# Patient Record
Sex: MALE | Race: WHITE | Employment: OTHER | URBAN - METROPOLITAN AREA
[De-identification: names, ages, dates, MRNs, and addresses within clinical notes are randomized per-mention and may not be internally consistent; named-entity substitution may affect disease eponyms.]

---

## 2017-01-28 ENCOUNTER — HOSPITAL ENCOUNTER (INPATIENT)
Age: 62
LOS: 3 days | Discharge: HOME | DRG: 249 | End: 2017-01-31
Attending: EMERGENCY MEDICINE | Admitting: INTERNAL MEDICINE
Payer: SUBSIDIZED

## 2017-01-28 ENCOUNTER — APPOINTMENT (OUTPATIENT)
Dept: GENERAL RADIOLOGY | Age: 62
DRG: 249 | End: 2017-01-28
Attending: EMERGENCY MEDICINE
Payer: SUBSIDIZED

## 2017-01-28 DIAGNOSIS — I20.0 UNSTABLE ANGINA PECTORIS (HCC): Primary | ICD-10-CM

## 2017-01-28 DIAGNOSIS — I20.9 ISCHEMIC CHEST PAIN (HCC): ICD-10-CM

## 2017-01-28 DIAGNOSIS — Z72.0 TOBACCO ABUSE: ICD-10-CM

## 2017-01-28 PROBLEM — I21.4 NSTEMI (NON-ST ELEVATED MYOCARDIAL INFARCTION) (HCC): Status: ACTIVE | Noted: 2017-01-28

## 2017-01-28 PROBLEM — R07.9 CHEST PAIN: Status: ACTIVE | Noted: 2017-01-28

## 2017-01-28 LAB
ALBUMIN SERPL BCP-MCNC: 3.1 G/DL (ref 3.2–4.6)
ALBUMIN/GLOB SERPL: 0.6 {RATIO} (ref 1.2–3.5)
ALP SERPL-CCNC: 85 U/L (ref 50–136)
ALT SERPL-CCNC: 15 U/L (ref 12–65)
ANION GAP BLD CALC-SCNC: 9 MMOL/L (ref 7–16)
AST SERPL W P-5'-P-CCNC: 21 U/L (ref 15–37)
BASOPHILS # BLD AUTO: 0 K/UL (ref 0–0.2)
BASOPHILS # BLD: 0 % (ref 0–2)
BILIRUB SERPL-MCNC: 0.2 MG/DL (ref 0.2–1.1)
BUN SERPL-MCNC: 15 MG/DL (ref 8–23)
CALCIUM SERPL-MCNC: 8.1 MG/DL (ref 8.3–10.4)
CHLORIDE SERPL-SCNC: 106 MMOL/L (ref 98–107)
CO2 SERPL-SCNC: 26 MMOL/L (ref 21–32)
CREAT SERPL-MCNC: 1.08 MG/DL (ref 0.8–1.5)
DIFFERENTIAL METHOD BLD: ABNORMAL
EOSINOPHIL # BLD: 0.2 K/UL (ref 0–0.8)
EOSINOPHIL NFR BLD: 3 % (ref 0.5–7.8)
ERYTHROCYTE [DISTWIDTH] IN BLOOD BY AUTOMATED COUNT: 13.1 % (ref 11.9–14.6)
GLOBULIN SER CALC-MCNC: 4.8 G/DL (ref 2.3–3.5)
GLUCOSE SERPL-MCNC: 106 MG/DL (ref 65–100)
HCT VFR BLD AUTO: 45.4 % (ref 41.1–50.3)
HGB BLD-MCNC: 15.2 G/DL (ref 13.6–17.2)
IMM GRANULOCYTES # BLD: 0 K/UL (ref 0–0.5)
IMM GRANULOCYTES NFR BLD AUTO: 0.1 % (ref 0–5)
LYMPHOCYTES # BLD AUTO: 40 % (ref 13–44)
LYMPHOCYTES # BLD: 2.9 K/UL (ref 0.5–4.6)
MCH RBC QN AUTO: 31.4 PG (ref 26.1–32.9)
MCHC RBC AUTO-ENTMCNC: 33.5 G/DL (ref 31.4–35)
MCV RBC AUTO: 93.8 FL (ref 79.6–97.8)
MONOCYTES # BLD: 0.5 K/UL (ref 0.1–1.3)
MONOCYTES NFR BLD AUTO: 7 % (ref 4–12)
NEUTS SEG # BLD: 3.5 K/UL (ref 1.7–8.2)
NEUTS SEG NFR BLD AUTO: 50 % (ref 43–78)
PLATELET # BLD AUTO: 196 K/UL (ref 150–450)
PMV BLD AUTO: 9.9 FL (ref 10.8–14.1)
POTASSIUM SERPL-SCNC: 4.1 MMOL/L (ref 3.5–5.1)
PROT SERPL-MCNC: 7.9 G/DL (ref 6.3–8.2)
RBC # BLD AUTO: 4.84 M/UL (ref 4.23–5.67)
SODIUM SERPL-SCNC: 141 MMOL/L (ref 136–145)
TROPONIN I BLD-MCNC: 0.02 NG/ML (ref 0–0.08)
WBC # BLD AUTO: 7.3 K/UL (ref 4.3–11.1)

## 2017-01-28 PROCEDURE — 96368 THER/DIAG CONCURRENT INF: CPT | Performed by: EMERGENCY MEDICINE

## 2017-01-28 PROCEDURE — 80053 COMPREHEN METABOLIC PANEL: CPT | Performed by: EMERGENCY MEDICINE

## 2017-01-28 PROCEDURE — 84484 ASSAY OF TROPONIN QUANT: CPT

## 2017-01-28 PROCEDURE — 99285 EMERGENCY DEPT VISIT HI MDM: CPT | Performed by: EMERGENCY MEDICINE

## 2017-01-28 PROCEDURE — 65270000029 HC RM PRIVATE

## 2017-01-28 PROCEDURE — 99218 HC RM OBSERVATION: CPT

## 2017-01-28 PROCEDURE — 74011000250 HC RX REV CODE- 250: Performed by: INTERNAL MEDICINE

## 2017-01-28 PROCEDURE — 74011250636 HC RX REV CODE- 250/636: Performed by: EMERGENCY MEDICINE

## 2017-01-28 PROCEDURE — 96365 THER/PROPH/DIAG IV INF INIT: CPT | Performed by: EMERGENCY MEDICINE

## 2017-01-28 PROCEDURE — 93005 ELECTROCARDIOGRAM TRACING: CPT | Performed by: EMERGENCY MEDICINE

## 2017-01-28 PROCEDURE — 71010 XR CHEST PORT: CPT

## 2017-01-28 PROCEDURE — 85025 COMPLETE CBC W/AUTO DIFF WBC: CPT | Performed by: EMERGENCY MEDICINE

## 2017-01-28 PROCEDURE — 65660000000 HC RM CCU STEPDOWN

## 2017-01-28 RX ORDER — HEPARIN SODIUM 5000 [USP'U]/100ML
12-25 INJECTION, SOLUTION INTRAVENOUS
Status: DISCONTINUED | OUTPATIENT
Start: 2017-01-28 | End: 2017-01-30

## 2017-01-28 RX ORDER — NITROGLYCERIN 20 MG/100ML
10 INJECTION INTRAVENOUS CONTINUOUS
Status: DISCONTINUED | OUTPATIENT
Start: 2017-01-28 | End: 2017-01-30

## 2017-01-28 RX ORDER — ZOLPIDEM TARTRATE 5 MG/1
5 TABLET ORAL
Status: DISCONTINUED | OUTPATIENT
Start: 2017-01-28 | End: 2017-01-31 | Stop reason: HOSPADM

## 2017-01-28 RX ORDER — LISINOPRIL 5 MG/1
10 TABLET ORAL DAILY
Status: DISCONTINUED | OUTPATIENT
Start: 2017-01-29 | End: 2017-01-29

## 2017-01-28 RX ORDER — ATORVASTATIN CALCIUM 40 MG/1
80 TABLET, FILM COATED ORAL ONCE
Status: COMPLETED | OUTPATIENT
Start: 2017-01-29 | End: 2017-01-29

## 2017-01-28 RX ORDER — GUAIFENESIN 100 MG/5ML
81 LIQUID (ML) ORAL DAILY
Status: DISCONTINUED | OUTPATIENT
Start: 2017-01-29 | End: 2017-01-31 | Stop reason: HOSPADM

## 2017-01-28 RX ORDER — METOPROLOL TARTRATE 25 MG/1
25 TABLET, FILM COATED ORAL 2 TIMES DAILY
Status: DISCONTINUED | OUTPATIENT
Start: 2017-01-29 | End: 2017-01-31 | Stop reason: HOSPADM

## 2017-01-28 RX ORDER — ATORVASTATIN CALCIUM 40 MG/1
40 TABLET, FILM COATED ORAL DAILY
Status: DISCONTINUED | OUTPATIENT
Start: 2017-01-29 | End: 2017-01-31 | Stop reason: HOSPADM

## 2017-01-28 RX ORDER — SODIUM CHLORIDE 0.9 % (FLUSH) 0.9 %
5-10 SYRINGE (ML) INJECTION EVERY 8 HOURS
Status: DISCONTINUED | OUTPATIENT
Start: 2017-01-29 | End: 2017-01-31 | Stop reason: HOSPADM

## 2017-01-28 RX ORDER — NITROGLYCERIN 0.4 MG/1
0.4 TABLET SUBLINGUAL
Status: DISCONTINUED | OUTPATIENT
Start: 2017-01-28 | End: 2017-01-31 | Stop reason: HOSPADM

## 2017-01-28 RX ORDER — HEPARIN SODIUM 5000 [USP'U]/100ML
12-25 INJECTION, SOLUTION INTRAVENOUS
Status: DISCONTINUED | OUTPATIENT
Start: 2017-01-29 | End: 2017-01-29 | Stop reason: SDUPTHER

## 2017-01-28 RX ORDER — RANOLAZINE 500 MG/1
500 TABLET, EXTENDED RELEASE ORAL 2 TIMES DAILY
Status: DISCONTINUED | OUTPATIENT
Start: 2017-01-29 | End: 2017-01-31 | Stop reason: HOSPADM

## 2017-01-28 RX ORDER — RANITIDINE 150 MG/1
150 TABLET, FILM COATED ORAL 2 TIMES DAILY
Status: ON HOLD | COMMUNITY
End: 2017-01-31

## 2017-01-28 RX ORDER — GUAIFENESIN 100 MG/5ML
81 LIQUID (ML) ORAL DAILY
Status: DISCONTINUED | OUTPATIENT
Start: 2017-01-29 | End: 2017-01-28

## 2017-01-28 RX ORDER — HEPARIN SODIUM 5000 [USP'U]/ML
4000 INJECTION, SOLUTION INTRAVENOUS; SUBCUTANEOUS
Status: COMPLETED | OUTPATIENT
Start: 2017-01-28 | End: 2017-01-28

## 2017-01-28 RX ORDER — SODIUM CHLORIDE 0.9 % (FLUSH) 0.9 %
5-10 SYRINGE (ML) INJECTION AS NEEDED
Status: DISCONTINUED | OUTPATIENT
Start: 2017-01-28 | End: 2017-01-31 | Stop reason: HOSPADM

## 2017-01-28 RX ADMIN — HEPARIN SODIUM 4000 UNITS: 5000 INJECTION, SOLUTION INTRAVENOUS; SUBCUTANEOUS at 21:05

## 2017-01-28 RX ADMIN — NITROGLYCERIN 10 MCG/MIN: 20 INJECTION INTRAVENOUS at 21:17

## 2017-01-28 RX ADMIN — Medication 10 ML: at 23:10

## 2017-01-28 RX ADMIN — HEPARIN SODIUM AND DEXTROSE 12 UNITS/KG/HR: 5000; 5 INJECTION INTRAVENOUS at 21:05

## 2017-01-28 NOTE — ED TRIAGE NOTES
Patient arrives via ems from home after experiencing chest pain after walking to the mailbox. VSS for ems. 1 NTG given by EMS with full relief of pain. No ASA due to allergy.

## 2017-01-28 NOTE — IP AVS SNAPSHOT
Current Discharge Medication List  
  
Take these medications at their scheduled times Dose & Instructions Dispensing Information Comments Morning Noon Evening Bedtime  
 aspirin 81 mg tablet Your next dose is:  Tomorrow Dose:  81 mg Take 81 mg by mouth daily. Refills:  0  
     
  
   
   
   
  
 atorvastatin 40 mg tablet Commonly known as:  LIPITOR Your next dose is: Today Dose:  40 mg Take 1 Tab by mouth daily. Quantity:  30 Tab Refills:  11  
     
   
   
   
  
  
 lisinopril 20 mg tablet Commonly known as:  Melanie Wilber Your next dose is:  Tomorrow Dose:  20 mg Take 1 Tab by mouth daily. Quantity:  30 Tab Refills:  11  
     
  
   
   
   
  
 metoprolol tartrate 25 mg tablet Commonly known as:  LOPRESSOR Your next dose is: Today Dose:  25 mg Take 1 Tab by mouth two (2) times a day. Quantity:  60 Tab Refills:  11  
     
  
   
   
   
  
  
 prasugrel 10 mg tablet Commonly known as:  EFFIENT Your next dose is:  Tomorrow Dose:  10 mg Take 1 Tab by mouth daily. Quantity:  30 Tab Refills:  11  
     
  
   
   
   
  
 raNITIdine 150 mg tablet Commonly known as:  ZANTAC Your next dose is: Today Dose:  150 mg Take 1 Tab by mouth two (2) times a day. Indications: GASTROESOPHAGEAL REFLUX, HEARTBURN Quantity:  60 Tab Refills:  11  
     
  
   
   
   
  
  
 ranolazine  mg SR tablet Commonly known as:  RANEXA Your next dose is: Today Dose:  500 mg Take 1 Tab by mouth two (2) times a day. Quantity:  60 Tab Refills:  11 Take these medications as needed Dose & Instructions Dispensing Information Comments Morning Noon Evening Bedtime  
 albuterol 90 mcg/actuation inhaler Commonly known as:  VENTOLIN HFA Notes to Patient:  AS NEEDED Dose:  2 Puff Take 2 Puffs by inhalation every four (4) hours as needed for Wheezing or Shortness of Breath. Quantity:  1 Inhaler Refills:  4  
     
   
   
   
  
 nitroglycerin 0.4 mg SL tablet Commonly known as:  NITROSTAT Notes to Patient:  AS NEEDED Dose:  0.4 mg  
1 Tab by SubLINGual route every five (5) minutes as needed for Chest Pain. Quantity:  1 Bottle Refills:  5 Where to Get Your Medications These medications were sent to [address] [place] [address], [facility] [place] [address]  [address] [place] [address] [facility] [address] Phone:  [phone]  
  prasugrel 10 mg tablet Information about where to get these medications is not yet available ! Ask your nurse or doctor about these medications  
  atorvastatin 40 mg tablet  
 lisinopril 20 mg tablet  
 metoprolol tartrate 25 mg tablet  
 nitroglycerin 0.4 mg SL tablet  
 raNITIdine 150 mg tablet  
 ranolazine  mg SR tablet

## 2017-01-28 NOTE — IP AVS SNAPSHOT
Merlin Laroche 
 
 
 [address] [facility] [address] 
[phone] Patient: Kerri Bob MRN: [id number] ZTZ:[id number] You are allergic to the following Allergen Reactions Aspirin Swelling Takes 81 mg at home. Patient can tolerate Aspirin in low doses, but in larger doses causes swelling. Recent Documentation Height Weight BMI Smoking Status 1.707 m 80.3 kg 27.57 kg/m2 Current Every Day Smoker Emergency Contacts Name Discharge Info Relation Home Work Mobile Staci Prajapati  Child [2] [phone] About your hospitalization You were admitted on:  January 28, 2017 You last received care in the:  [facility] 3 CLINICAL OBSERVATION You were discharged on:  January 31, 2017 Unit phone number:  [phone] Why you were hospitalized Your primary diagnosis was:  Not on File Your diagnoses also included:  Chest Pain, Nstemi (Non-St Elevated Myocardial Infarction) (Hcc) Providers Seen During Your Hospitalizations Provider Role Specialty Primary office phone Jorgito Solorio MD Attending Provider Emergency Medicine [phone] Sasha Watts MD Attending Provider Cardiology [phone] Your Primary Care Physician (PCP) Primary Care Physician Office Phone Office Fax UNKNOWN, PROVIDER ** None ** ** None ** Follow-up Information Follow up With Details Comments Contact Info Sharda Diamond MD On 2/6/2017 Monday, February 6th @ 8:15am-- [facility] office [address] [facility] [address] 
[phone] Your Appointments Monday February 06, 2017  8:15 AM EST Extended Office Visit with Sharda Diamond MD  
[facility] Cardiology ([address] [facility]) [address] [facility] 
[address] [facility] [address]  
[phone] Current Discharge Medication List  
  
START taking these medications Dose & Instructions Dispensing Information Comments Morning Noon Evening Bedtime  
 atorvastatin 40 mg tablet Commonly known as:  LIPITOR Your next dose is: Today Dose:  40 mg Take 1 Tab by mouth daily. Quantity:  30 Tab Refills:  11  
     
   
   
   
  
  
 lisinopril 20 mg tablet Commonly known as:  Marge Lillian Your next dose is:  Tomorrow Dose:  20 mg Take 1 Tab by mouth daily. Quantity:  30 Tab Refills:  11  
     
  
   
   
   
  
 metoprolol tartrate 25 mg tablet Commonly known as:  LOPRESSOR Your next dose is: Today Dose:  25 mg Take 1 Tab by mouth two (2) times a day. Quantity:  60 Tab Refills:  11  
     
  
   
   
   
  
  
 nitroglycerin 0.4 mg SL tablet Commonly known as:  NITROSTAT Notes to Patient:  AS NEEDED Dose:  0.4 mg  
1 Tab by SubLINGual route every five (5) minutes as needed for Chest Pain. Quantity:  1 Bottle Refills:  5  
     
   
   
   
  
 prasugrel 10 mg tablet Commonly known as:  EFFIENT Your next dose is:  Tomorrow Dose:  10 mg Take 1 Tab by mouth daily. Quantity:  30 Tab Refills:  11  
     
  
   
   
   
  
 ranolazine  mg SR tablet Commonly known as:  RANEXA Your next dose is: Today Dose:  500 mg Take 1 Tab by mouth two (2) times a day. Quantity:  60 Tab Refills:  11 CONTINUE these medications which have NOT CHANGED Dose & Instructions Dispensing Information Comments Morning Noon Evening Bedtime  
 albuterol 90 mcg/actuation inhaler Commonly known as:  VENTOLIN HFA Notes to Patient:  AS NEEDED Dose:  2 Puff Take 2 Puffs by inhalation every four (4) hours as needed for Wheezing or Shortness of Breath. Quantity:  1 Inhaler Refills:  4  
     
   
   
   
  
 aspirin 81 mg tablet Your next dose is:  Tomorrow Dose:  81 mg Take 81 mg by mouth daily. Refills:  0 raNITIdine 150 mg tablet Commonly known as:  ZANTAC Your next dose is: Today Dose:  150 mg Take 1 Tab by mouth two (2) times a day. Indications: GASTROESOPHAGEAL REFLUX, HEARTBURN Quantity:  60 Tab Refills:  11 STOP taking these medications   
 amoxicillin-clavulanate 875-125 mg per tablet Commonly known as:  AUGMENTIN Where to Get Your Medications These medications were sent to [address] [facility] [address] [facility]  [address] [facility] [address] [place] [address] Phone:  [phone]  
  prasugrel 10 mg tablet Information on where to get these meds will be given to you by the nurse or doctor. ! Ask your nurse or doctor about these medications  
  atorvastatin 40 mg tablet  
 lisinopril 20 mg tablet  
 metoprolol tartrate 25 mg tablet  
 nitroglycerin 0.4 mg SL tablet  
 raNITIdine 150 mg tablet  
 ranolazine  mg SR tablet Discharge Instructions Cardiac Catheterization/Angiography Discharge Instructions *Check the puncture site frequently for swelling or bleeding. If you see any bleeding, lie down and apply pressure over the area with a clean town or washcloth. Notify your doctor for any redness, swelling, drainage or oozing from the puncture site. Notify your doctor for any fever or chills. *If the leg or arm with the puncture becomes cold, numb or painful, call [facility] Cardiology at [phone] *Activity should be limited for the next 48 hours. Climb stairs as little as possible and avoid any stooping, bending or strenuous activity for 48 hours. No heavy lifting (anything over 10 pounds) for three days. *Do not drive for 48 hours. *You may resume your usual diet.  Drink more fluids than usual. 
 
*Have a responsible person drive you home and stay with you for at least 24 hours after your heart catheterization/angiography. *You may remove the bandage from your Left Wrist in 24 hours. You may shower in 24 hours. No tub baths, hot tubs or swimming for one week. Do not place any lotions, creams, powders, ointments over the puncture site for one week. You may place a clean band-aid over the puncture site each day for 5 days. Change this daily. Percutaneous Coronary Intervention: What to Expect at [facility] Your Recovery Percutaneous coronary intervention (PCI) is the name for procedures that are used to open a narrowed or blocked coronary artery. The two most common PCI procedures are coronary angioplasty and coronary stent placement. Your groin or arm may have a bruise and feel sore for a day or two after a percutaneous coronary intervention (PCI). You can do light activities around the house, but nothing strenuous for several days. This care sheet gives you a general idea about how long it will take for you to recover. But each person recovers at a different pace. Follow the steps below to get better as quickly as possible. How can you care for yourself at home? Activity · Do not do strenuous exercise and do not lift, pull, or push anything heavy until your doctor says it is okay. This may be for a day or two. You can walk around the house and do light activity, such as cooking. · You may shower 24 to 48 hours after the procedure, if your doctor okays it. Pat the incision dry. Do not take a bath for 1 week, or until your doctor tells you it is okay. · If the catheter was placed in your groin, try not to walk up stairs for the first couple of days. · If the catheter was placed in your arm near your wrist, do not bend your wrist deeply for the first couple of days. Be careful using your hand to get into and out of a chair or bed. · If your doctor recommends it, get more exercise. Walking is a good choice. Bit by bit, increase the amount you walk every day.  Try for at least 30 minutes on most days of the week. Diet · Drink plenty of fluids to help your body flush out the dye. If you have kidney, heart, or liver disease and have to limit fluids, talk with your doctor before you increase the amount of fluids you drink. · Keep eating a heart-healthy diet that has lots of fruits, vegetables, and whole grains. If you have not been eating this way, talk to your doctor. You also may want to talk to a dietitian. This expert can help you to learn about healthy foods and plan meals. Medicines · Your doctor will tell you if and when you can restart your medicines. He or she will also give you instructions about taking any new medicines. · If you take blood thinners, such as warfarin (Coumadin), clopidogrel (Plavix), or aspirin, be sure to talk to your doctor. He or she will tell you if and when to start taking those medicines again. Make sure that you understand exactly what your doctor wants you to do. · Your doctor will prescribe blood-thinning medicines. You will likely take aspirin plus another antiplatelet, such as clopidogrel (Plavix). It is very important that you take these medicines exactly as directed. These medicines help keep the coronary artery open and reduce your risk of a heart attack. · Call your doctor if you think you are having a problem with your medicine. Care of the catheter site · For 1 or 2 days, keep a bandage over the spot where the catheter was inserted. The bandage probably will fall off in this time. · Put ice or a cold pack on the area for 10 to 20 minutes at a time to help with soreness or swelling. Put a thin cloth between the ice and your skin. Follow-up care is a key part of your treatment and safety. Be sure to make and go to all appointments, and call your doctor if you are having problems. It's also a good idea to know your test results and keep a list of the medicines you take. When should you call for help? Call 911 anytime you think you may need emergency care. For example, call if: 
· You passed out (lost consciousness). · You have severe trouble breathing. · You have sudden chest pain and shortness of breath, or you cough up blood. · You have symptoms of a heart attack, such as: ¨ Chest pain or pressure. ¨ Sweating. ¨ Shortness of breath. ¨ Nausea or vomiting. ¨ Pain that spreads from the chest to the neck, jaw, or one or both shoulders or arms. ¨ Dizziness or lightheadedness. ¨ A fast or uneven pulse. After calling 911, chew 1 adult-strength aspirin. Wait for an ambulance. Do not try to drive yourself. · You have been diagnosed with angina, and you have angina symptoms that do not go away with rest or are not getting better within 5 minutes after you take one dose of nitroglycerin. Call your doctor now or seek immediate medical care if: 
· You are bleeding from the area where the catheter was put in your artery. · You have a fast-growing, painful lump at the catheter site. · You have signs of infection, such as: 
¨ Increased pain, swelling, warmth, or redness. ¨ Red streaks leading from the catheter site. ¨ Pus draining from the catheter site. ¨ A fever. · Your leg or arm looks blue or feels cold, numb, or tingly. Watch closely for changes in your health, and be sure to contact your doctor if you have any problems. Where can you learn more? Go to http://jaz-yaritza.info/. Enter C962 in the search box to learn more about \"Percutaneous Coronary Intervention: What to Expect at Home. \" Current as of: January 27, 2016 Content Version: 11.1 © 6165-9231 [employer]. Care instructions adapted under license by [employer] (which disclaims liability or warranty for this information).  If you have questions about a medical condition or this instruction, always ask your healthcare professional. Linda Meléndez [facility] disclaims any warranty or liability for your use of this information. Reducing Heart Attack Risk With Daily Medicine: Care Instructions Your Care Instructions Heart disease is the number one cause of death. If you are at risk for heart disease, there are many medicines that can reduce your risk. These include: · ACE inhibitors. These are a type of blood pressure medicine. They can reduce the risk of heart attacks and strokes if you are at high risk. · Statin medicines. These lower cholesterol. They can also reduce the risk of heart disease and strokes. · Aspirin. It can help certain people lower their risk of a heart attack or stroke. · Beta-blocker medicines. These are a type of blood pressure and heart medicine. They can reduce the chance of early death if you have had a heart attack. All medicines can cause side effects. So it is important to understand the pros and cons of any medicine you take. It is also important to take your medicines exactly as your doctor tells you to. Follow-up care is a key part of your treatment and safety. Be sure to make and go to all appointments, and call your doctor if you are having problems. It's also a good idea to know your test results and keep a list of the medicines you take. ACE inhibitors ACE (angiotensin-converting enzyme) inhibitors are used for three main reasons. They lower blood pressure, protect the kidneys, and prevent heart attacks and strokes. Examples include benazepril (Lotensin), lisinopril (Prinivil, Zestril), and ramipril (Altace). Before you start taking an ACE inhibitor, make sure your doctor knows if: 
· You are taking a water pill (diuretic). · You are taking potassium pills or using salt substitutes. · You are pregnant or breastfeeding. · You have had a kidney transplant or other kidney problems. ACE inhibitors can cause side effects. Call your doctor right away if you have: · Trouble breathing. · Swelling in your face, head, neck, or tongue. · Dizziness or lightheadedness. · A dry cough. Statins Statins lower cholesterol. Examples include atorvastatin (Lipitor), lovastatin (Mevacor), pravastatin (Pravachol), and simvastatin (Zocor). Before you start taking a statin, make sure your doctor knows if: 
· You have had a kidney transplant or other kidney problems. · You have liver disease. · You take any other prescription medicine, over-the-counter medicine, vitamins, supplements, or herbal remedies. · You are pregnant or breastfeeding. Statins can cause side effects. Call your doctor right away if you have: · New, severe muscle aches. · Brown urine. Aspirin Taking an aspirin every day can lower your risk for a heart attack. A heart attack occurs when a blood vessel in the heart gets blocked. When this happens, oxygen can't get to the heart muscle, and part of the heart dies. Aspirin can help prevent blood clots that can block the blood vessels. Talk to your doctor before you start taking aspirin every day. He or she may recommend that you take one low-dose aspirin (81 mg) tablet each day, with a meal and a full glass of water. Taking aspirin isn't right for everyone, because it can cause serious bleeding. And you may not be able to use aspirin if you: 
· Have asthma. · Have an ulcer or other stomach problem. · Take some other medicine (called a blood thinner) that prevents blood clots. · Are allergic to aspirin. Before having a surgery or procedure, tell your doctor or dentist that you take aspirin. He or she will tell you if you should stop taking aspirin beforehand. Make sure that you understand exactly what your doctor wants you to do. Aspirin can cause side effects. Call your doctor right away if you have: · Unusual bleeding or bruising. · Nausea, vomiting, or heartburn. · Black or bloody stools. Beta-blockers Beta-blockers are used for three main reasons. They lower blood pressure, relieve angina symptoms (such as chest pain or pressure), and reduce the chances of a second heart attack. They include atenolol (Tenormin), carvedilol (Coreg), and metoprolol (Lopressor). Before you start taking a beta-blocker, make sure your doctor knows if you have: · Severe asthma or frequent asthma attacks. · A very slow pulse (less than 55 beats a minute). Beta-blockers can cause side effects. Call your doctor right away if you have: · Wheezing or trouble breathing. · Dizziness or lightheadedness. · Asthma that gets worse. When should you call for help? Call 911 anytime you think you may need emergency care. For example, call if: 
· You passed out (lost consciousness). Call your doctor now or seek immediate medical care if: 
· You are wheezing or have trouble breathing. · You have swelling in your face, head, neck, or tongue. · You are dizzy or lightheaded, or you feel like you may faint. · You have severe muscle pain, weakness, or brown urine. · You have vision problems. · You have new bruises or blood spots under your skin. · Your stools are black and tarlike or have streaks of blood. Watch closely for changes in your health, and be sure to contact your doctor if: 
· You have ringing in your ears. · You feel very tired. · You have gas, constipation, or an upset stomach. Where can you learn more? Go to http://jaz-yaritza.info/. Enter R428 in the search box to learn more about \"Reducing Heart Attack Risk With Daily Medicine: Care Instructions. \" Current as of: March 28, 2016 Content Version: 11.1 © 4582-6514 [employer]. Care instructions adapted under license by [employer] (which disclaims liability or warranty for this information).  If you have questions about a medical condition or this instruction, always ask your healthcare professional. [employer] disclaims any warranty or liability for your use of this information. Heart-Healthy Diet: Care Instructions Your Care Instructions A heart-healthy diet has lots of vegetables, fruits, nuts, beans, and whole grains, and is low in salt. It limits foods that are high in saturated fat, such as meats, cheeses, and fried foods. It may be hard to change your diet, but even small changes can lower your risk of heart attack and heart disease. Follow-up care is a key part of your treatment and safety. Be sure to make and go to all appointments, and call your doctor if you are having problems. It's also a good idea to know your test results and keep a list of the medicines you take. How can you care for yourself at home? Watch your portions · Learn what a serving is. A \"serving\" and a \"portion\" are not always the same thing. Make sure that you are not eating larger portions than are recommended. For example, a serving of pasta is ½ cup. A serving size of meat is 2 to 3 ounces. A 3-ounce serving is about the size of a deck of cards. Measure serving sizes until you are good at [place]" them. Keep in mind that restaurants often serve portions that are 2 or 3 times the size of one serving. · To keep your energy level up and keep you from feeling hungry, eat often but in smaller portions. · Eat only the number of calories you need to stay at a healthy weight. If you need to lose weight, eat fewer calories than your body burns (through exercise and other physical activity). Eat more fruits and vegetables · Eat a variety of fruit and vegetables every day. Dark green, deep orange, red, or yellow fruits and vegetables are especially good for you. Examples include spinach, carrots, peaches, and berries. · Keep carrots, celery, and other veggies handy for snacks. Buy fruit that is in season and store it where you can see it so that you will be tempted to eat it. · Cook dishes that have a lot of veggies in them, such as stir-fries and soups. Limit saturated and trans fat · Read food labels, and try to avoid saturated and trans fats. They increase your risk of heart disease. Trans fat is found in many processed foods such as cookies and crackers. · Use olive or canola oil when you cook. Try cholesterol-lowering spreads, such as Benecol or Take Control. · Bake, broil, grill, or steam foods instead of frying them. · Choose lean meats instead of high-fat meats such as hot dogs and sausages. Cut off all visible fat when you prepare meat. · Eat fish, skinless poultry, and meat alternatives such as soy products instead of high-fat meats. Soy products, such as tofu, may be especially good for your heart. · Choose low-fat or fat-free milk and dairy products. Eat fish · Eat at least two servings of fish a week. Certain fish, such as salmon and tuna, contain omega-3 fatty acids, which may help reduce your risk of heart attack. Eat foods high in fiber · Eat a variety of grain products every day. Include whole-grain foods that have lots of fiber and nutrients. Examples of whole-grain foods include oats, whole wheat bread, and brown rice. · Buy whole-grain breads and cereals, instead of white bread or pastries. Limit salt and sodium · Limit how much salt and sodium you eat to help lower your blood pressure. · Taste food before you salt it. Add only a little salt when you think you need it. With time, your taste buds will adjust to less salt. · Eat fewer snack items, fast foods, and other high-salt, processed foods. Check food labels for the amount of sodium in packaged foods. · Choose low-sodium versions of canned goods (such as soups, vegetables, and beans). Limit sugar · Limit drinks and foods with added sugar. These include candy, desserts, and soda pop. Limit alcohol · Limit alcohol to no more than 2 drinks a day for men and 1 drink a day for women. Too much alcohol can cause health problems. When should you call for help? Watch closely for changes in your health, and be sure to contact your doctor if: 
· You would like help planning heart-healthy meals. Where can you learn more? Go to http://jaz-yaritza.info/. Enter V137 in the search box to learn more about \"Heart-Healthy Diet: Care Instructions. \" Current as of: January 27, 2016 Content Version: 11.1 © 2006-2016 [employer]. Care instructions adapted under license by [employer] (which disclaims liability or warranty for this information). If you have questions about a medical condition or this instruction, always ask your healthcare professional. [place] [address] any warranty or liability for your use of this information. DISCHARGE SUMMARY from Nurse The following personal items are in your possession at time of discharge: 
 
Dental Appliances: None Visual Aid: Glasses Home Medications: None Jewelry: Watch Clothing: At bedside Other Valuables: Cell Phone, [employer] PATIENT INSTRUCTIONS: 
 
 
F-face looks uneven A-arms unable to move or move unevenly S-speech slurred or non-existent T-time-call 911 as soon as signs and symptoms begin-DO NOT go Back to bed or wait to see if you get better-TIME IS BRAIN. Warning Signs of HEART ATTACK Call 911 if you have these symptoms: 
? Chest discomfort.  Most heart attacks involve discomfort in the center of the chest that lasts more than a few minutes, or that goes away and comes back. It can feel like uncomfortable pressure, squeezing, fullness, or pain. ? Discomfort in other areas of the upper body. Symptoms can include pain or discomfort in one or both arms, the back, neck, jaw, or stomach. ? Shortness of breath with or without chest discomfort. ? Other signs may include breaking out in a cold sweat, nausea, or lightheadedness. Don't wait more than five minutes to call [address]! Fast action can save your life. Calling 911 is almost always the fastest way to get lifesaving treatment. Emergency Medical Services staff can begin treatment when they arrive  up to an hour sooner than if someone gets to the hospital by car. The discharge information has been reviewed with the patient. The patient verbalized understanding. Discharge medications reviewed with the patient and appropriate educational materials and side effects teaching were provided. Discharge Orders Procedure Order Date Status Priority Quantity Spec Type Associated Dx REFERRAL TO [facility] 01/31/17 0856 Normal Routine 1 [facility] Announcement We are excited to announce that we are making your provider's discharge notes available to you in [facility]. You will see these notes when they are completed and signed by the physician that discharged you from your recent hospital stay. If you have any questions or concerns about any information you see in [facility], please call the Health Information Department where you were seen or reach out to your Primary Care Provider for more information about your plan of care. Introducing [facility]! [facility] introduces [facility] patient portal. Now you can access parts of your medical record, email your doctor's office, and request medication refills online. 1. In your internet browser, go to [email or web address] 2. Click on the First Time User? Click Here link in the Sign In box.  You will see the New Member Sign Up page. 3. Enter your [employer] Access Code exactly as it appears below. You will not need to use this code after youve completed the sign-up process. If you do not sign up before the expiration date, you must request a new code. · [employer] Access Code: [email or web address] Expires: 3/20/2017  6:40 PM 
 
4. Enter the last four digits of your Social Security Number (xxxx) and Date of Birth (mm/dd/yyyy) as indicated and click Submit. You will be taken to the next sign-up page. 5. Create a [employer] ID. This will be your [employer] login ID and cannot be changed, so think of one that is secure and easy to remember. 6. Create a [employer] password. You can change your password at any time. 7. Enter your Password Reset Question and Answer. This can be used at a later time if you forget your password. 8. Enter your e-mail address. You will receive e-mail notification when new information is available in [address] 9. Click Sign Up. You can now view and download portions of your medical record. 10. Click the Download Summary menu link to download a portable copy of your medical information. If you have questions, please visit the Frequently Asked Questions section of the [employer] website. Remember, [employer] is NOT to be used for urgent needs. For medical emergencies, dial 911. Now available from your iPhone and Android! General Information Please provide this summary of care documentation to your next provider. Patient Signature:  ____________________________________________________________ Date:  ____________________________________________________________  
  
Cady Saavedra Provider Signature:  ____________________________________________________________ Date:  ____________________________________________________________

## 2017-01-29 ENCOUNTER — APPOINTMENT (OUTPATIENT)
Dept: GENERAL RADIOLOGY | Age: 62
DRG: 249 | End: 2017-01-29
Attending: INTERNAL MEDICINE
Payer: SUBSIDIZED

## 2017-01-29 LAB
ALBUMIN SERPL BCP-MCNC: 3 G/DL (ref 3.2–4.6)
ALBUMIN/GLOB SERPL: 0.7 {RATIO} (ref 1.2–3.5)
ALP SERPL-CCNC: 80 U/L (ref 50–136)
ALT SERPL-CCNC: 14 U/L (ref 12–65)
ANION GAP BLD CALC-SCNC: 12 MMOL/L (ref 7–16)
APTT PPP: 41.6 SEC (ref 23.5–31.7)
APTT PPP: 55.9 SEC (ref 23.5–31.7)
APTT PPP: 80.2 SEC (ref 23.5–31.7)
AST SERPL W P-5'-P-CCNC: 22 U/L (ref 15–37)
ATRIAL RATE: 59 BPM
ATRIAL RATE: 77 BPM
BILIRUB DIRECT SERPL-MCNC: <0.1 MG/DL
BILIRUB SERPL-MCNC: 0.5 MG/DL (ref 0.2–1.1)
BUN SERPL-MCNC: 13 MG/DL (ref 8–23)
CALCIUM SERPL-MCNC: 8.4 MG/DL (ref 8.3–10.4)
CALCULATED P AXIS, ECG09: 44 DEGREES
CALCULATED P AXIS, ECG09: 60 DEGREES
CALCULATED R AXIS, ECG10: -26 DEGREES
CALCULATED R AXIS, ECG10: -31 DEGREES
CALCULATED T AXIS, ECG11: -160 DEGREES
CALCULATED T AXIS, ECG11: 92 DEGREES
CHLORIDE SERPL-SCNC: 104 MMOL/L (ref 98–107)
CHOLEST SERPL-MCNC: 221 MG/DL
CO2 SERPL-SCNC: 24 MMOL/L (ref 21–32)
CREAT SERPL-MCNC: 0.83 MG/DL (ref 0.8–1.5)
DIAGNOSIS, 93000: NORMAL
DIAGNOSIS, 93000: NORMAL
DIASTOLIC BP, ECG02: NORMAL MMHG
DIASTOLIC BP, ECG02: NORMAL MMHG
ERYTHROCYTE [DISTWIDTH] IN BLOOD BY AUTOMATED COUNT: 13.2 % (ref 11.9–14.6)
EST. AVERAGE GLUCOSE BLD GHB EST-MCNC: 143 MG/DL
GLOBULIN SER CALC-MCNC: 4.6 G/DL (ref 2.3–3.5)
GLUCOSE BLD STRIP.AUTO-MCNC: 120 MG/DL (ref 65–100)
GLUCOSE BLD STRIP.AUTO-MCNC: 122 MG/DL (ref 65–100)
GLUCOSE BLD STRIP.AUTO-MCNC: 138 MG/DL (ref 65–100)
GLUCOSE SERPL-MCNC: 109 MG/DL (ref 65–100)
HBA1C MFR BLD: 6.6 % (ref 4.8–6)
HCT VFR BLD AUTO: 45.6 % (ref 41.1–50.3)
HDLC SERPL-MCNC: 57 MG/DL (ref 40–60)
HDLC SERPL: 3.9 {RATIO}
HGB BLD-MCNC: 15.2 G/DL (ref 13.6–17.2)
INR PPP: 1 (ref 0.9–1.2)
LDLC SERPL CALC-MCNC: 142.6 MG/DL
LIPID PROFILE,FLP: ABNORMAL
MCH RBC QN AUTO: 31.1 PG (ref 26.1–32.9)
MCHC RBC AUTO-ENTMCNC: 33.3 G/DL (ref 31.4–35)
MCV RBC AUTO: 93.3 FL (ref 79.6–97.8)
P-R INTERVAL, ECG05: 134 MS
P-R INTERVAL, ECG05: 136 MS
PLATELET # BLD AUTO: 215 K/UL (ref 150–450)
PMV BLD AUTO: 10.2 FL (ref 10.8–14.1)
POTASSIUM SERPL-SCNC: 4 MMOL/L (ref 3.5–5.1)
PROT SERPL-MCNC: 7.6 G/DL (ref 6.3–8.2)
PROTHROMBIN TIME: 10.5 SEC (ref 9.6–12)
Q-T INTERVAL, ECG07: 354 MS
Q-T INTERVAL, ECG07: 488 MS
QRS DURATION, ECG06: 92 MS
QRS DURATION, ECG06: 94 MS
QTC CALCULATION (BEZET), ECG08: 400 MS
QTC CALCULATION (BEZET), ECG08: 483 MS
RBC # BLD AUTO: 4.89 M/UL (ref 4.23–5.67)
SODIUM SERPL-SCNC: 140 MMOL/L (ref 136–145)
SYSTOLIC BP, ECG01: NORMAL MMHG
SYSTOLIC BP, ECG01: NORMAL MMHG
TRIGL SERPL-MCNC: 107 MG/DL (ref 35–150)
TROPONIN I SERPL-MCNC: 0.33 NG/ML (ref 0.02–0.05)
TROPONIN I SERPL-MCNC: 0.43 NG/ML (ref 0.02–0.05)
TROPONIN I SERPL-MCNC: 0.62 NG/ML (ref 0.02–0.05)
TSH SERPL DL<=0.005 MIU/L-ACNC: 2.36 UIU/ML (ref 0.36–3.74)
VENTRICULAR RATE, ECG03: 59 BPM
VENTRICULAR RATE, ECG03: 77 BPM
VLDLC SERPL CALC-MCNC: 21.4 MG/DL (ref 6–23)
WBC # BLD AUTO: 6.8 K/UL (ref 4.3–11.1)

## 2017-01-29 PROCEDURE — 74011250636 HC RX REV CODE- 250/636: Performed by: EMERGENCY MEDICINE

## 2017-01-29 PROCEDURE — 71010 XR CHEST SNGL V: CPT

## 2017-01-29 PROCEDURE — 84484 ASSAY OF TROPONIN QUANT: CPT | Performed by: INTERNAL MEDICINE

## 2017-01-29 PROCEDURE — 80061 LIPID PANEL: CPT | Performed by: INTERNAL MEDICINE

## 2017-01-29 PROCEDURE — 74011250636 HC RX REV CODE- 250/636: Performed by: INTERNAL MEDICINE

## 2017-01-29 PROCEDURE — 83036 HEMOGLOBIN GLYCOSYLATED A1C: CPT | Performed by: INTERNAL MEDICINE

## 2017-01-29 PROCEDURE — 85027 COMPLETE CBC AUTOMATED: CPT | Performed by: INTERNAL MEDICINE

## 2017-01-29 PROCEDURE — 82962 GLUCOSE BLOOD TEST: CPT

## 2017-01-29 PROCEDURE — 65270000029 HC RM PRIVATE

## 2017-01-29 PROCEDURE — C8929 TTE W OR WO FOL WCON,DOPPLER: HCPCS

## 2017-01-29 PROCEDURE — 93005 ELECTROCARDIOGRAM TRACING: CPT | Performed by: INTERNAL MEDICINE

## 2017-01-29 PROCEDURE — 84443 ASSAY THYROID STIM HORMONE: CPT | Performed by: INTERNAL MEDICINE

## 2017-01-29 PROCEDURE — 36415 COLL VENOUS BLD VENIPUNCTURE: CPT | Performed by: INTERNAL MEDICINE

## 2017-01-29 PROCEDURE — 74011250637 HC RX REV CODE- 250/637: Performed by: INTERNAL MEDICINE

## 2017-01-29 PROCEDURE — 85730 THROMBOPLASTIN TIME PARTIAL: CPT | Performed by: INTERNAL MEDICINE

## 2017-01-29 PROCEDURE — 65660000000 HC RM CCU STEPDOWN

## 2017-01-29 PROCEDURE — 74011000250 HC RX REV CODE- 250: Performed by: INTERNAL MEDICINE

## 2017-01-29 PROCEDURE — 80048 BASIC METABOLIC PNL TOTAL CA: CPT | Performed by: INTERNAL MEDICINE

## 2017-01-29 PROCEDURE — 80076 HEPATIC FUNCTION PANEL: CPT | Performed by: INTERNAL MEDICINE

## 2017-01-29 PROCEDURE — 85610 PROTHROMBIN TIME: CPT | Performed by: INTERNAL MEDICINE

## 2017-01-29 RX ORDER — LISINOPRIL 20 MG/1
20 TABLET ORAL DAILY
Status: DISCONTINUED | OUTPATIENT
Start: 2017-01-30 | End: 2017-01-31 | Stop reason: HOSPADM

## 2017-01-29 RX ORDER — HEPARIN SODIUM 5000 [USP'U]/ML
35 INJECTION, SOLUTION INTRAVENOUS; SUBCUTANEOUS ONCE
Status: COMPLETED | OUTPATIENT
Start: 2017-01-29 | End: 2017-01-29

## 2017-01-29 RX ADMIN — Medication 10 ML: at 16:20

## 2017-01-29 RX ADMIN — Medication 10 ML: at 05:08

## 2017-01-29 RX ADMIN — RANOLAZINE 500 MG: 500 TABLET, FILM COATED, EXTENDED RELEASE ORAL at 21:35

## 2017-01-29 RX ADMIN — Medication 10 ML: at 21:36

## 2017-01-29 RX ADMIN — HEPARIN SODIUM 2850 UNITS: 5000 INJECTION, SOLUTION INTRAVENOUS; SUBCUTANEOUS at 05:18

## 2017-01-29 RX ADMIN — RANOLAZINE 500 MG: 500 TABLET, FILM COATED, EXTENDED RELEASE ORAL at 09:20

## 2017-01-29 RX ADMIN — HEPARIN SODIUM 2850 UNITS: 5000 INJECTION INTRAVENOUS; SUBCUTANEOUS at 14:20

## 2017-01-29 RX ADMIN — HEPARIN SODIUM AND DEXTROSE 18 UNITS/KG/HR: 5000; 5 INJECTION INTRAVENOUS at 16:15

## 2017-01-29 RX ADMIN — PERFLUTREN 1 ML: 6.52 INJECTION, SUSPENSION INTRAVENOUS at 10:36

## 2017-01-29 RX ADMIN — ATORVASTATIN CALCIUM 40 MG: 40 TABLET, FILM COATED ORAL at 09:21

## 2017-01-29 RX ADMIN — ASPIRIN 81 MG CHEWABLE TABLET 81 MG: 81 TABLET CHEWABLE at 09:21

## 2017-01-29 RX ADMIN — METOPROLOL TARTRATE 25 MG: 25 TABLET ORAL at 09:21

## 2017-01-29 RX ADMIN — LISINOPRIL 10 MG: 5 TABLET ORAL at 09:21

## 2017-01-29 RX ADMIN — ATORVASTATIN CALCIUM 80 MG: 40 TABLET, FILM COATED ORAL at 00:25

## 2017-01-29 NOTE — PROGRESS NOTES
Received call from Dr Thomas Barnett for pt he is admitting. VO received to place admit order to the obs unit and Nitro gtt and placed in connect care.   Called and talked with  Kristy Anderson to notify of VO received from Dr Thomas Barnett.

## 2017-01-29 NOTE — ROUTINE PROCESS
TRANSFER - IN REPORT:    Verbal report received from Josué Carson RN(name) on Martha Hammond  being received from ED(unit) for routine progression of care      Report consisted of patients Situation, Background, Assessment and   Recommendations(SBAR). Information from the following report(s) SBAR, Kardex, [facility] and Recent Results was reviewed with the receiving nurse. Opportunity for questions and clarification was provided. Assessment completed upon patients arrival to unit and care assumed.

## 2017-01-29 NOTE — ROUTINE PROCESS
TRANSFER - OUT REPORT:    Verbal report given to receiving RN on Kerri Bob  being transferred to room 12 Tele for routine progression of care       Report consisted of patients Situation, Background, Assessment and   Recommendations(SBAR). Information from the following report(s) SBAR, ED Summary, Recent Results and Cardiac Rhythm NSR was reviewed with the receiving nurse. Lines:   Peripheral IV 01/28/17 Right Antecubital (Active)        Opportunity for questions and clarification was provided.       Patient transported with:   Monitor  O2 @ 2 liters  Registered Nurse

## 2017-01-29 NOTE — ED PROVIDER NOTES
HPI Comments: For about a month he has had recurring chest discomfort mainly at fixed level of exertion such as walking to the mailbox. With rest it tends to go away approximately 5 minutes. Today he was in the shower and non-active when he had onset of similar pain that lasted for much longer period of time  probably at least 1 hour  and resolved when given nitroglycerin after EMS arrived. He had taken aspirin 81 mg this morning. He is a smoker with history of CABG ×4 vessel and stenting. He is not seeing Dr. Onelia Frederick for multiple years though he has contacted him and has appointment pending in the early February. On arrival he is pain-free. Patient is a 60 y.o. male presenting with chest pain. The history is provided by the patient. Chest Pain (Angina)    This is a recurrent problem. The problem has been resolved. The problem occurs daily. The pain is associated with normal activity. The pain is moderate. The quality of the pain is described as tightness. The symptoms are aggravated by movement. Associated symptoms include nausea and shortness of breath. Pertinent negatives include no diaphoresis, no fever, no irregular heartbeat, no lower extremity edema, no near-syncope, no sputum production, no vomiting and no weakness. He has tried nothing for the symptoms. His past medical history does not include DVT or PE. Procedural history includes cardiac catheterization, cardiac stents and CABG.        Past Medical History:   Diagnosis Date    Arthritis     CAD (coronary artery disease)     GERD (gastroesophageal reflux disease)     Hypertension        Past Surgical History:   Procedure Laterality Date    Pr cardiac surg procedure unlist       cabg quad 1999, stents 2008    Hx heent       tonsil;s    Hx heart catheterization  02/08/2012         Family History:   Problem Relation Age of Onset    Heart Disease Mother     Cancer Mother     Heart Disease Father     Diabetes Brother     Stroke Maternal Grandfather        Social History     Social History    Marital status: SINGLE     Spouse name: N/A    Number of children: N/A    Years of education: N/A     Occupational History    Not on file. Social History Main Topics    Smoking status: Current Every Day Smoker     Packs/day: 0.25     Years: 30.00    Smokeless tobacco: Not on file    Alcohol use No      Comment: occasional    Drug use: No    Sexual activity: Not on file     Other Topics Concern    Not on file     Social History Narrative         ALLERGIES: Aspirin    Review of Systems   Constitutional: Negative for diaphoresis and fever. Respiratory: Positive for shortness of breath. Negative for sputum production. Cardiovascular: Positive for chest pain. Negative for near-syncope. Gastrointestinal: Positive for nausea. Negative for vomiting. Neurological: Negative for weakness. All other systems reviewed and are negative. Vitals:    01/28/17 1854   BP: (!) 180/98   Pulse: 71   Resp: 16   Temp: 97.8 °F (36.6 °C)   SpO2: 98%   Weight: 86.2 kg (190 lb)   Height: 5' 6\" (1.676 m)            Physical Exam   Constitutional: He appears well-developed and well-nourished. No distress. no distress pleasant and cooperative   HENT:   Head: Atraumatic. Poor dentition   Eyes: No scleral icterus. Neck: Neck supple. Cardiovascular: Normal rate, regular rhythm, normal heart sounds and intact distal pulses. Pulmonary/Chest: Effort normal. No respiratory distress. He has no wheezes. He has no rales. Abdominal: Soft. There is no tenderness. There is no rebound. Musculoskeletal: Normal range of motion. He exhibits no tenderness or deformity. Neurological: He is alert. Coordination normal.   Skin: Skin is warm and dry. Psychiatric: His behavior is normal. Thought content normal.   Nursing note and vitals reviewed. MDM  Number of Diagnoses or Management Options  Diagnosis management comments:  It was about 1 month of anginal type pain as fixed level of exertion that provoked in on this night had similar pain of more prolonged duration only resolved by nitroglycerin given by EMS and now consistent with unstable angina. He is a smoker with history of prior coronary artery bypass grafting ×4 vessels and multiple stents. Cardiology was consulted and admission is planned       Amount and/or Complexity of Data Reviewed  Clinical lab tests: ordered and reviewed  Tests in the radiology section of CPT®: reviewed and ordered  Obtain history from someone other than the patient: yes  Review and summarize past medical records: yes  Discuss the patient with other providers: yes  Independent visualization of images, tracings, or specimens: yes    Risk of Complications, Morbidity, and/or Mortality  Presenting problems: high  Diagnostic procedures: low  Management options: moderate    Patient Progress  Patient progress: stable    ED Course       Procedures      Recent Results (from the past 12 hour(s))   POC TROPONIN-I    Collection Time: 01/28/17  6:58 PM   Result Value Ref Range    Troponin-I (POC) 0.02 0.0 - 0.08 ng/ml   CBC WITH AUTOMATED DIFF    Collection Time: 01/28/17  7:06 PM   Result Value Ref Range    WBC 7.3 4.3 - 11.1 K/uL    RBC 4.84 4.23 - 5.67 M/uL    HGB 15.2 13.6 - 17.2 g/dL    HCT 45.4 41.1 - 50.3 %    MCV 93.8 79.6 - 97.8 FL    MCH 31.4 26.1 - 32.9 PG    MCHC 33.5 31.4 - 35.0 g/dL    RDW 13.1 11.9 - 14.6 %    PLATELET 579 828 - 171 K/uL    MPV 9.9 (L) 10.8 - 14.1 FL    DF AUTOMATED      NEUTROPHILS 50 43 - 78 %    LYMPHOCYTES 40 13 - 44 %    MONOCYTES 7 4.0 - 12.0 %    EOSINOPHILS 3 0.5 - 7.8 %    BASOPHILS 0 0.0 - 2.0 %    IMMATURE GRANULOCYTES 0.1 0.0 - 5.0 %    ABS. NEUTROPHILS 3.5 1.7 - 8.2 K/UL    ABS. LYMPHOCYTES 2.9 0.5 - 4.6 K/UL    ABS. MONOCYTES 0.5 0.1 - 1.3 K/UL    ABS. EOSINOPHILS 0.2 0.0 - 0.8 K/UL    ABS. BASOPHILS 0.0 0.0 - 0.2 K/UL    ABS. IMM.  GRANS. 0.0 0.0 - 0.5 K/UL   METABOLIC PANEL, COMPREHENSIVE Collection Time: 01/28/17  7:06 PM   Result Value Ref Range    Sodium 141 136 - 145 mmol/L    Potassium 4.1 3.5 - 5.1 mmol/L    Chloride 106 98 - 107 mmol/L    CO2 26 21 - 32 mmol/L    Anion gap 9 7 - 16 mmol/L    Glucose 106 (H) 65 - 100 mg/dL    BUN 15 8 - 23 MG/DL    Creatinine 1.08 0.8 - 1.5 MG/DL    GFR est AA >60 >60 ml/min/1.73m2    GFR est non-AA >60 >60 ml/min/1.73m2    Calcium 8.1 (L) 8.3 - 10.4 MG/DL    Bilirubin, total 0.2 0.2 - 1.1 MG/DL    ALT 15 12 - 65 U/L    AST 21 15 - 37 U/L    Alk.  phosphatase 85 50 - 136 U/L    Protein, total 7.9 6.3 - 8.2 g/dL    Albumin 3.1 (L) 3.2 - 4.6 g/dL    Globulin 4.8 (H) 2.3 - 3.5 g/dL    A-G Ratio 0.6 (L) 1.2 - 3.5

## 2017-01-29 NOTE — PROGRESS NOTES
Bedside and Verbal shift change report given to Chaparrita Alamo RN (oncoming nurse) by self (offgoing nurse). Report included the following information SBAR, Kardex, MAR and Recent Results.

## 2017-01-29 NOTE — PROGRESS NOTES
[facility] CARDIOLOGY PROGRESS NOTE           1/29/2017 9:26 AM    Admit Date: 1/28/2017      Subjective:     Chest pain resolved patient resting comfortably    ROS:  Cardiovascular:  As noted above    Objective:      Vitals:    01/28/17 2306 01/29/17 0044 01/29/17 0451 01/29/17 0710   BP: (!) 160/99 131/82 123/85 129/84   Pulse: 70 (!) 57 60 62   Resp: 18 18 18 16   Temp: 97.9 °F (36.6 °C) 97.9 °F (36.6 °C) 98 °F (36.7 °C) 96.7 °F (35.9 °C)   SpO2: 95% 97% 97% 97%   Weight: 82.3 kg (181 lb 6.4 oz)  81.2 kg (179 lb)    Height: 5' 7.2\" (1.707 m)        Current Facility-Administered Medications   Medication Dose Route Frequency    [START ON 1/30/2017] lisinopril (PRINIVIL, ZESTRIL) tablet 20 mg  20 mg Oral DAILY    heparin 25,000 units in dextrose 500 mL infusion  12-25 Units/kg/hr IntraVENous TITRATE    nitroglycerin (Tridil) 200 mcg/ml infusion  10 mcg/min IntraVENous CONTINUOUS    ranolazine ER (RANEXA) tablet 500 mg  500 mg Oral BID    sodium chloride (NS) flush 5-10 mL  5-10 mL IntraVENous Q8H    sodium chloride (NS) flush 5-10 mL  5-10 mL IntraVENous PRN    nitroglycerin (NITROSTAT) tablet 0.4 mg  0.4 mg SubLINGual Q5MIN PRN    zolpidem (AMBIEN) tablet 5 mg  5 mg Oral QHS PRN    metoprolol tartrate (LOPRESSOR) tablet 25 mg  25 mg Oral BID    atorvastatin (LIPITOR) tablet 40 mg  40 mg Oral DAILY    aspirin chewable tablet 81 mg  81 mg Oral DAILY       Physical Exam:  General-No Acute Distress  Neck- supple, no JVD  CV- regular rate and rhythm no MRG  Lung- clear bilaterally  Abd- soft, nontender, nondistended  Ext- no edema bilaterally.   Skin- warm and dry    Data Review:   Recent Labs      01/29/17   0343  01/28/17   1906   NA  140  141   K  4.0  4.1   BUN  13  15   CREA  0.83  1.08   GLU  109*  106*   WBC  6.8  7.3   HGB  15.2  15.2   HCT  45.6  45.4   PLT  215  196   INR  1.0   --    TROIQ  0.62*   --    CHOL  221*   --    TGL  107   --    LDLC  142.6*   --    HDL  57   -- Assessment/Plan:     Active Problems:    Chest pain (1/28/2017) improved patient has had EKG changes since admission with terminal T-wave inversions noted no pain. Mildly elevated cardiac biomarkers    NSTEMI (non-ST elevated myocardial infarction) ([facility] [address] (1/28/2017) chest pain resolved. History of complex PCI described previously. Continue medical management eta blocker initiated as well as antianginals. Patient was on no cardiac medications other than aspirin on admission. Hypertension uncontrolled lisinopril dose increased.       Kenneth Mcdaniel MD  1/29/2017 9:26 AM

## 2017-01-29 NOTE — PROGRESS NOTES
Admission skin assessment completed with second RN and reveals the following: skin is warm, dry and intact. Patients sacrum is intact with no breakdown noted, heels are intact. 2 healed CABG scars.

## 2017-01-29 NOTE — H&P
[address]   HISTORY AND PHYSICAL       Name:  Rossy Barone   MR#:  [id number]   :  1955   Account #:  [de-identified]   Date of Adm:  2017       HISTORY OF PRESENT ILLNESS: This is a 59-year-old male with   history of coronary artery disease and a complicated   interventional history. The patient is status post 4-vessel   bypass surgery in  at [facility]. He   subsequently had multiple PCIs, most recently in  with   stenting of his native left main into a circumflex vessel. He   has known chronic total occlusion of the obtuse marginal branch. The patient had a history of having been lost to followup at   [facility] for several years. He was followed by Dr. Beryle Crow but has not been in the office in several years. At   the time of his original admission, he had discontinued all   medications. He presented to [address] [place] [address] Emergency Department on   2017 with complaints of substernal chest discomfort. The   patient has chronic stable angina for several years. It appeared   that he had substernal chest discomfort that occurred with   predictable exercise activity. He cites a specific example of   walking to the mailbox. At the end of this walk, he stated that   he had severe pain, that, however, this would resolve with rest.   These were his typical symptoms. The patient was planning on   presenting to [facility] in 2017 due to   resumption of insurance benefits at that time on 2017. Unfortunately, the patient began having symptoms on the day of   admission and stated that he had severe chest discomfort that   occurred at rest with no physical activity, which is unusual for   him. He described it as a dull substernal pain in the center of   his chest. He stated that this occurred while at rest, and there   were no aggravating or alleviating factors.  He stated that this   resolved soon arrival in the emergency department. PAST MEDICAL HISTORY    1. Gastroesophageal reflux disease. 2. Hypertension. 3. Coronary artery disease, status post 4-vessel bypass surgery   with stents in 2008, 2011. 4. Peripheral revascularization with unsuccessful PTI of totally   occluded SFA, angioplasty and stent placement in the right   common iliac artery in 2011.   5. Cardiac catheterization history: History of coronary artery   disease, status post 4-vessel bypass surgery in 2009. Coronary   anatomy, operative report was not available at the time of his   previous catheterizations, but it is believed he had LIMA to   circumflex, SVG to LAD, left circumflex and right coronary   arteries. 6. Cardiac catheterization in 2008. LIMA graft was occluded and   3 vein grafts were patent, with PCI and stenting of the right   coronary artery and second obtuse marginal branch of the   circumflex. 7. Cardiac catheterization in 2011 with intervention of the left   main, left circumflex, and the previously stented OM2 vessel. 8. Unsuccessful attempt at percutaneous coronary intervention of   a chronically occluded obtuse marginal branch of the left   circumflex. Neither antegrade or retrograde approach was   successful. 9. Echocardiogram in 2008, normal left ventricular systolic   function and wall motion. SOCIAL HISTORY    1. The patient stated he is  and cares for his spouse. 2. The patient is a current everyday smoker. He currently works   at [employer]. REVIEW OF SYSTEMS   CONSTITUTIONAL: No fevers, chills. SKIN: No rashes. HEENT: No headaches. EYES: No blurred vision. CARDIOVASCULAR: Chest pain. RESPIRATORY: No cough. GENITOURINARY: No dysuria. GASTROINTESTINAL: No heartburn, nausea. MUSCULOSKELETAL: No myalgias. NEUROLOGIC: No dizziness. PSYCHIATRIC: No depression. PHYSICAL EXAMINATION   HEAD: Normocephalic, atraumatic. EYES: Extraocular muscles intact.    NECK: Supple. No masses, no lymphadenopathy. CARDIAC: Regular rate and rhythm. No murmurs, rubs or gallops. ABDOMEN: Soft, nontender, nondistended. Bowel sounds present. EXTREMITIES: 5/5 strength in distal bilateral extremities with   diminished pulses in right lower extremity, although the   extremity is warm. ASSESSMENT AND PLAN   1. Angina. The patient has symptoms of chronic stable angina. It   appears that his symptoms have changed over the past several   days. Unfortunately, the patient was trying to wait to seek   medical attention for changes in his angina symptoms for several   weeks; however, due to lack of funds and insurance, he was   trying to wait until February, 2017. At this time, the patient   has no ischemic echocardiographic changes and the first cardiac   biomarkers were normal, so on further evaluation, angiography   could be considered; however, it is interesting that the patient   is not been medically managed at all. At the time of   presentation, he was not on beta-blocker, calcium channel   blocker, or any other medications. Plan to initiate med mgt. Patient will be tentatively scheduled for LHC with [facility]  1/30/2017  2. Coronary artery disease. Initiate medical management   with beta blockers, statin therapy. 3. Tobacco abuse. Smoking cessation counseling provided.         MD Best Gutiérrez / Liz Posadas   D:  01/29/2017   00:10   T:  01/29/2017   01:05   Job #:  [id number]

## 2017-01-30 LAB
ACT BLD: 384 SECS (ref 70–128)
APTT PPP: 59 SEC (ref 23.5–31.7)
APTT PPP: 84 SEC (ref 23.5–31.7)
ATRIAL RATE: 64 BPM
CALCULATED P AXIS, ECG09: 44 DEGREES
CALCULATED R AXIS, ECG10: -41 DEGREES
CALCULATED T AXIS, ECG11: -161 DEGREES
DIAGNOSIS, 93000: NORMAL
DIASTOLIC BP, ECG02: NORMAL MMHG
ERYTHROCYTE [DISTWIDTH] IN BLOOD BY AUTOMATED COUNT: 13.1 % (ref 11.9–14.6)
HCT VFR BLD AUTO: 46.5 % (ref 41.1–50.3)
HGB BLD-MCNC: 15.5 G/DL (ref 13.6–17.2)
MCH RBC QN AUTO: 30.9 PG (ref 26.1–32.9)
MCHC RBC AUTO-ENTMCNC: 33.3 G/DL (ref 31.4–35)
MCV RBC AUTO: 92.6 FL (ref 79.6–97.8)
P-R INTERVAL, ECG05: 128 MS
PLATELET # BLD AUTO: 218 K/UL (ref 150–450)
PMV BLD AUTO: 10.2 FL (ref 10.8–14.1)
Q-T INTERVAL, ECG07: 534 MS
QRS DURATION, ECG06: 94 MS
QTC CALCULATION (BEZET), ECG08: 550 MS
RBC # BLD AUTO: 5.02 M/UL (ref 4.23–5.67)
SYSTOLIC BP, ECG01: NORMAL MMHG
VENTRICULAR RATE, ECG03: 64 BPM
WBC # BLD AUTO: 7.8 K/UL (ref 4.3–11.1)

## 2017-01-30 PROCEDURE — 92978 ENDOLUMINL IVUS OCT C 1ST: CPT

## 2017-01-30 PROCEDURE — B2131ZZ FLUOROSCOPY OF MULTIPLE CORONARY ARTERY BYPASS GRAFTS USING LOW OSMOLAR CONTRAST: ICD-10-PCS | Performed by: INTERNAL MEDICINE

## 2017-01-30 PROCEDURE — 74011250637 HC RX REV CODE- 250/637: Performed by: INTERNAL MEDICINE

## 2017-01-30 PROCEDURE — B2151ZZ FLUOROSCOPY OF LEFT HEART USING LOW OSMOLAR CONTRAST: ICD-10-PCS | Performed by: INTERNAL MEDICINE

## 2017-01-30 PROCEDURE — 3E07317 INTRODUCTION OF OTHER THROMBOLYTIC INTO CORONARY ARTERY, PERCUTANEOUS APPROACH: ICD-10-PCS | Performed by: INTERNAL MEDICINE

## 2017-01-30 PROCEDURE — C1769 GUIDE WIRE: HCPCS

## 2017-01-30 PROCEDURE — B2111ZZ FLUOROSCOPY OF MULTIPLE CORONARY ARTERIES USING LOW OSMOLAR CONTRAST: ICD-10-PCS | Performed by: INTERNAL MEDICINE

## 2017-01-30 PROCEDURE — 74011250636 HC RX REV CODE- 250/636: Performed by: EMERGENCY MEDICINE

## 2017-01-30 PROCEDURE — C1725 CATH, TRANSLUMIN NON-LASER: HCPCS

## 2017-01-30 PROCEDURE — 93005 ELECTROCARDIOGRAM TRACING: CPT | Performed by: INTERNAL MEDICINE

## 2017-01-30 PROCEDURE — 92928 PRQ TCAT PLMT NTRAC ST 1 LES: CPT

## 2017-01-30 PROCEDURE — 02703DZ DILATION OF CORONARY ARTERY, ONE ARTERY WITH INTRALUMINAL DEVICE, PERCUTANEOUS APPROACH: ICD-10-PCS | Performed by: INTERNAL MEDICINE

## 2017-01-30 PROCEDURE — 93459 L HRT ART/GRFT ANGIO: CPT

## 2017-01-30 PROCEDURE — 74011250636 HC RX REV CODE- 250/636: Performed by: INTERNAL MEDICINE

## 2017-01-30 PROCEDURE — C1753 CATH, INTRAVAS ULTRASOUND: HCPCS

## 2017-01-30 PROCEDURE — 77030004534 HC CATH ANGI DX INFN CARD -A

## 2017-01-30 PROCEDURE — 85730 THROMBOPLASTIN TIME PARTIAL: CPT | Performed by: INTERNAL MEDICINE

## 2017-01-30 PROCEDURE — C1894 INTRO/SHEATH, NON-LASER: HCPCS

## 2017-01-30 PROCEDURE — 77030032490 HC SLV COMPR SCD KNE COVD -B

## 2017-01-30 PROCEDURE — B240ZZ3 ULTRASONOGRAPHY OF SINGLE CORONARY ARTERY, INTRAVASCULAR: ICD-10-PCS | Performed by: INTERNAL MEDICINE

## 2017-01-30 PROCEDURE — 65660000000 HC RM CCU STEPDOWN

## 2017-01-30 PROCEDURE — 36415 COLL VENOUS BLD VENIPUNCTURE: CPT | Performed by: INTERNAL MEDICINE

## 2017-01-30 PROCEDURE — 74011000250 HC RX REV CODE- 250: Performed by: INTERNAL MEDICINE

## 2017-01-30 PROCEDURE — C1887 CATHETER, GUIDING: HCPCS

## 2017-01-30 PROCEDURE — 99152 MOD SED SAME PHYS/QHP 5/>YRS: CPT

## 2017-01-30 PROCEDURE — 77030004559 HC CATH ANGI DX SUPT CARD -B

## 2017-01-30 PROCEDURE — 77030029997 HC DEV COM RDL R BND TELE -B

## 2017-01-30 PROCEDURE — C1874 STENT, COATED/COV W/DEL SYS: HCPCS

## 2017-01-30 PROCEDURE — 85347 COAGULATION TIME ACTIVATED: CPT

## 2017-01-30 PROCEDURE — 4A023N7 MEASUREMENT OF CARDIAC SAMPLING AND PRESSURE, LEFT HEART, PERCUTANEOUS APPROACH: ICD-10-PCS | Performed by: INTERNAL MEDICINE

## 2017-01-30 PROCEDURE — 92937 PRQ TRLUML REVSC CAB GRF 1: CPT

## 2017-01-30 PROCEDURE — 99153 MOD SED SAME PHYS/QHP EA: CPT

## 2017-01-30 PROCEDURE — 3E073PZ INTRODUCTION OF PLATELET INHIBITOR INTO CORONARY ARTERY, PERCUTANEOUS APPROACH: ICD-10-PCS | Performed by: INTERNAL MEDICINE

## 2017-01-30 PROCEDURE — 85027 COMPLETE CBC AUTOMATED: CPT | Performed by: INTERNAL MEDICINE

## 2017-01-30 PROCEDURE — 77030012468 HC VLV BLEEDBK CNTRL ABBT -B

## 2017-01-30 PROCEDURE — 74011250636 HC RX REV CODE- 250/636

## 2017-01-30 PROCEDURE — 74011636320 HC RX REV CODE- 636/320: Performed by: INTERNAL MEDICINE

## 2017-01-30 PROCEDURE — 93458 L HRT ARTERY/VENTRICLE ANGIO: CPT

## 2017-01-30 RX ORDER — SODIUM CHLORIDE 9 MG/ML
100 INJECTION, SOLUTION INTRAVENOUS CONTINUOUS
Status: DISPENSED | OUTPATIENT
Start: 2017-01-30 | End: 2017-01-31

## 2017-01-30 RX ORDER — PRASUGREL 10 MG/1
10 TABLET, FILM COATED ORAL DAILY
Status: DISCONTINUED | OUTPATIENT
Start: 2017-01-31 | End: 2017-01-31 | Stop reason: HOSPADM

## 2017-01-30 RX ORDER — HEPARIN SODIUM 10000 [USP'U]/ML
1000-9000 INJECTION, SOLUTION INTRAVENOUS; SUBCUTANEOUS
Status: DISCONTINUED | OUTPATIENT
Start: 2017-01-30 | End: 2017-01-30

## 2017-01-30 RX ORDER — PRASUGREL 10 MG/1
60 TABLET, FILM COATED ORAL
Status: COMPLETED | OUTPATIENT
Start: 2017-01-30 | End: 2017-01-30

## 2017-01-30 RX ORDER — SODIUM CHLORIDE 9 MG/ML
75 INJECTION, SOLUTION INTRAVENOUS
Status: COMPLETED | OUTPATIENT
Start: 2017-01-30 | End: 2017-01-30

## 2017-01-30 RX ORDER — MIDAZOLAM HYDROCHLORIDE 1 MG/ML
1-6 INJECTION, SOLUTION INTRAMUSCULAR; INTRAVENOUS
Status: DISCONTINUED | OUTPATIENT
Start: 2017-01-30 | End: 2017-01-30

## 2017-01-30 RX ORDER — PHENYLEPHRINE HYDROCHLORIDE 10 MG/ML
100 INJECTION INTRAVENOUS
Status: DISCONTINUED | OUTPATIENT
Start: 2017-01-30 | End: 2017-01-30

## 2017-01-30 RX ORDER — LIDOCAINE HYDROCHLORIDE 20 MG/ML
1-20 INJECTION, SOLUTION INFILTRATION; PERINEURAL ONCE
Status: COMPLETED | OUTPATIENT
Start: 2017-01-30 | End: 2017-01-30

## 2017-01-30 RX ORDER — FENTANYL CITRATE 50 UG/ML
25-100 INJECTION, SOLUTION INTRAMUSCULAR; INTRAVENOUS
Status: DISCONTINUED | OUTPATIENT
Start: 2017-01-30 | End: 2017-01-30

## 2017-01-30 RX ORDER — HEPARIN SODIUM 200 [USP'U]/100ML
25 INJECTION, SOLUTION INTRAVENOUS CONTINUOUS
Status: DISCONTINUED | OUTPATIENT
Start: 2017-01-30 | End: 2017-01-30

## 2017-01-30 RX ADMIN — MIDAZOLAM HYDROCHLORIDE 1 MG: 1 INJECTION, SOLUTION INTRAMUSCULAR; INTRAVENOUS at 17:50

## 2017-01-30 RX ADMIN — HEPARIN SODIUM 25 ML/HR: 200 INJECTION, SOLUTION INTRAVENOUS at 16:32

## 2017-01-30 RX ADMIN — METOPROLOL TARTRATE 25 MG: 25 TABLET ORAL at 22:41

## 2017-01-30 RX ADMIN — NITROGLYCERIN 0.2 MG: 200 INJECTION, SOLUTION INTRAVENOUS at 18:22

## 2017-01-30 RX ADMIN — Medication 5 ML: at 22:00

## 2017-01-30 RX ADMIN — LISINOPRIL 20 MG: 20 TABLET ORAL at 09:37

## 2017-01-30 RX ADMIN — LIDOCAINE HYDROCHLORIDE 60 MG: 20 INJECTION, SOLUTION INFILTRATION; PERINEURAL at 16:50

## 2017-01-30 RX ADMIN — FENTANYL CITRATE 25 MCG: 50 INJECTION, SOLUTION INTRAMUSCULAR; INTRAVENOUS at 17:50

## 2017-01-30 RX ADMIN — PRASUGREL HYDROCHLORIDE 60 MG: 10 TABLET, FILM COATED ORAL at 17:54

## 2017-01-30 RX ADMIN — METOPROLOL TARTRATE 25 MG: 25 TABLET ORAL at 09:37

## 2017-01-30 RX ADMIN — HEPARIN SODIUM AND DEXTROSE 16 UNITS/KG/HR: 5000; 5 INJECTION INTRAVENOUS at 10:55

## 2017-01-30 RX ADMIN — ATORVASTATIN CALCIUM 40 MG: 40 TABLET, FILM COATED ORAL at 09:37

## 2017-01-30 RX ADMIN — HEPARIN SODIUM 2 ML: 10000 INJECTION, SOLUTION INTRAVENOUS; SUBCUTANEOUS at 16:45

## 2017-01-30 RX ADMIN — HEPARIN SODIUM 8000 UNITS: 10000 INJECTION, SOLUTION INTRAVENOUS; SUBCUTANEOUS at 17:15

## 2017-01-30 RX ADMIN — RANOLAZINE 500 MG: 500 TABLET, FILM COATED, EXTENDED RELEASE ORAL at 22:42

## 2017-01-30 RX ADMIN — SODIUM CHLORIDE 100 ML/HR: 900 INJECTION, SOLUTION INTRAVENOUS at 19:45

## 2017-01-30 RX ADMIN — IOPAMIDOL 286 ML: 755 INJECTION, SOLUTION INTRAVENOUS at 18:26

## 2017-01-30 RX ADMIN — Medication 5 ML: at 14:00

## 2017-01-30 RX ADMIN — RANOLAZINE 500 MG: 500 TABLET, FILM COATED, EXTENDED RELEASE ORAL at 09:37

## 2017-01-30 RX ADMIN — PHENYLEPHRINE HYDROCHLORIDE 100 MCG: 10 INJECTION INTRAVENOUS at 17:09

## 2017-01-30 RX ADMIN — MIDAZOLAM HYDROCHLORIDE 2 MG: 1 INJECTION, SOLUTION INTRAMUSCULAR; INTRAVENOUS at 16:42

## 2017-01-30 RX ADMIN — SODIUM CHLORIDE 75 ML/HR: 900 INJECTION, SOLUTION INTRAVENOUS at 18:00

## 2017-01-30 RX ADMIN — SODIUM CHLORIDE 75 ML/HR: 900 INJECTION, SOLUTION INTRAVENOUS at 16:30

## 2017-01-30 RX ADMIN — ASPIRIN 81 MG CHEWABLE TABLET 81 MG: 81 TABLET CHEWABLE at 09:36

## 2017-01-30 RX ADMIN — MIDAZOLAM HYDROCHLORIDE 2 MG: 1 INJECTION, SOLUTION INTRAMUSCULAR; INTRAVENOUS at 16:45

## 2017-01-30 RX ADMIN — PHENYLEPHRINE HYDROCHLORIDE 100 MCG: 10 INJECTION INTRAVENOUS at 16:51

## 2017-01-30 NOTE — PROGRESS NOTES
TRANSFER - OUT REPORT:    Verbal report given to [facility] on Diego Emmanuel  being transferred to [facility] for routine post - op       Report consisted of patients Situation, Background, Assessment and   Recommendations(SBAR). Information from the following report(s) SBAR, Procedure Summary and MAR was reviewed with the receiving nurse. Opportunity for questions and clarification was provided. Procedure: [facility]   Finding Summary: 1 stent to native LAD  Location: L wrist   Closure Device: R band at 1828 with 10ml  Post Site Assessment: No bleeding, no hematoma        Intra Procedure Meds:    Versed: 5mg  Fentanyl: 25mcg  Heparin:8000u  Antiplatelet: Effient 28QX             Peripheral IV 01/30/17 Left Forearm (Active)   Site Assessment Clean, dry, & intact 1/30/2017 12:15 PM   Phlebitis Assessment 0 1/30/2017 12:15 PM   Infiltration Assessment 0 1/30/2017 12:15 PM   Dressing Status Clean, dry, & intact 1/30/2017 12:15 PM   Dressing Type Transparent;Tape 1/30/2017 12:15 PM   Hub Color/Line Status Infusing 1/30/2017 12:15 PM       Peripheral IV 01/30/17 Right Forearm (Active)   Site Assessment Clean, dry, & intact 1/30/2017 12:15 PM   Phlebitis Assessment 0 1/30/2017 12:15 PM   Infiltration Assessment 0 1/30/2017 12:15 PM   Dressing Status Clean, dry, & intact 1/30/2017 12:15 PM   Dressing Type Transparent;Tape 1/30/2017 12:15 PM   Hub Color/Line Status Patent 1/30/2017 12:15 PM        Post-Procedure Site Assessment (1)  Wound Type: Catheter entry/exit  Location: Radial  Orientation : Left  Closure Device:  (R band at 1828 with 10ml)  Site Assessment: No bleeding, No hematoma                       is allergic to aspirin.     Past Medical History   Diagnosis Date    Arthritis     CAD (coronary artery disease)     GERD (gastroesophageal reflux disease)     Hypertension      Visit Vitals    /82    Pulse 65    Temp 97.5 °F (36.4 °C)    Resp 16    Ht 5' 7.2\" (1.707 m)    Wt 80.3 kg (177 lb 1.6 oz)    SpO2 96%    BMI 27.57 kg/m2

## 2017-01-30 NOTE — PROGRESS NOTES
Care Management Interventions  PCP Verified by CM: No  Transition of Care Consult (CM Consult): Discharge Planning  Physical Therapy Consult: No  Occupational Therapy Consult: No  Current Support Network: Lives with Spouse  Confirm Follow Up Transport: Self  Discharge Location  Discharge Placement: Home    SW dc screening. S/p NSTEMI. Alert and oriented in all spheres. Fully independent with functioning PTA. Drives. Has no insurance currently but will have [facility] effective 2/1/17 which he purchased through the [employer]. 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 Income derived from his early [facility] and wife's SSD. Will request that MD write for inexpensive meds at dc given that pt is not certain about his drug coverage under the current plan. Requested new PCP appt through the Physician's Referral Line. They will be in contact with pt with new appt date and time. No other anticipated dc needs noted.

## 2017-01-30 NOTE — PROGRESS NOTES
Bedside and verbal report received from Jose Mclain [facility] [address]

## 2017-01-30 NOTE — PROGRESS NOTES
Bedside and Verbal shift change report given to Margarette Fenton RN (oncoming nurse) by self (offgoing nurse). Report included the following information SBAR, Kardex, MAR and Recent Results.

## 2017-01-30 NOTE — PROGRESS NOTES
Bedside and Verbal shift change report given to self (oncoming nurse) by [facility], RN (offgoing nurse). Report included the following information SBAR, Kardex, MAR and Recent Results.

## 2017-01-30 NOTE — PROGRESS NOTES
Report received from [address] [facility] [address]. Procedural findings communicated. Intra procedural  medication administration reviewed. Progression of care discussed.      Patient received into [address] [facility] 3 post sheath removal.     Access site without bleeding or swelling yes    Dressing dry and intact yes    Patient instructed to limit movement to left upper extremity    Routine post procedural vital signs and site assessment initiated yes

## 2017-01-30 NOTE — PROCEDURES
Brief Cardiac Procedure Note    Patient: Eleonora Haile MRN: [id number]  SSN: [id number]    YOB: 1955  Age: 60 y.o. Sex: male      Date of Procedure: 1/30/2017     Pre-procedure Diagnosis: Chest pain CCS Class IV    Post-procedure Diagnosis: Coronary Artery Disease    Procedure: Left Heart Catheterization with Percutaneous Coronary Intervention    Brief Description of Procedure: via lra    Performed By: Paul Posada MD     Assistants:     Anesthesia: Moderate Sedation    Estimated Blood Loss: Less than 10 mL      Specimens: None    Implants: None    Findings:   lvef 60%  Lm stent into the lcx ok  Lad 100%  vfg to lad severe distal  lcx stent int OM 2 ok, Om one fills late by collaterals  rca 100%  svg to rca ok  PCI;  0% distal svg - lad /mid lad after 32 synergy post dilated hp 2.5 to 3.25 mm  + heparin  + effient    Complications: None    Recommendations: Continue medical therapy.     Signed By: Paul Posada MD     January 30, 2017

## 2017-01-30 NOTE — PROGRESS NOTES
TRANSFER - OUT REPORT:    Verbal report given to Kaela Washington on Mallory Watson  being transferred to [facility] for routine progression of care       Report consisted of patients Situation, Background, Assessment and   Recommendations(SBAR). Information from the following report(s) SBAR, Procedure Summary and MAR was reviewed with the receiving nurse. Opportunity for questions and clarification was provided. Procedure: [facility]   Finding Summary: 1 stent to native LAD  Location: L wrist   Closure Device: R band at 1828 with 10ml  Post Site Assessment: No bleeding, no hematoma     Pre Procedure Meds:(if any)    Intra Procedure Meds:    Versed: 5mg  Fentanyl:25mcg  Heparin: 8000u  Antiplatelet: Effient 80KC           Peripheral IV 01/30/17 Left Forearm (Active)   Site Assessment Clean, dry, & intact 1/30/2017 12:15 PM   Phlebitis Assessment 0 1/30/2017 12:15 PM   Infiltration Assessment 0 1/30/2017 12:15 PM   Dressing Status Clean, dry, & intact 1/30/2017 12:15 PM   Dressing Type Transparent;Tape 1/30/2017 12:15 PM   Hub Color/Line Status Infusing 1/30/2017 12:15 PM       Peripheral IV 01/30/17 Right Forearm (Active)   Site Assessment Clean, dry, & intact 1/30/2017 12:15 PM   Phlebitis Assessment 0 1/30/2017 12:15 PM   Infiltration Assessment 0 1/30/2017 12:15 PM   Dressing Status Clean, dry, & intact 1/30/2017 12:15 PM   Dressing Type Transparent;Tape 1/30/2017 12:15 PM   Hub Color/Line Status Patent 1/30/2017 12:15 PM        Post-Procedure Site Assessment (1)  Wound Type: Catheter entry/exit  Location: Radial  Orientation : Left  Closure Device:  (R band at 1828 with 10ml)  Site Assessment: No bleeding, No hematoma                       is allergic to aspirin.     Past Medical History   Diagnosis Date    Arthritis     CAD (coronary artery disease)     GERD (gastroesophageal reflux disease)     Hypertension      Visit Vitals    /68 (BP 1 Location: Right arm, BP Patient Position: At rest)    Pulse (!) 57    Temp 97.5 °F (36.4 °C)    Resp 20    Ht 5' 7.2\" (1.707 m)    Wt 80.3 kg (177 lb 1.6 oz)    SpO2 98%    BMI 27.57 kg/m2

## 2017-01-31 VITALS
SYSTOLIC BLOOD PRESSURE: 129 MMHG | BODY MASS INDEX: 27.8 KG/M2 | WEIGHT: 177.1 LBS | TEMPERATURE: 97.7 F | OXYGEN SATURATION: 94 % | HEART RATE: 59 BPM | HEIGHT: 67 IN | RESPIRATION RATE: 18 BRPM | DIASTOLIC BLOOD PRESSURE: 73 MMHG

## 2017-01-31 LAB
ANION GAP BLD CALC-SCNC: 8 MMOL/L (ref 7–16)
BUN SERPL-MCNC: 14 MG/DL (ref 8–23)
CALCIUM SERPL-MCNC: 8 MG/DL (ref 8.3–10.4)
CHLORIDE SERPL-SCNC: 108 MMOL/L (ref 98–107)
CO2 SERPL-SCNC: 25 MMOL/L (ref 21–32)
CREAT SERPL-MCNC: 1.11 MG/DL (ref 0.8–1.5)
GLUCOSE SERPL-MCNC: 99 MG/DL (ref 65–100)
POTASSIUM SERPL-SCNC: 4.2 MMOL/L (ref 3.5–5.1)
SODIUM SERPL-SCNC: 141 MMOL/L (ref 136–145)

## 2017-01-31 PROCEDURE — 80048 BASIC METABOLIC PNL TOTAL CA: CPT | Performed by: INTERNAL MEDICINE

## 2017-01-31 PROCEDURE — 74011250637 HC RX REV CODE- 250/637: Performed by: INTERNAL MEDICINE

## 2017-01-31 PROCEDURE — 36415 COLL VENOUS BLD VENIPUNCTURE: CPT | Performed by: INTERNAL MEDICINE

## 2017-01-31 RX ORDER — LISINOPRIL 20 MG/1
20 TABLET ORAL DAILY
Qty: 30 TAB | Refills: 11 | Status: SHIPPED | OUTPATIENT
Start: 2017-01-31 | End: 2017-02-06

## 2017-01-31 RX ORDER — RANITIDINE 150 MG/1
150 TABLET, FILM COATED ORAL 2 TIMES DAILY
Qty: 60 TAB | Refills: 11 | Status: ON HOLD | OUTPATIENT
Start: 2017-01-31 | End: 2017-03-17

## 2017-01-31 RX ORDER — METOPROLOL TARTRATE 25 MG/1
25 TABLET, FILM COATED ORAL 2 TIMES DAILY
Qty: 60 TAB | Refills: 11 | Status: SHIPPED | OUTPATIENT
Start: 2017-01-31 | End: 2017-02-24 | Stop reason: ALTCHOICE

## 2017-01-31 RX ORDER — RANOLAZINE 500 MG/1
500 TABLET, EXTENDED RELEASE ORAL 2 TIMES DAILY
Qty: 60 TAB | Refills: 11 | Status: SHIPPED | OUTPATIENT
Start: 2017-01-31 | End: 2017-02-06

## 2017-01-31 RX ORDER — PRASUGREL 10 MG/1
10 TABLET, FILM COATED ORAL DAILY
Qty: 30 TAB | Refills: 11 | Status: SHIPPED | OUTPATIENT
Start: 2017-01-31 | End: 2017-03-11

## 2017-01-31 RX ORDER — NITROGLYCERIN 0.4 MG/1
0.4 TABLET SUBLINGUAL
Qty: 1 BOTTLE | Refills: 5 | Status: SHIPPED | OUTPATIENT
Start: 2017-01-31 | End: 2018-01-01 | Stop reason: SDUPTHER

## 2017-01-31 RX ORDER — ATORVASTATIN CALCIUM 40 MG/1
40 TABLET, FILM COATED ORAL DAILY
Qty: 30 TAB | Refills: 11 | Status: SHIPPED | OUTPATIENT
Start: 2017-01-31 | End: 2017-03-11

## 2017-01-31 RX ADMIN — ATORVASTATIN CALCIUM 40 MG: 40 TABLET, FILM COATED ORAL at 08:25

## 2017-01-31 RX ADMIN — RANOLAZINE 500 MG: 500 TABLET, FILM COATED, EXTENDED RELEASE ORAL at 08:25

## 2017-01-31 RX ADMIN — ASPIRIN 81 MG CHEWABLE TABLET 81 MG: 81 TABLET CHEWABLE at 08:25

## 2017-01-31 RX ADMIN — PRASUGREL HYDROCHLORIDE 10 MG: 10 TABLET, FILM COATED ORAL at 08:25

## 2017-01-31 RX ADMIN — METOPROLOL TARTRATE 25 MG: 25 TABLET ORAL at 08:25

## 2017-01-31 RX ADMIN — Medication 5 ML: at 06:00

## 2017-01-31 RX ADMIN — LISINOPRIL 20 MG: 20 TABLET ORAL at 08:25

## 2017-01-31 NOTE — PROGRESS NOTES
10 ml of air removed from the TR band. TR band removed and gauze and tegaderm applied to cath site entry. No bleeding, no hematuria. Radial pulse palpated. Hemostasis achieved. Pt tolerated well.

## 2017-01-31 NOTE — PROCEDURES
[address]       Name:  Melissa Berger   MR#:  [id number]   :  1955   Account #:  [de-identified]   Date of Adm:  2017       DATE OF PROCEDURE: 2017    PROCEDURES PERFORMED:    1. Cardiac catheterization with left ventriculography, coronary   angiography, and graft angiography. 2. Percutaneous coronary intervention with stenting of the left   anterior descending at its insertion with its saphenous vein   graft. HISTORY: This is a 79-year-old gentleman with coronary disease. He has had prior CABG. He has had subsequent PCI. His last PCI   was several years ago with a  PCI to the left circumflex. He   is admitted on this occasion with unstable angina. Cardiac   catheterization with possible angioplasty is recommended. PROCEDURE: Left heart catheterization with left ventriculography   and coronary angiography is carried out from the left radial   artery by modified Seldinger technique. The vein graft to the   right coronary artery is injected with a 6-Finnish multipurpose. The stump of the vein graft to the left circumflex is injected   with the same catheter. The same catheter was then used to   inject the vein graft to the LAD. Subtotal occlusion of the LAD   distal vein graft insertion is identified. The native left was   then injected with a 6-Finnish 3.5 left Jack. A left   ventriculogram was then performed with a 5-Finnish angled pigtail   catheter. He tolerated the procedure well. Following the   diagnostic portion of the procedure, angioplasty of the LAD was   performed. Heparin was the anticoagulant. The guide catheter was   a 6-Finnish AL1. Via a FineCross microcatheter, the LAD was wired   and ballooned with a 2.0 mm balloon. The wire was redirected   down the dominant distal LAD branch and IVUS was then performed.    Based on the angiogram and the IVUS, the LAD was then stented   with a 32 mm long Synergy stent deployed on a 2.25 mm balloon. Avoiding just the distal edge of the stent of the vessel, the   LAD portion received high pressure inflation with a 2.5 mm   balloon. A 3.25 mm balloon was then used in the proximal portion   of the stent in the vein graft and just into the LAD. A final   IVUS run and angiogram was performed. A good result was   obtained. Heparin was the anticoagulant. He was given Effient 60   mg p.o. on the table. FINDINGS: The left ventricle is normal size. The wall motion is   well preserved. Ejection fraction is 62%. The left main has been stented into the left circumflex. The   stent is patent with only mild focal restenosis. The stents   lands in the second marginal branch. The first obtuse marginal   branch is jailed by the stent and fills late by collateral flow   from the left coronary. The LAD is occluded at its origin. The proximal portion of the   LAD fills by collateral flow and by septal collaterals from the   right primarily. The mid to distal portion of the vessel fills   via the saphenous vein graft. The saphenous vein graft is patent   to its distal anastomosis and then the LAD is subtotally   occluded with faint distal antegrade filling of a bifurcated   distal large segment. The right coronary artery is chronically   occluded. The saphenous vein to the right coronary artery is   patent with good runoff. From this right coronary artery septal   collaterals are observed as is a large ipsilateral collateral to   the marginal branch of the left circumflex. Post angioplasty and stenting, the LAD is widely patent. There   is JENIFFER-3 flow. There is no dissection. No thrombus. IMPRESSION:   1. Normal left ventricular function. 2. Coronary artery disease, as described. The left main into the   left circumflex has been previously stented with minimal   restenosis. The left anterior descending is occluded at its   origin.  There is collateral filling of the upper vessel from the right and the middle to distal portion of the vessel fills via   its saphenous vein graft. This saphenous vein graft is patent,   but has severe distal disease and the left anterior descending   distal to its insertion is subtotally occluded. The stented left   circumflex is patent with minor focal restenosis. The obtuse   marginal branch fills by collateral flow from the left   circumflex and right coronary artery. The right coronary artery   is chronically occluded and fills via its saphenous vein graft. 3. Successful percutaneous coronary intervention of the left   anterior descending and distal left anterior descending vein   graft is performed as catheterization as described above. A good   result was obtained after deployment of a 33 mm long Synergy   stent, post-dilated at high pressure from 2.5 to 3.25 mm. 4. Two out of 3 patent vein grafts as described above.         MD DAWSON Juarez / Joey Pat   D:  01/30/2017   18:37   T:  01/30/2017   19:42   Job #:  [id number]

## 2017-01-31 NOTE — DISCHARGE SUMMARY
[facility] Cardiology Discharge Summary     Patient ID:  Teresita Christianson  [id number]  40 y.o.  1955    Admit date: 1/28/2017    Discharge date:  01/31/2017    Admitting Physician: Deidre Doherty MD     Discharge Physician: Sheyla Gonzalez NP/Dr. Otis Victoria    Admission Diagnoses: Chest pain  Unstable Angina  NSTEMI (non-ST elevated myocardial infarction) [facility])    Discharge Diagnoses:    Diagnosis    Chest pain    NSTEMI (non-ST elevated myocardial infarction) [facility])    Peripheral vascular disease-s/p PPI to right common iliac 5/4/11    Unstable angina pectoris ([address]    CAD (coronary artery disease)    HTN (hypertension), benign    Other and unspecified hyperlipidemia    Tobacco use disorder       Cardiology Procedures this admission:  Left heart catheterization with PCI  Consults: None    Hospital Course: Patient presented to the ER with c/o exertional chest pain. At the time of admission he had discontinued all of his medications. Troponin levels peaked at 0.62. Patient underwent cardiac catheterization by Dr. BRITTANI BUCHANAN JR. [facility]. Patient was found to have subtotal occlusion  of the SVG to LAD that was stented with a Synergy 2.5x33 with 0% residual stenosis. Patient tolerated the procedure well and was taken to the telemetry floor for recovery. The morning of discharge, patient was up feeling well without any complaints of chest pain or shortness of breath. Patient's left radial cath site was clean, dry and intact without hematoma or bruit. Patient's labs were WNL. Patient was seen and examined by Dr. Otis Victoria and determined stable and ready for discharge. Patient was instructed on the importance of medication compliance including taking aspirin and Effient everyday without missing a dose. After receiving drug eluting stents, the patient will remain on dual anti-platelet therapy for 1 year. For maximized medical therapy for CAD, patient will continue BB, ACE-I, and statin as well.   The patient will follow up with [facility] -- Dr. Raven Caruso on 2/6/17 @ [phone] in the [place] office  . Patient has been referred to cardiac rehab. DISPOSITION: The patient is being discharged home in stable condition on a low saturated fat, low cholesterol and low salt diet. The patient is instructed to advance activities as tolerated to the limit of fatigue or shortness of breath. The patient is instructed to avoid all heavy lifting, straining, stooping or squatting for 3-5 days. The patient is instructed to watch the cath site for bleeding/oozing; if seen, the patient is instructed to apply firm pressure with a clean cloth and call [facility] at [phone]. The patient is instructed to watch for signs of infection which include: increasing area of redness, fever/hot to touch or purulent drainage at the catheterization site. The patient is instructed not to soak in a bathtub for 7-10 days, but is cleared to shower. The patient is instructed to call the office or return to the ER for immediate evaluation for any shortness of breath or chest pain not relieved by NTG. Discharge Exam:   Visit Vitals    /73 (BP 1 Location: Right arm, BP Patient Position: At rest)    Pulse (!) 59    Temp 97.7 °F (36.5 °C)    Resp 18    Ht 5' 7.2\" (1.707 m)    Wt 80.3 kg (177 lb 1.6 oz)    SpO2 94%    BMI 27.57 kg/m2     Patient has been seen by Dr. Gerson Booker: see his progress note for exam details.     Recent Results (from the past 24 hour(s))   PTT    Collection Time: 01/30/17  1:36 PM   Result Value Ref Range    aPTT 59.0 (H) 23.5 - 31.7 SEC   POC ACTIVATED CLOTTING TIME    Collection Time: 01/30/17  5:16 PM   Result Value Ref Range    Activated Clotting Time (POC) 384 (H) 70 - 969 SECS   METABOLIC PANEL, BASIC    Collection Time: 01/31/17  5:10 AM   Result Value Ref Range    Sodium 141 136 - 145 mmol/L    Potassium 4.2 3.5 - 5.1 mmol/L    Chloride 108 (H) 98 - 107 mmol/L    CO2 25 21 - 32 mmol/L    Anion gap 8 7 - 16 mmol/L Glucose 99 65 - 100 mg/dL    BUN 14 8 - 23 MG/DL    Creatinine 1.11 0.8 - 1.5 MG/DL    GFR est AA >60 >60 ml/min/1.73m2    GFR est non-AA >60 >60 ml/min/1.73m2    Calcium 8.0 (L) 8.3 - 10.4 MG/DL         Patient Instructions:     Current Discharge Medication List      START taking these medications    Details   ranolazine ER (RANEXA) 500 mg SR tablet Take 1 Tab by mouth two (2) times a day. Qty: 60 Tab, Refills: 11      atorvastatin (LIPITOR) 40 mg tablet Take 1 Tab by mouth daily. Qty: 30 Tab, Refills: 11      lisinopril (PRINIVIL, ZESTRIL) 20 mg tablet Take 1 Tab by mouth daily. Qty: 30 Tab, Refills: 11      metoprolol tartrate (LOPRESSOR) 25 mg tablet Take 1 Tab by mouth two (2) times a day. Qty: 60 Tab, Refills: 11      prasugrel (EFFIENT) 10 mg tablet Take 1 Tab by mouth daily. Qty: 30 Tab, Refills: 11      nitroglycerin (NITROSTAT) 0.4 mg SL tablet 1 Tab by SubLINGual route every five (5) minutes as needed for Chest Pain. Qty: 1 Bottle, Refills: 5         CONTINUE these medications which have CHANGED    Details   raNITIdine (ZANTAC) 150 mg tablet Take 1 Tab by mouth two (2) times a day. Indications: GASTROESOPHAGEAL REFLUX, HEARTBURN  Qty: 60 Tab, Refills: 11         CONTINUE these medications which have NOT CHANGED    Details   albuterol (VENTOLIN HFA) 90 mcg/actuation inhaler Take 2 Puffs by inhalation every four (4) hours as needed for Wheezing or Shortness of Breath. Qty: 1 Inhaler, Refills: 4      aspirin 81 mg tablet Take 81 mg by mouth daily. Patient took 324 mg today.          STOP taking these medications       amoxicillin-clavulanate (AUGMENTIN) 875-125 mg per tablet Comments:   Reason for Stopping:                 Signed:  Clari Kaur NP  1/31/2017  8:29 AM

## 2017-01-31 NOTE — PROGRESS NOTES
Bedside and Verbal shift change report given to self (oncoming nurse) by Barak Szymanski RN (offgoing nurse). Report included the following information SBAR, Kardex, Procedure Summary, MAR and Cardiac Rhythm NSR.

## 2017-01-31 NOTE — PROGRESS NOTES
Discharge instructions reviewed with patient. Prescriptions given for Ranexa, Lipitor, Lisinopril, Lopressor, Effient, and Nitroglycerin and med info sheets provided for all new medications. The importance of each medication was stressed. Opportunity for questions provided. Pt voiced understanding of all discharge instructions. Ready now for discharge.

## 2017-01-31 NOTE — DISCHARGE INSTRUCTIONS
Cardiac Catheterization/Angiography Discharge Instructions    *Check the puncture site frequently for swelling or bleeding. If you see any bleeding, lie down and apply pressure over the area with a clean town or washcloth. Notify your doctor for any redness, swelling, drainage or oozing from the puncture site. Notify your doctor for any fever or chills. *If the leg or arm with the puncture becomes cold, numb or painful, call [facility] at [phone]    *Activity should be limited for the next 48 hours. Climb stairs as little as possible and avoid any stooping, bending or strenuous activity for 48 hours. No heavy lifting (anything over 10 pounds) for three days. *Do not drive for 48 hours. *You may resume your usual diet. Drink more fluids than usual.    *Have a responsible person drive you home and stay with you for at least 24 hours after your heart catheterization/angiography. *You may remove the bandage from your Left Wrist in 24 hours. You may shower in 24 hours. No tub baths, hot tubs or swimming for one week. Do not place any lotions, creams, powders, ointments over the puncture site for one week. You may place a clean band-aid over the puncture site each day for 5 days. Change this daily. Percutaneous Coronary Intervention: What to Expect at [facility]    Percutaneous coronary intervention (PCI) is the name for procedures that are used to open a narrowed or blocked coronary artery. The two most common PCI procedures are coronary angioplasty and coronary stent placement. Your groin or arm may have a bruise and feel sore for a day or two after a percutaneous coronary intervention (PCI). You can do light activities around the house, but nothing strenuous for several days. This care sheet gives you a general idea about how long it will take for you to recover. But each person recovers at a different pace.  Follow the steps below to get better as quickly as possible. How can you care for yourself at home? Activity  · Do not do strenuous exercise and do not lift, pull, or push anything heavy until your doctor says it is okay. This may be for a day or two. You can walk around the house and do light activity, such as cooking. · You may shower 24 to 48 hours after the procedure, if your doctor okays it. Pat the incision dry. Do not take a bath for 1 week, or until your doctor tells you it is okay. · If the catheter was placed in your groin, try not to walk up stairs for the first couple of days. · If the catheter was placed in your arm near your wrist, do not bend your wrist deeply for the first couple of days. Be careful using your hand to get into and out of a chair or bed. · If your doctor recommends it, get more exercise. Walking is a good choice. Bit by bit, increase the amount you walk every day. Try for at least 30 minutes on most days of the week. Diet  · Drink plenty of fluids to help your body flush out the dye. If you have kidney, heart, or liver disease and have to limit fluids, talk with your doctor before you increase the amount of fluids you drink. · Keep eating a heart-healthy diet that has lots of fruits, vegetables, and whole grains. If you have not been eating this way, talk to your doctor. You also may want to talk to a dietitian. This expert can help you to learn about healthy foods and plan meals. Medicines  · Your doctor will tell you if and when you can restart your medicines. He or she will also give you instructions about taking any new medicines. · If you take blood thinners, such as warfarin (Coumadin), clopidogrel (Plavix), or aspirin, be sure to talk to your doctor. He or she will tell you if and when to start taking those medicines again. Make sure that you understand exactly what your doctor wants you to do. · Your doctor will prescribe blood-thinning medicines.  You will likely take aspirin plus another antiplatelet, such as clopidogrel (Plavix). It is very important that you take these medicines exactly as directed. These medicines help keep the coronary artery open and reduce your risk of a heart attack. · Call your doctor if you think you are having a problem with your medicine. Care of the catheter site  · For 1 or 2 days, keep a bandage over the spot where the catheter was inserted. The bandage probably will fall off in this time. · Put ice or a cold pack on the area for 10 to 20 minutes at a time to help with soreness or swelling. Put a thin cloth between the ice and your skin. Follow-up care is a key part of your treatment and safety. Be sure to make and go to all appointments, and call your doctor if you are having problems. It's also a good idea to know your test results and keep a list of the medicines you take. When should you call for help? Call 911 anytime you think you may need emergency care. For example, call if:  · You passed out (lost consciousness). · You have severe trouble breathing. · You have sudden chest pain and shortness of breath, or you cough up blood. · You have symptoms of a heart attack, such as:  ¨ Chest pain or pressure. ¨ Sweating. ¨ Shortness of breath. ¨ Nausea or vomiting. ¨ Pain that spreads from the chest to the neck, jaw, or one or both shoulders or arms. ¨ Dizziness or lightheadedness. ¨ A fast or uneven pulse. After calling 911, chew 1 adult-strength aspirin. Wait for an ambulance. Do not try to drive yourself. · You have been diagnosed with angina, and you have angina symptoms that do not go away with rest or are not getting better within 5 minutes after you take one dose of nitroglycerin. Call your doctor now or seek immediate medical care if:  · You are bleeding from the area where the catheter was put in your artery. · You have a fast-growing, painful lump at the catheter site. · You have signs of infection, such as:  ¨ Increased pain, swelling, warmth, or redness.   ¨ Red streaks leading from the catheter site. ¨ Pus draining from the catheter site. ¨ A fever. · Your leg or arm looks blue or feels cold, numb, or tingly. Watch closely for changes in your health, and be sure to contact your doctor if you have any problems. Where can you learn more? Go to http://jaz-yaritza.info/. Enter S632 in the search box to learn more about \"Percutaneous Coronary Intervention: What to Expect at Home. \"  Current as of: January 27, 2016  Content Version: 11.1  © 3694-2981 [employer]. Care instructions adapted under license by [employer] (which disclaims liability or warranty for this information). If you have questions about a medical condition or this instruction, always ask your healthcare professional. [address] any warranty or liability for your use of this information. Reducing Heart Attack Risk With Daily Medicine: Care Instructions  Your Care Instructions    Heart disease is the number one cause of death. If you are at risk for heart disease, there are many medicines that can reduce your risk. These include:  · ACE inhibitors. These are a type of blood pressure medicine. They can reduce the risk of heart attacks and strokes if you are at high risk. · Statin medicines. These lower cholesterol. They can also reduce the risk of heart disease and strokes. · Aspirin. It can help certain people lower their risk of a heart attack or stroke. · Beta-blocker medicines. These are a type of blood pressure and heart medicine. They can reduce the chance of early death if you have had a heart attack. All medicines can cause side effects. So it is important to understand the pros and cons of any medicine you take. It is also important to take your medicines exactly as your doctor tells you to. Follow-up care is a key part of your treatment and safety.  Be sure to make and go to all appointments, and call your doctor if you are having problems. It's also a good idea to know your test results and keep a list of the medicines you take. ACE inhibitors  ACE (angiotensin-converting enzyme) inhibitors are used for three main reasons. They lower blood pressure, protect the kidneys, and prevent heart attacks and strokes. Examples include benazepril (Lotensin), lisinopril (Prinivil, Zestril), and ramipril (Altace). Before you start taking an ACE inhibitor, make sure your doctor knows if:  · You are taking a water pill (diuretic). · You are taking potassium pills or using salt substitutes. · You are pregnant or breastfeeding. · You have had a kidney transplant or other kidney problems. ACE inhibitors can cause side effects. Call your doctor right away if you have:  · Trouble breathing. · Swelling in your face, head, neck, or tongue. · Dizziness or lightheadedness. · A dry cough. Statins  Statins lower cholesterol. Examples include atorvastatin (Lipitor), lovastatin (Mevacor), pravastatin (Pravachol), and simvastatin (Zocor). Before you start taking a statin, make sure your doctor knows if:  · You have had a kidney transplant or other kidney problems. · You have liver disease. · You take any other prescription medicine, over-the-counter medicine, vitamins, supplements, or herbal remedies. · You are pregnant or breastfeeding. Statins can cause side effects. Call your doctor right away if you have:  · New, severe muscle aches. · Brown urine. Aspirin  Taking an aspirin every day can lower your risk for a heart attack. A heart attack occurs when a blood vessel in the heart gets blocked. When this happens, oxygen can't get to the heart muscle, and part of the heart dies. Aspirin can help prevent blood clots that can block the blood vessels. Talk to your doctor before you start taking aspirin every day. He or she may recommend that you take one low-dose aspirin (81 mg) tablet each day, with a meal and a full glass of water.   Taking aspirin isn't right for everyone, because it can cause serious bleeding. And you may not be able to use aspirin if you:  · Have asthma. · Have an ulcer or other stomach problem. · Take some other medicine (called a blood thinner) that prevents blood clots. · Are allergic to aspirin. Before having a surgery or procedure, tell your doctor or dentist that you take aspirin. He or she will tell you if you should stop taking aspirin beforehand. Make sure that you understand exactly what your doctor wants you to do. Aspirin can cause side effects. Call your doctor right away if you have:  · Unusual bleeding or bruising. · Nausea, vomiting, or heartburn. · Black or bloody stools. Beta-blockers  Beta-blockers are used for three main reasons. They lower blood pressure, relieve angina symptoms (such as chest pain or pressure), and reduce the chances of a second heart attack. They include atenolol (Tenormin), carvedilol (Coreg), and metoprolol (Lopressor). Before you start taking a beta-blocker, make sure your doctor knows if you have:  · Severe asthma or frequent asthma attacks. · A very slow pulse (less than 55 beats a minute). Beta-blockers can cause side effects. Call your doctor right away if you have:  · Wheezing or trouble breathing. · Dizziness or lightheadedness. · Asthma that gets worse. When should you call for help? Call 911 anytime you think you may need emergency care. For example, call if:  · You passed out (lost consciousness). Call your doctor now or seek immediate medical care if:  · You are wheezing or have trouble breathing. · You have swelling in your face, head, neck, or tongue. · You are dizzy or lightheaded, or you feel like you may faint. · You have severe muscle pain, weakness, or brown urine. · You have vision problems. · You have new bruises or blood spots under your skin. · Your stools are black and tarlike or have streaks of blood.   Watch closely for changes in your health, and be sure to contact your doctor if:  · You have ringing in your ears. · You feel very tired. · You have gas, constipation, or an upset stomach. Where can you learn more? Go to http://jaz-yaritza.info/. Enter R428 in the search box to learn more about \"Reducing Heart Attack Risk With Daily Medicine: Care Instructions. \"  Current as of: March 28, 2016  Content Version: 11.1  © 7763-2602 [employer]. Care instructions adapted under license by [employer] (which disclaims liability or warranty for this information). If you have questions about a medical condition or this instruction, always ask your healthcare professional. [address] any warranty or liability for your use of this information. Heart-Healthy Diet: Care Instructions  Your Care Instructions    A heart-healthy diet has lots of vegetables, fruits, nuts, beans, and whole grains, and is low in salt. It limits foods that are high in saturated fat, such as meats, cheeses, and fried foods. It may be hard to change your diet, but even small changes can lower your risk of heart attack and heart disease. Follow-up care is a key part of your treatment and safety. Be sure to make and go to all appointments, and call your doctor if you are having problems. It's also a good idea to know your test results and keep a list of the medicines you take. How can you care for yourself at home? Watch your portions  · Learn what a serving is. A \"serving\" and a \"portion\" are not always the same thing. Make sure that you are not eating larger portions than are recommended. For example, a serving of pasta is ½ cup. A serving size of meat is 2 to 3 ounces. A 3-ounce serving is about the size of a deck of cards. Measure serving sizes until you are good at [employer]" them. Keep in mind that restaurants often serve portions that are 2 or 3 times the size of one serving.   · To keep your energy level up and keep you from feeling hungry, eat often but in smaller portions. · Eat only the number of calories you need to stay at a healthy weight. If you need to lose weight, eat fewer calories than your body burns (through exercise and other physical activity). Eat more fruits and vegetables  · Eat a variety of fruit and vegetables every day. Dark green, deep orange, red, or yellow fruits and vegetables are especially good for you. Examples include spinach, carrots, peaches, and berries. · Keep carrots, celery, and other veggies handy for snacks. Buy fruit that is in season and store it where you can see it so that you will be tempted to eat it. · Cook dishes that have a lot of veggies in them, such as stir-fries and soups. Limit saturated and trans fat  · Read food labels, and try to avoid saturated and trans fats. They increase your risk of heart disease. Trans fat is found in many processed foods such as cookies and crackers. · Use olive or canola oil when you cook. Try cholesterol-lowering spreads, such as Benecol or Take Control. · Bake, broil, grill, or steam foods instead of frying them. · Choose lean meats instead of high-fat meats such as hot dogs and sausages. Cut off all visible fat when you prepare meat. · Eat fish, skinless poultry, and meat alternatives such as soy products instead of high-fat meats. Soy products, such as tofu, may be especially good for your heart. · Choose low-fat or fat-free milk and dairy products. Eat fish  · Eat at least two servings of fish a week. Certain fish, such as salmon and tuna, contain omega-3 fatty acids, which may help reduce your risk of heart attack. Eat foods high in fiber  · Eat a variety of grain products every day. Include whole-grain foods that have lots of fiber and nutrients. Examples of whole-grain foods include oats, whole wheat bread, and brown rice. · Buy whole-grain breads and cereals, instead of white bread or pastries.   Limit salt and sodium  · Limit how much salt and sodium you eat to help lower your blood pressure. · Taste food before you salt it. Add only a little salt when you think you need it. With time, your taste buds will adjust to less salt. · Eat fewer snack items, fast foods, and other high-salt, processed foods. Check food labels for the amount of sodium in packaged foods. · Choose low-sodium versions of canned goods (such as soups, vegetables, and beans). Limit sugar  · Limit drinks and foods with added sugar. These include candy, desserts, and soda pop. Limit alcohol  · Limit alcohol to no more than 2 drinks a day for men and 1 drink a day for women. Too much alcohol can cause health problems. When should you call for help? Watch closely for changes in your health, and be sure to contact your doctor if:  · You would like help planning heart-healthy meals. Where can you learn more? Go to http://jazToro Developmentyaritza.info/. Enter V137 in the search box to learn more about \"Heart-Healthy Diet: Care Instructions. \"  Current as of: January 27, 2016  Content Version: 11.1  © 7670-6444 [employer]. Care instructions adapted under license by [employer] (which disclaims liability or warranty for this information). If you have questions about a medical condition or this instruction, always ask your healthcare professional. [address] any warranty or liability for your use of this information. DISCHARGE SUMMARY from Nurse    The following personal items are in your possession at time of discharge:    Dental Appliances: None  Visual Aid: Glasses     Home Medications: None  Jewelry: Watch  Clothing:  At bedside  Other Valuables: Cell Phone, Wallet     PATIENT INSTRUCTIONS:    After general anesthesia or intravenous sedation, for 24 hours or while taking prescription Narcotics:  · Limit your activities  · Do not drive and operate hazardous machinery  · Do not make important personal or business decisions  · Do  not drink alcoholic beverages  · If you have not urinated within 8 hours after discharge, please contact your surgeon on call. Report the following to your surgeon:  · Excessive pain, swelling, redness or odor of or around the surgical area  · Temperature over 100.5  · Nausea and vomiting lasting longer than 4 hours or if unable to take medications  · Any signs of decreased circulation or nerve impairment to extremity: change in color, persistent  numbness, tingling, coldness or increase pain  · Any questions    What to do at Home:  Recommended activity: No lifting or Strenuous exercise for 5 days    If you experience any of the following symptoms of unrelieved chest pain or increased shortness of breath, please follow up with [facility] at [phone]    *  Please give a list of your current medications to your Primary Care Provider. *  Please update this list whenever your medications are discontinued, doses are      changed, or new medications (including over-the-counter products) are added. *  Please carry medication information at all times in case of emergency situations. These are general instructions for a healthy lifestyle:    No smoking/ No tobacco products/ Avoid exposure to second hand smoke    Surgeon General's Warning:  Quitting smoking now greatly reduces serious risk to your health. Obesity, smoking, and sedentary lifestyle greatly increases your risk for illness    A healthy diet, regular physical exercise & weight monitoring are important for maintaining a healthy lifestyle    You may be retaining fluid if you have a history of heart failure or if you experience any of the following symptoms:  Weight gain of 3 pounds or more overnight or 5 pounds in a week, increased swelling in our hands or feet or shortness of breath while lying flat in bed.   Please call your doctor as soon as you notice any of these symptoms; do not wait until your next office visit. Recognize signs and symptoms of STROKE:    F-face looks uneven    A-arms unable to move or move unevenly    S-speech slurred or non-existent    T-time-call 911 as soon as signs and symptoms begin-DO NOT go       Back to bed or wait to see if you get better-TIME IS BRAIN. Warning Signs of HEART ATTACK     Call 911 if you have these symptoms:   Chest discomfort. Most heart attacks involve discomfort in the center of the chest that lasts more than a few minutes, or that goes away and comes back. It can feel like uncomfortable pressure, squeezing, fullness, or pain.  Discomfort in other areas of the upper body. Symptoms can include pain or discomfort in one or both arms, the back, neck, jaw, or stomach.  Shortness of breath with or without chest discomfort.  Other signs may include breaking out in a cold sweat, nausea, or lightheadedness. Don't wait more than five minutes to call 911 - MINUTES MATTER! Fast action can save your life. Calling 911 is almost always the fastest way to get lifesaving treatment. Emergency Medical Services staff can begin treatment when they arrive -- up to an hour sooner than if someone gets to the hospital by car. The discharge information has been reviewed with the patient. The patient verbalized understanding. Discharge medications reviewed with the patient and appropriate educational materials and side effects teaching were provided.

## 2017-01-31 NOTE — PROGRESS NOTES
Bedside and Verbal shift change report given to Ghulam Clement RN (oncoming nurse) by self Vivien reyes). Report included the following information SBAR, Kardex, Procedure Summary, MAR, Recent Results and Cardiac Rhythm NSR.

## 2017-02-06 PROBLEM — R05.9 COUGH: Status: ACTIVE | Noted: 2017-02-06

## 2017-02-09 PROBLEM — J40 BRONCHITIS: Status: ACTIVE | Noted: 2017-02-09

## 2017-03-01 ENCOUNTER — HOSPITAL ENCOUNTER (OUTPATIENT)
Dept: LAB | Age: 62
Discharge: HOME | End: 2017-03-01
Attending: INTERNAL MEDICINE
Payer: COMMERCIAL

## 2017-03-01 DIAGNOSIS — I25.10 CORONARY ARTERY DISEASE INVOLVING NATIVE CORONARY ARTERY WITHOUT ANGINA PECTORIS, UNSPECIFIED WHETHER NATIVE OR TRANSPLANTED HEART: Chronic | ICD-10-CM

## 2017-03-01 LAB
ANION GAP BLD CALC-SCNC: 10 MMOL/L
BNP SERPL-MCNC: 43 PG/ML
BUN SERPL-MCNC: 17 MG/DL (ref 8–23)
CALCIUM SERPL-MCNC: 8.8 MG/DL (ref 8.3–10.4)
CHLORIDE SERPL-SCNC: 104 MMOL/L (ref 98–107)
CO2 SERPL-SCNC: 25 MMOL/L (ref 23–32)
CREAT SERPL-MCNC: 1.1 MG/DL (ref 0.6–1)
GLUCOSE SERPL-MCNC: 108 MG/DL (ref 65–100)
POTASSIUM SERPL-SCNC: 3.9 MMOL/L (ref 3.5–5.1)
SODIUM SERPL-SCNC: 139 MMOL/L (ref 136–145)

## 2017-03-01 PROCEDURE — 80048 BASIC METABOLIC PNL TOTAL CA: CPT | Performed by: INTERNAL MEDICINE

## 2017-03-01 PROCEDURE — 36415 COLL VENOUS BLD VENIPUNCTURE: CPT | Performed by: INTERNAL MEDICINE

## 2017-03-01 PROCEDURE — 83880 ASSAY OF NATRIURETIC PEPTIDE: CPT | Performed by: INTERNAL MEDICINE

## 2017-03-10 ENCOUNTER — APPOINTMENT (OUTPATIENT)
Dept: GENERAL RADIOLOGY | Age: 62
End: 2017-03-10
Attending: EMERGENCY MEDICINE
Payer: COMMERCIAL

## 2017-03-10 ENCOUNTER — HOSPITAL ENCOUNTER (OUTPATIENT)
Age: 62
Setting detail: OBSERVATION
Discharge: HOME | End: 2017-03-11
Attending: EMERGENCY MEDICINE | Admitting: INTERNAL MEDICINE
Payer: COMMERCIAL

## 2017-03-10 ENCOUNTER — APPOINTMENT (OUTPATIENT)
Dept: CT IMAGING | Age: 62
End: 2017-03-10
Attending: INTERNAL MEDICINE
Payer: COMMERCIAL

## 2017-03-10 DIAGNOSIS — I25.10 CORONARY ARTERY DISEASE INVOLVING NATIVE HEART, ANGINA PRESENCE UNSPECIFIED, UNSPECIFIED VESSEL OR LESION TYPE: ICD-10-CM

## 2017-03-10 DIAGNOSIS — R07.89 ATYPICAL CHEST PAIN: ICD-10-CM

## 2017-03-10 DIAGNOSIS — R06.09 DYSPNEA ON EXERTION: Primary | ICD-10-CM

## 2017-03-10 LAB
ALBUMIN SERPL BCP-MCNC: 3.5 G/DL (ref 3.2–4.6)
ALBUMIN/GLOB SERPL: 0.7 {RATIO} (ref 1.2–3.5)
ALP SERPL-CCNC: 105 U/L (ref 50–136)
ALT SERPL-CCNC: 28 U/L (ref 12–65)
ANION GAP BLD CALC-SCNC: 10 MMOL/L (ref 7–16)
AST SERPL W P-5'-P-CCNC: 24 U/L (ref 15–37)
ATRIAL RATE: 70 BPM
BASOPHILS # BLD AUTO: 0 K/UL (ref 0–0.2)
BASOPHILS # BLD: 0 % (ref 0–2)
BILIRUB SERPL-MCNC: 0.4 MG/DL (ref 0.2–1.1)
BNP SERPL-MCNC: 30 PG/ML
BUN SERPL-MCNC: 37 MG/DL (ref 8–23)
CALCIUM SERPL-MCNC: 8.8 MG/DL (ref 8.3–10.4)
CALCULATED P AXIS, ECG09: 49 DEGREES
CALCULATED R AXIS, ECG10: 25 DEGREES
CALCULATED T AXIS, ECG11: -168 DEGREES
CHLORIDE SERPL-SCNC: 99 MMOL/L (ref 98–107)
CO2 SERPL-SCNC: 28 MMOL/L (ref 21–32)
CREAT SERPL-MCNC: 1.73 MG/DL (ref 0.8–1.5)
D DIMER PPP FEU-MCNC: 1.06 UG/ML(FEU)
DIAGNOSIS, 93000: NORMAL
DIASTOLIC BP, ECG02: NORMAL MMHG
DIFFERENTIAL METHOD BLD: ABNORMAL
EOSINOPHIL # BLD: 0.2 K/UL (ref 0–0.8)
EOSINOPHIL NFR BLD: 3 % (ref 0.5–7.8)
ERYTHROCYTE [DISTWIDTH] IN BLOOD BY AUTOMATED COUNT: 13.2 % (ref 11.9–14.6)
GLOBULIN SER CALC-MCNC: 5.3 G/DL (ref 2.3–3.5)
GLUCOSE SERPL-MCNC: 197 MG/DL (ref 65–100)
HCT VFR BLD AUTO: 46 % (ref 41.1–50.3)
HGB BLD-MCNC: 15.9 G/DL (ref 13.6–17.2)
IMM GRANULOCYTES # BLD: 0 K/UL (ref 0–0.5)
IMM GRANULOCYTES NFR BLD AUTO: 0.1 % (ref 0–5)
LYMPHOCYTES # BLD AUTO: 35 % (ref 13–44)
LYMPHOCYTES # BLD: 2.8 K/UL (ref 0.5–4.6)
MCH RBC QN AUTO: 31.6 PG (ref 26.1–32.9)
MCHC RBC AUTO-ENTMCNC: 34.6 G/DL (ref 31.4–35)
MCV RBC AUTO: 91.5 FL (ref 79.6–97.8)
MONOCYTES # BLD: 0.5 K/UL (ref 0.1–1.3)
MONOCYTES NFR BLD AUTO: 6 % (ref 4–12)
NEUTS SEG # BLD: 4.6 K/UL (ref 1.7–8.2)
NEUTS SEG NFR BLD AUTO: 56 % (ref 43–78)
P-R INTERVAL, ECG05: 134 MS
PLATELET # BLD AUTO: 212 K/UL (ref 150–450)
PMV BLD AUTO: 10.5 FL (ref 10.8–14.1)
POTASSIUM SERPL-SCNC: 3.9 MMOL/L (ref 3.5–5.1)
PROT SERPL-MCNC: 8.8 G/DL (ref 6.3–8.2)
Q-T INTERVAL, ECG07: 402 MS
QRS DURATION, ECG06: 98 MS
QTC CALCULATION (BEZET), ECG08: 434 MS
RBC # BLD AUTO: 5.03 M/UL (ref 4.23–5.67)
SODIUM SERPL-SCNC: 137 MMOL/L (ref 136–145)
SYSTOLIC BP, ECG01: NORMAL MMHG
TROPONIN I SERPL-MCNC: <0.02 NG/ML (ref 0.02–0.05)
TROPONIN I SERPL-MCNC: <0.02 NG/ML (ref 0.02–0.05)
VENTRICULAR RATE, ECG03: 70 BPM
WBC # BLD AUTO: 8.1 K/UL (ref 4.3–11.1)

## 2017-03-10 PROCEDURE — 83880 ASSAY OF NATRIURETIC PEPTIDE: CPT | Performed by: EMERGENCY MEDICINE

## 2017-03-10 PROCEDURE — 36415 COLL VENOUS BLD VENIPUNCTURE: CPT | Performed by: INTERNAL MEDICINE

## 2017-03-10 PROCEDURE — 85379 FIBRIN DEGRADATION QUANT: CPT | Performed by: EMERGENCY MEDICINE

## 2017-03-10 PROCEDURE — 96372 THER/PROPH/DIAG INJ SC/IM: CPT

## 2017-03-10 PROCEDURE — 93005 ELECTROCARDIOGRAM TRACING: CPT | Performed by: EMERGENCY MEDICINE

## 2017-03-10 PROCEDURE — 74011250637 HC RX REV CODE- 250/637: Performed by: INTERNAL MEDICINE

## 2017-03-10 PROCEDURE — 94760 N-INVAS EAR/PLS OXIMETRY 1: CPT

## 2017-03-10 PROCEDURE — 99218 HC RM OBSERVATION: CPT

## 2017-03-10 PROCEDURE — 84484 ASSAY OF TROPONIN QUANT: CPT | Performed by: EMERGENCY MEDICINE

## 2017-03-10 PROCEDURE — 74011636320 HC RX REV CODE- 636/320: Performed by: INTERNAL MEDICINE

## 2017-03-10 PROCEDURE — 85025 COMPLETE CBC W/AUTO DIFF WBC: CPT | Performed by: EMERGENCY MEDICINE

## 2017-03-10 PROCEDURE — 99284 EMERGENCY DEPT VISIT MOD MDM: CPT | Performed by: EMERGENCY MEDICINE

## 2017-03-10 PROCEDURE — 80053 COMPREHEN METABOLIC PANEL: CPT | Performed by: EMERGENCY MEDICINE

## 2017-03-10 PROCEDURE — 71260 CT THORAX DX C+: CPT

## 2017-03-10 PROCEDURE — 74011250636 HC RX REV CODE- 250/636: Performed by: INTERNAL MEDICINE

## 2017-03-10 PROCEDURE — 74011000258 HC RX REV CODE- 258: Performed by: INTERNAL MEDICINE

## 2017-03-10 PROCEDURE — 71020 XR CHEST PA LAT: CPT

## 2017-03-10 RX ORDER — SODIUM CHLORIDE 0.9 % (FLUSH) 0.9 %
5-10 SYRINGE (ML) INJECTION AS NEEDED
Status: DISCONTINUED | OUTPATIENT
Start: 2017-03-10 | End: 2017-03-11 | Stop reason: HOSPADM

## 2017-03-10 RX ORDER — ALBUTEROL SULFATE 90 UG/1
2 AEROSOL, METERED RESPIRATORY (INHALATION)
Status: DISCONTINUED | OUTPATIENT
Start: 2017-03-10 | End: 2017-03-11

## 2017-03-10 RX ORDER — ATORVASTATIN CALCIUM 40 MG/1
40 TABLET, FILM COATED ORAL DAILY
Status: CANCELLED | OUTPATIENT
Start: 2017-03-11

## 2017-03-10 RX ORDER — GUAIFENESIN 600 MG/1
600 TABLET, EXTENDED RELEASE ORAL 2 TIMES DAILY
Status: DISCONTINUED | OUTPATIENT
Start: 2017-03-10 | End: 2017-03-11 | Stop reason: HOSPADM

## 2017-03-10 RX ORDER — PRASUGREL 10 MG/1
10 TABLET, FILM COATED ORAL DAILY
Status: CANCELLED | OUTPATIENT
Start: 2017-03-11

## 2017-03-10 RX ORDER — SODIUM CHLORIDE 0.9 % (FLUSH) 0.9 %
10 SYRINGE (ML) INJECTION
Status: COMPLETED | OUTPATIENT
Start: 2017-03-10 | End: 2017-03-10

## 2017-03-10 RX ORDER — ISOSORBIDE MONONITRATE 30 MG/1
30 TABLET, EXTENDED RELEASE ORAL DAILY
Status: CANCELLED | OUTPATIENT
Start: 2017-03-11

## 2017-03-10 RX ORDER — FAMOTIDINE 20 MG/1
20 TABLET, FILM COATED ORAL 2 TIMES DAILY
Status: CANCELLED | OUTPATIENT
Start: 2017-03-10

## 2017-03-10 RX ORDER — CLOPIDOGREL BISULFATE 75 MG/1
75 TABLET ORAL DAILY
Status: DISCONTINUED | OUTPATIENT
Start: 2017-03-11 | End: 2017-03-11 | Stop reason: HOSPADM

## 2017-03-10 RX ORDER — BENZONATATE 100 MG/1
100 CAPSULE ORAL
Status: DISCONTINUED | OUTPATIENT
Start: 2017-03-10 | End: 2017-03-11 | Stop reason: HOSPADM

## 2017-03-10 RX ORDER — CARVEDILOL 6.25 MG/1
6.25 TABLET ORAL 2 TIMES DAILY WITH MEALS
Status: CANCELLED | OUTPATIENT
Start: 2017-03-10

## 2017-03-10 RX ORDER — SODIUM CHLORIDE 9 MG/ML
75 INJECTION, SOLUTION INTRAVENOUS CONTINUOUS
Status: DISCONTINUED | OUTPATIENT
Start: 2017-03-10 | End: 2017-03-11 | Stop reason: HOSPADM

## 2017-03-10 RX ORDER — TEMAZEPAM 15 MG/1
15 CAPSULE ORAL
Status: DISCONTINUED | OUTPATIENT
Start: 2017-03-10 | End: 2017-03-11 | Stop reason: HOSPADM

## 2017-03-10 RX ORDER — ENOXAPARIN SODIUM 100 MG/ML
40 INJECTION SUBCUTANEOUS EVERY 24 HOURS
Status: DISCONTINUED | OUTPATIENT
Start: 2017-03-10 | End: 2017-03-11 | Stop reason: HOSPADM

## 2017-03-10 RX ORDER — RANOLAZINE 500 MG/1
500 TABLET, EXTENDED RELEASE ORAL 2 TIMES DAILY
Status: CANCELLED | OUTPATIENT
Start: 2017-03-10

## 2017-03-10 RX ORDER — NITROGLYCERIN 0.4 MG/1
0.4 TABLET SUBLINGUAL
Status: DISCONTINUED | OUTPATIENT
Start: 2017-03-10 | End: 2017-03-11 | Stop reason: HOSPADM

## 2017-03-10 RX ORDER — SODIUM CHLORIDE 0.9 % (FLUSH) 0.9 %
5-10 SYRINGE (ML) INJECTION EVERY 8 HOURS
Status: DISCONTINUED | OUTPATIENT
Start: 2017-03-10 | End: 2017-03-11 | Stop reason: HOSPADM

## 2017-03-10 RX ORDER — ASPIRIN 81 MG/1
81 TABLET ORAL DAILY
Status: DISCONTINUED | OUTPATIENT
Start: 2017-03-11 | End: 2017-03-11 | Stop reason: HOSPADM

## 2017-03-10 RX ADMIN — ENOXAPARIN SODIUM 40 MG: 40 INJECTION SUBCUTANEOUS at 20:43

## 2017-03-10 RX ADMIN — ALBUTEROL SULFATE 2 PUFF: 90 AEROSOL, METERED RESPIRATORY (INHALATION) at 20:44

## 2017-03-10 RX ADMIN — SODIUM CHLORIDE 75 ML/HR: 900 INJECTION, SOLUTION INTRAVENOUS at 18:00

## 2017-03-10 RX ADMIN — Medication 10 ML: at 20:45

## 2017-03-10 RX ADMIN — SODIUM CHLORIDE 100 ML: 900 INJECTION, SOLUTION INTRAVENOUS at 18:35

## 2017-03-10 RX ADMIN — Medication 10 ML: at 18:35

## 2017-03-10 RX ADMIN — IOVERSOL 100 ML: 741 INJECTION INTRA-ARTERIAL; INTRAVENOUS at 18:35

## 2017-03-10 NOTE — H&P
Note dictated  Imp:  Chest pain  Sob  Cad  Copd  Plan:  I think there may be a drug reaction. Will stop all meds x for aspirin and change Effient to Plavix. Stop diuretic rx and hydrate. Add inhaler rx round the clock. Home in AM if troponins ok.

## 2017-03-10 NOTE — ED TRIAGE NOTES
Pt reports shortness of breath for the past week. Pt has had recent stents placed.  Pt states dry cough and tightness in his chest

## 2017-03-10 NOTE — IP AVS SNAPSHOT
Current Discharge Medication List  
  
Take these medications at their scheduled times Dose & Instructions Dispensing Information Comments Morning Noon Evening Bedtime  
 aspirin 81 mg tablet Your next dose is:  Tomorrow Dose:  81 mg Take 81 mg by mouth daily. Refills:  0  
     
  
   
   
   
  
 clopidogrel 75 mg Tab Commonly known as:  PLAVIX Your next dose is:  Tomorrow Dose:  75 mg Take 1 Tab by mouth daily. Quantity:  30 Tab Refills:  11  
     
  
   
   
   
  
 guaiFENesin  mg SR tablet Commonly known as:  Rodrigo & Rodrigo Your next dose is: Today Dose:  600 mg Take 1 Tab by mouth two (2) times a day. Quantity:  20 Tab Refills:  1  
     
   
   
   
  
  
 raNITIdine 150 mg tablet Commonly known as:  ZANTAC Your next dose is:  Tomorrow Dose:  150 mg Take 1 Tab by mouth two (2) times a day. Indications: GASTROESOPHAGEAL REFLUX, HEARTBURN Quantity:  60 Tab Refills:  11 Take these medications as needed Dose & Instructions Dispensing Information Comments Morning Noon Evening Bedtime  
 albuterol 90 mcg/actuation inhaler Commonly known as:  VENTOLIN HFA Your next dose is: Today Dose:  2 Puff Take 2 Puffs by inhalation every four (4) hours as needed for Wheezing or Shortness of Breath. Quantity:  1 Inhaler Refills:  4  
     
   
   
   
  
 benzonatate 100 mg capsule Commonly known as:  TESSALON Your next dose is: Today Dose:  100 mg Take 1 Cap by mouth three (3) times daily as needed for Cough for up to 7 days. Quantity:  60 Cap Refills:  0  
     
   
   
  
   
  
 nitroglycerin 0.4 mg SL tablet Commonly known as:  NITROSTAT Dose:  0.4 mg  
1 Tab by SubLINGual route every five (5) minutes as needed for Chest Pain. Quantity:  1 Bottle Refills:  5  
     
   
   
   
  
 temazepam 15 mg capsule Commonly known as:  RESTORIL  
 Your next dose is: Today Dose:  15 mg Take 1 Cap by mouth nightly as needed for Sleep. Max Daily Amount: 15 mg. Quantity:  30 Cap Refills:  2 Where to Get Your Medications Information about where to get these medications is not yet available ! Ask your nurse or doctor about these medications  
  benzonatate 100 mg capsule  
 clopidogrel 75 mg Tab

## 2017-03-10 NOTE — H&P
[facility] 64   HISTORY AND PHYSICAL       Name:  Reynaldo Meeks   MR#:  [id number]   :  1955   Account #:  [de-identified]   Date of Adm:  03/10/2017       HISTORY: This is a 39-year-old gentleman admitted for evaluation   of chest pain and shortness of breath. He has a history of coronary disease. He has had prior CABG and   PCI. He was recently admitted with chest pain and had PCI to a   subtotally occluded LAD via his saphenous vein graft. In spite   of this intervention, he has not felt well. He has had chronic   recurrent chest pain and shortness of breath in spite of   intensified medical therapy. He was supposed to see me in the   office Monday for further evaluation, but comes to the emergency   room for evaluation today. He takes nitroglycerin for the pain,   but it does not really seem to help much. The pain is at rest,   but also with exertion. There has been no fever, chills, or   increased cough or hemoptysis. PAST MEDICAL HISTORY: Remarkable for hypertension, smoking, PAD,   and suspected COPD. Reflux is also listed. FAMILY HISTORY: As outlined in the old record. MEDICATIONS: His most recent list of medications included   1. Carvedilol. 2.  Valsartan. 3.  Lasix. 4.  Spironolactone. 5.  Restoril. 6.  Mucinex. 7.  Lipitor. 8.  Zantac. 9.  Effient. 10. P.r.n. nitroglycerin. 11. Ventolin. SOCIAL HISTORY: Reveals him to have been a smoker and he   continues to smoke. No alcohol. ALLERGIES: NONE KNOWN. HE LISTS ASPIRIN, BUT IS ABLE TO TAKE IT   AT LOW DOSES. REVIEW OF SYSTEMS: Performed and otherwise noncontributory. There has been no fever, chills, diplopia, tinnitus, epistaxis,   dysphagia, abdominal pain or melena, flank pain or dysuria,   rash, adenopathy, or signs or symptoms of stroke.     PHYSICAL EXAMINATION   VITAL SIGNS: His blood pressure and pulse are normal.   GENERAL: This is a well-developed, well-nourished gentleman, in no acute distress. HENT: Grossly negative. There is no jaundice or drainage. NECK: Veins are flat. Carotids are negative. LUNGS: Reveal scattered wheezes. HEART: Reveals a regular rate and rhythm. There is a soft   ejection murmur in the aortic area. No rub. ABDOMEN: Soft. No masses. No tenderness. EXTREMITIES: There is no lower extremity edema. NEUROLOGIC: Grossly negative. MUSCULOSKELETAL: Grossly negative. GENITAL/RECTAL: Not performed. DIAGNOSTIC DATA: His EKG shows anterior T-wave changes,   unchanged from prior electrocardiograms. LABORATORY DATA: His blood work shows a normal CBC. His blood   sugar is 197. His BUN and creatinine are up from baseline to 37   and 1.7. His troponin is negative. His BNP is 30. A chest x-ray   shows no acute abnormality. IMPRESSION: Persistent chest pain and dyspnea in a middle-aged   smoker with longstanding ischemic heart disease, probable   chronic obstructive pulmonary disease, and recent coronary   intervention. PLAN: He will be admitted His medications are adjusted. Diuretic   therapy will be held. Some hydration will be administered. He   may end up requiring repeat cardiac catheterization.         MD DAWSON Gottlieb / MARINA   D:  03/10/2017   17:16   T:  03/10/2017   17:50   Job #:  [id number]

## 2017-03-10 NOTE — IP AVS SNAPSHOT
Maricel Stewart 
 
 
 [address] [facility] 
[phone] Patient: Mallory Watson MRN: [id number] EEY:[id number]/[id number] You are allergic to the following Allergen Reactions Aspirin Swelling Takes 81 mg at home. Patient can tolerate Aspirin in low doses, but in larger doses causes swelling. Recent Documentation Height Weight BMI Smoking Status 1.702 m 80.3 kg 27.72 kg/m2 Current Every Day Smoker Emergency Contacts Name Discharge Info Relation Home Work Mobile Lucas Minor  Child [2] [phone] About your hospitalization You were admitted on:  March 10, 2017 You last received care in the:  [facility] 2 CV STEPDOWN You were discharged on:  March 11, 2017 Unit phone number:  [phone] Why you were hospitalized Your primary diagnosis was:  Not on File Providers Seen During Your Hospitalizations Provider Role Specialty Primary office phone Brian Mosley MD Attending Provider Emergency Medicine [phone] Quiana Leach MD Attending Provider Cardiology [phone] Your Primary Care Physician (PCP) Primary Care Physician Office Phone Office Fax Dajuan Johnson, [address] [facility] [phone] Follow-up Information Follow up With Details Comments Contact Info Quiana Leach MD On 3/13/2017 Follow up with Dr. Long Osborne on 3/13/17 @ [phone] in the [place] office [address]  [address] [facility] [address] 
[phone] Delano White MD   [address] [facility] [address] [place] 
[phone] Your Appointments Monday March 13, 2017  9:45 AM EDT Office Visit with Quiana Leach MD  
[facility] ([address]) [address] [place]  
[address] [place] [address]  
[phone] Current Discharge Medication List  
  
START taking these medications Dose & Instructions Dispensing Information Comments Morning Noon Evening Bedtime  
 benzonatate 100 mg capsule Commonly known as:  TESSALON Your next dose is: Today Dose:  100 mg Take 1 Cap by mouth three (3) times daily as needed for Cough for up to 7 days. Quantity:  60 Cap Refills:  0  
     
   
   
  
   
  
 clopidogrel 75 mg Tab Commonly known as:  PLAVIX Your next dose is:  Tomorrow Dose:  75 mg Take 1 Tab by mouth daily. Quantity:  30 Tab Refills:  11 CONTINUE these medications which have NOT CHANGED Dose & Instructions Dispensing Information Comments Morning Noon Evening Bedtime  
 albuterol 90 mcg/actuation inhaler Commonly known as:  VENTOLIN HFA Your next dose is: Today Dose:  2 Puff Take 2 Puffs by inhalation every four (4) hours as needed for Wheezing or Shortness of Breath. Quantity:  1 Inhaler Refills:  4  
     
   
   
   
  
 aspirin 81 mg tablet Your next dose is:  Tomorrow Dose:  81 mg Take 81 mg by mouth daily. Refills:  0  
     
  
   
   
   
  
 guaiFENesin  mg SR tablet Commonly known as:  Rodrigo & Rodrigo Your next dose is: Today Dose:  600 mg Take 1 Tab by mouth two (2) times a day. Quantity:  20 Tab Refills:  1  
     
   
   
   
  
  
 nitroglycerin 0.4 mg SL tablet Commonly known as:  NITROSTAT Dose:  0.4 mg  
1 Tab by SubLINGual route every five (5) minutes as needed for Chest Pain. Quantity:  1 Bottle Refills:  5  
     
   
   
   
  
 raNITIdine 150 mg tablet Commonly known as:  ZANTAC Your next dose is:  Tomorrow Dose:  150 mg Take 1 Tab by mouth two (2) times a day. Indications: GASTROESOPHAGEAL REFLUX, HEARTBURN Quantity:  60 Tab Refills:  11  
     
  
   
   
   
  
 temazepam 15 mg capsule Commonly known as:  RESTORIL Your next dose is: Today  Dose:  15 mg  
 Take 1 Cap by mouth nightly as needed for Sleep. Max Daily Amount: 15 mg. Quantity:  30 Cap Refills:  2 STOP taking these medications   
 atorvastatin 40 mg tablet Commonly known as:  LIPITOR  
   
  
 carvedilol 3.125 mg tablet Commonly known as:  COREG  
   
  
 furosemide 40 mg tablet Commonly known as:  LASIX  
   
  
 prasugrel 10 mg tablet Commonly known as:  EFFIENT  
   
  
 spironolactone 25 mg tablet Commonly known as:  ALDACTONE  
   
  
 valsartan 40 mg tablet Commonly known as:  DIOVAN Where to Get Your Medications Information on where to get these meds will be given to you by the nurse or doctor. ! Ask your nurse or doctor about these medications  
  benzonatate 100 mg capsule  
 clopidogrel 75 mg Tab Discharge Instructions DISCHARGE SUMMARY from Nurse The following personal items are in your possession at time of discharge: 
 
Dental Appliances: None Visual Aid: Glasses, With patient sent home with patient Home Medications: None Jewelry: Watch Clothing: Footwear, Pants, Shirt, Undergarments, Socks, At bedside sent home with patient Other Valuables: Heriberto Roe sent home with patient PATIENT INSTRUCTIONS: 
 
 
F-face looks uneven A-arms unable to move or move unevenly S-speech slurred or non-existent T-time-call 911 as soon as signs and symptoms begin-DO NOT go Back to bed or wait to see if you get better-TIME IS BRAIN. Warning Signs of HEART ATTACK Call 911 if you have these symptoms: 
? Chest discomfort. Most heart attacks involve discomfort in the center of the chest that lasts more than a few minutes, or that goes away and comes back. It can feel like uncomfortable pressure, squeezing, fullness, or pain. ? Discomfort in other areas of the upper body.  Symptoms can include pain or discomfort in one or both arms, the back, neck, jaw, or stomach. ? Shortness of breath with or without chest discomfort. ? Other signs may include breaking out in a cold sweat, nausea, or lightheadedness. Don't wait more than five minutes to call [address]! Fast action can save your life. Calling 911 is almost always the fastest way to get lifesaving treatment. Emergency Medical Services staff can begin treatment when they arrive  up to an hour sooner than if someone gets to the hospital by car. The discharge information has been reviewed with the patient. The patient verbalized understanding. Discharge medications reviewed with the patient and appropriate educational materials and side effects teaching were provided. Discharge Instructions Attachments/References ANGINA (ENGLISH) CHEST PAIN (ENGLISH) SMOKING: STOPPING (ENGLISH) SECONDHAND SMOKE (ENGLISH) PULMONARY NODULES: GENERAL INFO (ENGLISH) Discharge Orders None [facility] Announcement We are excited to announce that we are making your provider's discharge notes available to you in [facility]. You will see these notes when they are completed and signed by the physician that discharged you from your recent hospital stay. If you have any questions or concerns about any information you see in [facility], please call the Health Information Department where you were seen or reach out to your Primary Care Provider for more information about your plan of care. Introducing [facility]! Dear Anthony Dyson: Thank you for requesting a [facility] account. Our records indicate that you already have an active [facility] account. You can access your account anytime at [email or web address] Did you know that you can access your hospital and ER discharge instructions at any time in [facility]? You can also review all of your test results from your hospital stay or ER visit. Additional Information If you have questions, please visit the Frequently Asked Questions section of the Good[employer]! website at [email or web address] Remember, GoodCh[employer]! is NOT to be used for urgent needs. For medical emergencies, dial 911. Now available from your iPhone and Android! General Information Please provide this summary of care documentation to your next provider. Patient Signature:  ____________________________________________________________ Date:  ____________________________________________________________  
  
Steve Carrera Provider Signature:  ____________________________________________________________ Date:  ____________________________________________________________ More Information Angina: Care Instructions Your Care Instructions You have a problem called angina. Angina happens when there is not enough blood flow to your heart muscle. Angina is a sign of coronary artery disease (CAD). CAD occurs when blood vessels that supply the heart become narrowed. Having CAD increases your risk of a heart attack. Chest pain or pressure is the most common symptom of angina. But some people have other symptoms, like: 
· Pain, pressure, or a strange feeling in the back, neck, jaw, or upper belly, or in one or both shoulders or arms. · Shortness of breath. · Nausea or vomiting. · Lightheadedness or sudden weakness. · Fast or irregular heartbeat. Women are somewhat more likely than men to have angina symptoms like shortness of breath, nausea, and back or jaw pain. Angina can be dangerous. That's why it is important to pay attention to your symptoms. Know what is typical for you, learn how to control your symptoms, and understand when you need to get treatment. A change in your usual pattern of symptoms is an emergency. It may mean that you are having a heart attack. The doctor has checked you carefully, but problems can develop later.  If you notice any problems or new symptoms, get medical treatment right away. Follow-up care is a key part of your treatment and safety. Be sure to make and go to all appointments, and call your doctor if you are having problems. It's also a good idea to know your test results and keep a list of the medicines you take. How can you care for yourself at home? Medicines · If your doctor has given you nitroglycerin for angina symptoms, keep it with you at all times. If you have symptoms, sit down and rest, and take the first dose of nitroglycerin as directed. If your symptoms get worse or are not getting better within 5 minutes, call 911 right away. Stay on the phone. The emergency  will give you further instructions. · If your doctor advises it, take 1 low-dose aspirin a day to prevent heart attack. · Be safe with medicines. Take your medicines exactly as prescribed. Call your doctor if you think you are having a problem with your medicine. You will get more details on the specific medicines your doctor prescribes. Lifestyle changes · Do not smoke. If you need help quitting, talk to your doctor about stop-smoking programs and medicines. These can increase your chances of quitting for good. · Eat a heart-healthy diet that is low in saturated fat and salt, and is high in fiber. Talk to your doctor or a dietitian about healthy eating. · Stay at a healthy weight. Or lose weight if you need to. Activity · Talk to your doctor about a level of activity that is safe for you. · If an activity causes angina symptoms, stop and rest. 
When should you call for help? Call 911 anytime you think you may need emergency care. For example, call if: 
· You passed out (lost consciousness). · You have symptoms of a heart attack. These may include: ¨ Chest pain or pressure, or a strange feeling in the chest. 
¨ Sweating. ¨ Shortness of breath. ¨ Nausea or vomiting. ¨ Pain, pressure, or a strange feeling in the back, neck, jaw, or upper belly or in one or both shoulders or arms. ¨ Lightheadedness or sudden weakness. ¨ A fast or irregular heartbeat. After you call 911, the  may tell you to chew 1 adult-strength or 2 to 4 low-dose aspirin. Wait for an ambulance. Do not try to drive yourself. · You have angina symptoms that do not go away with rest or are not getting better within 5 minutes after you take a dose of nitroglycerin. Call your doctor now or seek immediate medical care if: 
· You are having angina symptoms more often than usual, or they are different or worse than usual. 
· You feel dizzy or lightheaded, or you feel like you may faint. Watch closely for changes in your health, and be sure to contact your doctor if you have any problems. Where can you learn more? Go to http://jazConforMISyaritza.info/. Enter H129 in the search box to learn more about \"Angina: Care Instructions. \" Current as of: January 27, 2016 Content Version: 11.1 © 6015-3176 TabSquare. Care instructions adapted under license by [employer] (which disclaims liability or warranty for this information). If you have questions about a medical condition or this instruction, always ask your healthcare professional. [address] any warranty or liability for your use of this information. Chest Pain: Care Instructions Your Care Instructions There are many things that can cause chest pain. Some are not serious and will get better on their own in a few days. But some kinds of chest pain need more testing and treatment. Your doctor may have recommended a follow-up visit in the next 8 to 12 hours. If you are not getting better, you may need more tests or treatment. Even though your doctor has released you, you still need to watch for any problems.  The doctor carefully checked you, but sometimes problems can develop later. If you have new symptoms or if your symptoms do not get better, get medical care right away. If you have worse or different chest pain or pressure that lasts more than 5 minutes or you passed out (lost consciousness), call 911 or seek other emergency help right away. A medical visit is only one step in your treatment. Even if you feel better, you still need to do what your doctor recommends, such as going to all suggested follow-up appointments and taking medicines exactly as directed. This will help you recover and help prevent future problems. How can you care for yourself at home? · Rest until you feel better. · Take your medicine exactly as prescribed. Call your doctor if you think you are having a problem with your medicine. · Do not drive after taking a prescription pain medicine. When should you call for help? Call 911 if: 
· You passed out (lost consciousness). · You have severe difficulty breathing. · You have symptoms of a heart attack. These may include: ¨ Chest pain or pressure, or a strange feeling in your chest. 
¨ Sweating. ¨ Shortness of breath. ¨ Nausea or vomiting. ¨ Pain, pressure, or a strange feeling in your back, neck, jaw, or upper belly or in one or both shoulders or arms. ¨ Lightheadedness or sudden weakness. ¨ A fast or irregular heartbeat. After you call 911, the  may tell you to chew 1 adult-strength or 2 to 4 low-dose aspirin. Wait for an ambulance. Do not try to drive yourself. Call your doctor today if: 
· You have any trouble breathing. · Your chest pain gets worse. · You are dizzy or lightheaded, or you feel like you may faint. · You are not getting better as expected. · You are having new or different chest pain. Where can you learn more? Go to http://jaz-yaritza.info/. Enter A120 in the search box to learn more about \"Chest Pain: Care Instructions. \" Current as of: May 27, 2016 Content Version: 11.1 © 3052-3733 Healthwise, Incorporated. Care instructions adapted under license by [employer] (which disclaims liability or warranty for this information). If you have questions about a medical condition or this instruction, always ask your healthcare professional. [address] any warranty or liability for your use of this information. Stopping Smoking: Care Instructions Your Care Instructions Cigarette smokers crave the nicotine in cigarettes. Giving it up is much harder than simply changing a habit. Your body has to stop craving the nicotine. It is hard to quit, but you can do it. There are many tools that people use to quit smoking. You may find that combining tools works best for you. There are several steps to quitting. First you get ready to quit. Then you get support to help you. After that, you learn new skills and behaviors to become a nonsmoker. For many people, a necessary step is getting and using medicine. Your doctor will help you set up the plan that best meets your needs. You may want to attend a smoking cessation program to help you quit smoking. When you choose a program, look for one that has proven success. Ask your doctor for ideas. You will greatly increase your chances of success if you take medicine as well as get counseling or join a cessation program. 
Some of the changes you feel when you first quit tobacco are uncomfortable. Your body will miss the nicotine at first, and you may feel short-tempered and grumpy. You may have trouble sleeping or concentrating. Medicine can help you deal with these symptoms. You may struggle with changing your smoking habits and rituals. The last step is the tricky one: Be prepared for the smoking urge to continue for a time. This is a lot to deal with, but keep at it. You will feel better. Follow-up care is a key part of your treatment and safety.  Be sure to make and go to all appointments, and call your doctor if you are having problems. Its also a good idea to know your test results and keep a list of the medicines you take. How can you care for yourself at home? · Ask your family, friends, and coworkers for support. You have a better chance of quitting if you have help and support. · Join a support group, such as Nicotine Anonymous, for people who are trying to quit smoking. · Consider signing up for a smoking cessation program, such as the American Lung Association's Freedom from Smoking program. 
· Set a quit date. Pick your date carefully so that it is not right in the middle of a big deadline or stressful time. Once you quit, do not even take a puff. Get rid of all ashtrays and lighters after your last cigarette. Clean your house and your clothes so that they do not smell of smoke. · Learn how to be a nonsmoker. Think about ways you can avoid those things that make you reach for a cigarette. ¨ Avoid situations that put you at greatest risk for smoking. For some people, it is hard to have a drink with friends without smoking. For others, they might skip a coffee break with coworkers who smoke. ¨ Change your daily routine. Take a different route to work or eat a meal in a different place. · Cut down on stress. Calm yourself or release tension by doing an activity you enjoy, such as reading a book, taking a hot bath, or gardening. · Talk to your doctor or pharmacist about nicotine replacement therapy, which replaces the nicotine in your body. You still get nicotine but you do not use tobacco. Nicotine replacement products help you slowly reduce the amount of nicotine you need. These products come in several forms, many of them available over-the-counter: ¨ Nicotine patches ¨ Nicotine gum and lozenges ¨ Nicotine inhaler · Ask your doctor about bupropion (Wellbutrin) or varenicline (Chantix), which are prescription medicines. They do not contain nicotine. They help you by reducing withdrawal symptoms, such as stress and anxiety. · Some people find hypnosis, acupuncture, and massage helpful for ending the smoking habit. · Eat a healthy diet and get regular exercise. Having healthy habits will help your body move past its craving for nicotine. · Be prepared to keep trying. Most people are not successful the first few times they try to quit. Do not get mad at yourself if you smoke again. Make a list of things you learned and think about when you want to try again, such as next week, next month, or next year. Where can you learn more? Go to http://jazSurIDxyaritza.info/. Enter H914 in the search box to learn more about \"Stopping Smoking: Care Instructions. \" Current as of: May 26, 2016 Content Version: 11.1 © 3866-8900 ividence. Care instructions adapted under license by [employer] (which disclaims liability or warranty for this information). If you have questions about a medical condition or this instruction, always ask your healthcare professional. [address] any warranty or liability for your use of this information. Secondhand Smoke: Care Instructions Your Care Instructions Secondhand smoke comes from the burning end of a cigarette, cigar, or pipe and the smoke that a smoker exhales. The smoke contains nicotine and many other harmful chemicals. Breathing secondhand smoke can cause or worsen health problems including cancer, asthma, coronary artery disease, and respiratory infections. It can make your eyes and nose burn and cause a sore throat. Secondhand smoke is especially bad for babies and young children whose lungs are still developing.  Babies whose parents smoke are more likely to have ear infections, pneumonia, and bronchitis in the first few years of their lives. Secondhand smoke can make asthma symptoms worse in children. If you are pregnant, it is important that you not smoke and that you avoid secondhand smoke. You are more likely to give birth to a baby who weighs less than expected (low birth weight) if you smoke. And your baby may have a greater risk for sudden infant death syndrome (SIDS). Babies whose mothers are exposed to secondhand smoke during pregnancy have a higher risk for health problems. Follow-up care is a key part of your treatment and safety. Be sure to make and go to all appointments, and call your doctor if you are having problems. It's also a good idea to know your test results and keep a list of the medicines you take. How can you care for yourself at home? · Do not smoke or let anyone else smoke in your home. If people must smoke, ask them to go outside. · If people do smoke in your home, choose a room where you can open a window or use a fan to get the smoke outside. · Do not let anyone smoke in your car. If someone must smoke, pull over in a safe place and let him or her smoke away from the car. · Ask your employer to make sure that you have a smoke-free work area. · Make sure that your children are not exposed to secondhand smoke at day care, school, and after-school programs. · Try to choose nonsmoking bars, restaurants, and other public places when you go out. · Help your family and friends who smoke to quit by encouraging them to try. Tell them about treatment resources. Having support from others often helps. · If you smoke, quit. Quitting is hard, but there are ways to boost your chance of quitting tobacco for good. ¨ Use nicotine gum, patches, or lozenges. Call a quitline. Ask your doctor about stop-smoking programs and medicines. ¨ Keep trying. When should you call for help? Watch closely for changes in your health, and be sure to contact your doctor if you have any problems. Where can you learn more? Go to http://jaz-yaritza.info/. Enter L004 in the search box to learn more about \"Secondhand Smoke: Care Instructions. \" Current as of: May 26, 2016 Content Version: 11.1 © 3501-8349 mobiliThink. Care instructions adapted under license by [employer] (which disclaims liability or warranty for this information). If you have questions about a medical condition or this instruction, always ask your healthcare professional. [facility] [address] any warranty or liability for your use of this information. Learning About Lung Nodules What is a lung nodule? A lung nodule is a growth in the lung. A single nodule surrounded by lung tissue is called a solitary pulmonary nodule. A lung nodule might not cause any symptoms. Your doctor may have found one or more nodules on your lung when you were having a chest X-ray or CT scan. Or it may have been found during a lung cancer screening. A lung nodule may be caused by an old infection or cancer. It might also be a noncancerous growth. Lung nodules can cause a screening to give an abnormal result. Most nodules do not cause any harm. But without further tests, your doctor can't tell whether an abnormal finding is cancer, a harmless nodule, or something else. What can you expect when you have a lung nodule? Your doctor will look at several risk factors to see how likely it is that the nodule is cancer. He or she will look at: · Whether you smoke or have ever smoked. · Your age and your family's medical history. · Whether you have ever had lung cancer. · The size and shape of the nodule. · Whether the nodule has changed in size. Your doctor may look at past chest X-rays or CT scans, if available, and compare them. Or you may have a series of CT scans to see if the nodule grows over time. What happens next depends on the risk of the nodule being cancer. · If you have no risk factors and the nodule is small, your doctor may advise doing nothing. · If the risk is small, your doctor may schedule follow-up appointments and tests. You may have more CT scans later to see if the nodule is growing. If the nodule hasn't changed in 3 to 6 months, you may have CT scans every year. If it hasn't changed in 2 years, you may not need any more tests. · If there's a higher risk of cancer, your doctor may: ¨ Do a PET scan, which may help tell if the nodule is cancerous or not. ¨ Take a sample of tissue from the nodule for testing. This is called a biopsy. ¨ Remove the nodule with surgery. Follow-up care is a key part of your treatment and safety. Be sure to make and go to all appointments, and call your doctor if you are having problems. It's also a good idea to know your test results and keep a list of the medicines you take. Where can you learn more? Go to http://jaz-yaritza.info/. Enter I847 in the search box to learn more about \"Learning About Lung Nodules. \" Current as of: July 26, 2016 Content Version: 11.1 © 8746-9178 [employer]. Care instructions adapted under license by [employer] (which disclaims liability or warranty for this information). If you have questions about a medical condition or this instruction, always ask your healthcare professional. [place] [address] any warranty or liability for your use of this information.

## 2017-03-10 NOTE — ED NOTES
TRANSFER - OUT REPORT:    Verbal report given to April RN on Martha Hammond  being transferred to 2234 for routine progression of care       Report consisted of patients Situation, Background, Assessment and   Recommendations(SBAR). Information from the following report(s) SBAR, ED Summary and MAR was reviewed with the receiving nurse. Lines:   Peripheral IV 03/10/17 Left Antecubital (Active)        Opportunity for questions and clarification was provided.       Patient transported with:   [employer]

## 2017-03-10 NOTE — ED PROVIDER NOTES
HPI Comments: 50-year-old gentleman had a stent placed in his LAD about 6 weeks ago. Seen a cardiologist office 10 days ago because increasing shortness of breath and his Lasix was increased. The shortness of breath has increased over the last few days read as increasing dyspnea on exertion and nonproductive cough. No real chest pain that he knows of but has perhaps slight tightness in chest.  No fever chills no vomiting or diarrhea no rash. The edema in his leg is diminished. States no history of lung disease other than occasionally gets bronchitis uses inhalers. He tried an inhaler for this but not improved any. No history of DVT or PE    Patient is a 60 y.o. male presenting with shortness of breath. The history is provided by the patient. Shortness of Breath   This is a new problem. The current episode started more than 1 week ago. The problem has been gradually worsening. Associated symptoms include cough and orthopnea. Pertinent negatives include no fever, no headaches, no coryza, no rhinorrhea, no sore throat, no neck pain, no sputum production, no hemoptysis, no wheezing, no chest pain, no syncope, no vomiting, no abdominal pain, no rash and no leg swelling. Associated medical issues include CAD and heart failure. Associated medical issues do not include pneumonia or DVT.         Past Medical History:   Diagnosis Date    Arthritis     CAD (coronary artery disease)     GERD (gastroesophageal reflux disease)     Hypertension        Past Surgical History:   Procedure Laterality Date    CARDIAC SURG PROCEDURE UNLIST      cabg quad 1999, stents 2008    HX HEART CATHETERIZATION  02/08/2012    HX HEENT      tonsil;s         Family History:   Problem Relation Age of Onset    Heart Disease Mother     Cancer Mother     Heart Disease Father     Diabetes Brother     Stroke Maternal Grandfather        Social History     Social History    Marital status: SINGLE     Spouse name: N/A    Number of children: N/A    Years of education: N/A     Occupational History    Not on file. Social History Main Topics    Smoking status: Current Every Day Smoker     Packs/day: 0.25     Years: 30.00    Smokeless tobacco: Not on file    Alcohol use No      Comment: occasional    Drug use: No    Sexual activity: Not on file     Other Topics Concern    Not on file     Social History Narrative         ALLERGIES: Aspirin    Review of Systems   Constitutional: Negative for chills and fever. HENT: Negative for rhinorrhea and sore throat. Respiratory: Positive for cough and shortness of breath. Negative for hemoptysis, sputum production and wheezing. Cardiovascular: Positive for orthopnea. Negative for chest pain, palpitations, leg swelling and syncope. Gastrointestinal: Negative for abdominal pain, diarrhea and vomiting. Genitourinary: Negative for dysuria and flank pain. Musculoskeletal: Negative for back pain and neck pain. Skin: Negative for color change and rash. Neurological: Negative for syncope and headaches. All other systems reviewed and are negative. Vitals:    03/10/17 1250   BP: 100/65   Pulse: 77   Resp: 18   Temp: 97.7 °F (36.5 °C)   SpO2: 96%   Weight: 81.6 kg (180 lb)   Height: 5' 7\" (1.702 m)            Physical Exam   Constitutional: He is oriented to person, place, and time. He appears well-developed and well-nourished. No distress. HENT:   Head: Normocephalic and atraumatic. Mouth/Throat: Oropharynx is clear and moist. No oropharyngeal exudate. Eyes: Conjunctivae and EOM are normal. Pupils are equal, round, and reactive to light. Neck: Normal range of motion. Neck supple. Cardiovascular: Normal rate, regular rhythm and intact distal pulses. No murmur heard. Pulmonary/Chest: No respiratory distress. He has rales (minimal at base). Abdominal: Soft. Bowel sounds are normal. He exhibits no mass. There is no tenderness. There is no rebound and no guarding.  No hernia. Neurological: He is alert and oriented to person, place, and time. Gait normal.   Nl speech   Skin: Skin is warm and dry. Psychiatric: He has a normal mood and affect. His speech is normal.   Nursing note and vitals reviewed. MDM  Number of Diagnoses or Management Options  Diagnosis management comments: Check EKG and troponin for ischemia. Chest x-ray and BNP to assess for congestive failure or pneumonia or effusion. Check electrolytes and renal function. Amount and/or Complexity of Data Reviewed  Clinical lab tests: ordered and reviewed  Tests in the radiology section of CPT®: ordered and reviewed  Tests in the medicine section of CPT®: ordered and reviewed  Independent visualization of images, tracings, or specimens: yes    Risk of Complications, Morbidity, and/or Mortality  Presenting problems: moderate  Diagnostic procedures: low  Management options: moderate  General comments: EKG shows normal sinus rhythm with some T-wave inversion does not significantly change.     Patient Progress  Patient progress: stable    ED Course       Procedures      Results Include:    Recent Results (from the past 24 hour(s))   EKG, 12 LEAD, INITIAL    Collection Time: 03/10/17 12:50 PM   Result Value Ref Range    Systolic BP  mmHg    Diastolic BP  mmHg    Ventricular Rate 70 BPM    Atrial Rate 70 BPM    P-R Interval 134 ms    QRS Duration 98 ms    Q-T Interval 402 ms    QTC Calculation (Bezet) 434 ms    Calculated P Axis 49 degrees    Calculated R Axis 25 degrees    Calculated T Axis -168 degrees    Diagnosis       Normal sinus rhythm with sinus arrhythmia  Incomplete right bundle branch block  ST & T wave abnormality, consider inferolateral ischemia  Abnormal ECG  When compared with ECG of 30-JAN-2017 06:15,  T wave inversion less evident in Anterolateral leads  QT has shortened  Confirmed by Ramesh Howell MD (), DELANEY SHORE ([id number]) on 3/10/2017 2:59:49 PM     CBC WITH AUTOMATED DIFF    Collection Time: 03/10/17 12:55 PM   Result Value Ref Range    WBC 8.1 4.3 - 11.1 K/uL    RBC 5.03 4.23 - 5.67 M/uL    HGB 15.9 13.6 - 17.2 g/dL    HCT 46.0 41.1 - 50.3 %    MCV 91.5 79.6 - 97.8 FL    MCH 31.6 26.1 - 32.9 PG    MCHC 34.6 31.4 - 35.0 g/dL    RDW 13.2 11.9 - 14.6 %    PLATELET 716 466 - 420 K/uL    MPV 10.5 (L) 10.8 - 14.1 FL    DF AUTOMATED      NEUTROPHILS 56 43 - 78 %    LYMPHOCYTES 35 13 - 44 %    MONOCYTES 6 4.0 - 12.0 %    EOSINOPHILS 3 0.5 - 7.8 %    BASOPHILS 0 0.0 - 2.0 %    IMMATURE GRANULOCYTES 0.1 0.0 - 5.0 %    ABS. NEUTROPHILS 4.6 1.7 - 8.2 K/UL    ABS. LYMPHOCYTES 2.8 0.5 - 4.6 K/UL    ABS. MONOCYTES 0.5 0.1 - 1.3 K/UL    ABS. EOSINOPHILS 0.2 0.0 - 0.8 K/UL    ABS. BASOPHILS 0.0 0.0 - 0.2 K/UL    ABS. IMM. GRANS. 0.0 0.0 - 0.5 K/UL   METABOLIC PANEL, COMPREHENSIVE    Collection Time: 03/10/17 12:55 PM   Result Value Ref Range    Sodium 137 136 - 145 mmol/L    Potassium 3.9 3.5 - 5.1 mmol/L    Chloride 99 98 - 107 mmol/L    CO2 28 21 - 32 mmol/L    Anion gap 10 7 - 16 mmol/L    Glucose 197 (H) 65 - 100 mg/dL    BUN 37 (H) 8 - 23 MG/DL    Creatinine 1.73 (H) 0.8 - 1.5 MG/DL    GFR est AA 52 (L) >60 ml/min/1.73m2    GFR est non-AA 43 (L) >60 ml/min/1.73m2    Calcium 8.8 8.3 - 10.4 MG/DL    Bilirubin, total 0.4 0.2 - 1.1 MG/DL    ALT (SGPT) 28 12 - 65 U/L    AST (SGOT) 24 15 - 37 U/L    Alk. phosphatase 105 50 - 136 U/L    Protein, total 8.8 (H) 6.3 - 8.2 g/dL    Albumin 3.5 3.2 - 4.6 g/dL    Globulin 5.3 (H) 2.3 - 3.5 g/dL    A-G Ratio 0.7 (L) 1.2 - 3.5     TROPONIN I    Collection Time: 03/10/17 12:55 PM   Result Value Ref Range    Troponin-I, Qt. <0.02 (L) 0.02 - 0.05 NG/ML        Xr Chest Pa Lat    Result Date: 3/10/2017  Chest 2 views dated 3/10/2017 Comparison x-ray 1/29/2017 CLINICAL INFORMATION: Shortness of breath Metallic sternal wires are present. Heart is borderline, unchanged. Mediastinum unremarkable. Interstitial markings are coarsened in the lungs as on the prior study.  No acute infiltrate in either lung. No pleural effusion. IMPRESSION: Stable appearance. No acute abnormality      EKG may be slightly congested. BNP is pending. As is a d-dimer.   Patient seems to have some early dehydration and increasing creatinine, I believe to increased diuresis

## 2017-03-11 VITALS
OXYGEN SATURATION: 95 % | WEIGHT: 177 LBS | SYSTOLIC BLOOD PRESSURE: 121 MMHG | TEMPERATURE: 97.6 F | DIASTOLIC BLOOD PRESSURE: 72 MMHG | BODY MASS INDEX: 27.78 KG/M2 | HEIGHT: 67 IN | HEART RATE: 68 BPM | RESPIRATION RATE: 16 BRPM

## 2017-03-11 LAB
ANION GAP BLD CALC-SCNC: 12 MMOL/L (ref 7–16)
ATRIAL RATE: 59 BPM
BUN SERPL-MCNC: 35 MG/DL (ref 8–23)
CALCIUM SERPL-MCNC: 8.5 MG/DL (ref 8.3–10.4)
CALCULATED P AXIS, ECG09: 46 DEGREES
CALCULATED R AXIS, ECG10: -10 DEGREES
CALCULATED T AXIS, ECG11: -132 DEGREES
CHLORIDE SERPL-SCNC: 103 MMOL/L (ref 98–107)
CO2 SERPL-SCNC: 26 MMOL/L (ref 21–32)
COLLECTION COMMENT, COLCM: NORMAL
CREAT SERPL-MCNC: 1.52 MG/DL (ref 0.8–1.5)
DIAGNOSIS, 93000: NORMAL
DIASTOLIC BP, ECG02: NORMAL MMHG
GLUCOSE SERPL-MCNC: 113 MG/DL (ref 65–100)
P-R INTERVAL, ECG05: 136 MS
POTASSIUM SERPL-SCNC: 4.4 MMOL/L (ref 3.5–5.1)
Q-T INTERVAL, ECG07: 446 MS
QRS DURATION, ECG06: 98 MS
QTC CALCULATION (BEZET), ECG08: 441 MS
SODIUM SERPL-SCNC: 141 MMOL/L (ref 136–145)
SYSTOLIC BP, ECG01: NORMAL MMHG
TROPONIN I SERPL-MCNC: <0.02 NG/ML (ref 0.02–0.05)
VENTRICULAR RATE, ECG03: 59 BPM

## 2017-03-11 PROCEDURE — 74011250636 HC RX REV CODE- 250/636: Performed by: INTERNAL MEDICINE

## 2017-03-11 PROCEDURE — 74011250637 HC RX REV CODE- 250/637: Performed by: INTERNAL MEDICINE

## 2017-03-11 PROCEDURE — 36415 COLL VENOUS BLD VENIPUNCTURE: CPT | Performed by: INTERNAL MEDICINE

## 2017-03-11 PROCEDURE — 80048 BASIC METABOLIC PNL TOTAL CA: CPT | Performed by: NURSE PRACTITIONER

## 2017-03-11 PROCEDURE — 84484 ASSAY OF TROPONIN QUANT: CPT | Performed by: INTERNAL MEDICINE

## 2017-03-11 PROCEDURE — 93005 ELECTROCARDIOGRAM TRACING: CPT | Performed by: INTERNAL MEDICINE

## 2017-03-11 PROCEDURE — 99218 HC RM OBSERVATION: CPT

## 2017-03-11 RX ORDER — ALBUTEROL SULFATE 90 UG/1
2 AEROSOL, METERED RESPIRATORY (INHALATION)
Status: DISCONTINUED | OUTPATIENT
Start: 2017-03-11 | End: 2017-03-11 | Stop reason: HOSPADM

## 2017-03-11 RX ORDER — CLOPIDOGREL BISULFATE 75 MG/1
75 TABLET ORAL DAILY
Qty: 30 TAB | Refills: 11 | Status: ON HOLD | OUTPATIENT
Start: 2017-03-11 | End: 2018-01-01 | Stop reason: SDUPTHER

## 2017-03-11 RX ORDER — BENZONATATE 100 MG/1
100 CAPSULE ORAL
Qty: 60 CAP | Refills: 0 | Status: SHIPPED | OUTPATIENT
Start: 2017-03-11 | End: 2017-03-18

## 2017-03-11 RX ADMIN — TEMAZEPAM 15 MG: 15 CAPSULE ORAL at 00:01

## 2017-03-11 RX ADMIN — CLOPIDOGREL BISULFATE 75 MG: 75 TABLET ORAL at 09:54

## 2017-03-11 RX ADMIN — ALBUTEROL SULFATE 2 PUFF: 90 AEROSOL, METERED RESPIRATORY (INHALATION) at 09:48

## 2017-03-11 RX ADMIN — Medication 10 ML: at 05:40

## 2017-03-11 RX ADMIN — NITROGLYCERIN 0.4 MG: 0.4 TABLET SUBLINGUAL at 10:25

## 2017-03-11 RX ADMIN — GUAIFENESIN 600 MG: 600 TABLET, EXTENDED RELEASE ORAL at 09:55

## 2017-03-11 RX ADMIN — ASPIRIN 81 MG: 81 TABLET, COATED ORAL at 09:54

## 2017-03-11 RX ADMIN — SODIUM CHLORIDE 75 ML/HR: 900 INJECTION, SOLUTION INTRAVENOUS at 00:01

## 2017-03-11 NOTE — PROGRESS NOTES
Oxygen rounds:     03/10/17 8094   Oxygen Therapy   O2 Sat (%) 91 %   Pulse via Oximetry 63 beats per minute   O2 Device Room air

## 2017-03-11 NOTE — PROGRESS NOTES
Patient complaining of tightness in chest pain level of 6 or 7 and feeling light headed and dizzy just sitting in chair. /65, O2 sat 98% HR 72. Gave patient 1 nitro. Patient stated that pain is now 3 or 4.

## 2017-03-11 NOTE — PROGRESS NOTES
Assisted patient in rodriguez to ambulate. Patient denies any chest tightness or discomfort. Patient stable.

## 2017-03-11 NOTE — PROGRESS NOTES
Pt continues to c/o chest tightness 6/7, skin warm and dry. Wants to remain in chair. BP soft after NTG sublingual. Remains in SR with slight ST depression rate 76, no ectopy. Will continue to monitor. Pt v/u to call for any s/s. Primary RN aware. Ordered EKG.

## 2017-03-11 NOTE — PROGRESS NOTES
Pt appears more calm and is resting in bed. Pt states Restoril has helped him relax. Call light in reach.

## 2017-03-11 NOTE — PROGRESS NOTES
Verbal & Bedside shift change report given to Danyell (oncoming nurse) by Hedy Mcgregor (offgoing nurse). Report given with SBAR, Kardex, Intake/Output, MAR and Recent Results.

## 2017-03-11 NOTE — PROGRESS NOTES
Patient stated that while ambulating in room he started having chest tightness and became diaphoretic and started coughing. /59, O2 sat 98%. Patient sat in chair and pain subsided. Will continue to monitor.

## 2017-03-11 NOTE — PROGRESS NOTES
Reviewed discharge instructions and medications with patient and gave copies and prescriptions. Gave time for questions. Removed PIV and heart monitor. Transported downstairs by w/c to home. Patient stable upon discharge.

## 2017-03-11 NOTE — PROGRESS NOTES
Pt states he is feeling anxious and is unable to sleep. Emotional support and encouragement provided. Restoril 15 mg given PRN.

## 2017-03-11 NOTE — PROGRESS NOTES
Pt states he has only been taking an 81 mg Aspirin since 2013. Pt educated on medications, importance of compliance, and verbalized understanding.

## 2017-03-11 NOTE — PROGRESS NOTES
TRANSFER - IN REPORT:    Verbal report received from Wolf(name) on Danni Said  being received from ER(unit) for routine progression of care      Report consisted of patients Situation, Background, Assessment and   Recommendations(SBAR). Information from the following report(s) SBAR, Kardex, [facility] and Recent Results was reviewed with the receiving nurse. Opportunity for questions and clarification was provided. Assessment completed upon patients arrival to unit and care assumed. Dual skin shift assessment completed with Nidia Choi RN. No redness or breakdown noted.

## 2017-03-11 NOTE — DISCHARGE INSTRUCTIONS
DISCHARGE SUMMARY from Nurse    The following personal items are in your possession at time of discharge:    Dental Appliances: None  Visual Aid: Glasses, With patient sent home with patient     Home Medications: None  Jewelry: Watch  Clothing: Footwear, Pants, Shirt, Undergarments, Socks, At bedside sent home with patient  Other Valuables: Ben Lainez sent home with patient             PATIENT INSTRUCTIONS:    After general anesthesia or intravenous sedation, for 24 hours or while taking prescription Narcotics:  · Limit your activities  · Do not drive and operate hazardous machinery  · Do not make important personal or business decisions  · Do  not drink alcoholic beverages  · If you have not urinated within 8 hours after discharge, please contact your surgeon on call. Report the following to your surgeon:  · Excessive pain, swelling, redness or odor of or around the surgical area  · Temperature over 100.5  · Nausea and vomiting lasting longer than 4 hours or if unable to take medications  · Any signs of decreased circulation or nerve impairment to extremity: change in color, persistent  numbness, tingling, coldness or increase pain  · Any questions        *  Please give a list of your current medications to your Primary Care Provider. *  Please update this list whenever your medications are discontinued, doses are      changed, or new medications (including over-the-counter products) are added. *  Please carry medication information at all times in case of emergency situations. These are general instructions for a healthy lifestyle:    No smoking/ No tobacco products/ Avoid exposure to second hand smoke    Surgeon General's Warning:  Quitting smoking now greatly reduces serious risk to your health.     Obesity, smoking, and sedentary lifestyle greatly increases your risk for illness    A healthy diet, regular physical exercise & weight monitoring are important for maintaining a healthy lifestyle    You may be retaining fluid if you have a history of heart failure or if you experience any of the following symptoms:  Weight gain of 3 pounds or more overnight or 5 pounds in a week, increased swelling in our hands or feet or shortness of breath while lying flat in bed. Please call your doctor as soon as you notice any of these symptoms; do not wait until your next office visit. Recognize signs and symptoms of STROKE:    F-face looks uneven    A-arms unable to move or move unevenly    S-speech slurred or non-existent    T-time-call 911 as soon as signs and symptoms begin-DO NOT go       Back to bed or wait to see if you get better-TIME IS BRAIN. Warning Signs of HEART ATTACK     Call 911 if you have these symptoms:   Chest discomfort. Most heart attacks involve discomfort in the center of the chest that lasts more than a few minutes, or that goes away and comes back. It can feel like uncomfortable pressure, squeezing, fullness, or pain.  Discomfort in other areas of the upper body. Symptoms can include pain or discomfort in one or both arms, the back, neck, jaw, or stomach.  Shortness of breath with or without chest discomfort.  Other signs may include breaking out in a cold sweat, nausea, or lightheadedness. Don't wait more than five minutes to call 911 - MINUTES MATTER! Fast action can save your life. Calling 911 is almost always the fastest way to get lifesaving treatment. Emergency Medical Services staff can begin treatment when they arrive -- up to an hour sooner than if someone gets to the hospital by car. The discharge information has been reviewed with the patient. The patient verbalized understanding. Discharge medications reviewed with the patient and appropriate educational materials and side effects teaching were provided.

## 2017-03-11 NOTE — DISCHARGE SUMMARY
[facility] Discharge Summary     Patient ID:  Mary Calderón  [id number]  34 y.o.  1955    Admit date: 3/10/2017    Discharge date:  03/11/2017    Admitting Physician: Stuart Lubin MD     Discharge Physician: Kylee Shelby NP/Dr. Jessie Panchal    Admission Diagnoses: cad    Discharge Diagnoses:    Diagnosis    Cough    Chest pain    Unstable angina pectoris ([address]    CAD (coronary artery disease)    HTN (hypertension), benign    Other and unspecified hyperlipidemia    Tobacco use disorder       Cardiology Procedures this admission:  None  Consults: None    Hospital Course: Patient presented to the ER with c/o dyspnea and chest pain at rest and with exertion, with little relief from nitroglycerin. Troponin levels remained negative. Observed overnight with gentle hydration. The morning of discharge, patient was up feeling well without any complaints of chest pain or shortness of breath. Patient's labs were WNL. Patient was seen and examined by Dr. Jessie Panchal and determined stable and ready for discharge. The patient will follow up with [facility] -- Dr. BRITTANI BUCHANAN JR. [facility] on 3/13/17 @ [phone] in the [place] office. Diuretics and ARB held due to KEAGAN, primary cardiologist will address at follow up. DISPOSITION: The patient is being discharged home in stable condition on a low saturated fat, low cholesterol and low salt diet. The patient is instructed to advance activities as tolerated to the limit of fatigue or shortness of breath. The patient is instructed to avoid all heavy lifting, straining, stooping or squatting for 3-5 days. The patient is instructed to watch the cath site for bleeding/oozing; if seen, the patient is instructed to apply firm pressure with a clean cloth and call [facility] at [phone]. The patient is instructed to watch for signs of infection which include: increasing area of redness, fever/hot to touch or purulent drainage at the catheterization site.  The patient is instructed not to soak in a bathtub for 7-10 days, but is cleared to shower. The patient is instructed to call the office or return to the ER for immediate evaluation for any shortness of breath or chest pain not relieved by NTG. Discharge Exam:   Visit Vitals    /59 (BP 1 Location: Right arm, BP Patient Position: Sitting)    Pulse 64    Temp 97.1 °F (36.2 °C)    Resp 18    Ht 5' 7\" (1.702 m)    Wt 80.3 kg (177 lb)    SpO2 96%    BMI 27.72 kg/m2       Recent Results (from the past 24 hour(s))   EKG, 12 LEAD, INITIAL    Collection Time: 03/10/17 12:50 PM   Result Value Ref Range    Systolic BP  mmHg    Diastolic BP  mmHg    Ventricular Rate 70 BPM    Atrial Rate 70 BPM    P-R Interval 134 ms    QRS Duration 98 ms    Q-T Interval 402 ms    QTC Calculation (Bezet) 434 ms    Calculated P Axis 49 degrees    Calculated R Axis 25 degrees    Calculated T Axis -168 degrees    Diagnosis       Normal sinus rhythm with sinus arrhythmia  Incomplete right bundle branch block  ST & T wave abnormality, consider inferolateral ischemia  Abnormal ECG  When compared with ECG of 30-JAN-2017 06:15,  T wave inversion less evident in Anterolateral leads  QT has shortened  Confirmed by Blanca Jacinto MD (), DELANEY SHORE ([id number]) on 3/10/2017 2:59:49 PM     CBC WITH AUTOMATED DIFF    Collection Time: 03/10/17 12:55 PM   Result Value Ref Range    WBC 8.1 4.3 - 11.1 K/uL    RBC 5.03 4.23 - 5.67 M/uL    HGB 15.9 13.6 - 17.2 g/dL    HCT 46.0 41.1 - 50.3 %    MCV 91.5 79.6 - 97.8 FL    MCH 31.6 26.1 - 32.9 PG    MCHC 34.6 31.4 - 35.0 g/dL    RDW 13.2 11.9 - 14.6 %    PLATELET 109 392 - 260 K/uL    MPV 10.5 (L) 10.8 - 14.1 FL    DF AUTOMATED      NEUTROPHILS 56 43 - 78 %    LYMPHOCYTES 35 13 - 44 %    MONOCYTES 6 4.0 - 12.0 %    EOSINOPHILS 3 0.5 - 7.8 %    BASOPHILS 0 0.0 - 2.0 %    IMMATURE GRANULOCYTES 0.1 0.0 - 5.0 %    ABS. NEUTROPHILS 4.6 1.7 - 8.2 K/UL    ABS. LYMPHOCYTES 2.8 0.5 - 4.6 K/UL    ABS.  MONOCYTES 0.5 0.1 - 1.3 K/UL    ABS. EOSINOPHILS 0.2 0.0 - 0.8 K/UL    ABS. BASOPHILS 0.0 0.0 - 0.2 K/UL    ABS. IMM. GRANS. 0.0 0.0 - 0.5 K/UL   METABOLIC PANEL, COMPREHENSIVE    Collection Time: 03/10/17 12:55 PM   Result Value Ref Range    Sodium 137 136 - 145 mmol/L    Potassium 3.9 3.5 - 5.1 mmol/L    Chloride 99 98 - 107 mmol/L    CO2 28 21 - 32 mmol/L    Anion gap 10 7 - 16 mmol/L    Glucose 197 (H) 65 - 100 mg/dL    BUN 37 (H) 8 - 23 MG/DL    Creatinine 1.73 (H) 0.8 - 1.5 MG/DL    GFR est AA 52 (L) >60 ml/min/1.73m2    GFR est non-AA 43 (L) >60 ml/min/1.73m2    Calcium 8.8 8.3 - 10.4 MG/DL    Bilirubin, total 0.4 0.2 - 1.1 MG/DL    ALT (SGPT) 28 12 - 65 U/L    AST (SGOT) 24 15 - 37 U/L    Alk. phosphatase 105 50 - 136 U/L    Protein, total 8.8 (H) 6.3 - 8.2 g/dL    Albumin 3.5 3.2 - 4.6 g/dL    Globulin 5.3 (H) 2.3 - 3.5 g/dL    A-G Ratio 0.7 (L) 1.2 - 3.5     TROPONIN I    Collection Time: 03/10/17 12:55 PM   Result Value Ref Range    Troponin-I, Qt. <0.02 (L) 0.02 - 0.05 NG/ML   BNP    Collection Time: 03/10/17 12:55 PM   Result Value Ref Range    BNP 30 pg/mL   D DIMER    Collection Time: 03/10/17  4:06 PM   Result Value Ref Range    D DIMER 1.06 (HH) <0.55 ug/ml(FEU)   TROPONIN I    Collection Time: 03/10/17  7:09 PM   Result Value Ref Range    Troponin-I, Qt. <0.02 (L) 0.02 - 0.05 NG/ML   TROPONIN I    Collection Time: 03/11/17  2:45 AM   Result Value Ref Range    Troponin-I, Qt. <0.02 (L) 0.02 - 0.05 NG/ML         Patient Instructions:     Current Discharge Medication List      START taking these medications    Details   clopidogrel (PLAVIX) 75 mg tab Take 1 Tab by mouth daily. Qty: 30 Tab, Refills: 11      benzonatate (TESSALON) 100 mg capsule Take 1 Cap by mouth three (3) times daily as needed for Cough for up to 7 days. Qty: 60 Cap, Refills: 0         CONTINUE these medications which have NOT CHANGED    Details   temazepam (RESTORIL) 15 mg capsule Take 1 Cap by mouth nightly as needed for Sleep.  Max Daily Amount: 15 mg.  Qty: 30 Cap, Refills: 2    Associated Diagnoses: Primary insomnia      guaiFENesin SR (MUCINEX) 600 mg SR tablet Take 1 Tab by mouth two (2) times a day. Qty: 20 Tab, Refills: 1    Associated Diagnoses: Acute maxillary sinusitis, recurrence not specified; Bronchitis      raNITIdine (ZANTAC) 150 mg tablet Take 1 Tab by mouth two (2) times a day. Indications: GASTROESOPHAGEAL REFLUX, HEARTBURN  Qty: 60 Tab, Refills: 11      nitroglycerin (NITROSTAT) 0.4 mg SL tablet 1 Tab by SubLINGual route every five (5) minutes as needed for Chest Pain. Qty: 1 Bottle, Refills: 5      albuterol (VENTOLIN HFA) 90 mcg/actuation inhaler Take 2 Puffs by inhalation every four (4) hours as needed for Wheezing or Shortness of Breath. Qty: 1 Inhaler, Refills: 4      aspirin 81 mg tablet Take 81 mg by mouth daily.          STOP taking these medications       furosemide (LASIX) 40 mg tablet Comments:   Reason for Stopping:         carvedilol (COREG) 3.125 mg tablet Comments:   Reason for Stopping:         valsartan (DIOVAN) 40 mg tablet Comments:   Reason for Stopping:         spironolactone (ALDACTONE) 25 mg tablet Comments:   Reason for Stopping:         atorvastatin (LIPITOR) 40 mg tablet Comments:   Reason for Stopping:         prasugrel (EFFIENT) 10 mg tablet Comments:   Reason for Stopping:               Signed:  Justo Herbert NP  3/11/2017  10:06 AM

## 2017-03-13 ENCOUNTER — HOSPITAL ENCOUNTER (OUTPATIENT)
Dept: LAB | Age: 62
Discharge: HOME | End: 2017-03-13
Attending: INTERNAL MEDICINE
Payer: COMMERCIAL

## 2017-03-13 DIAGNOSIS — I25.10 CORONARY ARTERY DISEASE INVOLVING NATIVE CORONARY ARTERY WITHOUT ANGINA PECTORIS, UNSPECIFIED WHETHER NATIVE OR TRANSPLANTED HEART: Chronic | ICD-10-CM

## 2017-03-13 LAB
ANION GAP BLD CALC-SCNC: 9 MMOL/L
BUN SERPL-MCNC: 13 MG/DL (ref 8–23)
CALCIUM SERPL-MCNC: 9 MG/DL (ref 8.3–10.4)
CHLORIDE SERPL-SCNC: 103 MMOL/L (ref 98–107)
CO2 SERPL-SCNC: 26 MMOL/L (ref 23–32)
CREAT SERPL-MCNC: 1.1 MG/DL (ref 0.6–1)
GLUCOSE SERPL-MCNC: 136 MG/DL (ref 65–100)
POTASSIUM SERPL-SCNC: 4.3 MMOL/L (ref 3.5–5.1)
SODIUM SERPL-SCNC: 138 MMOL/L (ref 136–145)

## 2017-03-13 PROCEDURE — 80048 BASIC METABOLIC PNL TOTAL CA: CPT | Performed by: INTERNAL MEDICINE

## 2017-03-13 PROCEDURE — 36415 COLL VENOUS BLD VENIPUNCTURE: CPT | Performed by: INTERNAL MEDICINE

## 2017-03-16 NOTE — PROGRESS NOTES
Patient pre-assessment complete for [facility] poss with [facility] scheduled for 3/17/17 at 8am, arrival time 6am. Patient verified using . Patient instructed to bring all home medications in labeled bottles on the day of procedure. NPO status reinforced. Patient informed to take a full dose aspirin 325mg  or 81 mg x 4 on the day of procedure. Instructed they can take all other medications excluding vitamins & supplements. Patient verbalizes understanding of all instructions & denies any questions at this time.

## 2017-03-17 ENCOUNTER — HOSPITAL ENCOUNTER (OUTPATIENT)
Dept: CARDIAC CATH/INVASIVE PROCEDURES | Age: 62
Discharge: HOME | End: 2017-03-17
Attending: INTERNAL MEDICINE | Admitting: INTERNAL MEDICINE
Payer: COMMERCIAL

## 2017-03-17 VITALS
HEIGHT: 67 IN | BODY MASS INDEX: 28.25 KG/M2 | WEIGHT: 180 LBS | TEMPERATURE: 97.7 F | HEART RATE: 46 BPM | RESPIRATION RATE: 16 BRPM | OXYGEN SATURATION: 95 % | DIASTOLIC BLOOD PRESSURE: 71 MMHG | SYSTOLIC BLOOD PRESSURE: 122 MMHG

## 2017-03-17 PROCEDURE — 93571 IV DOP VEL&/PRESS C FLO 1ST: CPT

## 2017-03-17 PROCEDURE — 74011636320 HC RX REV CODE- 636/320: Performed by: INTERNAL MEDICINE

## 2017-03-17 PROCEDURE — C1894 INTRO/SHEATH, NON-LASER: HCPCS

## 2017-03-17 PROCEDURE — 74011250636 HC RX REV CODE- 250/636: Performed by: INTERNAL MEDICINE

## 2017-03-17 PROCEDURE — 74011000250 HC RX REV CODE- 250: Performed by: INTERNAL MEDICINE

## 2017-03-17 PROCEDURE — 77030004559 HC CATH ANGI DX SUPT CARD -B

## 2017-03-17 PROCEDURE — 74011000258 HC RX REV CODE- 258: Performed by: INTERNAL MEDICINE

## 2017-03-17 PROCEDURE — 99152 MOD SED SAME PHYS/QHP 5/>YRS: CPT

## 2017-03-17 PROCEDURE — 77030029997 HC DEV COM RDL R BND TELE -B

## 2017-03-17 PROCEDURE — 74011250637 HC RX REV CODE- 250/637: Performed by: INTERNAL MEDICINE

## 2017-03-17 PROCEDURE — 74011250636 HC RX REV CODE- 250/636

## 2017-03-17 PROCEDURE — 99153 MOD SED SAME PHYS/QHP EA: CPT

## 2017-03-17 PROCEDURE — C1769 GUIDE WIRE: HCPCS

## 2017-03-17 PROCEDURE — C1887 CATHETER, GUIDING: HCPCS

## 2017-03-17 PROCEDURE — 93455 CORONARY ART/GRFT ANGIO S&I: CPT

## 2017-03-17 PROCEDURE — 93454 CORONARY ARTERY ANGIO S&I: CPT

## 2017-03-17 RX ORDER — FENTANYL CITRATE 50 UG/ML
25-50 INJECTION, SOLUTION INTRAMUSCULAR; INTRAVENOUS
Status: DISCONTINUED | OUTPATIENT
Start: 2017-03-17 | End: 2017-03-17 | Stop reason: HOSPADM

## 2017-03-17 RX ORDER — PANTOPRAZOLE SODIUM 40 MG/1
20 TABLET, DELAYED RELEASE ORAL DAILY
Qty: 60 TAB | Refills: 2 | Status: SHIPPED | OUTPATIENT
Start: 2017-03-17 | End: 2017-06-29

## 2017-03-17 RX ORDER — HEPARIN SODIUM 200 [USP'U]/100ML
3 INJECTION, SOLUTION INTRAVENOUS CONTINUOUS
Status: DISCONTINUED | OUTPATIENT
Start: 2017-03-17 | End: 2017-03-17 | Stop reason: HOSPADM

## 2017-03-17 RX ORDER — SODIUM CHLORIDE 9 MG/ML
75 INJECTION, SOLUTION INTRAVENOUS CONTINUOUS
Status: DISCONTINUED | OUTPATIENT
Start: 2017-03-17 | End: 2017-03-17 | Stop reason: HOSPADM

## 2017-03-17 RX ORDER — RANITIDINE 150 MG/1
300 TABLET, FILM COATED ORAL
Qty: 60 TAB | Refills: 11 | Status: SHIPPED | OUTPATIENT
Start: 2017-03-17 | End: 2019-01-01

## 2017-03-17 RX ORDER — DIAZEPAM 5 MG/1
5 TABLET ORAL AS NEEDED
Status: DISCONTINUED | OUTPATIENT
Start: 2017-03-17 | End: 2017-03-17 | Stop reason: HOSPADM

## 2017-03-17 RX ORDER — LIDOCAINE HYDROCHLORIDE 20 MG/ML
1-20 INJECTION, SOLUTION INFILTRATION; PERINEURAL
Status: DISCONTINUED | OUTPATIENT
Start: 2017-03-17 | End: 2017-03-17 | Stop reason: HOSPADM

## 2017-03-17 RX ORDER — HEPARIN SODIUM 10000 [USP'U]/ML
40-80 INJECTION, SOLUTION INTRAVENOUS; SUBCUTANEOUS
Status: DISCONTINUED | OUTPATIENT
Start: 2017-03-17 | End: 2017-03-17 | Stop reason: HOSPADM

## 2017-03-17 RX ORDER — MIDAZOLAM HYDROCHLORIDE 1 MG/ML
.5-2 INJECTION, SOLUTION INTRAMUSCULAR; INTRAVENOUS
Status: DISCONTINUED | OUTPATIENT
Start: 2017-03-17 | End: 2017-03-17 | Stop reason: HOSPADM

## 2017-03-17 RX ADMIN — HEPARIN SODIUM 2 ML: 10000 INJECTION, SOLUTION INTRAVENOUS; SUBCUTANEOUS at 08:41

## 2017-03-17 RX ADMIN — DIAZEPAM 5 MG: 5 TABLET ORAL at 07:12

## 2017-03-17 RX ADMIN — HEPARIN SODIUM 5000 UNITS: 10000 INJECTION, SOLUTION INTRAVENOUS; SUBCUTANEOUS at 09:02

## 2017-03-17 RX ADMIN — HEPARIN SODIUM 3 ML/HR: 200 INJECTION, SOLUTION INTRAVENOUS at 08:31

## 2017-03-17 RX ADMIN — IOVERSOL 100 ML: 741 INJECTION INTRA-ARTERIAL; INTRAVENOUS at 09:11

## 2017-03-17 RX ADMIN — MIDAZOLAM HYDROCHLORIDE 2 MG: 1 INJECTION, SOLUTION INTRAMUSCULAR; INTRAVENOUS at 08:38

## 2017-03-17 RX ADMIN — NITROGLYCERIN 0.2 MG: 200 INJECTION, SOLUTION INTRAVENOUS at 09:06

## 2017-03-17 RX ADMIN — ADENOSINE 140 MCG/KG/MIN: 3 INJECTION, SOLUTION INTRAVENOUS at 09:07

## 2017-03-17 RX ADMIN — LIDOCAINE HYDROCHLORIDE 60 MG: 20 INJECTION, SOLUTION INFILTRATION; PERINEURAL at 08:41

## 2017-03-17 RX ADMIN — SODIUM CHLORIDE 75 ML/HR: 900 INJECTION, SOLUTION INTRAVENOUS at 07:01

## 2017-03-17 NOTE — PROGRESS NOTES
TRANSFER - OUT REPORT:    Verbal report given to Medina Isabel RN on Shelley Elaine  being transferred to [facility] for routine progression of care       Report consisted of patients Situation, Background, Assessment and Recommendations(SBAR). Information from the following report(s) SBAR, Kardex, Procedure Summary and MAR was reviewed with the receiving nurse. Opportunity for questions and clarification was provided.       C with Dr Feliciano Hawk  [facility]  No intervention  2 versed  5000 heparin  Left radial R band 12ml at 0912

## 2017-03-17 NOTE — PROGRESS NOTES
Patient received to [address] [place] [address] room # 2  Ambulatory from [facility]. Patient scheduled for C today with Dr Citlaly Barboza. Procedure reviewed & questions answered, voiced good understanding consent obtained & placed on chart. All medications and medical history reviewed. Will prep patient per orders. Patient & family updated on plan of care.

## 2017-03-17 NOTE — PROCEDURES
Brief Cardiac Procedure Note    Patient: Kerri Bob MRN: [id number]  SSN: [id number]    YOB: 1955  Age: 60 y.o. Sex: male      Date of Procedure: 3/17/2017     Pre-procedure Diagnosis: Coronary Artery Disease    Post-procedure Diagnosis: Coronary Artery Disease    Procedure: Left Heart Catheterization and ffr    Brief Description of Procedure: via lra    Performed By: Sharda Diamond MD     Assistants:     Anesthesia: Moderate Sedation    Estimated Blood Loss: Less than 10 mL      Specimens: None    Implants: None    Findings: recent stent ok, jailed lad segment narrowed but ffr 0.80, other anatomy no change    Complications: None    Recommendations: Continue medical therapy.     Signed By: Sharda Diamond MD     March 17, 2017

## 2017-03-17 NOTE — PROGRESS NOTES
Report received from [address] [place] [address]. Procedural findings communicated. Intra procedural  medication administration reviewed. Progression of care discussed.      Patient received into [address] [facility] 2 post sheath removal.     Access site without bleeding or swelling yes    Dressing dry and intact yes    Patient instructed to limit movement to left upper extremity    Routine post procedural vital signs and site assessment initiated yes

## 2017-03-17 NOTE — PROGRESS NOTES
Discharged to home accompanied by family. Left unit via wheelhair. Left radial site without bleeding or hematoma.

## 2017-03-17 NOTE — IP AVS SNAPSHOT
[facility] 
 
 
 [address] 
[phone] Patient: Jayce Galarza MRN: [id number] SWY:5/8/2027 Discharge Summary 3/17/2017 Jayce Galarza MRN[de-identified]  [id number] Admission Information Provider Pager Service Admission Date Expected D/C Date Sole Swann MD  CARDIAC CATH LAB 3/17/2017 3/17/2017 Actual LOS Patient Class 0 days OUTPATIENT Follow-up Information Follow up With Details Comments Contact Info Sole Swann MD On 3/30/2017 at 3:45 p.m. 2 [place] [address] [facility] [address] [facility] [address] [phone] Current Discharge Medication List  
  
START taking these medications Dose & Instructions Dispensing Information Comments Morning Noon Evening Bedtime  
 pantoprazole 40 mg tablet Commonly known as:  PROTONIX Your last dose was: Your next dose is:    
   
   
 Dose:  20 mg Take 1 Tab by mouth daily. Quantity:  60 Tab Refills:  2 CONTINUE these medications which have CHANGED Dose & Instructions Dispensing Information Comments Morning Noon Evening Bedtime  
 raNITIdine 150 mg tablet Commonly known as:  ZANTAC What changed:   
- how much to take - when to take this Your last dose was: Your next dose is:    
   
   
 Dose:  300 mg Take 2 Tabs by mouth nightly. Indications: GASTROESOPHAGEAL REFLUX, HEARTBURN Quantity:  60 Tab Refills:  11 CONTINUE these medications which have NOT CHANGED Dose & Instructions Dispensing Information Comments Morning Noon Evening Bedtime  
 albuterol 90 mcg/actuation inhaler Commonly known as:  VENTOLIN HFA Your last dose was: Your next dose is:    
   
   
 Dose:  2 Puff Take 2 Puffs by inhalation every four (4) hours as needed for Wheezing or Shortness of Breath. Quantity:  1 Inhaler Refills:  4 aspirin 81 mg tablet Your last dose was: Your next dose is:    
   
   
 Dose:  81 mg Take 81 mg by mouth daily. Refills:  0  
     
   
   
   
  
 benzonatate 100 mg capsule Commonly known as:  TESSALON Your last dose was: Your next dose is:    
   
   
 Dose:  100 mg Take 1 Cap by mouth three (3) times daily as needed for Cough for up to 7 days. Quantity:  60 Cap Refills:  0  
     
   
   
   
  
 clopidogrel 75 mg Tab Commonly known as:  PLAVIX Your last dose was: Your next dose is:    
   
   
 Dose:  75 mg Take 1 Tab by mouth daily. Quantity:  30 Tab Refills:  11  
     
   
   
   
  
 nitroglycerin 0.4 mg SL tablet Commonly known as:  NITROSTAT Your last dose was: Your next dose is:    
   
   
 Dose:  0.4 mg  
1 Tab by SubLINGual route every five (5) minutes as needed for Chest Pain. Quantity:  1 Bottle Refills:  5  
     
   
   
   
  
 temazepam 15 mg capsule Commonly known as:  RESTORIL Your last dose was: Your next dose is:    
   
   
 Dose:  15 mg Take 1 Cap by mouth nightly as needed for Sleep. Max Daily Amount: 15 mg. Quantity:  30 Cap Refills:  2 Where to Get Your Medications These medications were sent to [address],[facility], [address] [place] [address], [address] [place] [address] Phone:  [phone]  
  pantoprazole 40 mg tablet  
 raNITIdine 150 mg tablet General Information Please provide this summary of care documentation to your next provider. Allergies Unspecified:  Aspirin Current Immunizations  Never Reviewed No immunizations on file. Discharge Instructions Discharge Instructions HEART CATHETERIZATION/ANGIOGRAPHY DISCHARGE INSTRUCTIONS 1. Check puncture site frequently for swelling or bleeding.  If there is any bleeding, lie down and apply pressure over the area with a clean towel or washcloth. Notify your doctor for any redness, swelling, drainage, or oozing from the puncture site. Notify your doctor for any fever or chills. 2. If the extremity becomes cold, numb, or painful call Dr. Hugo Alba at [phone]. 
3. Activity should be limited for the next 48 hours. Do not use your left arm for lifting, pushing, or pulling during this time. 4. You may resume your usual diet. Drink more fluids than usual. 
5. Have a responsible person drive you home and stay with you for at least 24 hours after your heart catheterization/angiography. 6. You may remove bandage from your Left wrist in 24 hours. You may shower in 24 hours. No tub baths, hot tubs, or swimming for 1 week. Do not place any lotions, creams, powders, or ointments over puncture site for 1 week. You may place a clean band-aid over the puncture site each day for 5 days. Change daily. 7. Do not drive for 24 hours. I have read the above instructions and have had the opportunity to ask questions. Patient: ________________________   Date: 3/17/2017 Witness: _______________________   Date: 3/17/2017 Discharge Orders None  
  
` Patient Signature:  ____________________________________________________________ Date:  ____________________________________________________________  
  
 Delphine Klein Provider Signature:  ____________________________________________________________ Date:  ____________________________________________________________

## 2017-03-17 NOTE — PROGRESS NOTES
R band removed after deflation completed. No bleeding or hematoma. Site redressed with sterile gauze covered with tegaderm. Discharge instructions reviewed with patient and family. Verbalize understanding. Written instructions given.

## 2017-03-17 NOTE — DISCHARGE INSTRUCTIONS
HEART CATHETERIZATION/ANGIOGRAPHY DISCHARGE INSTRUCTIONS    1. Check puncture site frequently for swelling or bleeding. If there is any bleeding, lie down and apply pressure over the area with a clean towel or washcloth. Notify your doctor for any redness, swelling, drainage, or oozing from the puncture site. Notify your doctor for any fever or chills. 2. If the extremity becomes cold, numb, or painful call Dr. Judie Farris at [phone].  3. Activity should be limited for the next 48 hours. Do not use your left arm for lifting, pushing, or pulling during this time. 4. You may resume your usual diet. Drink more fluids than usual.  5. Have a responsible person drive you home and stay with you for at least 24 hours after your heart catheterization/angiography. 6. You may remove bandage from your Left wrist in 24 hours. You may shower in 24 hours. No tub baths, hot tubs, or swimming for 1 week. Do not place any lotions, creams, powders, or ointments over puncture site for 1 week. You may place a clean band-aid over the puncture site each day for 5 days. Change daily. 7. Do not drive for 24 hours. I have read the above instructions and have had the opportunity to ask questions.       Patient: ________________________   Date: 3/17/2017    Witness: _______________________   Date: 3/17/2017

## 2017-03-18 NOTE — PROCEDURES
Vania Stewart 43       Name:  Erika Estrella   MR#:  [id number]   :  1955   Account #:  [de-identified]   Date of Adm:  2017       DATE OF PROCEDURE: 2017     PROCEDURES PERFORMED: Cardiac catheterization. HISTORY: This is a middle-aged gentleman with coronary disease. He has had prior CABG. He has had subsequent PCI. His latest   intervention was 3 months ago to the LAD via his vein graft. He   has problems with persistent chest pain consistent with angina   pectoris. Repeat angiography is recommended. PROCEDURE: Coronary angiography is performed via the left radial   artery by modified Seldinger technique. The vein graft to the   right coronary is injected first with a 6-Vietnamese multipurpose. This was then used to inject the vein graft to the LAD. The   native left was then injected with a 6-Vietnamese 3.5 left Jack. Pressure wire evaluation was then performed across the distal   jailed LAD. Heparin was the anticoagulant. Adenosine was given   at 140 mcg/kg per minute. No intervention was performed. FINDINGS: The saphenous vein graft to right coronary artery is   patent and normal with good runoff into the distal right   coronary artery. Its anastomosed at the crux. The posterior   descending branch is patent with a patent stent. Saphenous vein graft to the LAD is patent. There is mild   proximal disease. The middle and distal portion of the vein   graft looked normal. The recently stented LAD into the lateral   limb of the bifurcation of the LAD is patent with no restenosis. The jailed more medial limb of the distal LAD bifurcation has a   50% to 60% residual narrowing. Left main has been stented with minor restenosis. The left   anterior descending is occluded. The obtuse marginal branch of   the left circumflex is occluded and fills late by collateral   flow. It is not a good target for  angioplasty.  The mid   portion of the left circumflex is stented with no restenosis. It   terminates into a patent posterolateral branch with minor   disease. The terminal portion of the left circumflex is occluded   and fills late from the right coronary artery. The pressure wire evaluation of the LAD  on FFR that is on the   order of 0.88. IMPRESSION: Severe coronary artery disease as described. The   left anterior descending is occluded. The vein graft to the left   anterior descending is patent. The recently stented mid to   distal left anterior descending segment remains patent with no   restenosis. There is a jailed distal left anterior descending   segment that has moderate nonobstructive stenosis as measured by   its fractional flow reserve. The left main into the left   circumflex has been stented with no restenosis. The obtuse   marginal branch is chronically occluded and fills late. The   stented portion of the left circumflex into the second marginal   branch is patent with no restenosis. The distal left circumflex   is chronically occluded and fills late from the right coronary   artery. RECOMMENDATIONS: Continued medical therapy.         MD Monae Lewis / Sanna Burrows   D:  03/18/2017   11:39   T:  03/18/2017   11:57   Job #:  [id number]

## 2017-03-20 PROBLEM — J98.4 RESTRICTIVE AIRWAY DISEASE: Status: ACTIVE | Noted: 2017-03-20

## 2017-03-20 PROBLEM — J44.1 CHRONIC OBSTRUCTIVE PULMONARY DISEASE WITH ACUTE EXACERBATION (HCC): Status: ACTIVE | Noted: 2017-03-20

## 2017-03-20 PROBLEM — R06.02 SOB (SHORTNESS OF BREATH): Status: ACTIVE | Noted: 2017-03-20

## 2017-08-18 ENCOUNTER — HOSPITAL ENCOUNTER (OUTPATIENT)
Dept: SURGERY | Age: 62
Discharge: HOME | End: 2017-08-18

## 2017-08-18 VITALS
DIASTOLIC BLOOD PRESSURE: 91 MMHG | RESPIRATION RATE: 16 BRPM | HEART RATE: 100 BPM | BODY MASS INDEX: 29.79 KG/M2 | WEIGHT: 189.8 LBS | TEMPERATURE: 98.7 F | SYSTOLIC BLOOD PRESSURE: 135 MMHG | OXYGEN SATURATION: 94 % | HEIGHT: 67 IN

## 2017-08-18 RX ORDER — ACETAMINOPHEN 325 MG/1
325 TABLET ORAL AS NEEDED
COMMUNITY

## 2017-08-18 RX ORDER — PREDNISONE 5 MG/1
5 TABLET ORAL EVERY OTHER DAY
COMMUNITY
End: 2018-01-01

## 2017-08-18 NOTE — PERIOP NOTES
Release faxed to Dr. Marisela Muir office for last pulmonary office note and records. Fax confirmation on chart.

## 2017-08-18 NOTE — PERIOP NOTES
Pulmonary records not yet received. Chart marked and placed in chart room for charge RN to follow up.

## 2017-08-18 NOTE — PERIOP NOTES
Patient verified name, , and surgery as listed in [facility]. TYPE  CASE:1B            Orders: received. Labs per surgeon:  None ordered    Labs per anesthesia protocol: None needed   EKG  :  None needed     DOCS ON CHART AND REVIEWED BY DR GARCIA: CT CHEST 3/10/17, CARDIAC OFFICE NOTE 17, CCL 3/17/17, EKG 3/11/17 & 3/1/17 & 17, ECHO 17. ALSO REVIEWED W/ DR GARCIA LAST STENT PLACED  AND PT IS TO STAY ON PLAVIX FOR SX. NO ORDERS GIVEN. REVIEWED W/ MARIUSZ VERBALLY AT DR Viola Cassidy OFFICE THAT PT IS ON PLAVIX AND WAS NOT INSTRUCTED TO STOP. MARIUSZ STATES PT CAN STAY ON PLAVIX. Pt medications obtained, med bottles visualized. Requested pt to validate each medication, dose and frequency while here today. Instructed patient to continue previous medications as prescribed prior to surgery and  to take the following medications the day of surgery according to anesthesia guidelines with a small sip of water : ALBUTEROL, ASA 81MG, PLAVIX, BEVESPI, ZANTAC       Continue all previous medications unless otherwise directed. Instructed patient to hold  the following medications prior to surgery: NONE    Patient instructed on the following and verbalized understanding: Pt teach back was successful and pt demonstrates knowledge of instruction  Arrive at [facility] entrance, time of arrival to be called the day before by 1700. Responsible adult must drive patient to and from hospital, stay during surgery and 24 hours postoperatively. Npo after midnight including gum, mints and ice chips. Use ANTIBACTERIAL SOAP  in the shower the night before and the morning of surgery. Leave all valuables at home. Instructed on no jewelry or body piercings on the dos. Bring insurance card and ID. No perfumes, oil, powder, colognes, makeup or  lotions on the skin. Patient verbalized understanding of all instructions and provided all medical/health information to the best of their ability.

## 2017-08-21 NOTE — PERIOP NOTES
Received faxed copy of last pulmonologist ov note (Dr. Amarilis Sarah) dated 7/19/17 and placed on chart.

## 2017-08-23 ENCOUNTER — ANESTHESIA EVENT (OUTPATIENT)
Dept: SURGERY | Age: 62
End: 2017-08-23
Payer: COMMERCIAL

## 2017-08-24 ENCOUNTER — HOSPITAL ENCOUNTER (OUTPATIENT)
Age: 62
Setting detail: OUTPATIENT SURGERY
Discharge: HOME | End: 2017-08-24
Attending: ORTHOPAEDIC SURGERY | Admitting: ORTHOPAEDIC SURGERY
Payer: COMMERCIAL

## 2017-08-24 ENCOUNTER — ANESTHESIA (OUTPATIENT)
Dept: SURGERY | Age: 62
End: 2017-08-24
Payer: COMMERCIAL

## 2017-08-24 VITALS
RESPIRATION RATE: 15 BRPM | TEMPERATURE: 97.8 F | HEART RATE: 61 BPM | WEIGHT: 187.5 LBS | OXYGEN SATURATION: 96 % | BODY MASS INDEX: 29.37 KG/M2 | DIASTOLIC BLOOD PRESSURE: 73 MMHG | SYSTOLIC BLOOD PRESSURE: 147 MMHG

## 2017-08-24 PROCEDURE — 77030018836 HC SOL IRR NACL ICUM -A: Performed by: ORTHOPAEDIC SURGERY

## 2017-08-24 PROCEDURE — 76010000138 HC OR TIME 0.5 TO 1 HR: Performed by: ORTHOPAEDIC SURGERY

## 2017-08-24 PROCEDURE — 76210000021 HC REC RM PH II 0.5 TO 1 HR: Performed by: ORTHOPAEDIC SURGERY

## 2017-08-24 PROCEDURE — 77030003666 HC NDL SPINAL BD -A: Performed by: ORTHOPAEDIC SURGERY

## 2017-08-24 PROCEDURE — 76210000063 HC OR PH I REC FIRST 0.5 HR: Performed by: ORTHOPAEDIC SURGERY

## 2017-08-24 PROCEDURE — 77030020143 HC AIRWY LARYN INTUB CGAS -A: Performed by: ANESTHESIOLOGY

## 2017-08-24 PROCEDURE — 74011250636 HC RX REV CODE- 250/636: Performed by: ANESTHESIOLOGY

## 2017-08-24 PROCEDURE — 74011250637 HC RX REV CODE- 250/637: Performed by: ANESTHESIOLOGY

## 2017-08-24 PROCEDURE — 76060000032 HC ANESTHESIA 0.5 TO 1 HR: Performed by: ORTHOPAEDIC SURGERY

## 2017-08-24 PROCEDURE — 77030022036 HC BLD SHV TOMCAT STRY -B: Performed by: ORTHOPAEDIC SURGERY

## 2017-08-24 PROCEDURE — 74011000250 HC RX REV CODE- 250

## 2017-08-24 PROCEDURE — 77030000032 HC CUF TRNQT ZIMM -B: Performed by: ORTHOPAEDIC SURGERY

## 2017-08-24 PROCEDURE — 77030012712 HC WND ARTHRO ABLT S&N -E: Performed by: ORTHOPAEDIC SURGERY

## 2017-08-24 PROCEDURE — 74011250636 HC RX REV CODE- 250/636

## 2017-08-24 PROCEDURE — 74011250636 HC RX REV CODE- 250/636: Performed by: ORTHOPAEDIC SURGERY

## 2017-08-24 RX ORDER — ROPIVACAINE HYDROCHLORIDE 5 MG/ML
INJECTION, SOLUTION EPIDURAL; INFILTRATION; PERINEURAL AS NEEDED
Status: DISCONTINUED | OUTPATIENT
Start: 2017-08-24 | End: 2017-08-24 | Stop reason: HOSPADM

## 2017-08-24 RX ORDER — SODIUM CHLORIDE, SODIUM LACTATE, POTASSIUM CHLORIDE, CALCIUM CHLORIDE 600; 310; 30; 20 MG/100ML; MG/100ML; MG/100ML; MG/100ML
75 INJECTION, SOLUTION INTRAVENOUS CONTINUOUS
Status: DISCONTINUED | OUTPATIENT
Start: 2017-08-24 | End: 2017-08-24 | Stop reason: HOSPADM

## 2017-08-24 RX ORDER — HYDROMORPHONE HYDROCHLORIDE 2 MG/ML
0.5 INJECTION, SOLUTION INTRAMUSCULAR; INTRAVENOUS; SUBCUTANEOUS
Status: DISCONTINUED | OUTPATIENT
Start: 2017-08-24 | End: 2017-08-24 | Stop reason: HOSPADM

## 2017-08-24 RX ORDER — FENTANYL CITRATE 50 UG/ML
100 INJECTION, SOLUTION INTRAMUSCULAR; INTRAVENOUS ONCE
Status: DISCONTINUED | OUTPATIENT
Start: 2017-08-24 | End: 2017-08-24 | Stop reason: HOSPADM

## 2017-08-24 RX ORDER — HYDROCODONE BITARTRATE AND ACETAMINOPHEN 5; 325 MG/1; MG/1
2 TABLET ORAL AS NEEDED
Status: DISCONTINUED | OUTPATIENT
Start: 2017-08-24 | End: 2017-08-24 | Stop reason: HOSPADM

## 2017-08-24 RX ORDER — OXYCODONE HYDROCHLORIDE 5 MG/1
5 TABLET ORAL
Qty: 20 TAB | Refills: 0 | Status: SHIPPED | OUTPATIENT
Start: 2017-08-24 | End: 2018-01-01

## 2017-08-24 RX ORDER — OXYCODONE HYDROCHLORIDE 5 MG/1
5 TABLET ORAL
Status: DISCONTINUED | OUTPATIENT
Start: 2017-08-24 | End: 2017-08-24 | Stop reason: HOSPADM

## 2017-08-24 RX ORDER — MIDAZOLAM HYDROCHLORIDE 1 MG/ML
2 INJECTION, SOLUTION INTRAMUSCULAR; INTRAVENOUS
Status: DISCONTINUED | OUTPATIENT
Start: 2017-08-24 | End: 2017-08-24 | Stop reason: HOSPADM

## 2017-08-24 RX ORDER — MIDAZOLAM HYDROCHLORIDE 1 MG/ML
5 INJECTION, SOLUTION INTRAMUSCULAR; INTRAVENOUS ONCE
Status: DISCONTINUED | OUTPATIENT
Start: 2017-08-24 | End: 2017-08-24 | Stop reason: HOSPADM

## 2017-08-24 RX ORDER — LIDOCAINE HYDROCHLORIDE 20 MG/ML
INJECTION, SOLUTION EPIDURAL; INFILTRATION; INTRACAUDAL; PERINEURAL AS NEEDED
Status: DISCONTINUED | OUTPATIENT
Start: 2017-08-24 | End: 2017-08-24 | Stop reason: HOSPADM

## 2017-08-24 RX ORDER — PROPOFOL 10 MG/ML
INJECTION, EMULSION INTRAVENOUS AS NEEDED
Status: DISCONTINUED | OUTPATIENT
Start: 2017-08-24 | End: 2017-08-24 | Stop reason: HOSPADM

## 2017-08-24 RX ORDER — FAMOTIDINE 20 MG/1
20 TABLET, FILM COATED ORAL ONCE
Status: COMPLETED | OUTPATIENT
Start: 2017-08-24 | End: 2017-08-24

## 2017-08-24 RX ORDER — LIDOCAINE HYDROCHLORIDE 10 MG/ML
0.1 INJECTION INFILTRATION; PERINEURAL AS NEEDED
Status: DISCONTINUED | OUTPATIENT
Start: 2017-08-24 | End: 2017-08-24 | Stop reason: HOSPADM

## 2017-08-24 RX ORDER — FENTANYL CITRATE 50 UG/ML
INJECTION, SOLUTION INTRAMUSCULAR; INTRAVENOUS AS NEEDED
Status: DISCONTINUED | OUTPATIENT
Start: 2017-08-24 | End: 2017-08-24 | Stop reason: HOSPADM

## 2017-08-24 RX ADMIN — FENTANYL CITRATE 50 MCG: 50 INJECTION, SOLUTION INTRAMUSCULAR; INTRAVENOUS at 09:06

## 2017-08-24 RX ADMIN — FAMOTIDINE 20 MG: 20 TABLET, FILM COATED ORAL at 07:46

## 2017-08-24 RX ADMIN — FENTANYL CITRATE 50 MCG: 50 INJECTION, SOLUTION INTRAMUSCULAR; INTRAVENOUS at 09:02

## 2017-08-24 RX ADMIN — SODIUM CHLORIDE, SODIUM LACTATE, POTASSIUM CHLORIDE, AND CALCIUM CHLORIDE 75 ML/HR: 600; 310; 30; 20 INJECTION, SOLUTION INTRAVENOUS at 07:44

## 2017-08-24 RX ADMIN — MIDAZOLAM HYDROCHLORIDE 2 MG: 1 INJECTION, SOLUTION INTRAMUSCULAR; INTRAVENOUS at 07:49

## 2017-08-24 RX ADMIN — PROPOFOL 200 MG: 10 INJECTION, EMULSION INTRAVENOUS at 09:03

## 2017-08-24 RX ADMIN — SODIUM CHLORIDE, SODIUM LACTATE, POTASSIUM CHLORIDE, AND CALCIUM CHLORIDE: 600; 310; 30; 20 INJECTION, SOLUTION INTRAVENOUS at 08:57

## 2017-08-24 RX ADMIN — LIDOCAINE HYDROCHLORIDE 100 MG: 20 INJECTION, SOLUTION EPIDURAL; INFILTRATION; INTRACAUDAL; PERINEURAL at 09:03

## 2017-08-24 NOTE — ANESTHESIA PREPROCEDURE EVALUATION
Anesthetic History   No history of anesthetic complications            Review of Systems / Medical History  Patient summary reviewed and pertinent labs reviewed    Pulmonary    COPD: moderate               Neuro/Psych   Within defined limits           Cardiovascular    Hypertension: well controlled          CAD, cardiac stents (last JERRY 1-2017) and CABG    Exercise tolerance: >4 METS  Comments: EF 62%   GI/Hepatic/Renal     GERD: well controlled           Endo/Other  Within defined limits           Other Findings            Physical Exam    Airway  Mallampati: II  TM Distance: 4 - 6 cm  Neck ROM: normal range of motion   Mouth opening: Normal     Cardiovascular  Regular rate and rhythm,  S1 and S2 normal,  no murmur, click, rub, or gallop             Dental    Dentition: Lower partial plate and Poor dentition     Pulmonary  Breath sounds clear to auscultation               Abdominal  GI exam deferred       Other Findings            Anesthetic Plan    ASA: 3  Anesthesia type: general          Induction: Intravenous  Anesthetic plan and risks discussed with: Patient and Spouse

## 2017-08-24 NOTE — BRIEF OP NOTE
BRIEF OPERATIVE NOTE    Date of Procedure: 8/24/2017   Preoperative Diagnosis: Complex tear of medial meniscus, current injury, left knee, initial encounter [S83.232A]  Postoperative Diagnosis: Complex tear of medial meniscus, current injury, left knee, initial encounter [S83.232A]    Procedure(s):  LEFT KNEE ARTHROSCOPY PARTIAL MEDIAL MENISCECTOMY  Surgeon(s) and Role:     * Zahida Gomez III, MD - Primary         Assistant Staff:       Surgical Staff:  Circ-1: Racquel Mercado RN  Scrub Tech-1: Poncho Rush  Event Time In   Incision Start 0912   Incision Close 0920     Anesthesia: General   Estimated Blood Loss:   Specimens: * No specimens in log *   Findings:    Complications:   Implants: * No implants in log *

## 2017-08-24 NOTE — ANESTHESIA POSTPROCEDURE EVALUATION
Post-Anesthesia Evaluation and Assessment    Patient: Chuckie Zambrano MRN: [id number]  SSN: [id number]    YOB: 1955  Age: 60 y.o. Sex: male       Cardiovascular Function/Vital Signs  Visit Vitals    BP (!) 141/98    Pulse 68    Temp 36.6 °C (97.8 °F)    Resp 14    Wt 85 kg (187 lb 8 oz)    SpO2 95%    BMI 29.37 kg/m2       Patient is status post general anesthesia for Procedure(s):  LEFT KNEE ARTHROSCOPY PARTIAL MEDIAL MENISCECTOMY. Nausea/Vomiting: None    Postoperative hydration reviewed and adequate. Pain:  Pain Scale 1: Visual (08/24/17 0934)  Pain Intensity 1: 0 (08/24/17 0934)   Managed    Neurological Status:   Neuro (WDL): Within Defined Limits (08/24/17 0934)   At baseline    Mental Status and Level of Consciousness: Arousable    Pulmonary Status:   O2 Device: Nasal cannula (08/24/17 0934)   Adequate oxygenation and airway patent    Complications related to anesthesia: None    Post-anesthesia assessment completed.  No concerns    Signed By: Shreya Crawford MD     August 24, 2017

## 2017-08-24 NOTE — H&P
Outpatient Surgery History and Physical:  Liliam Nephew was seen and examined. CHIEF COMPLAINT:   Left knee. PE:     Visit Vitals    /89 (BP 1 Location: Right arm, BP Patient Position: At rest)    Pulse 81    Temp 98.5 °F (36.9 °C)    Resp 16    Wt 85 kg (187 lb 8 oz)    SpO2 96%    BMI 29.37 kg/m2       Heart:   Regular rhythm      Lungs:  Are clear      Past Medical History:    Patient Active Problem List    Diagnosis    Restrictive airway disease    Chronic obstructive pulmonary disease with acute exacerbation (HCC)    SOB (shortness of breath)    Bronchitis    Cough    Chest pain    NSTEMI (non-ST elevated myocardial infarction) (HCC)    Peripheral vascular disease-s/p PPI to right common iliac 5/4/11    CAD (coronary artery disease)    HTN (hypertension), benign    Other and unspecified hyperlipidemia    Tobacco use disorder       Surgical History:   Past Surgical History:   Procedure Laterality Date    HX HEART CATHETERIZATION      multiple    HX TONSILLECTOMY      VASCULAR SURGERY PROCEDURE UNLIST      CABG       Social History: Patient  reports that he has been smoking. He has a 7.50 pack-year smoking history. He has never used smokeless tobacco. He reports that he does not drink alcohol or use illicit drugs. Family History:   Family History   Problem Relation Age of Onset    Heart Disease Mother     Cancer Mother      UNKNOWN TYPE    Heart Disease Father        Allergies: Reviewed per EMR  Allergies   Allergen Reactions    Aspirin Swelling     Takes 81 mg at home. Patient can tolerate Aspirin in low doses, but in larger doses causes swelling. Medications:    No current facility-administered medications on file prior to encounter. Current Outpatient Prescriptions on File Prior to Encounter   Medication Sig    glycopyrrolate-formoterol (BEVESPI AEROSPHERE) 9-4.8 mcg HFAA Take  by inhalation two (2) times a day.     raNITIdine (ZANTAC) 150 mg tablet Take 2 Tabs by mouth nightly. Indications: GASTROESOPHAGEAL REFLUX, HEARTBURN (Patient taking differently: Take 150 mg by mouth as needed. Indications: gastroesophageal reflux disease, HEARTBURN)    clopidogrel (PLAVIX) 75 mg tab Take 1 Tab by mouth daily. (Patient taking differently: Take 75 mg by mouth every morning.)    albuterol (VENTOLIN HFA) 90 mcg/actuation inhaler Take 2 Puffs by inhalation every four (4) hours as needed for Wheezing or Shortness of Breath. (Patient taking differently: Take 2 Puffs by inhalation as needed for Wheezing or Shortness of Breath.)    aspirin 81 mg tablet Take 81 mg by mouth every morning.  nitroglycerin (NITROSTAT) 0.4 mg SL tablet 1 Tab by SubLINGual route every five (5) minutes as needed for Chest Pain. The surgery is planned for the left knee. The patient is here today for outpatient surgery. I have examined the patient, no changes are noted in the patient's medical status. Necessity for the procedure/care is still present and the history and physical above is current. See the office notes for the full long term history of the problem. Please see the recent office notes for the musculoskeletal examination.     Signed By: Nicolasa Ormond, MD     August 24, 2017 8:26 AM

## 2017-08-24 NOTE — OP NOTES
PATIENT:   Kelsey Nair      DATE OF SURGERY:  8/24/2017      PREOPERATIVE  DIAGNOSIS:  Complex tear of medial meniscus, current injury, left knee, initial encounter [S83.232A]      POSTOPERATIVE DIAGNOSIS:   Complex tear of medial meniscus, current injury, left knee, initial encounter [S83.232A]      PROCEDURE:   Procedure(s):  LEFT KNEE ARTHROSCOPY PARTIAL MEDIAL MENISCECTOMY      PROCEDURE IN DETAIL:   The patient's left   knee was prepped and draped in a sterile fashion. The arthroscope was inserted through an anterolateral  portal and the interior of the joint was examined. The patellofemoral compartment was normal.  The medial compartment was examined. There was a tear of the medial meniscus involving the posterior horn. It was a complex tear. The articular surfaces were noted to be degenerative. The torn portion of the meniscus was removed using the Acufex duckling biters  and the   intraarticular shaver. The Minivac was used as well. This left a stable rim of meniscus tissue. It was probed and there was no unstable meniscal tissue left. The ACL and the PCL were noted to be intact. The lateral compartment was examined next. The lateral meniscus was not torn. The lateral articular surfaces were intact. The knee was then irrigated. A sterile dressing was applied. The patient was then taken to the recovery room in satisfactory condition.           Kyler Rodrigues M.D.

## 2017-08-24 NOTE — PERIOP NOTES
Pt has spoken with [facility] DOS and 2 mg Versed given IV per order. Pt placed on pulse oximetry. 02 Sat 96% on room air. SR up x2.

## 2017-08-24 NOTE — IP AVS SNAPSHOT
[facility] 
 
 
 [address] [facility] [address] [facility] 
[phone] Patient: Anne Merino MRN: [id number] SPH:[id number] You are allergic to the following Allergen Reactions Aspirin Swelling Takes 81 mg at home. Patient can tolerate Aspirin in low doses, but in larger doses causes swelling. Recent Documentation Weight BMI Smoking Status 85 kg 29.37 kg/m2 Current Every Day Smoker Emergency Contacts Name Discharge Info Relation Home Work Mobile [facility] DISCHARGE CAREGIVER [3] Life Partner [7] [phone] About your hospitalization You were admitted on:  August 24, 2017 You last received care in the:  [place] [address] You were discharged on:  August 24, 2017 Unit phone number:  [phone] Why you were hospitalized Your primary diagnosis was:  Not on File Providers Seen During Your Hospitalizations Provider Role Specialty Primary office phone Kirill Cardona MD Attending Provider Orthopedic Surgery [phone] Your Primary Care Physician (PCP) Primary Care Physician Office Phone Office Fax Stephane Adams, [address] [facility] [phone] Follow-up Information Follow up With Details Comments Contact Info Kirill Cardona MD  Keep follow up appointment as scheduled [place] [address] 
[phone] Blanca Coy MD   [address] [place] [address] Primary Care  Ladi Talley 
[phone] Current Discharge Medication List  
  
START taking these medications Dose & Instructions Dispensing Information Comments Morning Noon Evening Bedtime  
 oxyCODONE IR 5 mg immediate release tablet Commonly known as:  [place] Your last dose was: Your next dose is:    
   
   
 Dose:  5 mg Take 1 Tab by mouth every four (4) hours as needed for Pain. Max Daily Amount: 30 mg.  
 Quantity:  20 Tab Refills:  0 CONTINUE these medications which have CHANGED Dose & Instructions Dispensing Information Comments Morning Noon Evening Bedtime  
 albuterol 90 mcg/actuation inhaler Commonly known as:  VENTOLIN HFA What changed:  when to take this Your last dose was: Your next dose is:    
   
   
 Dose:  2 Puff Take 2 Puffs by inhalation every four (4) hours as needed for Wheezing or Shortness of Breath. Quantity:  1 Inhaler Refills:  4  
     
   
   
   
  
 clopidogrel 75 mg Tab Commonly known as:  PLAVIX What changed:  when to take this Your last dose was: Your next dose is:    
   
   
 Dose:  75 mg Take 1 Tab by mouth daily. Quantity:  30 Tab Refills:  11  
     
   
   
   
  
 raNITIdine 150 mg tablet Commonly known as:  ZANTAC What changed:   
- how much to take - when to take this 
- reasons to take this Your last dose was: Your next dose is:    
   
   
 Dose:  300 mg Take 2 Tabs by mouth nightly. Indications: GASTROESOPHAGEAL REFLUX, HEARTBURN Quantity:  60 Tab Refills:  11 CONTINUE these medications which have NOT CHANGED Dose & Instructions Dispensing Information Comments Morning Noon Evening Bedtime  
 aspirin 81 mg tablet Your last dose was: Your next dose is:    
   
   
 Dose:  81 mg Take 81 mg by mouth every morning. Refills:  0 BEVESPI AEROSPHERE 9-4.8 mcg Hfaa Generic drug:  glycopyrrolate-formoterol Your last dose was: Your next dose is: Take  by inhalation two (2) times a day. Refills:  0  
     
   
   
   
  
 nitroglycerin 0.4 mg SL tablet Commonly known as:  NITROSTAT Your last dose was:     
   
Your next dose is:    
   
   
 Dose:  0.4 mg  
 1 Tab by SubLINGual route every five (5) minutes as needed for Chest Pain. Quantity:  1 Bottle Refills:  5  
     
   
   
   
  
 predniSONE 5 mg tablet Commonly known as:  Ce [place] Your last dose was: Your next dose is:    
   
   
 Dose:  5 mg Take 5 mg by mouth every other day. Refills:  0  
     
   
   
   
  
 TYLENOL 325 mg tablet Generic drug:  acetaminophen Your last dose was: Your next dose is:    
   
   
 Dose:  325 mg Take 325 mg by mouth as needed for Pain. Refills:  0 Where to Get Your Medications Information on where to get these meds will be given to you by the nurse or doctor. ! Ask your nurse or doctor about these medications  
  oxyCODONE IR 5 mg immediate release tablet Discharge Instructions Post-Operative Instructions For  [facility] Phone:  [phone] 1. Unless otherwise instructed, you may place as much weight on your leg as you wish. 2. For the first 48-72 hours, following surgery, use ice on the knee every two hours (while awake) for 20-30 minutes at a time to help prevent swelling and lessen pain. Elevate the leg. 3. Perform at least 30 to 50 leg-raising exercises twice a day. These are in the arthroscopy booklet that you received. Begin exercise as soon as pain allows. Also perform gentle active motion of the knee as soon as pain allows. 4. On the second  day after surgery, remove the dressing. Leave steri-strips on, they eventually will peel off. Use waterproof band aids to cover when you shower. 5. Use  pain medication as instructed on the bottle. If you already have pain medication from another physician do not use it with this medication. You can also take ibuprofen for pain with this medication if you are not already on an arthritis type medication. 6. You may have some side effects from your pain medication. If you have nausea, try taking your medication with food. For itching, you may take over the counter Benadryl. 7. You may shower after two days. Remove the dressing and use waterproof band aids from the drugstore. 8. If you have a problem, please call [address] [place] [address] at [phone] Maynor Crawley M.D. [facility]  
 
TYPICAL SIDE EFFECTS OF PAIN MEDICATION: 
*    Constipation: Drink lots of fluids, try prune juice. OTC stool softener if needed. *    Nausea: Take pain medication with food. ACTIVITY · As tolerated and as directed by your doctor. · Bathe or shower as directed by your doctor. DIET 
· Day of surgery: Clear liquids until no nausea or vomiting; small portion, light diet [place] foods (ex: baked chicken, plain rice, grits, scrambled eggs, toast). Nothing greasy, fried or spicy today. · Advance to regular diet on second day, unless your doctor orders otherwise. · If nausea and vomiting continues, call your doctor. PAIN 
· Take pain medication as directed by your doctor. · DO NOT take aspirin or blood thinners unless directed by your doctor. CALL YOUR DOCTOR IF   
s Call your doctor if pain is NOT relieved by medication.  
s Excessive bleeding that does not stop after holding pressure over the area · Temperature of 101 degrees F or above · Excessive redness, swelling or bruising, and/ or green or yellow, smelly discharge from incision AFTER ANESTHESIA · For the first 24 hours: DO NOT Drive, Drink alcoholic beverages, or Make important decisions. · Be aware of dizziness following anesthesia and while taking pain medication. DISCHARGE SUMMARY from Nurse PATIENT INSTRUCTIONS: 
 
After general anesthesia or intravenous sedation, for 24 hours or while taking prescription Narcotics: · Limit your activities · Do not drive and operate hazardous machinery · Do not make important personal or business decisions · Do  not drink alcoholic beverages · If you have not urinated within 8 hours after discharge, please contact your surgeon on call. *  Please give a list of your current medications to your Primary Care Provider. *  Please update this list whenever your medications are discontinued, doses are 
    changed, or new medications (including over-the-counter products) are added. *  Please carry medication information at all times in case of emergency situations. Preventing Infection at Home We care about preventing infection and avoiding the spread of germs  not only when you are in the hospital but also when you return home. When you return home from the hospital, its important to take the following steps to help prevent infection and avoid spreading germs that could infect you and others. Ask everyone in your home to follow these guidelines, too. Clean Your Hands · Clean your hands whenever your hands are visibly dirty, before you eat, before or after touching your mouth, nose or eyes, and before preparing food. Clean them after contact with body fluids, using the restroom, touching animals or changing diapers. · When washing hands, wet them with warm water and work up a lather. Rub hands for at least 15 seconds, then rinse them and pat them dry with a clean towel or paper towel. · When using hand sanitizers, it should take about 15 seconds to rub your hands dry. If not, you probably didnt apply enough . Cover Your Sneeze or Cough Germs are released into the air whenever you sneeze or cough. To prevent the spread of infection: · Turn away from other people before coughing or sneezing. · Cover your mouth or nose with a tissue when you cough or sneeze. Put the tissue in the trash. · If you dont have a tissue, cough or sneeze into your upper sleeve, not your hands. · Always clean your hands after coughing or sneezing. Care for Wounds Your skin is your bodys first line of defense against germs, but an open wound leaves an easy way for germs to enter your body. To prevent infection: · Clean your hands before and after changing wound dressings, and wear gloves to change dressings if recommended by your doctor. · Take special care with IV lines or other devices inserted into the body. If you must touch them, clean your hands first. 
· Follow any specific instructions from your doctor to care for your wounds. Contact your doctor if you experience any signs of infection, such as fever or increased redness at the surgical or wound site. Keep a [place] [address] · Clean or wipe commonly touched hard surfaces like door handles, sinks, tabletops, phones and TV remotes. · Use products labeled disinfectant to kill harmful bacteria and viruses. · Use a clean cloth or paper towel to clean and dry surfaces. Wiping surfaces with a dirty dishcloth, sponge or towel will only spread germs. · Never share toothbrushes, butler, drinking glasses, utensils, razor blades, face cloths or bath towels to avoid spreading germs. · Be sure that the linens that you sleep on are clean. · Keep pets away from wounds and wash your hands after touching pets, their toys or bedding. We care about you and your health. Remember, preventing infections is a team effort between you, your family, friends and health care providers. These are general instructions for a healthy lifestyle: No smoking/ No tobacco products/ Avoid exposure to second hand smoke Surgeon General's Warning:  Quitting smoking now greatly reduces serious risk to your health. Obesity, smoking, and sedentary lifestyle greatly increases your risk for illness A healthy diet, regular physical exercise & weight monitoring are important for maintaining a healthy lifestyle You may be retaining fluid if you have a history of heart failure or if you experience any of the following symptoms:  Weight gain of 3 pounds or more overnight or 5 pounds in a week, increased swelling in our hands or feet or shortness of breath while lying flat in bed. Please call your doctor as soon as you notice any of these symptoms; do not wait until your next office visit. Recognize signs and symptoms of STROKE: 
 
F-face looks uneven A-arms unable to move or move unevenly S-speech slurred or non-existent T-time-call 911 as soon as signs and symptoms begin-DO NOT go Back to bed or wait to see if you get better-TIME IS BRAIN. Discharge Orders None Introducing [facility]! Dear Nicolle Batch: Thank you for requesting a [email or web address] account. Our records indicate that you already have an active [email or web address] account. You can access your account anytime at [email or web address] Did you know that you can access your hospital and ER discharge instructions at any time in [email or web address]? You can also review all of your test results from your hospital stay or ER visit. Additional Information If you have questions, please visit the Frequently Asked Questions section of the [email or web address] website at [email or web address] Remember, [email or web address] is NOT to be used for urgent needs. For medical emergencies, dial 911. Now available from your iPhone and Android! General Information Please provide this summary of care documentation to your next provider. Patient Signature:  ____________________________________________________________ Date:  ____________________________________________________________  
  
Vijaya Marcelino Provider Signature:  ____________________________________________________________ Date:  ____________________________________________________________

## 2017-08-24 NOTE — DISCHARGE INSTRUCTIONS
Post-Operative Instructions   For  [facility]  Phone:  [phone]    1. Unless otherwise instructed, you may place as much weight on your leg as you wish. 2. For the first 48-72 hours, following surgery, use ice on the knee every two hours (while awake) for 20-30 minutes at a time to help prevent swelling and lessen pain. Elevate the leg. 3. Perform at least 30 to 50 leg-raising exercises twice a day. These are in the arthroscopy booklet that you received. Begin exercise as soon as pain allows. Also perform gentle active motion of the knee as soon as pain allows. 4. On the second  day after surgery, remove the dressing. Leave steri-strips on, they eventually will peel off. Use waterproof band aids to cover when you shower. 5. Use  pain medication as instructed on the bottle. If you already have pain medication from another physician do not use it with this medication. You can also take ibuprofen for pain with this medication if you are not already on an arthritis type medication. 6. You may have some side effects from your pain medication. If you have nausea, try taking your medication with food. For itching, you may take over the counter Benadryl. 7. You may shower after two days. Remove the dressing and use waterproof band aids from the drugstore. 8. If you have a problem, please call [address] [place] [address] at [phone]    Lior Lopez M.D. [facility]     TYPICAL SIDE EFFECTS OF PAIN MEDICATION:  *    Constipation: Drink lots of fluids, try prune juice. OTC stool softener if needed. *    Nausea: Take pain medication with food. ACTIVITY  · As tolerated and as directed by your doctor. · Bathe or shower as directed by your doctor. DIET  · Day of surgery: Clear liquids until no nausea or vomiting; small portion, light diet [place] foods (ex: baked chicken, plain rice, grits, scrambled eggs, toast). Nothing greasy, fried or spicy today. · Advance to regular diet on second day, unless your doctor orders otherwise. · If nausea and vomiting continues, call your doctor. PAIN  · Take pain medication as directed by your doctor. · DO NOT take aspirin or blood thinners unless directed by your doctor. CALL YOUR DOCTOR IF    s Call your doctor if pain is NOT relieved by medication.   s Excessive bleeding that does not stop after holding pressure over the area  · Temperature of 101 degrees F or above  · Excessive redness, swelling or bruising, and/ or green or yellow, smelly discharge from incision    AFTER ANESTHESIA   · For the first 24 hours: DO NOT Drive, Drink alcoholic beverages, or Make important decisions. · Be aware of dizziness following anesthesia and while taking pain medication. DISCHARGE SUMMARY from Nurse    PATIENT INSTRUCTIONS:    After general anesthesia or intravenous sedation, for 24 hours or while taking prescription Narcotics:  · Limit your activities  · Do not drive and operate hazardous machinery  · Do not make important personal or business decisions  · Do  not drink alcoholic beverages  · If you have not urinated within 8 hours after discharge, please contact your surgeon on call. *  Please give a list of your current medications to your Primary Care Provider. *  Please update this list whenever your medications are discontinued, doses are      changed, or new medications (including over-the-counter products) are added. *  Please carry medication information at all times in case of emergency situations. Preventing Infection at Home  We care about preventing infection and avoiding the spread of germs - not only when you are in the hospital but also when you return home. When you return home from the hospital, its important to take the following steps to help prevent infection and avoid spreading germs that could infect you and others.  Ask everyone in your home to follow these guidelines, too. Clean Your Hands  · Clean your hands whenever your hands are visibly dirty, before you eat, before or after touching your mouth, nose or eyes, and before preparing food. Clean them after contact with body fluids, using the restroom, touching animals or changing diapers. · When washing hands, wet them with warm water and work up a lather. Rub hands for at least 15 seconds, then rinse them and pat them dry with a clean towel or paper towel. · When using hand sanitizers, it should take about 15 seconds to rub your hands dry. If not, you probably didnt apply enough . Cover Your Sneeze or Cough  Germs are released into the air whenever you sneeze or cough. To prevent the spread of infection:  · Turn away from other people before coughing or sneezing. · Cover your mouth or nose with a tissue when you cough or sneeze. Put the tissue in the trash. · If you dont have a tissue, cough or sneeze into your upper sleeve, not your hands. · Always clean your hands after coughing or sneezing. Care for Wounds  Your skin is your bodys first line of defense against germs, but an open wound leaves an easy way for germs to enter your body. To prevent infection:  · Clean your hands before and after changing wound dressings, and wear gloves to change dressings if recommended by your doctor. · Take special care with IV lines or other devices inserted into the body. If you must touch them, clean your hands first.  · Follow any specific instructions from your doctor to care for your wounds. Contact your doctor if you experience any signs of infection, such as fever or increased redness at the surgical or wound site. Keep a Clean Home  · Clean or wipe commonly touched hard surfaces like door handles, sinks, tabletops, phones and TV remotes. · Use products labeled disinfectant to kill harmful bacteria and viruses.   · Use a clean cloth or paper towel to clean and dry surfaces. Wiping surfaces with a dirty dishcloth, sponge or towel will only spread germs. · Never share toothbrushes, butler, drinking glasses, utensils, razor blades, face cloths or bath towels to avoid spreading germs. · Be sure that the linens that you sleep on are clean. · Keep pets away from wounds and wash your hands after touching pets, their toys or bedding. We care about you and your health. Remember, preventing infections is a team effort between you, your family, friends and health care providers. These are general instructions for a healthy lifestyle:    No smoking/ No tobacco products/ Avoid exposure to second hand smoke    Surgeon General's Warning:  Quitting smoking now greatly reduces serious risk to your health. Obesity, smoking, and sedentary lifestyle greatly increases your risk for illness    A healthy diet, regular physical exercise & weight monitoring are important for maintaining a healthy lifestyle    You may be retaining fluid if you have a history of heart failure or if you experience any of the following symptoms:  Weight gain of 3 pounds or more overnight or 5 pounds in a week, increased swelling in our hands or feet or shortness of breath while lying flat in bed. Please call your doctor as soon as you notice any of these symptoms; do not wait until your next office visit. Recognize signs and symptoms of STROKE:    F-face looks uneven    A-arms unable to move or move unevenly    S-speech slurred or non-existent    T-time-call 911 as soon as signs and symptoms begin-DO NOT go       Back to bed or wait to see if you get better-TIME IS BRAIN.

## 2017-08-24 NOTE — PERIOP NOTES
Discharge instructions given to spouse. Verbalized understanding and opportunity for questions was given. Dr Sharath martines to discharge at this time. Pt and belongings taken via wheelchair to car.

## 2018-01-01 ENCOUNTER — HOSPITAL ENCOUNTER (INPATIENT)
Age: 63
LOS: 1 days | Discharge: HOME | DRG: 247 | End: 2018-08-05
Attending: EMERGENCY MEDICINE | Admitting: INTERNAL MEDICINE
Payer: SELF-PAY

## 2018-01-01 ENCOUNTER — HOSPITAL ENCOUNTER (INPATIENT)
Age: 63
LOS: 1 days | Discharge: HOME | DRG: 287 | End: 2018-08-08
Admitting: INTERNAL MEDICINE
Payer: SELF-PAY

## 2018-01-01 ENCOUNTER — APPOINTMENT (OUTPATIENT)
Dept: GENERAL RADIOLOGY | Age: 63
DRG: 247 | End: 2018-01-01
Attending: EMERGENCY MEDICINE
Payer: SELF-PAY

## 2018-01-01 ENCOUNTER — APPOINTMENT (OUTPATIENT)
Dept: GENERAL RADIOLOGY | Age: 63
DRG: 287 | End: 2018-01-01
Payer: SELF-PAY

## 2018-01-01 VITALS
SYSTOLIC BLOOD PRESSURE: 135 MMHG | HEIGHT: 66 IN | OXYGEN SATURATION: 98 % | HEART RATE: 65 BPM | RESPIRATION RATE: 18 BRPM | WEIGHT: 196.2 LBS | TEMPERATURE: 97.8 F | DIASTOLIC BLOOD PRESSURE: 88 MMHG | BODY MASS INDEX: 31.53 KG/M2

## 2018-01-01 VITALS
SYSTOLIC BLOOD PRESSURE: 115 MMHG | TEMPERATURE: 97.4 F | HEIGHT: 66 IN | HEART RATE: 84 BPM | DIASTOLIC BLOOD PRESSURE: 73 MMHG | BODY MASS INDEX: 30.87 KG/M2 | WEIGHT: 192.1 LBS | OXYGEN SATURATION: 95 % | RESPIRATION RATE: 18 BRPM

## 2018-01-01 DIAGNOSIS — I20.0 UNSTABLE ANGINA (HCC): Primary | ICD-10-CM

## 2018-01-01 DIAGNOSIS — I20.9 ANGINA PECTORIS (HCC): Primary | ICD-10-CM

## 2018-01-01 LAB
ACT BLD: 230 SECS (ref 70–128)
ACT BLD: 268 SECS (ref 70–128)
ALBUMIN SERPL-MCNC: 2.9 G/DL (ref 3.2–4.6)
ALBUMIN SERPL-MCNC: 3.2 G/DL (ref 3.2–4.6)
ALBUMIN/GLOB SERPL: 0.5 {RATIO} (ref 1.2–3.5)
ALBUMIN/GLOB SERPL: 0.6 {RATIO} (ref 1.2–3.5)
ALP SERPL-CCNC: 90 U/L (ref 50–136)
ALP SERPL-CCNC: 96 U/L (ref 50–136)
ALT SERPL-CCNC: 16 U/L (ref 12–65)
ALT SERPL-CCNC: 21 U/L (ref 12–65)
ANION GAP SERPL CALC-SCNC: 10 MMOL/L (ref 7–16)
ANION GAP SERPL CALC-SCNC: 11 MMOL/L (ref 7–16)
ANION GAP SERPL CALC-SCNC: 11 MMOL/L (ref 7–16)
ANION GAP SERPL CALC-SCNC: 9 MMOL/L (ref 7–16)
ANION GAP SERPL CALC-SCNC: 9 MMOL/L (ref 7–16)
AST SERPL-CCNC: 15 U/L (ref 15–37)
AST SERPL-CCNC: 19 U/L (ref 15–37)
ATRIAL RATE: 73 BPM
ATRIAL RATE: 80 BPM
ATRIAL RATE: 81 BPM
ATRIAL RATE: 82 BPM
BASOPHILS # BLD: 0 K/UL
BASOPHILS # BLD: 0 K/UL (ref 0–0.2)
BASOPHILS NFR BLD: 0 % (ref 0–2)
BASOPHILS NFR BLD: 0 % (ref 0–2)
BILIRUB SERPL-MCNC: 0.4 MG/DL (ref 0.2–1.1)
BILIRUB SERPL-MCNC: 0.5 MG/DL (ref 0.2–1.1)
BNP SERPL-MCNC: 77 PG/ML
BUN SERPL-MCNC: 11 MG/DL (ref 8–23)
BUN SERPL-MCNC: 15 MG/DL (ref 8–23)
BUN SERPL-MCNC: 17 MG/DL (ref 8–23)
CALCIUM SERPL-MCNC: 8.2 MG/DL (ref 8.3–10.4)
CALCIUM SERPL-MCNC: 8.3 MG/DL (ref 8.3–10.4)
CALCIUM SERPL-MCNC: 8.4 MG/DL (ref 8.3–10.4)
CALCIUM SERPL-MCNC: 8.4 MG/DL (ref 8.3–10.4)
CALCIUM SERPL-MCNC: 8.7 MG/DL (ref 8.3–10.4)
CALCULATED P AXIS, ECG09: 32 DEGREES
CALCULATED P AXIS, ECG09: 36 DEGREES
CALCULATED P AXIS, ECG09: 40 DEGREES
CALCULATED P AXIS, ECG09: 42 DEGREES
CALCULATED R AXIS, ECG10: -42 DEGREES
CALCULATED R AXIS, ECG10: -46 DEGREES
CALCULATED R AXIS, ECG10: -53 DEGREES
CALCULATED R AXIS, ECG10: -56 DEGREES
CALCULATED T AXIS, ECG11: 120 DEGREES
CALCULATED T AXIS, ECG11: 121 DEGREES
CALCULATED T AXIS, ECG11: 135 DEGREES
CALCULATED T AXIS, ECG11: 146 DEGREES
CHLORIDE SERPL-SCNC: 103 MMOL/L (ref 98–107)
CHLORIDE SERPL-SCNC: 104 MMOL/L (ref 98–107)
CHLORIDE SERPL-SCNC: 104 MMOL/L (ref 98–107)
CHLORIDE SERPL-SCNC: 105 MMOL/L (ref 98–107)
CHLORIDE SERPL-SCNC: 107 MMOL/L (ref 98–107)
CHOLEST SERPL-MCNC: 245 MG/DL
CO2 SERPL-SCNC: 24 MMOL/L (ref 21–32)
CO2 SERPL-SCNC: 25 MMOL/L (ref 21–32)
CO2 SERPL-SCNC: 26 MMOL/L (ref 21–32)
CREAT SERPL-MCNC: 0.96 MG/DL (ref 0.8–1.5)
CREAT SERPL-MCNC: 1.05 MG/DL (ref 0.8–1.5)
CREAT SERPL-MCNC: 1.11 MG/DL (ref 0.8–1.5)
CREAT SERPL-MCNC: 1.18 MG/DL (ref 0.8–1.5)
CREAT SERPL-MCNC: 1.25 MG/DL (ref 0.8–1.5)
DIAGNOSIS, 93000: NORMAL
DIFFERENTIAL METHOD BLD: ABNORMAL
DIFFERENTIAL METHOD BLD: NORMAL
EOSINOPHIL # BLD: 0.2 K/UL (ref 0–0.8)
EOSINOPHIL # BLD: 0.4 K/UL
EOSINOPHIL NFR BLD: 3 % (ref 0.5–7.8)
EOSINOPHIL NFR BLD: 4 % (ref 0.5–7.8)
ERYTHROCYTE [DISTWIDTH] IN BLOOD BY AUTOMATED COUNT: 13.3 %
ERYTHROCYTE [DISTWIDTH] IN BLOOD BY AUTOMATED COUNT: 13.5 % (ref 11.9–14.6)
ERYTHROCYTE [DISTWIDTH] IN BLOOD BY AUTOMATED COUNT: 13.8 % (ref 11.9–14.6)
GLOBULIN SER CALC-MCNC: 5 G/DL (ref 2.3–3.5)
GLOBULIN SER CALC-MCNC: 5.3 G/DL (ref 2.3–3.5)
GLUCOSE SERPL-MCNC: 130 MG/DL (ref 65–100)
GLUCOSE SERPL-MCNC: 144 MG/DL (ref 65–100)
GLUCOSE SERPL-MCNC: 149 MG/DL (ref 65–100)
GLUCOSE SERPL-MCNC: 247 MG/DL (ref 65–100)
GLUCOSE SERPL-MCNC: 252 MG/DL (ref 65–100)
HCT VFR BLD AUTO: 46.3 % (ref 41.1–50.3)
HCT VFR BLD AUTO: 47.7 % (ref 41.1–50.3)
HCT VFR BLD AUTO: 48.9 % (ref 41.1–50.3)
HDLC SERPL-MCNC: 44 MG/DL (ref 40–60)
HDLC SERPL: 5.6 {RATIO}
HGB BLD-MCNC: 15.1 G/DL (ref 13.6–17.2)
HGB BLD-MCNC: 16.1 G/DL (ref 13.6–17.2)
HGB BLD-MCNC: 16.3 G/DL (ref 13.6–17.2)
IMM GRANULOCYTES # BLD: 0 K/UL
IMM GRANULOCYTES # BLD: 0 K/UL (ref 0–0.5)
IMM GRANULOCYTES NFR BLD AUTO: 0 % (ref 0–5)
IMM GRANULOCYTES NFR BLD AUTO: 0 % (ref 0–5)
LDLC SERPL CALC-MCNC: 178.6 MG/DL
LIPID PROFILE,FLP: ABNORMAL
LYMPHOCYTES # BLD: 3 K/UL
LYMPHOCYTES # BLD: 3 K/UL (ref 0.5–4.6)
LYMPHOCYTES NFR BLD: 36 % (ref 13–44)
LYMPHOCYTES NFR BLD: 36 % (ref 13–44)
MAGNESIUM SERPL-MCNC: 2 MG/DL (ref 1.8–2.4)
MCH RBC QN AUTO: 30.6 PG (ref 26.1–32.9)
MCH RBC QN AUTO: 31 PG (ref 26.1–32.9)
MCH RBC QN AUTO: 31.4 PG (ref 26.1–32.9)
MCHC RBC AUTO-ENTMCNC: 32.6 G/DL (ref 31.4–35)
MCHC RBC AUTO-ENTMCNC: 33.3 G/DL (ref 31.4–35)
MCHC RBC AUTO-ENTMCNC: 33.8 G/DL (ref 31.4–35)
MCV RBC AUTO: 93 FL (ref 79.6–97.8)
MCV RBC AUTO: 93 FL (ref 79.6–97.8)
MCV RBC AUTO: 93.7 FL (ref 79.6–97.8)
MONOCYTES # BLD: 0.3 K/UL (ref 0.1–1.3)
MONOCYTES # BLD: 0.5 K/UL
MONOCYTES NFR BLD: 4 % (ref 4–12)
MONOCYTES NFR BLD: 7 % (ref 4–12)
NEUTS SEG # BLD: 4.3 K/UL
NEUTS SEG # BLD: 4.7 K/UL (ref 1.7–8.2)
NEUTS SEG NFR BLD: 53 % (ref 43–78)
NEUTS SEG NFR BLD: 57 % (ref 43–78)
NRBC # BLD: 0 K/UL (ref 0–0.2)
P-R INTERVAL, ECG05: 130 MS
P-R INTERVAL, ECG05: 138 MS
P-R INTERVAL, ECG05: 138 MS
P-R INTERVAL, ECG05: 142 MS
PLATELET # BLD AUTO: 184 K/UL (ref 150–450)
PLATELET # BLD AUTO: 211 K/UL (ref 150–450)
PLATELET # BLD AUTO: 229 K/UL (ref 150–450)
PMV BLD AUTO: 9.8 FL (ref 10.8–14.1)
PMV BLD AUTO: 9.8 FL (ref 9.4–12.3)
PMV BLD AUTO: 9.9 FL (ref 10.8–14.1)
POTASSIUM SERPL-SCNC: 3.8 MMOL/L (ref 3.5–5.1)
POTASSIUM SERPL-SCNC: 3.9 MMOL/L (ref 3.5–5.1)
POTASSIUM SERPL-SCNC: 4.1 MMOL/L (ref 3.5–5.1)
POTASSIUM SERPL-SCNC: 4.3 MMOL/L (ref 3.5–5.1)
POTASSIUM SERPL-SCNC: 4.5 MMOL/L (ref 3.5–5.1)
PROT SERPL-MCNC: 8.2 G/DL (ref 6.3–8.2)
PROT SERPL-MCNC: 8.2 G/DL (ref 6.3–8.2)
Q-T INTERVAL, ECG07: 396 MS
Q-T INTERVAL, ECG07: 404 MS
Q-T INTERVAL, ECG07: 410 MS
Q-T INTERVAL, ECG07: 426 MS
QRS DURATION, ECG06: 110 MS
QRS DURATION, ECG06: 94 MS
QRS DURATION, ECG06: 98 MS
QRS DURATION, ECG06: 98 MS
QTC CALCULATION (BEZET), ECG08: 460 MS
QTC CALCULATION (BEZET), ECG08: 469 MS
QTC CALCULATION (BEZET), ECG08: 472 MS
QTC CALCULATION (BEZET), ECG08: 472 MS
RBC # BLD AUTO: 4.94 M/UL (ref 4.23–5.6)
RBC # BLD AUTO: 5.13 M/UL (ref 4.23–5.67)
RBC # BLD AUTO: 5.26 M/UL (ref 4.23–5.67)
SODIUM SERPL-SCNC: 139 MMOL/L (ref 136–145)
SODIUM SERPL-SCNC: 139 MMOL/L (ref 136–145)
SODIUM SERPL-SCNC: 140 MMOL/L (ref 136–145)
SODIUM SERPL-SCNC: 140 MMOL/L (ref 136–145)
SODIUM SERPL-SCNC: 142 MMOL/L (ref 136–145)
TRIGL SERPL-MCNC: 112 MG/DL (ref 35–150)
TROPONIN I BLD-MCNC: 0.68 NG/ML (ref 0.02–0.05)
TROPONIN I SERPL-MCNC: 0.02 NG/ML (ref 0.02–0.05)
TROPONIN I SERPL-MCNC: 0.02 NG/ML (ref 0.02–0.05)
TROPONIN I SERPL-MCNC: 0.42 NG/ML (ref 0.02–0.05)
TROPONIN I SERPL-MCNC: 0.53 NG/ML (ref 0.02–0.05)
TROPONIN I SERPL-MCNC: <0.02 NG/ML (ref 0.02–0.05)
VENTRICULAR RATE, ECG03: 73 BPM
VENTRICULAR RATE, ECG03: 80 BPM
VENTRICULAR RATE, ECG03: 81 BPM
VENTRICULAR RATE, ECG03: 82 BPM
VLDLC SERPL CALC-MCNC: 22.4 MG/DL (ref 6–23)
WBC # BLD AUTO: 8.2 K/UL (ref 4.3–11.1)
WBC # BLD AUTO: 8.2 K/UL (ref 4.3–11.1)
WBC # BLD AUTO: 8.5 K/UL (ref 4.3–11.1)

## 2018-01-01 PROCEDURE — 74011000250 HC RX REV CODE- 250: Performed by: INTERNAL MEDICINE

## 2018-01-01 PROCEDURE — C1725 CATH, TRANSLUMIN NON-LASER: HCPCS

## 2018-01-01 PROCEDURE — C1769 GUIDE WIRE: HCPCS

## 2018-01-01 PROCEDURE — 4A023N7 MEASUREMENT OF CARDIAC SAMPLING AND PRESSURE, LEFT HEART, PERCUTANEOUS APPROACH: ICD-10-PCS | Performed by: INTERNAL MEDICINE

## 2018-01-01 PROCEDURE — 74011250636 HC RX REV CODE- 250/636: Performed by: INTERNAL MEDICINE

## 2018-01-01 PROCEDURE — 36415 COLL VENOUS BLD VENIPUNCTURE: CPT

## 2018-01-01 PROCEDURE — 027135Z DILATION OF CORONARY ARTERY, TWO ARTERIES WITH TWO DRUG-ELUTING INTRALUMINAL DEVICES, PERCUTANEOUS APPROACH: ICD-10-PCS | Performed by: INTERNAL MEDICINE

## 2018-01-01 PROCEDURE — 74011250637 HC RX REV CODE- 250/637: Performed by: NURSE PRACTITIONER

## 2018-01-01 PROCEDURE — C1894 INTRO/SHEATH, NON-LASER: HCPCS

## 2018-01-01 PROCEDURE — 94640 AIRWAY INHALATION TREATMENT: CPT

## 2018-01-01 PROCEDURE — 74011250637 HC RX REV CODE- 250/637: Performed by: INTERNAL MEDICINE

## 2018-01-01 PROCEDURE — B212YZZ FLUOROSCOPY OF SINGLE CORONARY ARTERY BYPASS GRAFT USING OTHER CONTRAST: ICD-10-PCS | Performed by: INTERNAL MEDICINE

## 2018-01-01 PROCEDURE — 99152 MOD SED SAME PHYS/QHP 5/>YRS: CPT

## 2018-01-01 PROCEDURE — 74011250636 HC RX REV CODE- 250/636: Performed by: NURSE PRACTITIONER

## 2018-01-01 PROCEDURE — 92937 PRQ TRLUML REVSC CAB GRF 1: CPT

## 2018-01-01 PROCEDURE — 80061 LIPID PANEL: CPT

## 2018-01-01 PROCEDURE — 74011250636 HC RX REV CODE- 250/636

## 2018-01-01 PROCEDURE — 80053 COMPREHEN METABOLIC PANEL: CPT

## 2018-01-01 PROCEDURE — 74011000250 HC RX REV CODE- 250: Performed by: PHYSICIAN ASSISTANT

## 2018-01-01 PROCEDURE — 99285 EMERGENCY DEPT VISIT HI MDM: CPT

## 2018-01-01 PROCEDURE — 99218 HC RM OBSERVATION: CPT

## 2018-01-01 PROCEDURE — 94760 N-INVAS EAR/PLS OXIMETRY 1: CPT

## 2018-01-01 PROCEDURE — 80048 BASIC METABOLIC PNL TOTAL CA: CPT

## 2018-01-01 PROCEDURE — 77030004534 HC CATH ANGI DX INFN CARD -A

## 2018-01-01 PROCEDURE — 74011250637 HC RX REV CODE- 250/637: Performed by: PHYSICIAN ASSISTANT

## 2018-01-01 PROCEDURE — 85025 COMPLETE CBC W/AUTO DIFF WBC: CPT

## 2018-01-01 PROCEDURE — 93571 IV DOP VEL&/PRESS C FLO 1ST: CPT

## 2018-01-01 PROCEDURE — 93005 ELECTROCARDIOGRAM TRACING: CPT | Performed by: EMERGENCY MEDICINE

## 2018-01-01 PROCEDURE — B2111ZZ FLUOROSCOPY OF MULTIPLE CORONARY ARTERIES USING LOW OSMOLAR CONTRAST: ICD-10-PCS | Performed by: INTERNAL MEDICINE

## 2018-01-01 PROCEDURE — 93005 ELECTROCARDIOGRAM TRACING: CPT

## 2018-01-01 PROCEDURE — 99153 MOD SED SAME PHYS/QHP EA: CPT

## 2018-01-01 PROCEDURE — 99285 EMERGENCY DEPT VISIT HI MDM: CPT | Performed by: EMERGENCY MEDICINE

## 2018-01-01 PROCEDURE — 92928 PRQ TCAT PLMT NTRAC ST 1 LES: CPT

## 2018-01-01 PROCEDURE — B2151ZZ FLUOROSCOPY OF LEFT HEART USING LOW OSMOLAR CONTRAST: ICD-10-PCS | Performed by: INTERNAL MEDICINE

## 2018-01-01 PROCEDURE — 65660000000 HC RM CCU STEPDOWN

## 2018-01-01 PROCEDURE — 93455 CORONARY ART/GRFT ANGIO S&I: CPT

## 2018-01-01 PROCEDURE — B2131ZZ FLUOROSCOPY OF MULTIPLE CORONARY ARTERY BYPASS GRAFTS USING LOW OSMOLAR CONTRAST: ICD-10-PCS | Performed by: INTERNAL MEDICINE

## 2018-01-01 PROCEDURE — 71046 X-RAY EXAM CHEST 2 VIEWS: CPT

## 2018-01-01 PROCEDURE — C1753 CATH, INTRAVAS ULTRASOUND: HCPCS

## 2018-01-01 PROCEDURE — B241ZZ3 ULTRASONOGRAPHY OF MULTIPLE CORONARY ARTERIES, INTRAVASCULAR: ICD-10-PCS | Performed by: INTERNAL MEDICINE

## 2018-01-01 PROCEDURE — 84484 ASSAY OF TROPONIN QUANT: CPT

## 2018-01-01 PROCEDURE — 85027 COMPLETE CBC AUTOMATED: CPT

## 2018-01-01 PROCEDURE — 77030013687 HC GD NDL BARD -B

## 2018-01-01 PROCEDURE — 93458 L HRT ARTERY/VENTRICLE ANGIO: CPT

## 2018-01-01 PROCEDURE — 74011250636 HC RX REV CODE- 250/636: Performed by: PHYSICIAN ASSISTANT

## 2018-01-01 PROCEDURE — 85347 COAGULATION TIME ACTIVATED: CPT

## 2018-01-01 PROCEDURE — 76937 US GUIDE VASCULAR ACCESS: CPT

## 2018-01-01 PROCEDURE — C8929 TTE W OR WO FOL WCON,DOPPLER: HCPCS

## 2018-01-01 PROCEDURE — 77030020263 HC SOL INJ SOD CL0.9% LFCR 1000ML

## 2018-01-01 PROCEDURE — 83880 ASSAY OF NATRIURETIC PEPTIDE: CPT

## 2018-01-01 PROCEDURE — 77030019569 HC BND COMPR RAD TERU -B

## 2018-01-01 PROCEDURE — 93459 L HRT ART/GRFT ANGIO: CPT

## 2018-01-01 PROCEDURE — 93005 ELECTROCARDIOGRAM TRACING: CPT | Performed by: INTERNAL MEDICINE

## 2018-01-01 PROCEDURE — 74011636320 HC RX REV CODE- 636/320: Performed by: INTERNAL MEDICINE

## 2018-01-01 PROCEDURE — C1874 STENT, COATED/COV W/DEL SYS: HCPCS

## 2018-01-01 PROCEDURE — 92978 ENDOLUMINL IVUS OCT C 1ST: CPT

## 2018-01-01 PROCEDURE — 92979 ENDOLUMINL IVUS OCT C EA: CPT

## 2018-01-01 PROCEDURE — C1887 CATHETER, GUIDING: HCPCS

## 2018-01-01 PROCEDURE — B211YZZ FLUOROSCOPY OF MULTIPLE CORONARY ARTERIES USING OTHER CONTRAST: ICD-10-PCS | Performed by: INTERNAL MEDICINE

## 2018-01-01 PROCEDURE — 83735 ASSAY OF MAGNESIUM: CPT

## 2018-01-01 PROCEDURE — 77030012468 HC VLV BLEEDBK CNTRL ABBT -B

## 2018-01-01 RX ORDER — SODIUM CHLORIDE 9 MG/ML
75 INJECTION, SOLUTION INTRAVENOUS CONTINUOUS
Status: DISPENSED | OUTPATIENT
Start: 2018-01-01 | End: 2018-01-01

## 2018-01-01 RX ORDER — HYDROCODONE BITARTRATE AND ACETAMINOPHEN 5; 325 MG/1; MG/1
1 TABLET ORAL
Status: DISCONTINUED | OUTPATIENT
Start: 2018-01-01 | End: 2018-01-01 | Stop reason: HOSPADM

## 2018-01-01 RX ORDER — FENTANYL CITRATE 50 UG/ML
25-100 INJECTION, SOLUTION INTRAMUSCULAR; INTRAVENOUS
Status: DISCONTINUED | OUTPATIENT
Start: 2018-01-01 | End: 2018-01-01

## 2018-01-01 RX ORDER — ISOSORBIDE MONONITRATE 30 MG/1
30 TABLET, EXTENDED RELEASE ORAL DAILY
Qty: 30 TAB | Refills: 11 | Status: SHIPPED | OUTPATIENT
Start: 2018-01-01 | End: 2019-01-01

## 2018-01-01 RX ORDER — METOPROLOL TARTRATE 25 MG/1
12.5 TABLET, FILM COATED ORAL EVERY 12 HOURS
Qty: 30 TAB | Refills: 11 | Status: SHIPPED | OUTPATIENT
Start: 2018-01-01 | End: 2018-01-01 | Stop reason: ALTCHOICE

## 2018-01-01 RX ORDER — METOPROLOL TARTRATE 25 MG/1
25 TABLET, FILM COATED ORAL EVERY 12 HOURS
Status: DISCONTINUED | OUTPATIENT
Start: 2018-01-01 | End: 2018-01-01 | Stop reason: HOSPADM

## 2018-01-01 RX ORDER — CLOPIDOGREL BISULFATE 75 MG/1
75 TABLET ORAL DAILY
Status: DISCONTINUED | OUTPATIENT
Start: 2018-01-01 | End: 2018-01-01 | Stop reason: HOSPADM

## 2018-01-01 RX ORDER — HEPARIN SODIUM 5000 [USP'U]/100ML
12-25 INJECTION, SOLUTION INTRAVENOUS
Status: DISCONTINUED | OUTPATIENT
Start: 2018-01-01 | End: 2018-01-01

## 2018-01-01 RX ORDER — LISINOPRIL 5 MG/1
5 TABLET ORAL DAILY
Status: DISCONTINUED | OUTPATIENT
Start: 2018-01-01 | End: 2018-01-01 | Stop reason: HOSPADM

## 2018-01-01 RX ORDER — MAG HYDROX/ALUMINUM HYD/SIMETH 200-200-20
30 SUSPENSION, ORAL (FINAL DOSE FORM) ORAL AS NEEDED
Status: DISCONTINUED | OUTPATIENT
Start: 2018-01-01 | End: 2018-01-01

## 2018-01-01 RX ORDER — MIDAZOLAM HYDROCHLORIDE 1 MG/ML
.5-2 INJECTION, SOLUTION INTRAMUSCULAR; INTRAVENOUS
Status: DISCONTINUED | OUTPATIENT
Start: 2018-01-01 | End: 2018-01-01 | Stop reason: HOSPADM

## 2018-01-01 RX ORDER — METOPROLOL TARTRATE 25 MG/1
12.5 TABLET, FILM COATED ORAL EVERY 12 HOURS
Qty: 30 TAB | Refills: 11 | Status: SHIPPED | OUTPATIENT
Start: 2018-01-01 | End: 2018-01-01

## 2018-01-01 RX ORDER — ALBUTEROL SULFATE 0.83 MG/ML
2.5 SOLUTION RESPIRATORY (INHALATION)
Status: DISCONTINUED | OUTPATIENT
Start: 2018-01-01 | End: 2018-01-01 | Stop reason: HOSPADM

## 2018-01-01 RX ORDER — HEPARIN SODIUM 5000 [USP'U]/ML
4000 INJECTION, SOLUTION INTRAVENOUS; SUBCUTANEOUS ONCE
Status: COMPLETED | OUTPATIENT
Start: 2018-01-01 | End: 2018-01-01

## 2018-01-01 RX ORDER — SODIUM NITROPRUSSIDE 25 MG/ML
100 INJECTION INTRAVENOUS
Status: DISCONTINUED | OUTPATIENT
Start: 2018-01-01 | End: 2018-01-01

## 2018-01-01 RX ORDER — METOPROLOL TARTRATE 25 MG/1
25 TABLET, FILM COATED ORAL EVERY 12 HOURS
Status: DISCONTINUED | OUTPATIENT
Start: 2018-01-01 | End: 2018-01-01

## 2018-01-01 RX ORDER — HYDROMORPHONE HYDROCHLORIDE 1 MG/ML
.5-1 INJECTION, SOLUTION INTRAMUSCULAR; INTRAVENOUS; SUBCUTANEOUS AS NEEDED
Status: DISCONTINUED | OUTPATIENT
Start: 2018-01-01 | End: 2018-01-01

## 2018-01-01 RX ORDER — HEPARIN SODIUM 200 [USP'U]/100ML
3 INJECTION, SOLUTION INTRAVENOUS CONTINUOUS
Status: DISCONTINUED | OUTPATIENT
Start: 2018-01-01 | End: 2018-01-01 | Stop reason: HOSPADM

## 2018-01-01 RX ORDER — CLOPIDOGREL BISULFATE 75 MG/1
75 TABLET ORAL DAILY
Qty: 30 TAB | Refills: 11 | Status: SHIPPED | OUTPATIENT
Start: 2018-01-01 | End: 2018-01-01

## 2018-01-01 RX ORDER — CLOPIDOGREL BISULFATE 75 MG/1
600 TABLET ORAL ONCE
Status: COMPLETED | OUTPATIENT
Start: 2018-01-01 | End: 2018-01-01

## 2018-01-01 RX ORDER — FAMOTIDINE 20 MG/1
40 TABLET, FILM COATED ORAL EVERY 12 HOURS
Status: DISCONTINUED | OUTPATIENT
Start: 2018-01-01 | End: 2018-01-01 | Stop reason: HOSPADM

## 2018-01-01 RX ORDER — NITROGLYCERIN 0.4 MG/1
0.4 TABLET SUBLINGUAL
Status: DISCONTINUED | OUTPATIENT
Start: 2018-01-01 | End: 2018-01-01 | Stop reason: HOSPADM

## 2018-01-01 RX ORDER — ISOSORBIDE MONONITRATE 30 MG/1
30 TABLET, EXTENDED RELEASE ORAL DAILY
Status: DISCONTINUED | OUTPATIENT
Start: 2018-01-01 | End: 2018-01-01 | Stop reason: HOSPADM

## 2018-01-01 RX ORDER — HEPARIN SODIUM 200 [USP'U]/100ML
3 INJECTION, SOLUTION INTRAVENOUS CONTINUOUS
Status: DISCONTINUED | OUTPATIENT
Start: 2018-01-01 | End: 2018-01-01

## 2018-01-01 RX ORDER — ALBUTEROL SULFATE 90 UG/1
2 AEROSOL, METERED RESPIRATORY (INHALATION) AS NEEDED
Status: DISCONTINUED | OUTPATIENT
Start: 2018-01-01 | End: 2018-01-01 | Stop reason: CLARIF

## 2018-01-01 RX ORDER — LIDOCAINE HYDROCHLORIDE 10 MG/ML
5-10 INJECTION INFILTRATION; PERINEURAL ONCE
Status: COMPLETED | OUTPATIENT
Start: 2018-01-01 | End: 2018-01-01

## 2018-01-01 RX ORDER — MORPHINE SULFATE 2 MG/ML
2 INJECTION, SOLUTION INTRAMUSCULAR; INTRAVENOUS
Status: DISCONTINUED | OUTPATIENT
Start: 2018-01-01 | End: 2018-01-01 | Stop reason: HOSPADM

## 2018-01-01 RX ORDER — MIDAZOLAM HYDROCHLORIDE 1 MG/ML
.5-5 INJECTION, SOLUTION INTRAMUSCULAR; INTRAVENOUS
Status: DISCONTINUED | OUTPATIENT
Start: 2018-01-01 | End: 2018-01-01

## 2018-01-01 RX ORDER — SODIUM CHLORIDE 0.9 % (FLUSH) 0.9 %
5-10 SYRINGE (ML) INJECTION EVERY 8 HOURS
Status: DISCONTINUED | OUTPATIENT
Start: 2018-01-01 | End: 2018-01-01 | Stop reason: HOSPADM

## 2018-01-01 RX ORDER — ISOSORBIDE MONONITRATE 30 MG/1
30 TABLET, EXTENDED RELEASE ORAL DAILY
Qty: 30 TAB | Refills: 11 | Status: SHIPPED | OUTPATIENT
Start: 2018-01-01 | End: 2018-01-01

## 2018-01-01 RX ORDER — ASPIRIN 81 MG/1
81 TABLET ORAL
Status: DISCONTINUED | OUTPATIENT
Start: 2018-01-01 | End: 2018-01-01 | Stop reason: HOSPADM

## 2018-01-01 RX ORDER — SODIUM CHLORIDE 0.9 % (FLUSH) 0.9 %
5-10 SYRINGE (ML) INJECTION AS NEEDED
Status: DISCONTINUED | OUTPATIENT
Start: 2018-01-01 | End: 2018-01-01 | Stop reason: HOSPADM

## 2018-01-01 RX ORDER — PREDNISONE 5 MG/1
5 TABLET ORAL EVERY OTHER DAY
Status: DISCONTINUED | OUTPATIENT
Start: 2018-01-01 | End: 2018-01-01 | Stop reason: HOSPADM

## 2018-01-01 RX ORDER — CLOPIDOGREL BISULFATE 75 MG/1
75 TABLET ORAL DAILY
Qty: 30 TAB | Refills: 11 | Status: SHIPPED | OUTPATIENT
Start: 2018-01-01 | End: 2019-01-01

## 2018-01-01 RX ORDER — METOPROLOL SUCCINATE 50 MG/1
50 TABLET, EXTENDED RELEASE ORAL DAILY
Status: DISCONTINUED | OUTPATIENT
Start: 2018-01-01 | End: 2018-01-01 | Stop reason: HOSPADM

## 2018-01-01 RX ORDER — LIDOCAINE HYDROCHLORIDE 20 MG/ML
1-20 INJECTION, SOLUTION INFILTRATION; PERINEURAL
Status: DISCONTINUED | OUTPATIENT
Start: 2018-01-01 | End: 2018-01-01

## 2018-01-01 RX ORDER — ATORVASTATIN CALCIUM 40 MG/1
40 TABLET, FILM COATED ORAL
Status: DISCONTINUED | OUTPATIENT
Start: 2018-01-01 | End: 2018-01-01 | Stop reason: HOSPADM

## 2018-01-01 RX ORDER — OXYCODONE HYDROCHLORIDE 5 MG/1
5 TABLET ORAL
Status: DISCONTINUED | OUTPATIENT
Start: 2018-01-01 | End: 2018-01-01 | Stop reason: HOSPADM

## 2018-01-01 RX ORDER — HEPARIN SODIUM 10000 [USP'U]/ML
2000 INJECTION, SOLUTION INTRAVENOUS; SUBCUTANEOUS
Status: DISCONTINUED | OUTPATIENT
Start: 2018-01-01 | End: 2018-01-01

## 2018-01-01 RX ORDER — ATORVASTATIN CALCIUM 40 MG/1
40 TABLET, FILM COATED ORAL
Qty: 30 TAB | Refills: 11 | Status: SHIPPED | OUTPATIENT
Start: 2018-01-01 | End: 2019-01-01

## 2018-01-01 RX ORDER — ACETAMINOPHEN 325 MG/1
650 TABLET ORAL
Status: DISCONTINUED | OUTPATIENT
Start: 2018-01-01 | End: 2018-01-01 | Stop reason: HOSPADM

## 2018-01-01 RX ORDER — SODIUM CHLORIDE 9 MG/ML
75 INJECTION, SOLUTION INTRAVENOUS CONTINUOUS
Status: DISCONTINUED | OUTPATIENT
Start: 2018-01-01 | End: 2018-01-01

## 2018-01-01 RX ORDER — LISINOPRIL 2.5 MG/1
2.5 TABLET ORAL DAILY
Qty: 30 TAB | Refills: 11 | Status: SHIPPED | OUTPATIENT
Start: 2018-01-01 | End: 2018-01-01

## 2018-01-01 RX ORDER — LISINOPRIL 2.5 MG/1
2.5 TABLET ORAL DAILY
Qty: 30 TAB | Refills: 11 | Status: SHIPPED | OUTPATIENT
Start: 2018-01-01

## 2018-01-01 RX ORDER — LISINOPRIL 5 MG/1
5 TABLET ORAL DAILY
Status: DISCONTINUED | OUTPATIENT
Start: 2018-01-01 | End: 2018-01-01

## 2018-01-01 RX ORDER — HYDROMORPHONE HYDROCHLORIDE 2 MG/ML
1 INJECTION, SOLUTION INTRAMUSCULAR; INTRAVENOUS; SUBCUTANEOUS ONCE
Status: COMPLETED | OUTPATIENT
Start: 2018-01-01 | End: 2018-01-01

## 2018-01-01 RX ORDER — GUAIFENESIN 100 MG/5ML
81 LIQUID (ML) ORAL
Status: DISCONTINUED | OUTPATIENT
Start: 2018-01-01 | End: 2018-01-01 | Stop reason: SDUPTHER

## 2018-01-01 RX ORDER — LISINOPRIL 5 MG/1
2.5 TABLET ORAL DAILY
Status: DISCONTINUED | OUTPATIENT
Start: 2018-01-01 | End: 2018-01-01 | Stop reason: HOSPADM

## 2018-01-01 RX ORDER — HEPARIN SODIUM 10000 [USP'U]/ML
1000-10000 INJECTION, SOLUTION INTRAVENOUS; SUBCUTANEOUS
Status: DISCONTINUED | OUTPATIENT
Start: 2018-01-01 | End: 2018-01-01 | Stop reason: HOSPADM

## 2018-01-01 RX ORDER — NITROGLYCERIN 0.4 MG/1
0.4 TABLET SUBLINGUAL
Status: DISCONTINUED | OUTPATIENT
Start: 2018-01-01 | End: 2018-01-01 | Stop reason: SDUPTHER

## 2018-01-01 RX ORDER — METOPROLOL TARTRATE 25 MG/1
12.5 TABLET, FILM COATED ORAL EVERY 12 HOURS
COMMUNITY
End: 2019-01-01

## 2018-01-01 RX ORDER — ATORVASTATIN CALCIUM 40 MG/1
40 TABLET, FILM COATED ORAL
Qty: 30 TAB | Refills: 11 | Status: SHIPPED | OUTPATIENT
Start: 2018-01-01 | End: 2018-01-01

## 2018-01-01 RX ORDER — GUAIFENESIN 100 MG/5ML
81 LIQUID (ML) ORAL DAILY
Status: DISCONTINUED | OUTPATIENT
Start: 2018-01-01 | End: 2018-01-01 | Stop reason: HOSPADM

## 2018-01-01 RX ORDER — MIDAZOLAM HYDROCHLORIDE 1 MG/ML
.5-2 INJECTION, SOLUTION INTRAMUSCULAR; INTRAVENOUS
Status: DISCONTINUED | OUTPATIENT
Start: 2018-01-01 | End: 2018-01-01

## 2018-01-01 RX ORDER — LIDOCAINE HYDROCHLORIDE 10 MG/ML
5-10 INJECTION INFILTRATION; PERINEURAL ONCE
Status: DISCONTINUED | OUTPATIENT
Start: 2018-01-01 | End: 2018-01-01

## 2018-01-01 RX ADMIN — METOPROLOL TARTRATE 25 MG: 25 TABLET ORAL at 22:08

## 2018-01-01 RX ADMIN — ALBUTEROL SULFATE 2.5 MG: 2.5 SOLUTION RESPIRATORY (INHALATION) at 08:02

## 2018-01-01 RX ADMIN — ATORVASTATIN CALCIUM 40 MG: 40 TABLET, FILM COATED ORAL at 21:04

## 2018-01-01 RX ADMIN — TIOTROPIUM BROMIDE 18 MCG: 18 CAPSULE ORAL; RESPIRATORY (INHALATION) at 08:18

## 2018-01-01 RX ADMIN — METOPROLOL TARTRATE 25 MG: 25 TABLET ORAL at 08:04

## 2018-01-01 RX ADMIN — HEPARIN SODIUM 8000 UNITS: 10000 INJECTION, SOLUTION INTRAVENOUS; SUBCUTANEOUS at 10:34

## 2018-01-01 RX ADMIN — Medication 5 ML: at 05:14

## 2018-01-01 RX ADMIN — METOPROLOL TARTRATE 25 MG: 25 TABLET ORAL at 08:40

## 2018-01-01 RX ADMIN — HEPARIN SODIUM 3 ML/HR: 5000 INJECTION, SOLUTION INTRAVENOUS; SUBCUTANEOUS at 14:57

## 2018-01-01 RX ADMIN — ALBUTEROL SULFATE 2.5 MG: 2.5 SOLUTION RESPIRATORY (INHALATION) at 08:14

## 2018-01-01 RX ADMIN — NITROGLYCERIN 1 INCH: 20 OINTMENT TOPICAL at 07:56

## 2018-01-01 RX ADMIN — HEPARIN SODIUM 2000 UNITS: 10000 INJECTION, SOLUTION INTRAVENOUS; SUBCUTANEOUS at 12:06

## 2018-01-01 RX ADMIN — ACETAMINOPHEN 650 MG: 325 TABLET ORAL at 01:14

## 2018-01-01 RX ADMIN — ASPIRIN 81 MG: 81 TABLET ORAL at 08:00

## 2018-01-01 RX ADMIN — HYDROMORPHONE HYDROCHLORIDE 1 MG: 2 INJECTION, SOLUTION INTRAMUSCULAR; INTRAVENOUS; SUBCUTANEOUS at 15:45

## 2018-01-01 RX ADMIN — MIDAZOLAM HYDROCHLORIDE 2 MG: 1 INJECTION, SOLUTION INTRAMUSCULAR; INTRAVENOUS at 10:13

## 2018-01-01 RX ADMIN — ALUMINUM HYDROXIDE, MAGNESIUM HYDROXIDE, AND SIMETHICONE 30 ML: 200; 200; 20 SUSPENSION ORAL at 11:44

## 2018-01-01 RX ADMIN — METOPROLOL SUCCINATE 50 MG: 50 TABLET, EXTENDED RELEASE ORAL at 10:40

## 2018-01-01 RX ADMIN — METOPROLOL TARTRATE 25 MG: 25 TABLET ORAL at 21:05

## 2018-01-01 RX ADMIN — HEPARIN SODIUM 5000 UNITS: 10000 INJECTION INTRAVENOUS; SUBCUTANEOUS at 15:33

## 2018-01-01 RX ADMIN — HYDROMORPHONE HYDROCHLORIDE 1 MG: 1 INJECTION, SOLUTION INTRAMUSCULAR; INTRAVENOUS; SUBCUTANEOUS at 11:49

## 2018-01-01 RX ADMIN — METOPROLOL TARTRATE 25 MG: 25 TABLET ORAL at 08:18

## 2018-01-01 RX ADMIN — ALBUTEROL SULFATE 2.5 MG: 2.5 SOLUTION RESPIRATORY (INHALATION) at 20:40

## 2018-01-01 RX ADMIN — ATORVASTATIN CALCIUM 40 MG: 40 TABLET, FILM COATED ORAL at 21:15

## 2018-01-01 RX ADMIN — CLOPIDOGREL BISULFATE 75 MG: 75 TABLET ORAL at 08:40

## 2018-01-01 RX ADMIN — TIOTROPIUM BROMIDE 18 MCG: 18 CAPSULE ORAL; RESPIRATORY (INHALATION) at 08:05

## 2018-01-01 RX ADMIN — FENTANYL CITRATE 50 MCG: 50 INJECTION, SOLUTION INTRAMUSCULAR; INTRAVENOUS at 12:06

## 2018-01-01 RX ADMIN — ISOSORBIDE MONONITRATE 30 MG: 30 TABLET, EXTENDED RELEASE ORAL at 16:07

## 2018-01-01 RX ADMIN — MIDAZOLAM HYDROCHLORIDE 2 MG: 1 INJECTION, SOLUTION INTRAMUSCULAR; INTRAVENOUS at 10:15

## 2018-01-01 RX ADMIN — PROMETHAZINE HYDROCHLORIDE 12.5 MG: 25 INJECTION INTRAMUSCULAR; INTRAVENOUS at 14:12

## 2018-01-01 RX ADMIN — ALBUTEROL SULFATE 2.5 MG: 2.5 SOLUTION RESPIRATORY (INHALATION) at 13:56

## 2018-01-01 RX ADMIN — SODIUM NITROPRUSSIDE 0.1 MG: 25 INJECTION, SOLUTION, CONCENTRATE INTRAVENOUS at 12:05

## 2018-01-01 RX ADMIN — TIOTROPIUM BROMIDE 18 MCG: 18 CAPSULE ORAL; RESPIRATORY (INHALATION) at 07:29

## 2018-01-01 RX ADMIN — ATORVASTATIN CALCIUM 40 MG: 40 TABLET, FILM COATED ORAL at 21:03

## 2018-01-01 RX ADMIN — LIDOCAINE HYDROCHLORIDE 60 MG: 20 INJECTION, SOLUTION INFILTRATION; PERINEURAL at 10:06

## 2018-01-01 RX ADMIN — HEPARIN SODIUM 3 ML/HR: 5000 INJECTION, SOLUTION INTRAVENOUS; SUBCUTANEOUS at 10:06

## 2018-01-01 RX ADMIN — FAMOTIDINE 40 MG: 20 TABLET ORAL at 21:03

## 2018-01-01 RX ADMIN — LISINOPRIL 5 MG: 5 TABLET ORAL at 08:01

## 2018-01-01 RX ADMIN — Medication 10 ML: at 10:41

## 2018-01-01 RX ADMIN — MIDAZOLAM HYDROCHLORIDE 2 MG: 1 INJECTION, SOLUTION INTRAMUSCULAR; INTRAVENOUS at 15:10

## 2018-01-01 RX ADMIN — ISOSORBIDE MONONITRATE 30 MG: 30 TABLET, EXTENDED RELEASE ORAL at 08:40

## 2018-01-01 RX ADMIN — ASPIRIN 81 MG: 81 TABLET ORAL at 08:40

## 2018-01-01 RX ADMIN — HEPARIN SODIUM AND DEXTROSE 12 UNITS/KG/HR: 5000; 5 INJECTION INTRAVENOUS at 09:28

## 2018-01-01 RX ADMIN — Medication 5 ML: at 21:06

## 2018-01-01 RX ADMIN — FAMOTIDINE 40 MG: 20 TABLET ORAL at 08:04

## 2018-01-01 RX ADMIN — METOPROLOL TARTRATE 25 MG: 25 TABLET ORAL at 21:03

## 2018-01-01 RX ADMIN — HEPARIN SODIUM 3 ML/HR: 5000 INJECTION, SOLUTION INTRAVENOUS; SUBCUTANEOUS at 11:39

## 2018-01-01 RX ADMIN — TICAGRELOR 180 MG: 90 TABLET ORAL at 12:19

## 2018-01-01 RX ADMIN — HEPARIN SODIUM 1500 UNITS: 10000 INJECTION, SOLUTION INTRAVENOUS; SUBCUTANEOUS at 10:53

## 2018-01-01 RX ADMIN — HEPARIN SODIUM 2 ML: 10000 INJECTION, SOLUTION INTRAVENOUS; SUBCUTANEOUS at 10:22

## 2018-01-01 RX ADMIN — HYDROMORPHONE HYDROCHLORIDE 1 MG: 1 INJECTION, SOLUTION INTRAMUSCULAR; INTRAVENOUS; SUBCUTANEOUS at 11:12

## 2018-01-01 RX ADMIN — CLOPIDOGREL BISULFATE 75 MG: 75 TABLET ORAL at 08:18

## 2018-01-01 RX ADMIN — PERFLUTREN 1 ML: 6.52 INJECTION, SUSPENSION INTRAVENOUS at 14:00

## 2018-01-01 RX ADMIN — FAMOTIDINE 40 MG: 20 TABLET ORAL at 08:18

## 2018-01-01 RX ADMIN — FAMOTIDINE 40 MG: 20 TABLET ORAL at 08:40

## 2018-01-01 RX ADMIN — FAMOTIDINE 40 MG: 20 TABLET ORAL at 21:15

## 2018-01-01 RX ADMIN — LISINOPRIL 5 MG: 5 TABLET ORAL at 08:18

## 2018-01-01 RX ADMIN — HEPARIN SODIUM 4000 UNITS: 5000 INJECTION, SOLUTION INTRAVENOUS; SUBCUTANEOUS at 09:25

## 2018-01-01 RX ADMIN — NITROGLYCERIN 1 INCH: 20 OINTMENT TOPICAL at 12:46

## 2018-01-01 RX ADMIN — ALBUTEROL SULFATE 2.5 MG: 2.5 SOLUTION RESPIRATORY (INHALATION) at 07:29

## 2018-01-01 RX ADMIN — ALBUTEROL SULFATE 2.5 MG: 2.5 SOLUTION RESPIRATORY (INHALATION) at 20:04

## 2018-01-01 RX ADMIN — ISOSORBIDE MONONITRATE 30 MG: 30 TABLET, EXTENDED RELEASE ORAL at 10:40

## 2018-01-01 RX ADMIN — ASPIRIN 81 MG: 81 TABLET ORAL at 08:17

## 2018-01-01 RX ADMIN — NITROGLYCERIN 0.2 MG: 200 INJECTION, SOLUTION INTRAVENOUS at 10:37

## 2018-01-01 RX ADMIN — LIDOCAINE HYDROCHLORIDE 8 ML: 10 INJECTION, SOLUTION INFILTRATION; PERINEURAL at 15:12

## 2018-01-01 RX ADMIN — ALBUTEROL SULFATE 2.5 MG: 2.5 SOLUTION RESPIRATORY (INHALATION) at 14:38

## 2018-01-01 RX ADMIN — IOPAMIDOL 110 ML: 755 INJECTION, SOLUTION INTRAVENOUS at 15:48

## 2018-01-01 RX ADMIN — ALBUTEROL SULFATE 2.5 MG: 2.5 SOLUTION RESPIRATORY (INHALATION) at 20:05

## 2018-01-01 RX ADMIN — FAMOTIDINE 40 MG: 20 TABLET ORAL at 21:04

## 2018-01-01 RX ADMIN — LISINOPRIL 5 MG: 5 TABLET ORAL at 08:40

## 2018-01-01 RX ADMIN — ASPIRIN 81 MG 81 MG: 81 TABLET ORAL at 10:40

## 2018-01-01 RX ADMIN — MIDAZOLAM HYDROCHLORIDE 2 MG: 1 INJECTION, SOLUTION INTRAMUSCULAR; INTRAVENOUS at 10:33

## 2018-01-01 RX ADMIN — SODIUM CHLORIDE 75 ML/HR: 900 INJECTION, SOLUTION INTRAVENOUS at 05:13

## 2018-01-01 RX ADMIN — CLOPIDOGREL BISULFATE 600 MG: 75 TABLET ORAL at 11:44

## 2018-01-01 RX ADMIN — LISINOPRIL 2.5 MG: 5 TABLET ORAL at 10:40

## 2018-01-01 RX ADMIN — ACETAMINOPHEN 650 MG: 325 TABLET ORAL at 15:52

## 2018-08-02 NOTE — PROGRESS NOTES
[facility]. Patient's situation, background, assessment and recommendations provided. Opportunity for questions provided. Oncoming RN assumed care of patient.

## 2018-08-02 NOTE — ED PROVIDER NOTES
HPI Comments: 57 yo male with off and on chest pain for the last 2 weeks. Todays episode lasted about 20 minutes. Episode was brought on by ambulating. He has hx of CABG and 6 stents in the past.  He also gets some sob with exertion. He did take an 81 mg of Aspirin. Patient is a 58 y.o. male presenting with chest pain. The history is provided by the patient. Chest Pain Pertinent negatives include no abdominal pain, no fever, no nausea, no palpitations, no shortness of breath and no vomiting. Past Medical History:  
Diagnosis Date  CAD (coronary artery disease) CABG 1999, stents after (last placed 2/17)  GERD (gastroesophageal reflux disease) PRN meds  Hypertension  Interstitial pneumonitis (HCC)   
 per CT chest 3/10/17 (daily inhalers, no home 02)  Osteoarthritis   
 hands, knees  Pulmonary nodule 8mm per CT chest 3/10/17 (biopsy WNL) Past Surgical History:  
Procedure Laterality Date  HX HEART CATHETERIZATION    
 multiple  HX TONSILLECTOMY  VASCULAR SURGERY PROCEDURE UNLIST CABG Family History:  
Problem Relation Age of Onset  Heart Disease Mother  Cancer Mother UNKNOWN TYPE  
 Heart Disease Father Social History Social History  Marital status: COMMON LAW Spouse name: N/A  
 Number of children: N/A  
 Years of education: N/A Occupational History  Not on file. Social History Main Topics  Smoking status: Current Every Day Smoker Packs/day: 0.25 Years: 30.00  Smokeless tobacco: Never Used  Alcohol use No  
 Drug use: No  
 Sexual activity: Not on file Other Topics Concern  Not on file Social History Narrative ALLERGIES: Aspirin Review of Systems Constitutional: Negative for chills and fever. Respiratory: Positive for chest tightness. Negative for shortness of breath, wheezing and stridor. Cardiovascular: Positive for chest pain.  Negative for palpitations. Gastrointestinal: Negative for abdominal pain, diarrhea, nausea and vomiting. Skin: Negative. All other systems reviewed and are negative. Vitals:  
 08/02/18 1409 08/02/18 1511 BP: (!) 149/93 Pulse: 85 Resp: 18 Temp: 98.2 °F (36.8 °C) SpO2: 92% 95% Weight: 84.8 kg (187 lb) Height: 5' 7\" (1.702 m) Physical Exam  
Constitutional: He is oriented to person, place, and time. He appears well-developed and well-nourished. No distress. HENT:  
Head: Normocephalic and atraumatic. Eyes: Conjunctivae and EOM are normal. Pupils are equal, round, and reactive to light. No scleral icterus. Neck: Normal range of motion. Neck supple. No tracheal deviation present. No thyromegaly present. Cardiovascular: Normal rate, regular rhythm, normal heart sounds and intact distal pulses. Exam reveals no gallop and no friction rub. No murmur heard. Pulmonary/Chest: Effort normal and breath sounds normal. No stridor. No respiratory distress. He has no wheezes. He has no rales. He exhibits no tenderness. Abdominal: Soft. Bowel sounds are normal. He exhibits no distension. There is no tenderness. There is no rebound and no guarding. Neurological: He is alert and oriented to person, place, and time. No focal weakness Skin: Skin is warm and dry. No rash noted. He is not diaphoretic. No erythema. No pallor. Psychiatric: He has a normal mood and affect. His behavior is normal.  
Nursing note and vitals reviewed. MDM Number of Diagnoses or Management Options Diagnosis management comments: Patient has symptoms of recurrent anginal symptoms. Given his past cardiac history including stents and CABG I have paged cardiology for consultation. Bob Gonzalez MD; 8/2/2018 @3:58 PM Voice dictation software was used during the making of this note. This software is not perfect and grammatical and other typographical errors may be present.   This note has not been proofread for errors. 
=================================================================== Amount and/or Complexity of Data Reviewed Clinical lab tests: ordered and reviewed (Results for orders placed or performed during the hospital encounter of 08/02/18 
-MAGNESIUM Result                                            Value                         Ref Range Magnesium                                         2.0                           1.8 - 2.4 mg/dL           
-CBC WITH AUTOMATED DIFF Result                                            Value                         Ref Range WBC                                               8.2                           4.3 - 11.1 K/uL           
     RBC                                               5.26                          4.23 - 5.67 M/uL HGB                                               16.3                          13.6 - 17.2 g/dL HCT                                               48.9                          41.1 - 50.3 % MCV                                               93.0                          79.6 - 97.8 FL            
     MCH                                               31.0                          26.1 - 32.9 PG            
     MCHC                                              33.3                          31.4 - 35.0 g/dL RDW                                               13.5                          11.9 - 14.6 % PLATELET                                          211                           150 - 450 K/uL MPV                                               9.9 (L)                       10.8 - 14.1 FL            
     DF                                                AUTOMATED      NEUTROPHILS                                       57 43 - 78 % LYMPHOCYTES                                       36                            13 - 44 % MONOCYTES                                         4                             4.0 - 12.0 % EOSINOPHILS                                       3                             0.5 - 7.8 % BASOPHILS                                         0                             0.0 - 2.0 % IMMATURE GRANULOCYTES                             0                             0.0 - 5.0 %               
     ABS. NEUTROPHILS                                  4.7                           1.7 - 8.2 K/UL            
     ABS. LYMPHOCYTES                                  3.0                           0.5 - 4.6 K/UL            
     ABS. MONOCYTES                                    0.3                           0.1 - 1.3 K/UL            
     ABS. EOSINOPHILS                                  0.2                           0.0 - 0.8 K/UL            
     ABS. BASOPHILS                                    0.0                           0.0 - 0.2 K/UL            
     ABS. IMM. GRANS.                                  0.0                           0.0 - 0.5 K/UL            
-METABOLIC PANEL, COMPREHENSIVE Result                                            Value                         Ref Range Sodium                                            139                           136 - 145 mmol/L Potassium                                         3.8                           3.5 - 5.1 mmol/L Chloride                                          103                           98 - 107 mmol/L           
     CO2                                               26                            21 - 32 mmol/L      Anion gap                                         10                            7 - 16 mmol/L Glucose                                           247 (H)                       65 - 100 mg/dL BUN                                               15                            8 - 23 MG/DL Creatinine                                        1.25                          0.8 - 1.5 MG/DL           
     GFR est AA                                        >60                           >60 ml/min/1.73m2 GFR est non-AA                                    >60                           >60 ml/min/1.73m2 Calcium                                           8.7                           8.3 - 10.4 MG/DL Bilirubin, total                                  0.5                           0.2 - 1.1 MG/DL           
     ALT (SGPT)                                        16                            12 - 65 U/L               
     AST (SGOT)                                        15                            15 - 37 U/L Alk. phosphatase                                  96                            50 - 136 U/L Protein, total                                    8.2                           6.3 - 8.2 g/dL Albumin                                           3.2                           3.2 - 4.6 g/dL Globulin                                          5.0 (H)                       2.3 - 3.5 g/dL A-G Ratio                                         0.6 (L)                       1.2 - 3.5                 
-TROPONIN I Result                                            Value                         Ref Range Troponin-I, Qt.                                   <0.02 (L)                     0.02 - 0.05 NG/ML         
-EKG, 12 LEAD, INITIAL      Result                                            Value                         Ref Range Ventricular Rate                                  81                            BPM                       
     Atrial Rate                                       81                            BPM                       
     P-R Interval                                      130                           ms                        
     QRS Duration                                      94                            ms Q-T Interval                                      396                           ms                        
     QTC Calculation (Bezet)                           460                           ms                        
     Calculated P Axis                                 36                            degrees Calculated R Axis                                 -46                           degrees Calculated T Axis                                 121                           degrees Diagnosis Normal sinus rhythm Pulmonary disease pattern Left anterior fascicular block ST & T wave abnormality, consider anterolateral ischemia Prolonged QT Abnormal ECG When compared with ECG of 11-MAR-2017 11:17, Left anterior fascicular block is now Present T wave inversion no longer evident in Inferior leads T wave inversion less evident in Lateral leads Confirmed by Noel Watson MD (), Joann Luna ([id number]) on 8/2/2018 2:48:46 PM 
  ) Tests in the radiology section of CPT®: ordered and reviewed (Xr Chest Pa Lat Result Date: 8/2/2018 Chest PA and lateral views INDICATION: Chest pain for weeks. Shortness of breath COMPARISON: 03/10/2017 FINDINGS: Median sternotomy wires with coronary artery calcifications are stenting. Cardiac silhouette is mildly enlarged similar to before.  Peripheral interstitial pulmonary fibrosis stable. No pleural effusion or pneumothorax. Surrounding bones are intact. IMPRESSION: Stable chest x-ray. 2. Postoperative changes from CABG. 3. Stable interstitial pulmonary fibrosis. ) Independent visualization of images, tracings, or specimens: yes (Sinus rhythm rate of 59 minimal st depressions of v2 and v3.  ) ED Course Procedures

## 2018-08-02 NOTE — IP AVS SNAPSHOT
[address] [place] [id number] 
[phone] Patient: Kristie Lund MRN: [id number] BPN:[birth year] A check elfego indicates which time of day the medication should be taken. My Medications START taking these medications Instructions Each Dose to Equal  
 Morning Noon Evening Bedtime  
 atorvastatin 40 mg tablet Commonly known as:  LIPITOR Your next dose is:  8/5/2018 Evening Notes to Patient:  High cholesterol, Triglyceride levels reduction Take 1 Tab by mouth nightly. 40 mg  
    
   
   
  
   
  
 isosorbide mononitrate ER 30 mg tablet Commonly known as:  IMDUR Your next dose is:  8/6/2018 Morning Notes to Patient:  Chest Pain (angina) Take 1 Tab by mouth daily. 30 mg  
    
  
   
   
   
  
 lisinopril 2.5 mg tablet Commonly known as:  Pia Needy Your next dose is:  8/6/2018 Morning Notes to Patient:  Blood Pressure (HTN), Heart indicator Take 1 Tab by mouth daily. 2.5 mg  
    
  
   
   
   
  
 metoprolol tartrate 25 mg tablet Commonly known as:  LOPRESSOR Your next dose is:  8/5/2018 Evening Notes to Patient:  Blood Pressure (HTN), Heart indicator Take 0.5 Tabs by mouth every twelve (12) hours. 12.5 mg  
    
   
   
   
  
  
CHANGE how you take these medications Instructions Each Dose to Equal  
 Morning Noon Evening Bedtime  
 albuterol 90 mcg/actuation inhaler Commonly known as:  VENTOLIN HFA What changed:  when to take this Take 2 Puffs by inhalation every four (4) hours as needed for Wheezing or Shortness of Breath. 2 Puff * clopidogrel 75 mg Tab Commonly known as:  PLAVIX What changed:  when to take this Your next dose is:  8/6/2018 Morning Notes to Patient:  Blood Thinner for your stent, do not stop taking without talking to your cardiologist   
   
 Take 1 Tab by mouth daily.   
 75 mg  
 * clopidogrel 75 mg Tab Commonly known as:  PLAVIX What changed: You were already taking a medication with the same name, and this prescription was added. Make sure you understand how and when to take each. Take 1 Tab by mouth daily. 75 mg  
    
   
   
   
  
 raNITIdine 150 mg tablet Commonly known as:  ZANTAC What changed:   
- how much to take - when to take this 
- reasons to take this Your next dose is:  8/5/2018 Evening Take 2 Tabs by mouth nightly. Indications: GASTROESOPHAGEAL REFLUX, HEARTBURN  
 300 mg * Notice: This list has 2 medication(s) that are the same as other medications prescribed for you. Read the directions carefully, and ask your doctor or other care provider to review them with you. CONTINUE taking these medications Instructions Each Dose to Equal  
 Morning Noon Evening Bedtime  
 aspirin 81 mg tablet Your next dose is:  8/6/2018 Morning Notes to Patient:  Take with plavix Take 81 mg by mouth every morning. 81 mg  
    
  
   
   
   
  
 BEVESPI AEROSPHERE 9-4.8 mcg Hfaa Generic drug:  glycopyrrolate-formoterol Take  by inhalation two (2) times a day. nitroglycerin 0.4 mg SL tablet Commonly known as:  NITROSTAT  
   
 1 Tab by SubLINGual route every five (5) minutes as needed for Chest Pain. 0.4 mg  
    
   
   
   
  
 oxyCODONE IR 5 mg immediate release tablet Commonly known as:  Pretty Barnes Take 1 Tab by mouth every four (4) hours as needed for Pain. Max Daily Amount: 30 mg.  
 5 mg  
    
   
   
   
  
 predniSONE 5 mg tablet Commonly known as:  Jair Melissa Your next dose is: Take as you take at home Take 5 mg by mouth every other day. 5 mg TYLENOL 325 mg tablet Generic drug:  acetaminophen Take 325 mg by mouth as needed for Pain.   
 325 mg  
    
   
   
   
  
  
  
 Where to Get Your Medications These medications were sent to [address], [address],  [place] Phone:  [phone]  
  atorvastatin 40 mg tablet  
 clopidogrel 75 mg Tab  
 isosorbide mononitrate ER 30 mg tablet  
 lisinopril 2.5 mg tablet  
 metoprolol tartrate 25 mg tablet

## 2018-08-02 NOTE — H&P
[facility] Cardiology History & Physical  
  
Date of  Admission: 8/2/2018  3:01 PM  
 
Primary Care Physician:  Dr. Aleksey Ann Primary Cardiologist:  Dr. Fito Weston Admitting Physician:  Dr. Li Yanez CC:  Chest pain HPI:  Martha Hammond is a 58 y.o. male with PMH of CAD (CABG-VG-LAD, PCI, dLCX ), COPD, IPF,  current everyday smoker ( 0.25-1 ppd x 40 years), HTN, and HLD, who presented to the ED with complaints of chest pain. He has been experiencing exertional chest pain for the last couple of weeks. The pain is midsternal brought on by exertion with associated worsening dyspnea. The pain would go away with rest, however, over the last couple of days the pain has persisted even at rest. Work up in the ED shows EKG with inverted t waves in anterolateral leads. Initial troponin is negative, other labs show H&H 16.3/48.9, plt 211, Na 139, K 3.8, creatinine 1.25, BUN 15. CXR is negative for acute finding. His  BP was also noted to be elevated at 190s/90s. On exam he is pain free. Past Medical History:  
Diagnosis Date  CAD (coronary artery disease) CABG 1999, stents after (last placed 2/17)  GERD (gastroesophageal reflux disease) PRN meds  Hypertension  Interstitial pneumonitis (HCC)   
 per CT chest 3/10/17 (daily inhalers, no home 02)  Osteoarthritis   
 hands, knees  Pulmonary nodule 8mm per CT chest 3/10/17 (biopsy WNL) Past Surgical History:  
Procedure Laterality Date  HX HEART CATHETERIZATION    
 multiple  HX TONSILLECTOMY  VASCULAR SURGERY PROCEDURE UNLIST CABG Allergies Allergen Reactions  Aspirin Swelling Takes 81 mg at home. Patient can tolerate Aspirin in low doses, but in larger doses causes swelling. Social History Social History  Marital status: COMMON LAW Spouse name: N/A  
 Number of children: N/A  
 Years of education: N/A Occupational History  Not on file.   
 
Social History Main Topics  Smoking status: Current Every Day Smoker Packs/day: 0.25 Years: 30.00  Smokeless tobacco: Never Used  Alcohol use No  
 Drug use: No  
 Sexual activity: Not on file Other Topics Concern  Not on file Social History Narrative Family History Problem Relation Age of Onset  Heart Disease Mother  Cancer Mother UNKNOWN TYPE  
 Heart Disease Father No current facility-administered medications for this encounter. Current Outpatient Prescriptions Medication Sig  
 oxyCODONE IR (ROXICODONE) 5 mg immediate release tablet Take 1 Tab by mouth every four (4) hours as needed for Pain. Max Daily Amount: 30 mg.  predniSONE (DELTASONE) 5 mg tablet Take 5 mg by mouth every other day.  acetaminophen (TYLENOL) 325 mg tablet Take 325 mg by mouth as needed for Pain.  glycopyrrolate-formoterol (BEVESPI AEROSPHERE) 9-4.8 mcg HFAA Take  by inhalation two (2) times a day.  raNITIdine (ZANTAC) 150 mg tablet Take 2 Tabs by mouth nightly. Indications: GASTROESOPHAGEAL REFLUX, HEARTBURN (Patient taking differently: Take 150 mg by mouth as needed. Indications: gastroesophageal reflux disease, HEARTBURN)  clopidogrel (PLAVIX) 75 mg tab Take 1 Tab by mouth daily. (Patient taking differently: Take 75 mg by mouth every morning.)  nitroglycerin (NITROSTAT) 0.4 mg SL tablet 1 Tab by SubLINGual route every five (5) minutes as needed for Chest Pain.  albuterol (VENTOLIN HFA) 90 mcg/actuation inhaler Take 2 Puffs by inhalation every four (4) hours as needed for Wheezing or Shortness of Breath. (Patient taking differently: Take 2 Puffs by inhalation as needed for Wheezing or Shortness of Breath.)  aspirin 81 mg tablet Take 81 mg by mouth every morning. Review of Systems Review of Systems Constitution: Negative. HENT: Negative. Eyes: Negative. Cardiovascular: Positive for chest pain, dyspnea on exertion and orthopnea.   
Respiratory: Positive for shortness of breath. Endocrine: Negative. Hematologic/Lymphatic: Negative. Skin: Negative. Musculoskeletal: Negative. Gastrointestinal: Negative. Genitourinary: Negative. Neurological: Negative. Psychiatric/Behavioral: Negative. Allergic/Immunologic: Negative. Subjective:  
 
Visit Vitals  BP (!) 196/91  Pulse 71  Temp 98.2 °F (36.8 °C)  Resp 20  
 Ht 5' 7\" (1.702 m)  Wt 84.8 kg (187 lb)  SpO2 93%  BMI 29.29 kg/m2 Physical Exam  
Constitutional: He is oriented to person, place, and time and well-developed, well-nourished, and in no distress. HENT:  
Head: Normocephalic. Neck: Normal range of motion. Cardiovascular: Normal rate and regular rhythm. Pulmonary/Chest: Effort normal.  
Abdominal: Soft. Bowel sounds are normal.  
Musculoskeletal: Normal range of motion. Neurological: He is alert and oriented to person, place, and time. Skin: Skin is warm and dry. Psychiatric: Mood, memory, affect and judgment normal.  
 
 
Cardiographics Telemetry: normal sinus rhythm ECG: normal sinus rhythm, t wave inversion in Anterolateral leads Labs:  
Recent Results (from the past 24 hour(s)) EKG, 12 LEAD, INITIAL Collection Time: 08/02/18  2:04 PM  
Result Value Ref Range Ventricular Rate 81 BPM  
 Atrial Rate 81 BPM  
 P-R Interval 130 ms QRS Duration 94 ms Q-T Interval 396 ms QTC Calculation (Bezet) 460 ms Calculated P Axis 36 degrees Calculated R Axis -46 degrees Calculated T Axis 121 degrees Diagnosis Normal sinus rhythm Pulmonary disease pattern Left anterior fascicular block ST & T wave abnormality, consider anterolateral ischemia Prolonged QT Abnormal ECG When compared with ECG of 11-MAR-2017 11:17, Left anterior fascicular block is now Present T wave inversion no longer evident in Inferior leads T wave inversion less evident in Lateral leads Confirmed by Anderson Sidhu MD (), Paola Fletcher ([id number]) on 8/2/2018 2:48:46 PM 
  
MAGNESIUM Collection Time: 08/02/18  2:18 PM  
Result Value Ref Range Magnesium 2.0 1.8 - 2.4 mg/dL CBC WITH AUTOMATED DIFF Collection Time: 08/02/18  2:18 PM  
Result Value Ref Range WBC 8.2 4.3 - 11.1 K/uL  
 RBC 5.26 4.23 - 5.67 M/uL  
 HGB 16.3 13.6 - 17.2 g/dL HCT 48.9 41.1 - 50.3 % MCV 93.0 79.6 - 97.8 FL  
 MCH 31.0 26.1 - 32.9 PG  
 MCHC 33.3 31.4 - 35.0 g/dL  
 RDW 13.5 11.9 - 14.6 % PLATELET 144 512 - 697 K/uL MPV 9.9 (L) 10.8 - 14.1 FL  
 DF AUTOMATED NEUTROPHILS 57 43 - 78 % LYMPHOCYTES 36 13 - 44 % MONOCYTES 4 4.0 - 12.0 % EOSINOPHILS 3 0.5 - 7.8 % BASOPHILS 0 0.0 - 2.0 % IMMATURE GRANULOCYTES 0 0.0 - 5.0 %  
 ABS. NEUTROPHILS 4.7 1.7 - 8.2 K/UL  
 ABS. LYMPHOCYTES 3.0 0.5 - 4.6 K/UL  
 ABS. MONOCYTES 0.3 0.1 - 1.3 K/UL  
 ABS. EOSINOPHILS 0.2 0.0 - 0.8 K/UL  
 ABS. BASOPHILS 0.0 0.0 - 0.2 K/UL  
 ABS. IMM. GRANS. 0.0 0.0 - 0.5 K/UL METABOLIC PANEL, COMPREHENSIVE Collection Time: 08/02/18  2:18 PM  
Result Value Ref Range Sodium 139 136 - 145 mmol/L Potassium 3.8 3.5 - 5.1 mmol/L Chloride 103 98 - 107 mmol/L  
 CO2 26 21 - 32 mmol/L Anion gap 10 7 - 16 mmol/L Glucose 247 (H) 65 - 100 mg/dL BUN 15 8 - 23 MG/DL Creatinine 1.25 0.8 - 1.5 MG/DL  
 GFR est AA >60 >60 ml/min/1.73m2 GFR est non-AA >60 >60 ml/min/1.73m2 Calcium 8.7 8.3 - 10.4 MG/DL Bilirubin, total 0.5 0.2 - 1.1 MG/DL  
 ALT (SGPT) 16 12 - 65 U/L  
 AST (SGOT) 15 15 - 37 U/L Alk. phosphatase 96 50 - 136 U/L Protein, total 8.2 6.3 - 8.2 g/dL Albumin 3.2 3.2 - 4.6 g/dL Globulin 5.0 (H) 2.3 - 3.5 g/dL A-G Ratio 0.6 (L) 1.2 - 3.5    
TROPONIN I Collection Time: 08/02/18  2:18 PM  
Result Value Ref Range Troponin-I, Qt. <0.02 (L) 0.02 - 0.05 NG/ML Patient has been seen and examined by Dr. Lj Cha and he agrees with the following assessment and plan: 
 
 Assessment/Plan:  
  
 Principal Problem: 
  Unstable angina -- Admit and plan for LHC in AM.  NPO after midnight. Trend troponin levels. Continue ASA, add statin,  ACE and BB. Check labs and lipids in AM.  NS at 75CC/hr for hydration. Patient admits to not taking plavix. Active Problems: 
  CAD (coronary artery disease) -- see above HTN (hypertension), benign -- start BB and ACE. Monitor BP and titrate as needed. Monitor renal function Tobacco use disorder-- cessation needed. Restrictive airway disease Jenni Alicea NP 
8/2/2018 4:17 PM

## 2018-08-02 NOTE — IP AVS SNAPSHOT
[address] [address] 
[phone] Patient: Luh Osullivan MRN: [id number] NMP:0/9/1008 About your hospitalization You were admitted on:  August 2, 2018 You last received care in the:  [facility] 3 CLINICAL OBSERVATION You were discharged on:  August 5, 2018 Why you were hospitalized Your primary diagnosis was:  Unstable Angina (Hcc) Your diagnoses also included: Tobacco Use Disorder, Restrictive Airway Disease, Htn (Hypertension), Benign, Cad (Coronary Artery Disease), Acute Coronary Syndrome (Hcc) Follow-up Information Follow up With Details Comments Contact Info Theresa Olvera MD  Aug 24 at 2:30[facility] CANCER [place] office [address] [facility] [address] 
[phone] June Bonds MD Schedule an appointment as soon as possible for a visit  [address] [facility] 3 Ladi Talley 
[phone] Your Scheduled Appointments Friday August 24, 2018  2:45 PM EDT HOSPITAL FOLLOW-UP with Theresa Olvera, [address] ([facility]) 2 [facility] 
[address] [facility] 81  
[phone] Discharge Orders Procedure Order Date Status Priority Quantity Spec Type Associated Dx REFERRAL TO CARDIAC [facility] 08/04/18 0823 Normal Routine 1 A check elfego indicates which time of day the medication should be taken. My Medications START taking these medications Instructions Each Dose to Equal  
 Morning Noon Evening Bedtime  
 atorvastatin 40 mg tablet Commonly known as:  LIPITOR Your next dose is:  8/5/2018 Evening Notes to Patient:  High cholesterol, Triglyceride levels reduction Take 1 Tab by mouth nightly. 40 mg  
    
   
   
  
   
  
 isosorbide mononitrate ER 30 mg tablet Commonly known as:  IMDUR Your next dose is:  8/6/2018 Morning Notes to Patient:  Chest Pain (angina) Take 1 Tab by mouth daily. 30 mg  
    
  
   
   
   
  
 lisinopril 2.5 mg tablet Commonly known as:  Ben Kendrick Your next dose is:  8/6/2018 Morning Notes to Patient:  Blood Pressure (HTN), Heart indicator Take 1 Tab by mouth daily. 2.5 mg  
    
  
   
   
   
  
 metoprolol tartrate 25 mg tablet Commonly known as:  LOPRESSOR Your next dose is:  8/5/2018 Evening Notes to Patient:  Blood Pressure (HTN), Heart indicator Take 0.5 Tabs by mouth every twelve (12) hours. 12.5 mg  
    
   
   
   
  
  
CHANGE how you take these medications Instructions Each Dose to Equal  
 Morning Noon Evening Bedtime  
 albuterol 90 mcg/actuation inhaler Commonly known as:  VENTOLIN HFA What changed:  when to take this Take 2 Puffs by inhalation every four (4) hours as needed for Wheezing or Shortness of Breath. 2 Puff * clopidogrel 75 mg Tab Commonly known as:  PLAVIX What changed:  when to take this Your next dose is:  8/6/2018 Morning Notes to Patient:  Blood Thinner for your stent, do not stop taking without talking to your cardiologist   
   
 Take 1 Tab by mouth daily. 75 mg  
    
  
   
   
   
  
 * clopidogrel 75 mg Tab Commonly known as:  PLAVIX What changed: You were already taking a medication with the same name, and this prescription was added. Make sure you understand how and when to take each. Take 1 Tab by mouth daily. 75 mg  
    
   
   
   
  
 raNITIdine 150 mg tablet Commonly known as:  ZANTAC What changed:   
- how much to take - when to take this 
- reasons to take this Your next dose is:  8/5/2018 Evening Take 2 Tabs by mouth nightly. Indications: GASTROESOPHAGEAL REFLUX, HEARTBURN  
 300 mg * Notice:   This list has 2 medication(s) that are the same as other medications prescribed for you. Read the directions carefully, and ask your doctor or other care provider to review them with you. CONTINUE taking these medications Instructions Each Dose to Equal  
 Morning Noon Evening Bedtime  
 aspirin 81 mg tablet Your next dose is:  8/6/2018 Morning Notes to Patient:  Take with plavix Take 81 mg by mouth every morning. 81 mg  
    
  
   
   
   
  
 BEVESPI AEROSPHERE 9-4.8 mcg Hfaa Generic drug:  glycopyrrolate-formoterol Take  by inhalation two (2) times a day. nitroglycerin 0.4 mg SL tablet Commonly known as:  NITROSTAT  
   
 1 Tab by SubLINGual route every five (5) minutes as needed for Chest Pain. 0.4 mg  
    
   
   
   
  
 oxyCODONE IR 5 mg immediate release tablet Commonly known as:  Nelly Ramal Take 1 Tab by mouth every four (4) hours as needed for Pain. Max Daily Amount: 30 mg.  
 5 mg  
    
   
   
   
  
 predniSONE 5 mg tablet Commonly known as:  Orlean Aas Your next dose is: Take as you take at home Take 5 mg by mouth every other day. 5 mg TYLENOL 325 mg tablet Generic drug:  acetaminophen Take 325 mg by mouth as needed for Pain. 325 mg Where to Get Your Medications These medications were sent to [address], [address] [place] [address], [address] [place] [address] Carlos Fostert Phone:  [phone]  
  atorvastatin 40 mg tablet  
 clopidogrel 75 mg Tab  
 isosorbide mononitrate ER 30 mg tablet  
 lisinopril 2.5 mg tablet  
 metoprolol tartrate 25 mg tablet Opioid Education Prescription Opioids: What You Need to Know: 
 
Prescription opioids can be used to help relieve moderate-to-severe pain and are often prescribed following a surgery or injury, or for certain health conditions.   These medications can be an important part of treatment but also come with serious risks. Opioids are strong pain medicines. Examples include hydrocodone, oxycodone, fentanyl, and morphine. Heroin is an example of an illegal opioid. It is important to work with your health care provider to make sure you are getting the safest, most effective care. WHAT ARE THE RISKS AND SIDE EFFECTS OF OPIOID USE? Prescription opioids carry serious risks of addiction and overdose, especially with prolonged use. An opioid overdose, often marked by slow breathing, can cause sudden death. The use of prescription opioids can have a number of side effects as well, even when taken as directed. · Tolerance-meaning you might need to take more of a medication for the same pain relief · Physical dependence-meaning you have symptoms of withdrawal when the medication is stopped. Withdrawal symptoms can include nausea, sweating, chills, diarrhea, stomach cramps, and muscle aches. Withdrawal can last up to several weeks, depending on which drug you took and how long you took it. · Increased sensitivity to pain · Constipation · Nausea, vomiting, and dry mouth · Sleepiness and dizziness · Confusion · Depression · Low levels of testosterone that can result in lower sex drive, energy, and strength · Itching and sweating RISKS ARE GREATER WITH:      
· History of drug misuse, substance use disorder, or overdose · Mental health conditions (such as depression or anxiety) · Sleep apnea · Older age (72 years or older) · Pregnancy Avoid alcohol while taking prescription opioids. Also, unless specifically advised by your health care provider, medications to avoid include: · Benzodiazepines (such as Xanax or Valium) · Muscle relaxants (such as Soma or Flexeril) · Hypnotics (such as Ambien or Lunesta) · Other prescription opioids KNOW YOUR OPTIONS Talk to your health care provider about ways to manage your pain that don't involve prescription opioids. Some of these options may actually work better and have fewer risks and side effects. Options may include: 
· Pain relievers such as acetaminophen, ibuprofen, and naproxen · Some medications that are also used for depression or seizures · Physical therapy and exercise · Counseling to help patients learn how to cope better with triggers of pain and stress. · Application of heat or cold compress · Massage therapy · Relaxation techniques Be Informed Make sure you know the name of your medication, how much and how often to take it, and its potential risks & side effects. IF YOU ARE PRESCRIBED OPIOIDS FOR PAIN: 
· Never take opioids in greater amounts or more often than prescribed. Remember the goal is not to be pain-free but to manage your pain at a tolerable level. · Follow up with your primary care provider to: · Work together to create a plan on how to manage your pain. · Talk about ways to help manage your pain that don't involve prescription opioids. · Talk about any and all concerns and side effects. · Help prevent misuse and abuse. · Never sell or share prescription opioids · Help prevent misuse and abuse. · Store prescription opioids in a secure place and out of reach of others (this may include visitors, children, friends, and family). · Safely dispose of unused/unwanted prescription opioids: Find your community drug take-back program or your pharmacy mail-back program, or flush them down the toilet, following guidance from the Food and Drug Administration (www.fda.gov/Drugs/ResourcesForYou). · Visit www.cdc.gov/drugoverdose to learn about the risks of opioid abuse and overdose. · If you believe you may be struggling with addiction, tell your health care provider and ask for guidance or call [address] [place], [place] [address] at [phone]. Discharge Instructions Learning About Coronary Artery Disease (CAD) What is coronary artery disease? Coronary artery disease (CAD) occurs when plaque builds up in the arteries that bring oxygen-rich blood to your heart. Plaque is a fatty substance made of cholesterol, calcium, and other substances in the blood. This process is called hardening of the arteries, or atherosclerosis. What happens when you have coronary artery disease? · Plaque may narrow the coronary arteries. Narrowed arteries cause poor blood flow. This can lead to angina symptoms such as chest pain or discomfort. If blood flow is completely blocked, you could have a heart attack. · You can slow CAD and reduce the risk of future problems by making changes in your lifestyle. These include quitting smoking and eating heart-healthy foods. · Treatments for CAD, along with changes in your lifestyle, can help you live a longer and healthier life. How can you prevent coronary artery disease? · Do not smoke. It may be the best thing you can do to prevent heart disease. If you need help quitting, talk to your doctor about stop-smoking programs and medicines. These can increase your chances of quitting for good. · Be active. Get at least 30 minutes of exercise on most days of the week. Walking is a good choice. You also may want to do other activities, such as running, swimming, cycling, or playing tennis or team sports. · Eat heart-healthy foods. Eat more fruits and vegetables and less foods that contain saturated and trans fats. Limit alcohol, sodium, and sweets. · Stay at a healthy weight. Lose weight if you need to. · Manage other health problems such as diabetes, high blood pressure, and high cholesterol. · Manage stress. Stress can hurt your heart. To keep stress low, talk about your problems and feelings. Don't keep your feelings hidden.  
· If you have talked about it with your doctor, take a low-dose aspirin every day. Aspirin can help certain people lower their risk of a heart attack or stroke. But taking aspirin isn't right for everyone, because it can cause serious bleeding. Do not start taking daily aspirin unless your doctor knows about it. How is coronary artery disease treated? · Your doctor will suggest that you make lifestyle changes. For example, your doctor may ask you to eat healthy foods, quit smoking, lose extra weight, and be more active. · You will have to take medicines. · Your doctor may suggest a procedure to open narrowed or blocked arteries. This is called angioplasty. Or your doctor may suggest using healthy blood vessels to create detours around narrowed or blocked arteries. This is called bypass surgery. Follow-up care is a key part of your treatment and safety. Be sure to make and go to all appointments, and call your doctor if you are having problems. It's also a good idea to know your test results and keep a list of the medicines you take. Where can you learn more? Go to http://jaz-yaritza.info/. Enter [phone] in the search box to learn more about \"Learning About Coronary Artery Disease (CAD). \" Current as of: December 6, 2017 Content Version: 11.7 © 1362-8518 Ceon. Care instructions adapted under license by [employer]os (which disclaims liability or warranty for this information). If you have questions about a medical condition or this instruction, always ask your healthcare professional. [place] [address] any warranty or liability for your use of this information. Left Heart Catheterization: About This Test 
What is it? Cardiac catheterization is a test to check the left side of your heart. Your doctor might look at the shape of your heart, the motion of your heart, or the blood pressure inside the chambers. Why is this test done? This test gives information about how your heart is working. It can: · Check blood flow and blood pressure in the chambers of the heart. · Check the pumping action of the heart. · Find out if a heart defect is present and how severe it is. · Find out how well the heart valves work. What happens during the test? 
· You will get medicine to help you relax. · A thin tube called a catheter is put into a blood vessel in the groin or the arm. The doctor moves the catheter through the blood vessel into your heart. · You will get a shot to numb the skin where the catheter goes in. You may feel pressure when the doctor moves the catheter through your blood vessel into your heart. · Dye may be injected into your heart. Your doctor can watch on special monitors as the dye moves in your heart. The dye helps your doctor see blood flow in your heart. · You may feel hot or flushed for several seconds when the dye is put in. 
· If a heart defect is found, cardiac catheterization sometimes is used to correct it during the test. 
How long does it take? · The test will take about 30 minutes. If a problem is found and the doctor treats it, it can take a few hours longer. What happens after the test? 
· You will stay in a room for at least a few hours to make sure the catheter site starts to heal. You may have a bandage or a compression device on your groin or arm to prevent bleeding. · If the catheter was placed in your groin, you may lie in bed for a few hours. If the catheter was put in your arm, you will need to keep your arm still for at least 1 hour. · You may or may not need to stay in the hospital overnight. You will get more instructions for what to do at home. · Drink plenty of fluids for several hours after the test. 
Follow-up care is a key part of your treatment and safety. Be sure to make and go to all appointments, and call your doctor if you are having problems. It's also a good idea to know your test results and keep a list of the medicines you take. Where can you learn more? Go to http://jaz-yaritza.info/. Enter W306 in the search box to learn more about \"Left Heart Catheterization: About This Test.\" Current as of: December 6, 2017 Content Version: 11.7 © 7375-7851 CDNlion. Care instructions adapted under license by Easy Eye (which disclaims liability or warranty for this information). If you have questions about a medical condition or this instruction, always ask your healthcare professional. [place] [address] any warranty or liability for your use of this information. DISCHARGE SUMMARY from Nurse PATIENT INSTRUCTIONS: 
 
After general anesthesia or intravenous sedation, for 24 hours or while taking prescription Narcotics: · Limit your activities · Do not drive and operate hazardous machinery · Do not make important personal or business decisions · Do  not drink alcoholic beverages · If you have not urinated within 8 hours after discharge, please contact your surgeon on call. Report the following to your surgeon: 
· Excessive pain, swelling, redness or odor of or around the surgical area · Temperature over 100.5 · Nausea and vomiting lasting longer than 4 hours or if unable to take medications · Any signs of decreased circulation or nerve impairment to extremity: change in color, persistent  numbness, tingling, coldness or increase pain · Any questions What to do at Home: 
Recommended activity: Activity as tolerated with limited movement of the right wrist.  
 
You are instructed to advance activities as tolerated to the limit of fatigue or shortness of breath. Avoid all heavy lifting or straining  3-5 days of right wrist, limit to 7-10 lbs weight. Watch the cath site for bleeding/oozing; if seen, apply firm pressure with a clean cloth and call [facility] Cardiology at [phone].  Watch for signs of infection which include: increasing area of redness, fever/hot to touch or purulent drainage at the catheterization site. Do not to soak in a bathtub for 7-10 days, but you may shower. *  Please give a list of your current medications to your Primary Care Provider. *  Please update this list whenever your medications are discontinued, doses are 
    changed, or new medications (including over-the-counter products) are added. *  Please carry medication information at all times in case of emergency situations. These are general instructions for a healthy lifestyle: No smoking/ No tobacco products/ Avoid exposure to second hand smoke Surgeon General's Warning:  Quitting smoking now greatly reduces serious risk to your health. Obesity, smoking, and sedentary lifestyle greatly increases your risk for illness A healthy diet, regular physical exercise & weight monitoring are important for maintaining a healthy lifestyle You may be retaining fluid if you have a history of heart failure or if you experience any of the following symptoms:  Weight gain of 3 pounds or more overnight or 5 pounds in a week, increased swelling in our hands or feet or shortness of breath while lying flat in bed. Please call your doctor as soon as you notice any of these symptoms; do not wait until your next office visit. Recognize signs and symptoms of STROKE: 
 
F-face looks uneven A-arms unable to move or move unevenly S-speech slurred or non-existent T-time-call 911 as soon as signs and symptoms begin-DO NOT go Back to bed or wait to see if you get better-TIME IS BRAIN. Warning Signs of HEART ATTACK Call 911 if you have these symptoms: 
? Chest discomfort. Most heart attacks involve discomfort in the center of the chest that lasts more than a few minutes, or that goes away and comes back. It can feel like uncomfortable pressure, squeezing, fullness, or pain. ? Discomfort in other areas of the upper body. Symptoms can include pain or discomfort in one or both arms, the back, neck, jaw, or stomach. ? Shortness of breath with or without chest discomfort. ? Other signs may include breaking out in a cold sweat, nausea, or lightheadedness. Don't wait more than five minutes to call [address]! Fast action can save your life. Calling 911 is almost always the fastest way to get lifesaving treatment. Emergency Medical Services staff can begin treatment when they arrive  up to an hour sooner than if someone gets to the hospital by car. The discharge information has been reviewed with the patient. The patient verbalized understanding. Discharge medications reviewed with the patient and appropriate educational materials and side effects teaching were provided. ___________________________________________________________________________________________________________________________________ Aprimohart Announcement We are excited to announce that we are making your provider's discharge notes available to you in [employer]. You will see these notes when they are completed and signed by the physician that discharged you from your recent hospital stay. If you have any questions or concerns about any information you see in [employer] [facility], please call the Health Information Department where you were seen or reach out to your Primary Care Provider for more information about your plan of care. Introducing [facility]! Dear Kacie Ramirez: Thank you for requesting a [employer] account. Our records indicate that you already have an active [employer] account. You can access your account anytime at [email or web address]. Nidmi/[email or web address] Did you know that you can access your hospital and ER discharge instructions at any time in [employer] [facility]? You can also review all of your test results from your hospital stay or ER visit. Additional Information If you have questions, please visit the Frequently Asked Questions section of the MyChart website at [email or web address] Remember, FastBookingt is NOT to be used for urgent needs. For medical emergencies, dial 911. Now available from your iPhone and Android! Introducing Matt Naranjo As a [facility] patient, I wanted to make you aware of our electronic visit tool called Matt Naranjo. Zutux/7 allows you to connect within minutes with a medical provider 24 hours a day, seven days a week via a mobile device or tablet or logging into a secure website from your computer. You can access Matt Naranjo from anywhere in the [place]. A virtual visit might be right for you when you have a simple condition and feel like you just dont want to get out of bed, or cant get away from work for an appointment, when your regular [facility] provider is not available (evenings, weekends or holidays), or when youre out of town and need minor care. Electronic visits cost only $49 and if the [facility] [email or web address] provider determines a prescription is needed to treat your condition, one can be electronically transmitted to a nearby pharmacy*. Please take a moment to enroll today if you have not already done so. The enrollment process is free and takes just a few minutes. To enroll, please download the [email or web address] berenice to your tablet or phone, or visit [email or web address] to enroll on your computer. And, as an [address] [place] [address] patient with a Mommy Nearest account, the results of your visits will be scanned into your electronic medical record and your primary care provider will be able to view the scanned results. We urge you to continue to see your regular [facility] provider for your ongoing medical care.   And while your primary care provider may not be the one available when you seek a Matt Naranjo virtual visit, the peace of mind you get from getting a real diagnosis real time can be priceless. For more information on Matt Naranjo, view our Frequently Asked Questions (FAQs) at [email or web address]. Sincerely, 
 
Payton Spear MD 
Chief Medical Officer 50Gwen Bojorquez *:  certain medications cannot be prescribed via Matt Naranjo Providers Seen During Your Hospitalization Provider Specialty Primary office phone Aissatou Hutchinson MD Emergency Medicine [phone] Rhonda Becerra MD Cardiology [phone] Your Primary Care Physician (PCP) Primary Care Physician Office Phone Office Fax Parth Ulrich, [address] [facility] [phone] You are allergic to the following Allergen Reactions Aspirin Swelling Takes 81 mg at home. Patient can tolerate Aspirin in low doses, but in larger doses causes swelling. Recent Documentation Height Weight BMI Smoking Status 1.676 m 87.1 kg 31.01 kg/m2 Current Every Day Smoker Emergency Contacts Name Discharge Info Relation Home Work Mobile [facility] DISCHARGE CAREGIVER [3] Life Partner [7] [phone] Patient Belongings The following personal items are in your possession at time of discharge: 
  Dental Appliances: None  Visual Aid: Glasses, With patient      Home Medications: Sent to pharmacy   Jewelry: Watch, With patient  Clothing: Nestor Mota, At bedside    Other Valuables: Cell Phone, [facility], At bedside, Other (comment) (phone ) Please provide this summary of care documentation to your next provider. Signatures-by signing, you are acknowledging that this After Visit Summary has been reviewed with you and you have received a copy. Patient Signature:  ____________________________________________________________  Date:  ____________________________________________________________  
  
Kim Gomez    
    
 Provider Signature:  ____________________________________________________________ Date:  ____________________________________________________________

## 2018-08-02 NOTE — ED TRIAGE NOTES
Pt arrived via ems for chest pain and a cough for the past couple weeks. Ems gave 324 mg asa and the pt is pain free. Pt denies any swelling in his legs or pain at time of triage. Pt states the cough is non productive.

## 2018-08-02 NOTE — ROUTINE PROCESS
TRANSFER - OUT REPORT: 
 
Verbal report given to 3rd floor RN on Martha Hammond  being transferred to 3rd Floor for routine progression of care Report consisted of patients Situation, Background, Assessment and  
Recommendations(SBAR). Information from the following report(s) SBAR was reviewed with the receiving nurse. Lines:  
Peripheral IV Left Antecubital (Active) Site Assessment Clean, dry, & intact 8/2/2018  2:16 PM  
Phlebitis Assessment 0 8/2/2018  2:16 PM  
Infiltration Assessment 0 8/2/2018  2:16 PM  
Dressing Status Clean, dry, & intact 8/2/2018  2:16 PM  
Hub Color/Line Status Pink 8/2/2018  2:16 PM  
  
 
Opportunity for questions and clarification was provided. Patient transported with: 
 Monitor

## 2018-08-02 NOTE — PROGRESS NOTES
TRANSFER - IN REPORT: 
 
Verbal report received from Krystal Cole RN on Lenore Cox being received from ER for routine progression of care Report consisted of patients Situation, Background, Assessment and Recommendations(SBAR). Information from the following report(s) SBAR was reviewed with the receiving nurse. Opportunity for questions and clarification was provided. Patient to be transported to telemetry unit

## 2018-08-02 NOTE — ED NOTES
Patient states he came to ER d/t angina lasting longer than normal.  Lasted for about 20 min, longer than the normal 2 min over the past couple weeks.

## 2018-08-02 NOTE — ED NOTES
Patient in bed, resting. Alert and oriented, able to answer questions, in no apparent distress. States has been have c/p x2 weeks. SOB with exertion. Has also noticed angina during physical activity (taking trash out, etc), but ends after a couple minutes of rest. Does not take antihypertensive meds, only baby asp.

## 2018-08-02 NOTE — PROGRESS NOTES
08/02/18 1730 Dual Skin Pressure Injury Assessment Dual Skin Pressure Injury Assessment WDL Second Care Provider (Based on Facility Policy) Page Fernie, RN Dual RN skin assessment completed. Patient's skin noted to be warm, dry and intact. No noted skin breakdown on any pressure points.

## 2018-08-03 NOTE — PROCEDURES
Brief Cardiac Procedure Note Patient: Kristie Lund MRN: [id number]  SSN: [id number] YOB: 1955  Age: 58 y.o. Sex: male Date of Procedure: 8/3/2018 Pre-procedure Diagnosis: Coronary Artery Disease Post-procedure Diagnosis: Coronary Artery Disease Reason for Procedure: New Onset Angina < or = 2 Months Procedure: Left Heart Catheterization with Percutaneous Coronary Intervention Brief Description of Procedure: via left anatomic snuff box Performed By: Chico Winn MD  
 
Assistants:  
 
Anesthesia: Moderate Sedation Estimated Blood Loss: Less than 10 mL Specimens: None Implants: None Findings:  
Ef ok Lm stented thru mid lcx w/ 90% focal restenosis prox and mid, 70% distal 
Lad 100 Svg to lad ok, severe restenosis distal stent 
rca 100 
svg rca ok Pci: 
0% lad svg 3.5 x 20 Roberto 0% lcx after 3.5 hp poba and 3.0x 16 Roberto distal. 
+ heparin Complications: none Recommendations: Continue medical therapy. Signed By: Chico Winn MD   
 August 3, 2018

## 2018-08-03 NOTE — PROGRESS NOTES
Verbal bedside [facility]. Patient's situation, background, assessment and recommendations provided. Opportunity for questions provided. Oncoming RN assumed care of patient.

## 2018-08-03 NOTE — PROGRESS NOTES
TRANSFER - OUT REPORT: 
 
Verbal report given to shruti rn/wanda rn(name) on Shelley Elaine  being transferred to cpru(unit) for routine progression of care Report consisted of patients Situation, Background, Assessment and  
Recommendations(SBAR). Information from the following report(s) SBAR was reviewed with the receiving nurse. Opportunity for questions and clarification was provided. Procedure: [facility]   Finding Summary: stent to circ and lad(cath/pci/pacer settings) Location: left wrist    Closure Device: tr band 13ml(yes/no/description) Post Site Assessment: no bleeding or hematoma Pre Procedure Meds:(if any) Intra Procedure Meds: 
 
Versed: 6mg Fentanyl: 50mcg Heparin: 11,500units Angiomax Stop Time: na 
Reopro:  na Integrelin: na Antiplatelet: plavix [id number] Peripheral IV Left Antecubital (Active) Site Assessment Clean, dry, & intact 8/3/2018  7:15 AM  
Phlebitis Assessment 0 8/3/2018  7:15 AM  
Infiltration Assessment 0 8/3/2018  7:15 AM  
Dressing Status Clean, dry, & intact 8/3/2018  7:15 AM  
Dressing Type Transparent 8/3/2018  7:15 AM  
Hub Color/Line Status Patent 8/3/2018  7:15 AM  
   
Peripheral IV 08/02/18 Right Antecubital (Active) Site Assessment Clean, dry, & intact 8/3/2018  7:15 AM  
Phlebitis Assessment 0 8/3/2018  7:15 AM  
Infiltration Assessment 0 8/3/2018  7:15 AM  
Dressing Status Clean, dry, & intact 8/3/2018  7:15 AM  
Dressing Type Transparent 8/3/2018  7:15 AM  
Hub Color/Line Status Patent 8/3/2018  7:15 AM  
 
  
Post-Procedure Site Assessment (1) Wound Type: Catheter entry/exit Location: Wrist 
Orientation : Left Hemostasis : TR Band (13ml) Site Assessment: No bleeding, No hematoma, Dry/intact 
  
  
  
  
  
  
  
is allergic to aspirin. Past Medical History:  
Diagnosis Date  CAD (coronary artery disease) CABG 1999, stents after (last placed 2/17)  GERD (gastroesophageal reflux disease) PRN meds  Hypertension  Interstitial pneumonitis (HCC)   
 per CT chest 3/10/17 (daily inhalers, no home 02)  Osteoarthritis   
 hands, knees  Pulmonary nodule 8mm per CT chest 3/10/17 (biopsy WNL) Visit Vitals  BP (!) 156/102  Pulse 65  Temp 97.3 °F (36.3 °C)  Resp 16  
 Ht 5' 6\" (1.676 m)  Wt 88.5 kg (195 lb 3.2 oz)  SpO2 97%  BMI 31.51 kg/m2

## 2018-08-03 NOTE — PROGRESS NOTES
[facility] CARDIOLOGY PROGRESS NOTE 
      
 
8/3/2018 8:09 AM 
 
Admit Date: 8/2/2018 Subjective:  
Troponin negative, LHC today, BP better controlled w ACE-I, BB. Cont CP and SOB w exertion, 6/10, better with rest.  
 
ROS: 
Cardiovascular:  As noted above Objective:  
  
Vitals:  
 08/02/18 2110 08/03/18 0110 08/03/18 6109 08/03/18 5702 BP: 148/88 133/83 118/67 139/84 Pulse: 81 65 62 66 Resp: 20 18 20 Temp: 97.2 °F (36.2 °C) 98.7 °F (37.1 °C) 98.3 °F (36.8 °C) SpO2: 94% 93% 97% Weight:   88.5 kg (195 lb 3.2 oz) Height:   5' 6\" (1.676 m) Physical Exam: 
General-No Acute Distress Neck- supple, no JVD 
CV- regular rate and rhythm no MRG Lung- clear bilaterally Abd- soft, nontender, nondistended Ext- no edema bilaterally. Skin- warm and dry Data Review:  
Recent Labs 08/03/18 
 0326  08/03/18 
 0004  08/02/18 2000 08/02/18 22 [id number] NA  140   --    --   139  
K  4.3   --    --   3.8 MG   --    --    --   2.0  
BUN  15   --    --   15  
CREA  0.96   --    --   1.25 GLU  144*   --    --   247* WBC  8.5   --    --   8.2 HGB  16.1   --    --   16.3 HCT  47.7   --    --   48.9 PLT  184   --    --   211 TROIQ   --   0.02  0.02  <0.02* CHOL  245*   --    --    --   
LDLC  178.6*   --    --    --   
HDL  44   --    --    --   
 
 
Assessment/Plan:  
 Unstable angina --  Troponin negative, cont ASA, BB, ACE-I, statin, LHC today.  
  
CAD (coronary artery disease) -- s/p CABG-VG-LAD, PCI, dLCX - as above. Pt not taking plavix.  
  
HTN (hypertension), benign -- start BB and ACE- BP better controlled 
  
Tobacco use disorder-- cessation needed.  
  
Restrictive airway disease/IPF- albuterol prn EVERARDO Crenshaw 
8/3/2018 8:09 AM

## 2018-08-03 NOTE — PROGRESS NOTES
TRANSFER - IN REPORT: 
 
Verbal report received from Fer Ness RN on Reanna Pump being received from [address] [place] [address] for routine post - op Report consisted of patients Situation, Background, Assessment and Recommendations(SBAR). Information from the following report(s) SBAR was reviewed with the receiving nurse. Opportunity for questions and clarification was provided.

## 2018-08-03 NOTE — PROGRESS NOTES
Pt admitted to 3rd floor [facility] for unstable angina. CM met with pt to discuss CM needs & DCP. Pt lives at home with SO ([id number]  [phone] or [phone]). Pt is indep with all ADLs. Pt has no DME, no difficulties with transport. Pt has no DME needs. Pt reports [employer] care coverage had significant cost increase due to an error in his chart stating he was a current smoker. Pt states he is no longer a smoker but it caused an increase of $300. Pt not able to afford premiums and lost coverage. Pt's main concern is cost of plavix. CM provided pt with GoodRx info ( Plavix at [facility] for $7). Pt is unsure what medications will be added at [facility]. CM to monitor for any further medication assistance programs pending meds at [facility]. Care Management Interventions PCP Verified by CM: Yes Last Visit to PCP: 08/17/17 Mode of Transport at Discharge: Self Transition of Care Consult (CM Consult): Discharge Planning Discharge Durable Medical Equipment: No 
Physical Therapy Consult: No 
Occupational Therapy Consult: No 
Speech Therapy Consult: No 
Current Support Network: Own Home (Lives with SO) Confirm Follow Up Transport: Family Plan discussed with Pt/Family/Caregiver: Yes Freedom of Choice Offered: Yes Discharge Location Discharge Placement: Home

## 2018-08-03 NOTE — PROGRESS NOTES
Problem: Falls - Risk of 
Goal: *Absence of Falls Document Winnie Getting Fall Risk and appropriate interventions in the flowsheet. Outcome: Progressing Towards Goal 
Fall Risk Interventions: 
Medication Interventions: Patient to call before getting OOB, Teach patient to arise slowly Patient progressing towards goal with no falls on current admission. Patient without confusion, agitation, or sensory perception deficits. Patient has steady gait on ambulation. Personal belongings are within reach. Bed is in the low and locked position with side rails up x2. Yellow gripper socks to bilateral feet. Call light within reach and patient verbalizes understanding of use. Problem: Unstable angina/NSTEMI: Day of Admission/Day 1 Goal: *Hemodynamically stable Outcome: Resolved/Met Date Met: 08/02/18 Patient has been hemodynamically stable on current day with BP 130s-140s/80s-90s and HR 60s-80s. Goal: *Optimal pain control at patient's stated goal 
Outcome: Resolved/Met Date Met: 08/02/18 Patient has remained pain free on current shift. Patient with small amount of chest pain on day shift. Resolved with supplemental oxygen. Goal: *Lungs clear or at baseline Outcome: Resolved/Met Date Met: 08/02/18 Patient's lungs remain at baseline on current shift.

## 2018-08-03 NOTE — PROGRESS NOTES
Bedside and Verbal shift change report given to Shelia Pettit RN (oncoming nurse) by self Genetta Ebbing nurse).  Report included the following information SBAR, Kardex, Intake/Output, MAR, Recent Results and Cardiac Rhythm SR.

## 2018-08-04 PROBLEM — I24.9 ACUTE CORONARY SYNDROME (HCC): Status: ACTIVE | Noted: 2018-01-01

## 2018-08-04 NOTE — PROGRESS NOTES
Verbal bedside report [facility] to [facility]. Patient's situation, background, assessment and recommendations provided. Opportunity for questions provided. Oncoming RN assumed care of patient. Left lateral wrist site visualized.

## 2018-08-04 NOTE — PROGRESS NOTES
Bedside and Verbal shift change report given to self (oncoming nurse) by Rita Rao RN (offgoing nurse). Report included the following information SBAR, Kardex, Procedure Summary, Intake/Output, MAR, Recent Results and Cardiac Rhythm SB-SR. Left radial site assessed. Scattered bruising. No hematoma or bleeding noted. Dressing replaced. Sensation present and extremity is warm to touch.

## 2018-08-04 NOTE — PROGRESS NOTES
Problem: Falls - Risk of 
Goal: *Absence of Falls Document Jose Luis Pichardo Fall Risk and appropriate interventions in the flowsheet. Outcome: Progressing Towards Goal 
Fall Risk Interventions: 
Medication Interventions: Patient to call before getting OOB, Teach patient to arise slowly Patient progressing towards goal with no falls on current admission. Patient without confusion, agitation, or sensory perception deficits. Patient has steady gait on ambulation. Personal belongings are within reach. Bed is in the low and locked position with side rails up x2. Yellow gripper socks to bilateral feet. Call light within reach and patient verbalizes understanding of use. Problem: Unstable angina/NSTEMI: Day 2 Goal: *Hemodynamically stable Outcome: Resolved/Met Date Met: 08/03/18 Patient has been hemodynamically stable on current day with BP 90s-120s/60s-80s and HR 60s-70s. Patient started metoprolol for blood pressure. Goal: *Optimal pain control at patient's stated goal 
Outcome: Resolved/Met Date Met: 08/03/18 Patient remains pain free on current shift. Goal: *Lungs clear or at baseline Outcome: Resolved/Met Date Met: 08/03/18 Patient's lungs are at baseline on auscultation. Problem: Cath Lab Procedures: Post-Cath Day of Procedure (Initiate SCIP Measures for Post-Op Care) Goal: *Procedure site is without bleeding and signs of infection six hours post sheath removal 
Outcome: Resolved/Met Date Met: 08/03/18 Left cath site is free of signs of infection. No bleeding or hematoma noted. Scattered bruising that is unchanged.

## 2018-08-04 NOTE — DISCHARGE SUMMARY
[place] Cardiology Discharge Summary Patient ID: 
Zehra Ma [id number] 
72 y.o. 
1955 Admit date: 8/2/2018 Discharge date:  8/4/2018 Admitting Physician: Christina Mendoza MD  
 
Discharge Physician: Dr. Che Meraz Admission Diagnoses: Unstable angina ([address] Discharge Diagnoses:  
 Diagnosis  Restrictive airway disease  Chronic obstructive pulmonary disease  SOB (shortness of breath)  Chest pain  Peripheral vascular disease-s/p PPI to right common iliac 5/4/11  Unstable angina  CAD (coronary artery disease)  HTN (hypertension), benign  Other and unspecified hyperlipidemia  Tobacco use disorder Cardiology Procedures this admission:  Left heart catheterization with PCI Consults: None Hospital Course: Patient was seen in the ED by Dr. [address] for complaints of worsening exertional CP. He was admitted and was subsequently scheduled for a LHC at [facility] on 8/3/18. Patient underwent cardiac catheterization by Dr. Che Meraz. Patient was found to have a severe restenosis of the distal SVG->LAD stent stented with a 3.5 x 20 [place] JERRY with 0% residual stenosis. Patient was found to have a 90% focal restenosis of the proximal and mid LCx treated with POBA and 70% distal LCx stenosis that was stented with 3.0 x 16 [place] JERRY with 0% residual stenosis. Patient tolerated the procedure well and was taken to the telemetry floor for recovery. The following morning patient was up feeling well without any complaints of chest pain or shortness of breath. Patient's left snuff box cath site was clean, dry and intact without hematoma or bruit. Patient's labs were stable. Patient was seen and examined by Dr. Che Meraz and determined stable and ready for discharge. Patient was instructed on the importance of medication compliance including taking Aspirin and Plavix everyday without missing a dose.  After receiving drug eluting stents, the patient will remain on dual anti-platelet therapy for 1 year. Indication for treatment and risk of dual antiplatelet therapy was discussed with the patient and he/she understands to seek medical care immediately if any signs of bleeding.  For maximized medical therapy for CAD, patient will continue BB, ACE-I (doses decreased due to hypotension), and statin as well. The patient will follow up with [facility] Dr. Long sOborne on 8/24/18 @ 2:30 PM in the [place] office and has been referred to cardiac rehab. DISPOSITION: The patient is being discharged home in stable condition on a low saturated fat, low cholesterol and low salt diet. The patient is instructed to advance activities as tolerated to the limit of fatigue or shortness of breath. The patient is instructed to avoid all heavy lifting, straining, stooping or squatting for 3-5 days. The patient is instructed to watch the cath site for bleeding/oozing; if seen, the patient is instructed to apply firm pressure with a clean cloth and call [facility] at [phone]. The patient is instructed to watch for signs of infection which include: increasing area of redness, fever/hot to touch or purulent drainage at the catheterization site. The patient is instructed not to soak in a bathtub for 7-10 days, but is cleared to shower. The patient is instructed to call the office or return to the ER for immediate evaluation for any shortness of breath or chest pain not relieved by NTG. Discharge Exam:  
Visit Vitals  /71 (BP 1 Location: Right arm, BP Patient Position: At rest)  Pulse 72  Temp 97.3 °F (36.3 °C)  Resp 22  
 Ht 5' 6\" (1.676 m)  Wt 91.9 kg (202 lb 9.6 oz)  SpO2 93%  BMI 32.7 kg/m2 Patient has been seen by Dr. Long Osborne: see his progress note for exam details. Recent Results (from the past 24 hour(s)) POC ACTIVATED CLOTTING TIME Collection Time: 08/03/18 10:49 AM  
Result Value Ref Range  Activated Clotting Time (POC) 268 (H) 70 - 128 SECS  
POC ACTIVATED CLOTTING TIME Collection Time: 08/03/18 12:01 PM  
Result Value Ref Range Activated Clotting Time (POC) 230 (H) 70 - 514 SECS  
METABOLIC PANEL, BASIC Collection Time: 08/04/18  3:40 AM  
Result Value Ref Range Sodium 142 136 - 145 mmol/L Potassium 4.5 3.5 - 5.1 mmol/L Chloride 107 98 - 107 mmol/L  
 CO2 24 21 - 32 mmol/L Anion gap 11 7 - 16 mmol/L Glucose 130 (H) 65 - 100 mg/dL BUN 15 8 - 23 MG/DL Creatinine 1.11 0.8 - 1.5 MG/DL  
 GFR est AA >60 >60 ml/min/1.73m2 GFR est non-AA >60 >60 ml/min/1.73m2 Calcium 8.2 (L) 8.3 - 10.4 MG/DL Patient Instructions:  
Current Discharge Medication List  
  
START taking these medications Details  
atorvastatin (LIPITOR) 40 mg tablet Take 1 Tab by mouth nightly. Qty: 30 Tab, Refills: 11  
  
lisinopril (PRINIVIL, ZESTRIL) 2.5 mg tablet Take 1 Tab by mouth daily. Qty: 30 Tab, Refills: 11  
  
metoprolol tartrate (LOPRESSOR) 25 mg tablet Take 0.5 Tabs by mouth every twelve (12) hours. Qty: 30 Tab, Refills: 11  
  
!! clopidogrel (PLAVIX) 75 mg tab Take 1 Tab by mouth daily. (refilled for pt) Qty: 30 Tab, Refills: 11 CONTINUE these medications which have NOT CHANGED Details  
oxyCODONE IR (ROXICODONE) 5 mg immediate release tablet Take 1 Tab by mouth every four (4) hours as needed for Pain. Max Daily Amount: 30 mg. 
Qty: 20 Tab, Refills: 0  
  
predniSONE (DELTASONE) 5 mg tablet Take 5 mg by mouth every other day. acetaminophen (TYLENOL) 325 mg tablet Take 325 mg by mouth as needed for Pain. glycopyrrolate-formoterol (BEVESPI AEROSPHERE) 9-4.8 mcg HFAA Take  by inhalation two (2) times a day. raNITIdine (ZANTAC) 150 mg tablet Take 2 Tabs by mouth nightly. Indications: GASTROESOPHAGEAL REFLUX, HEARTBURN Qty: 60 Tab, Refills: 11  
  
nitroglycerin (NITROSTAT) 0.4 mg SL tablet 1 Tab by SubLINGual route every five (5) minutes as needed for Chest Pain.  
Qty: 1 Bottle, Refills: 5  
  
albuterol (VENTOLIN HFA) 90 mcg/actuation inhaler Take 2 Puffs by inhalation every four (4) hours as needed for Wheezing or Shortness of Breath. Qty: 1 Inhaler, Refills: 4  
  
aspirin 81 mg tablet Take 81 mg by mouth every morning.

## 2018-08-04 NOTE — PROGRESS NOTES
Bedside and Verbal shift change report given to Karla Mcleod RN (oncoming nurse) by self [facility] ). Report included the following information SBAR, Kardex, Intake/Output, MAR, Recent Results and Cardiac Rhythm SR. Left radial cath site assessed.

## 2018-08-04 NOTE — PROGRESS NOTES
Walked half of the hallway with patient. Once returning to room, patient stated he has some chest pressure similar to how he felt earlier when he walked to bathroom rating it 4/10. Upon return to room, placed patient on 2 L O2 NC. Patient stated his chest pressure and jaw pain went away once rested in chair for a few minutes. /71. Spoke with Rodger Ulrich. Will continue to monitor.

## 2018-08-04 NOTE — PROGRESS NOTES
Patient called RN. Stated he went to the bathroom and got diaphoretic and developed chest pain/pressure 4/10. Stated he felt short of breath and he returned to bed. Upon assessment, /85 and HR 78 bpm sinus rhythm. Patient placed on 2 L O2 NC. Dr. Hugo Alba informed and orders received for stat ekg and to cancel patient's current discharge orders and to order a follow up ekg for tomorrow morning. Patient now chest pain/pressure free resting in bed on 2L O2. Ekg at bedside. Will continue to monitor.

## 2018-08-05 NOTE — DISCHARGE SUMMARY
Addendum:  D/C date 8-5-18  Pt was discharged yesterday but was up walking to the bathroom and became chest pain and diaphoresis. No arrhythmia noted, EKG unchanged. He had CP again later that day and imdur was added. His discharge was postponed. Later in the day and the following day the patient was up walking without chest pain.      Patient Vitals for the past 12 hrs:   Temp Pulse Resp BP SpO2   08/05/18 0729 - - - - 94 %   08/05/18 0437 97.3 °F (36.3 °C) 64 18 102/61 99 %   08/05/18 0055 97.8 °F (36.6 °C) 81 18 115/71 93 %   08/04/18 2103 - 85 - 114/66 -   08/04/18 2059 97.8 °F (36.6 °C) 80 18 114/66 96 %   08/04/18 2040 - - - - 97 %     Recent Results (from the past 12 hour(s))   METABOLIC PANEL, BASIC    Collection Time: 08/05/18  3:33 AM   Result Value Ref Range    Sodium 139 136 - 145 mmol/L    Potassium 4.1 3.5 - 5.1 mmol/L    Chloride 104 98 - 107 mmol/L    CO2 26 21 - 32 mmol/L    Anion gap 9 7 - 16 mmol/L    Glucose 149 (H) 65 - 100 mg/dL    BUN 11 8 - 23 MG/DL    Creatinine 1.05 0.8 - 1.5 MG/DL    GFR est AA >60 >60 ml/min/1.73m2    GFR est non-AA >60 >60 ml/min/1.73m2    Calcium 8.4 8.3 - 10.4 MG/DL   EKG, 12 LEAD, SUBSEQUENT    Collection Time: 08/05/18  7:15 AM   Result Value Ref Range    Ventricular Rate 73 BPM    Atrial Rate 73 BPM    P-R Interval 138 ms    QRS Duration 98 ms    Q-T Interval 426 ms    QTC Calculation (Bezet) 469 ms    Calculated P Axis 42 degrees    Calculated R Axis -56 degrees    Calculated T Axis 146 degrees    Diagnosis       Sinus rhythm with Premature atrial complexes  Pulmonary disease pattern  Incomplete right bundle branch block  Left anterior fascicular block  ST & T wave abnormality, consider lateral ischemia  Prolonged QT  Abnormal ECG  When compared with ECG of 04-AUG-2018 08:58,  Premature atrial complexes are now Present         Current Discharge Medication List      START taking these medications    Details   isosorbide mononitrate ER (IMDUR) 30 mg tablet Take 1 Tab by mouth daily. Qty: 30 Tab, Refills: 11      atorvastatin (LIPITOR) 40 mg tablet Take 1 Tab by mouth nightly. Qty: 30 Tab, Refills: 11      lisinopril (PRINIVIL, ZESTRIL) 2.5 mg tablet Take 1 Tab by mouth daily. Qty: 30 Tab, Refills: 11      metoprolol tartrate (LOPRESSOR) 25 mg tablet Take 0.5 Tabs by mouth every twelve (12) hours. Qty: 30 Tab, Refills: 11      !! clopidogrel (PLAVIX) 75 mg tab Take 1 Tab by mouth daily. Qty: 30 Tab, Refills: 11       !! - Potential duplicate medications found. Please discuss with provider. CONTINUE these medications which have NOT CHANGED    Details   oxyCODONE IR (ROXICODONE) 5 mg immediate release tablet Take 1 Tab by mouth every four (4) hours as needed for Pain. Max Daily Amount: 30 mg.  Qty: 20 Tab, Refills: 0      predniSONE (DELTASONE) 5 mg tablet Take 5 mg by mouth every other day. acetaminophen (TYLENOL) 325 mg tablet Take 325 mg by mouth as needed for Pain. glycopyrrolate-formoterol (BEVESPI AEROSPHERE) 9-4.8 mcg HFAA Take  by inhalation two (2) times a day. raNITIdine (ZANTAC) 150 mg tablet Take 2 Tabs by mouth nightly. Indications: GASTROESOPHAGEAL REFLUX, HEARTBURN  Qty: 60 Tab, Refills: 11      nitroglycerin (NITROSTAT) 0.4 mg SL tablet 1 Tab by SubLINGual route every five (5) minutes as needed for Chest Pain. Qty: 1 Bottle, Refills: 5      albuterol (VENTOLIN HFA) 90 mcg/actuation inhaler Take 2 Puffs by inhalation every four (4) hours as needed for Wheezing or Shortness of Breath. Qty: 1 Inhaler, Refills: 4      aspirin 81 mg tablet Take 81 mg by mouth every morning. !! - Potential duplicate medications found. Please discuss with provider.

## 2018-08-05 NOTE — PROGRESS NOTES
Bedside and Verbal shift change report given to Lazarus Apa, RN (oncoming nurse) by self Julius reyes).  Report included the following information SBAR, Kardex, ED Summary, Procedure Summary, Intake/Output, MAR, Recent Results and Cardiac Rhythm SR.

## 2018-08-05 NOTE — DISCHARGE INSTRUCTIONS
Learning About Coronary Artery Disease (CAD)  What is coronary artery disease? Coronary artery disease (CAD) occurs when plaque builds up in the arteries that bring oxygen-rich blood to your heart. Plaque is a fatty substance made of cholesterol, calcium, and other substances in the blood. This process is called hardening of the arteries, or atherosclerosis. What happens when you have coronary artery disease? · Plaque may narrow the coronary arteries. Narrowed arteries cause poor blood flow. This can lead to angina symptoms such as chest pain or discomfort. If blood flow is completely blocked, you could have a heart attack. · You can slow CAD and reduce the risk of future problems by making changes in your lifestyle. These include quitting smoking and eating heart-healthy foods. · Treatments for CAD, along with changes in your lifestyle, can help you live a longer and healthier life. How can you prevent coronary artery disease? · Do not smoke. It may be the best thing you can do to prevent heart disease. If you need help quitting, talk to your doctor about stop-smoking programs and medicines. These can increase your chances of quitting for good. · Be active. Get at least 30 minutes of exercise on most days of the week. Walking is a good choice. You also may want to do other activities, such as running, swimming, cycling, or playing tennis or team sports. · Eat heart-healthy foods. Eat more fruits and vegetables and less foods that contain saturated and trans fats. Limit alcohol, sodium, and sweets. · Stay at a healthy weight. Lose weight if you need to. · Manage other health problems such as diabetes, high blood pressure, and high cholesterol. · Manage stress. Stress can hurt your heart. To keep stress low, talk about your problems and feelings. Don't keep your feelings hidden. · If you have talked about it with your doctor, take a low-dose aspirin every day.  Aspirin can help certain people lower their risk of a heart attack or stroke. But taking aspirin isn't right for everyone, because it can cause serious bleeding. Do not start taking daily aspirin unless your doctor knows about it. How is coronary artery disease treated? · Your doctor will suggest that you make lifestyle changes. For example, your doctor may ask you to eat healthy foods, quit smoking, lose extra weight, and be more active. · You will have to take medicines. · Your doctor may suggest a procedure to open narrowed or blocked arteries. This is called angioplasty. Or your doctor may suggest using healthy blood vessels to create detours around narrowed or blocked arteries. This is called bypass surgery. Follow-up care is a key part of your treatment and safety. Be sure to make and go to all appointments, and call your doctor if you are having problems. It's also a good idea to know your test results and keep a list of the medicines you take. Where can you learn more? Go to http://[employer]Gifts that Giveyaritza.info/. Enter [phone] in the search box to learn more about \"Learning About Coronary Artery Disease (CAD). \"  Current as of: December 6, 2017  Content Version: 11.7  © 3586-2055 [employer]. Care instructions adapted under license by [employer] (which disclaims liability or warranty for this information). If you have questions about a medical condition or this instruction, always ask your healthcare professional. [place] [address] any warranty or liability for your use of this information. Left Heart Catheterization: About This Test  What is it? Cardiac catheterization is a test to check the left side of your heart. Your doctor might look at the shape of your heart, the motion of your heart, or the blood pressure inside the chambers. Why is this test done? This test gives information about how your heart is working.  It can:  · Check blood flow and blood pressure in the chambers of the heart. · Check the pumping action of the heart. · Find out if a heart defect is present and how severe it is. · Find out how well the heart valves work. What happens during the test?  · You will get medicine to help you relax. · A thin tube called a catheter is put into a blood vessel in the groin or the arm. The doctor moves the catheter through the blood vessel into your heart. · You will get a shot to numb the skin where the catheter goes in. You may feel pressure when the doctor moves the catheter through your blood vessel into your heart. · Dye may be injected into your heart. Your doctor can watch on special monitors as the dye moves in your heart. The dye helps your doctor see blood flow in your heart. · You may feel hot or flushed for several seconds when the dye is put in.  · If a heart defect is found, cardiac catheterization sometimes is used to correct it during the test.  How long does it take? · The test will take about 30 minutes. If a problem is found and the doctor treats it, it can take a few hours longer. What happens after the test?  · You will stay in a room for at least a few hours to make sure the catheter site starts to heal. You may have a bandage or a compression device on your groin or arm to prevent bleeding. · If the catheter was placed in your groin, you may lie in bed for a few hours. If the catheter was put in your arm, you will need to keep your arm still for at least 1 hour. · You may or may not need to stay in the hospital overnight. You will get more instructions for what to do at home. · Drink plenty of fluids for several hours after the test.  Follow-up care is a key part of your treatment and safety. Be sure to make and go to all appointments, and call your doctor if you are having problems. It's also a good idea to know your test results and keep a list of the medicines you take. Where can you learn more?   Go to http://jaz-yaritza.info/. Enter W306 in the search box to learn more about \"Left Heart Catheterization: About This Test.\"  Current as of: December 6, 2017  Content Version: 11.7  © 2006-2018 [employer]. Care instructions adapted under license by [employer] (which disclaims liability or warranty for this information). If you have questions about a medical condition or this instruction, always ask your healthcare professional. [facility] any warranty or liability for your use of this information. DISCHARGE SUMMARY from Nurse    PATIENT INSTRUCTIONS:    After general anesthesia or intravenous sedation, for 24 hours or while taking prescription Narcotics:  · Limit your activities  · Do not drive and operate hazardous machinery  · Do not make important personal or business decisions  · Do  not drink alcoholic beverages  · If you have not urinated within 8 hours after discharge, please contact your surgeon on call. Report the following to your surgeon:  · Excessive pain, swelling, redness or odor of or around the surgical area  · Temperature over 100.5  · Nausea and vomiting lasting longer than 4 hours or if unable to take medications  · Any signs of decreased circulation or nerve impairment to extremity: change in color, persistent  numbness, tingling, coldness or increase pain  · Any questions    What to do at Home:  Recommended activity: Activity as tolerated with limited movement of the right wrist.     You are instructed to advance activities as tolerated to the limit of fatigue or shortness of breath. Avoid all heavy lifting or straining  3-5 days of right wrist, limit to 7-10 lbs weight. Watch the cath site for bleeding/oozing; if seen, apply firm pressure with a clean cloth and call [facility] at [phone].  Watch for signs of infection which include: increasing area of redness, fever/hot to touch or purulent drainage at the catheterization site. Do not to soak in a bathtub for 7-10 days, but you may shower. *  Please give a list of your current medications to your Primary Care Provider. *  Please update this list whenever your medications are discontinued, doses are      changed, or new medications (including over-the-counter products) are added. *  Please carry medication information at all times in case of emergency situations. These are general instructions for a healthy lifestyle:    No smoking/ No tobacco products/ Avoid exposure to second hand smoke  Surgeon General's Warning:  Quitting smoking now greatly reduces serious risk to your health. Obesity, smoking, and sedentary lifestyle greatly increases your risk for illness    A healthy diet, regular physical exercise & weight monitoring are important for maintaining a healthy lifestyle    You may be retaining fluid if you have a history of heart failure or if you experience any of the following symptoms:  Weight gain of 3 pounds or more overnight or 5 pounds in a week, increased swelling in our hands or feet or shortness of breath while lying flat in bed. Please call your doctor as soon as you notice any of these symptoms; do not wait until your next office visit. Recognize signs and symptoms of STROKE:    F-face looks uneven    A-arms unable to move or move unevenly    S-speech slurred or non-existent    T-time-call 911 as soon as signs and symptoms begin-DO NOT go       Back to bed or wait to see if you get better-TIME IS BRAIN. Warning Signs of HEART ATTACK     Call 911 if you have these symptoms:   Chest discomfort. Most heart attacks involve discomfort in the center of the chest that lasts more than a few minutes, or that goes away and comes back. It can feel like uncomfortable pressure, squeezing, fullness, or pain.  Discomfort in other areas of the upper body.  Symptoms can include pain or discomfort in one or both arms, the back, neck, jaw, or stomach.  Shortness of breath with or without chest discomfort.  Other signs may include breaking out in a cold sweat, nausea, or lightheadedness. Don't wait more than five minutes to call 911 - MINUTES MATTER! Fast action can save your life. Calling 911 is almost always the fastest way to get lifesaving treatment. Emergency Medical Services staff can begin treatment when they arrive -- up to an hour sooner than if someone gets to the hospital by car. The discharge information has been reviewed with the patient. The patient verbalized understanding. Discharge medications reviewed with the patient and appropriate educational materials and side effects teaching were provided.   ___________________________________________________________________________________________________________________________________

## 2018-08-05 NOTE — PROGRESS NOTES
Bedside and Verbal shift change report given to self (oncoming nurse) by Shawanda Parrish RN (offgoing nurse).  Report included the following information SBAR, Kardex, Intake/Output, MAR, Recent Results and Cardiac Rhythm SR.

## 2018-08-05 NOTE — PROGRESS NOTES
Discharge instructions reviewed with patient. Prescriptions given for Imdur, Plavix, Lopressor, Lipitor, lisinopril  and med info sheets provided for all new medications. Opportunity for questions provided. Patient voiced understanding of all discharge instructions. IV and monitor removed. Aspirin returned to patient. Discharge expected by 0930 this morning.

## 2018-08-08 NOTE — PROGRESS NOTES
TRANSFER - IN REPORT:    Verbal report received from [place], RN on Mary Calderón  being received from [facility] for routine progression of care. Report consisted of patients Situation, Background, Assessment and   Recommendations(SBAR). Information from the following reports was reviewed: Kardex, Procedure Summary, MAR and Recent Results. Opportunity for questions and clarification was provided. Patient received to room 335 and connected to telemetry monitor. Assessment completed and plan of care reviewed. right femoral site dry and intact. Hematoma noted, pressure held, sandbag on R. Groin site. Gail Solorio, buster RN in room. BP cycling every 15 minutes. Patient oriented to room and call light. Patient voiced understanding to lay flat with affected leg straight. Patient aware of NPO status. Patient voiced understanding to use call light to communicate needs.

## 2018-08-08 NOTE — PROGRESS NOTES
Clarified with Dr. Nicole Oglesby regarding Brilinta order. Orders received to administer 180 mg of Brilinta and hold 90 mg dose.

## 2018-08-08 NOTE — PROGRESS NOTES
Bedside and Verbal shift change report given to self (oncoming nurse) by Chris Flores RN (offgoing nurse). Report included the following information SBAR, Kardex, MAR and Recent Results.

## 2018-08-08 NOTE — PROGRESS NOTES
Verbal bedside report given to Joann Matias oncoming RN. Patient's situation, background, assessment and recommendations provided. Opportunity for questions provided. Oncoming RN assumed care of patient. R. groin site visualized.

## 2018-08-08 NOTE — ED PROVIDER NOTES
HPI Comments: 26-year-old male complaining of  \"angina\". Patient has long history CAD has had CABG and stents (×8) has been having increasing angina. He was recently  In the hospital for the same and had a stent placed by Dr. Isai Salmeron. Patient states the pain feels same as before so more intense tonight. He took nitroglycerin and got nitroglycerin from EMS and arrived pain-free    Most recent heart catheter:     8/3/18. Patient underwent cardiac catheterization by Dr. Isai Salmeron. Patient was found to have a severe restenosis of the distal SVG->LAD stent stented with a 3.5 x 20 Roberto JERRY with 0% residual stenosis. Patient was found to have a 90% focal restenosis of the proximal and mid LCx treated with POBA and 70% distal LCx stenosis that was stented with 3.0 x 16 Roberto JERRY with 0% residual stenosis. Patient is a 58 y.o. male presenting with chest pain. The history is provided by the patient. Chest Pain (Angina)    This is a recurrent problem. The current episode started 3 to 5 hours ago. The problem has been resolved. The problem occurs constantly. The pain is associated with normal activity. The pain is present in the substernal region. The pain is at a severity of 0/10. The patient is experiencing no pain. The quality of the pain is described as vice-like and tightness. The pain does not radiate. Pertinent negatives include no abdominal pain, no diaphoresis, no fever, no irregular heartbeat, no lower extremity edema, no shortness of breath and no vomiting. He has tried nothing for the symptoms. Risk factors include cardiac disease, male gender and hypertension. Procedural history includes cardiac catheterization and cardiac stents.        Past Medical History:   Diagnosis Date    CAD (coronary artery disease)     CABG 1999, stents after (last placed 2/17)    GERD (gastroesophageal reflux disease)     PRN meds    Hypertension     Interstitial pneumonitis (HCC)     per CT chest 3/10/17 (daily inhalers, no home 02)  Osteoarthritis     hands, knees    Pulmonary nodule     8mm per CT chest 3/10/17 (biopsy WNL)       Past Surgical History:   Procedure Laterality Date    HX HEART CATHETERIZATION      multiple    HX TONSILLECTOMY      VASCULAR SURGERY PROCEDURE UNLIST      CABG         Family History:   Problem Relation Age of Onset    Heart Disease Mother     Cancer Mother      UNKNOWN TYPE    Heart Disease Father        Social History     Social History    Marital status: COMMON LAW     Spouse name: N/A    Number of children: N/A    Years of education: N/A     Occupational History    Not on file. Social History Main Topics    Smoking status: Current Every Day Smoker     Packs/day: 0.25     Years: 30.00    Smokeless tobacco: Never Used    Alcohol use No    Drug use: No    Sexual activity: Not on file     Other Topics Concern    Not on file     Social History Narrative         ALLERGIES: Aspirin    Review of Systems   Constitutional: Negative. Negative for activity change, diaphoresis and fever. HENT: Negative. Eyes: Negative. Respiratory: Negative. Negative for shortness of breath. Cardiovascular: Positive for chest pain. Gastrointestinal: Negative. Negative for abdominal pain and vomiting. Genitourinary: Negative. Musculoskeletal: Negative. Skin: Negative. Neurological: Negative. Psychiatric/Behavioral: Negative. All other systems reviewed and are negative. Vitals:    08/08/18 0215   BP: 156/84   Pulse: 83   Resp: 18   Temp: 97.6 °F (36.4 °C)   SpO2: 95%   Weight: 90.7 kg (200 lb)   Height: 5' 7\" (1.702 m)            Physical Exam   Constitutional: He is oriented to person, place, and time. He appears well-developed and well-nourished. No distress. HENT:   Head: Normocephalic and atraumatic. Right Ear: External ear normal.   Left Ear: External ear normal.   Nose: Nose normal.   Mouth/Throat: Oropharynx is clear and moist. No oropharyngeal exudate.    Eyes: Conjunctivae and EOM are normal. Pupils are equal, round, and reactive to light. Right eye exhibits no discharge. Left eye exhibits no discharge. No scleral icterus. Neck: Normal range of motion. Neck supple. No JVD present. No tracheal deviation present. Cardiovascular: Normal rate, regular rhythm and intact distal pulses. Pulmonary/Chest: Effort normal and breath sounds normal. No stridor. No respiratory distress. He has no wheezes. He exhibits no tenderness. Abdominal: Soft. Bowel sounds are normal. He exhibits no distension and no mass. There is no tenderness. Musculoskeletal: Normal range of motion. He exhibits no edema or tenderness. Neurological: He is alert and oriented to person, place, and time. No cranial nerve deficit. Skin: Skin is warm and dry. No rash noted. He is not diaphoretic. No erythema. No pallor. Psychiatric: He has a normal mood and affect. His behavior is normal. Thought content normal.   Nursing note and vitals reviewed. MDM  Number of Diagnoses or Management Options  Diagnosis management comments: Patient remained pain-free his entire stay in the emergency department troponin was slightly elevated.   Cardiology will come see the patient and decide his disposition       Amount and/or Complexity of Data Reviewed  Clinical lab tests: ordered and reviewed          ED Course       Procedures

## 2018-08-08 NOTE — PROGRESS NOTES
Patient transported to room 335 via hospital transport.  Right groin site remains intact at this time

## 2018-08-08 NOTE — ED TRIAGE NOTES
Pt arrives from home via [facility] with c/o substernal chest pain radiating into bilateral jaws. Onset approx 0100 while eating doughnuts. Admits to shortness of breath with onset of pain. Denies n/v or diaphoresis. Stents x2 placed Friday here. 324mg asa and nitro x2 given enroute by ems with relief. bgl 273 with ems.  Followed by Levine, Susan. \Hospital Has a New Name and Outlook.\"" cards, dr Fidel Yanez

## 2018-08-08 NOTE — PROGRESS NOTES
TRANSFER - OUT REPORT:    R femoral diagnostic [facility] with Dr Negrito Oneil  Versed 2 mg  Heparin 5000 units for pressure wire study  Dilaudid 1 mg  Pressure wire study negative  Perclose to groin site  No bleeding or hematoma noted at site. Site soft    Verbal report given to [facility] (name) on [facility]  being transferred to [facility](unit) for routine progression of care       Report consisted of patients Situation, Background, Assessment and   Recommendations(SBAR). Information from the following report(s) Procedure Summary was reviewed with the receiving nurse. Lines:   Peripheral IV 08/08/18 Right Antecubital (Active)   Site Assessment Clean, dry, & intact 8/8/2018 10:00 AM   Phlebitis Assessment 0 8/8/2018 10:00 AM   Infiltration Assessment 0 8/8/2018 10:00 AM   Dressing Status Clean, dry, & intact 8/8/2018 10:00 AM   Dressing Type Transparent;Tape 8/8/2018 10:00 AM   Hub Color/Line Status Patent; Infusing 8/8/2018 10:00 AM   Alcohol Cap Used No 8/8/2018 10:00 AM       Peripheral IV 08/08/18 Left Antecubital (Active)   Site Assessment Clean, dry, & intact 8/8/2018 11:18 AM   Phlebitis Assessment 0 8/8/2018 11:18 AM   Infiltration Assessment 0 8/8/2018 11:18 AM   Dressing Status Clean, dry, & intact 8/8/2018 11:18 AM   Dressing Type Transparent;Tape 8/8/2018 11:18 AM   Hub Color/Line Status Patent; Flushed 8/8/2018 11:18 AM   Alcohol Cap Used No 8/8/2018 11:18 AM        Opportunity for questions and clarification was provided.       Patient transported with:   Registered Nurse

## 2018-08-08 NOTE — IP AVS SNAPSHOT
[address] [address] 
[phone] Patient: Minor Taylor MRN: [id number] CAN:[birth year] About your hospitalization You were admitted on:  August 8, 2018 You last received care in the:  [facility] 3 CLINICAL OBSERVATION You were discharged on:  August 8, 2018 Why you were hospitalized Your primary diagnosis was:  Not on File Your diagnoses also included:  Cad (Coronary Artery Disease), Htn (Hypertension), Benign, Tobacco Use Disorder, Peripheral Vascular Disease (Hcc), Nstemi (Non-St Elevated Myocardial Infarction) (formerly [facility]) Follow-up Information Follow up With Details Comments Contact Info Cade Richard MD Schedule an appointment as soon as possible for a visit  [address] [facility] 3 Ladi Talley 
[phone] Your Scheduled Appointments Friday August 24, 2018  2:45 PM EDT HOSPITAL FOLLOW-UP with Marcello Robb, [facility] ([facility]) 2 Zenon Fischer 
[facility] [address] [address]  
[phone] Discharge Orders None A check elfego indicates which time of day the medication should be taken. My Medications CHANGE how you take these medications Instructions Each Dose to Equal  
 Morning Noon Evening Bedtime  
 albuterol 90 mcg/actuation inhaler Commonly known as:  VENTOLIN HFA What changed:  when to take this Take 2 Puffs by inhalation every four (4) hours as needed for Wheezing or Shortness of Breath. 2 Puff  
    
   
   
   
  
 clopidogrel 75 mg Tab Commonly known as:  PLAVIX What changed:  Another medication with the same name was removed. Continue taking this medication, and follow the directions you see here. Your next dose is:  8/9/2018 Morning Take 1 Tab by mouth daily. 75 mg  
    
  
   
   
   
  
 metoprolol tartrate 25 mg tablet Commonly known as:  LOPRESSOR What changed:  Another medication with the same name was removed. Continue taking this medication, and follow the directions you see here. Your next dose is:  8/9/2018 Morning Take 12.5 mg by mouth every twelve (12) hours. 12.5 mg  
    
  
   
   
  
   
  
 raNITIdine 150 mg tablet Commonly known as:  ZANTAC What changed:   
- how much to take - when to take this 
- reasons to take this Your next dose is:  08/08/2018 Tonight Take 2 Tabs by mouth nightly. Indications: GASTROESOPHAGEAL REFLUX, HEARTBURN  
 300 mg CONTINUE taking these medications Instructions Each Dose to Equal  
 Morning Noon Evening Bedtime  
 aspirin 81 mg tablet Your next dose is:  8/9/2018 Morning Take 81 mg by mouth every morning. 81 mg  
    
  
   
   
   
  
 atorvastatin 40 mg tablet Commonly known as:  LIPITOR Your next dose is:  8/8/2018 Evening Take 1 Tab by mouth nightly. 40 mg  
    
   
   
  
   
  
 BEVESPI AEROSPHERE 9-4.8 mcg Hfaa Generic drug:  glycopyrrolate-formoterol Take  by inhalation two (2) times a day. isosorbide mononitrate ER 30 mg tablet Commonly known as:  IMDUR Your next dose is:  8/9/2018 Morning Take 1 Tab by mouth daily. 30 mg  
    
  
   
   
   
  
 lisinopril 2.5 mg tablet Commonly known as:  Pia Needy Your next dose is:  8/9/2018 Morning Take 1 Tab by mouth daily. 2.5 mg  
    
  
   
   
   
  
 nitroglycerin 0.4 mg SL tablet Commonly known as:  NITROSTAT  
   
 1 Tab by SubLINGual route every five (5) minutes as needed for Chest Pain. 0.4 mg  
    
   
   
   
  
 oxyCODONE IR 5 mg immediate release tablet Commonly known as:  Pretty Barnes Take 1 Tab by mouth every four (4) hours as needed for Pain. Max Daily Amount: 30 mg.  
 5 mg  
    
   
   
   
  
 predniSONE 5 mg tablet Commonly known as:  Ruiz Koch Your next dose is:  8/9/2018 Morning Take 5 mg by mouth every other day. 5 mg TYLENOL 325 mg tablet Generic drug:  acetaminophen Take 325 mg by mouth as needed for Pain. 325 mg Opioid Education Prescription Opioids: What You Need to Know: 
 
Prescription opioids can be used to help relieve moderate-to-severe pain and are often prescribed following a surgery or injury, or for certain health conditions. These medications can be an important part of treatment but also come with serious risks. Opioids are strong pain medicines. Examples include hydrocodone, oxycodone, fentanyl, and morphine. Heroin is an example of an illegal opioid. It is important to work with your health care provider to make sure you are getting the safest, most effective care. WHAT ARE THE RISKS AND SIDE EFFECTS OF OPIOID USE? Prescription opioids carry serious risks of addiction and overdose, especially with prolonged use. An opioid overdose, often marked by slow breathing, can cause sudden death. The use of prescription opioids can have a number of side effects as well, even when taken as directed. · Tolerance-meaning you might need to take more of a medication for the same pain relief · Physical dependence-meaning you have symptoms of withdrawal when the medication is stopped. Withdrawal symptoms can include nausea, sweating, chills, diarrhea, stomach cramps, and muscle aches. Withdrawal can last up to several weeks, depending on which drug you took and how long you took it. · Increased sensitivity to pain · Constipation · Nausea, vomiting, and dry mouth · Sleepiness and dizziness · Confusion · Depression · Low levels of testosterone that can result in lower sex drive, energy, and strength · Itching and sweating RISKS ARE GREATER WITH:      
· History of drug misuse, substance use disorder, or overdose · Mental health conditions (such as depression or anxiety) · Sleep apnea · Older age (72 years or older) · Pregnancy Avoid alcohol while taking prescription opioids. Also, unless specifically advised by your health care provider, medications to avoid include: · Benzodiazepines (such as Xanax or Valium) · Muscle relaxants (such as Soma or Flexeril) · Hypnotics (such as Ambien or Lunesta) · Other prescription opioids KNOW YOUR OPTIONS Talk to your health care provider about ways to manage your pain that don't involve prescription opioids. Some of these options may actually work better and have fewer risks and side effects. Options may include: 
· Pain relievers such as acetaminophen, ibuprofen, and naproxen · Some medications that are also used for depression or seizures · Physical therapy and exercise · Counseling to help patients learn how to cope better with triggers of pain and stress. · Application of heat or cold compress · Massage therapy · Relaxation techniques Be Informed Make sure you know the name of your medication, how much and how often to take it, and its potential risks & side effects. IF YOU ARE PRESCRIBED OPIOIDS FOR PAIN: 
· Never take opioids in greater amounts or more often than prescribed. Remember the goal is not to be pain-free but to manage your pain at a tolerable level. · Follow up with your primary care provider to: · Work together to create a plan on how to manage your pain. · Talk about ways to help manage your pain that don't involve prescription opioids. · Talk about any and all concerns and side effects. · Help prevent misuse and abuse. · Never sell or share prescription opioids · Help prevent misuse and abuse. · Store prescription opioids in a secure place and out of reach of others (this may include visitors, children, friends, and family).  
· Safely dispose of unused/unwanted prescription opioids: Find your community drug take-back program or your pharmacy mail-back program, or flush them down the toilet, following guidance from the Food and Drug Administration (www.fda.gov/Drugs/ResourcesForYou). · Visit www.cdc.gov/drugoverdose to learn about the risks of opioid abuse and overdose. · If you believe you may be struggling with addiction, tell your health care provider and ask for guidance or call Reji Steven at 5-950-926-DRXV. Discharge Instructions Cardiac Catheterization/Angiography Discharge Instructions *Check the puncture site frequently for swelling or bleeding. If you see any bleeding, lie down and apply pressure over the area with a clean town or washcloth. Notify your doctor for any redness, swelling, drainage or oozing from the puncture site. Notify your doctor for any fever or chills. *If the leg or arm with the puncture becomes cold, numb or painful, call Dr Heather Miller *Activity should be limited for the next 48 hours. Climb stairs as little as possible and avoid any stooping, bending or strenuous activity for 48 hours. No heavy lifting (anything over 10 pounds) for three days. *Do not drive for 48 hours. *You may resume your usual diet. Drink more fluids than usual. 
 
*Have a responsible person drive you home and stay with you for at least 24 hours after your heart catheterization/angiography. *You may remove the bandage from your Right and Groin in 24 hours. You may shower in 24 hours. No tub baths, hot tubs or swimming for one week. Do not place any lotions, creams, powders, ointments over the puncture site for one week. You may place a clean band-aid over the puncture site each day for 5 days. Change this daily. DISCHARGE SUMMARY from Nurse PATIENT INSTRUCTIONS: 
 
After general anesthesia or intravenous sedation, for 24 hours or while taking prescription Narcotics: · Limit your activities · Do not drive and operate hazardous machinery · Do not make important personal or business decisions · Do  not drink alcoholic beverages · If you have not urinated within 8 hours after discharge, please contact your surgeon on call. Report the following to your surgeon: 
· Excessive pain, swelling, redness or odor of or around the surgical area · Temperature over 100.5 · Nausea and vomiting lasting longer than 4 hours or if unable to take medications · Any signs of decreased circulation or nerve impairment to extremity: change in color, persistent  numbness, tingling, coldness or increase pain · Any questions What to do at Home: 
Recommended activity:  
 
Please abstain from any heavy lifting, straining, stooping or driving for 5 days. Please watch your righ groin site for bleeding/oozing. If so, apply firm pressure with clean cloth and call office at [phone] or EMS at [phone]. Watch for signs of infection which include increasing area of redness around site, fever/hot to touch or purulent drainage. Do not to soak in a tub bath for 1 week, but it is okay to shower.  
  
  
 
*  Please give a list of your current medications to your Primary Care Provider. *  Please update this list whenever your medications are discontinued, doses are 
    changed, or new medications (including over-the-counter products) are added. *  Please carry medication information at all times in case of emergency situations. These are general instructions for a healthy lifestyle: No smoking/ No tobacco products/ Avoid exposure to second hand smoke Surgeon General's Warning:  Quitting smoking now greatly reduces serious risk to your health. Obesity, smoking, and sedentary lifestyle greatly increases your risk for illness A healthy diet, regular physical exercise & weight monitoring are important for maintaining a healthy lifestyle You may be retaining fluid if you have a history of heart failure or if you experience any of the following symptoms:  Weight gain of 3 pounds or more overnight or 5 pounds in a week, increased swelling in our hands or feet or shortness of breath while lying flat in bed. Please call your doctor as soon as you notice any of these symptoms; do not wait until your next office visit. Recognize signs and symptoms of STROKE: 
 
F-face looks uneven A-arms unable to move or move unevenly S-speech slurred or non-existent T-time-call 911 as soon as signs and symptoms begin-DO NOT go Back to bed or wait to see if you get better-TIME IS BRAIN. Warning Signs of HEART ATTACK Call 911 if you have these symptoms: 
? Chest discomfort. Most heart attacks involve discomfort in the center of the chest that lasts more than a few minutes, or that goes away and comes back. It can feel like uncomfortable pressure, squeezing, fullness, or pain. ? Discomfort in other areas of the upper body. Symptoms can include pain or discomfort in one or both arms, the back, neck, jaw, or stomach. ? Shortness of breath with or without chest discomfort. ? Other signs may include breaking out in a cold sweat, nausea, or lightheadedness. Don't wait more than five minutes to call [address]! Fast action can save your life. Calling 911 is almost always the fastest way to get lifesaving treatment. Emergency Medical Services staff can begin treatment when they arrive  up to an hour sooner than if someone gets to the hospital by car. The discharge information has been reviewed with the patient. The patient verbalized understanding. Discharge medications reviewed with the patient and appropriate educational materials and side effects teaching were provided. ___________________________________________________________________________________________________________________________________ We[employer]In Announcement We are excited to announce that we are making your provider's discharge notes available to you in WeStudy[employer]In. You will see these notes when they are completed and signed by the physician that discharged you from your recent hospital stay. If you have any questions or concerns about any information you see in WeStudy.In, please call the Health Information Department where you were seen or reach out to your Primary Care Provider for more information about your plan of care. Introducing [facility]! Dear Ken Bautista: Thank you for requesting a WeStudy.In account. Our records indicate that you already have an active WeStudy.In account. You can access your account anytime at [email or web address] Did you know that you can access your hospital and ER discharge instructions at any time in WeStudy.In? You can also review all of your test results from your hospital stay or ER visit. Additional Information If you have questions, please visit the Frequently Asked Questions section of the WeStudy.In website at [email or web address] Remember, WeStudy.In is NOT to be used for urgent needs. For medical emergencies, dial 911. Now available from your iPhone and Android! Introducing Matt Naranjo As a [facility] patient, I wanted to make you aware of our electronic visit tool called Matt Luismadelyn. [facility] 24/7 allows you to connect within minutes with a medical provider 24 hours a day, seven days a week via a mobile device or tablet or logging into a secure website from your computer. You can access Matt Naranjo from anywhere in the [place].  
 
A virtual visit might be right for you when you have a simple condition and feel like you just dont want to get out of bed, or cant get away from work for an appointment, when your regular [facility] provider is not available (evenings, weekends or holidays), or when youre out of town and need minor care. Electronic visits cost only $49 and if the [employer] 24/7 provider determines a prescription is needed to treat your condition, one can be electronically transmitted to a nearby pharmacy*. Please take a moment to enroll today if you have not already done so. The enrollment process is free and takes just a few minutes. To enroll, please download the [employer] 24/7 berenice to your tablet or phone, or visit [email or web address] to enroll on your computer. And, as an [address] [place] [address] patient with a [employer] account, the results of your visits will be scanned into your electronic medical record and your primary care provider will be able to view the scanned results. We urge you to continue to see your regular [employer] provider for your ongoing medical care. And while your primary care provider may not be the one available when you seek a [employer] virtual visit, the peace of mind you get from getting a real diagnosis real time can be priceless. For more information on [employer], view our Frequently Asked Questions (FAQs) at [email or web address]. Sincerely, 
 
Eileen Foreman MD 
Chief Medical Officer [address] *:  certain medications cannot be prescribed via [employer] Providers Seen During Your Hospitalization Provider Specialty Primary office phone Claribel Croft MD Emergency Medicine [phone] Yaya Bang MD Cardiology [phone] Your Primary Care Physician (PCP) Primary Care Physician Office Phone Office Fax Vidhya Ledesma, [facility] [phone] You are allergic to the following Allergen Reactions Aspirin Swelling Takes 81 mg at home. Patient can tolerate Aspirin in low doses, but in larger doses causes swelling. Recent Documentation Height Weight BMI Smoking Status 1.676 m 89 kg 31.67 kg/m2 Current Every Day Smoker Emergency Contacts Name Discharge Info Relation Home Work Mobile [facility] DISCHARGE CAREGIVER [3] Life Partner [7] [phone] Patient Belongings The following personal items are in your possession at time of discharge: 
                             
 
  
  
 Please provide this summary of care documentation to your next provider. Signatures-by signing, you are acknowledging that this After Visit Summary has been reviewed with you and you have received a copy. Patient Signature:  ____________________________________________________________ Date:  ____________________________________________________________  
  
Alfred Moura Provider Signature:  ____________________________________________________________ Date:  ____________________________________________________________

## 2018-08-08 NOTE — H&P
8/8/2018 7:56 AM    Admit Date: 8/8/2018    Admit Diagnosis: NSTEMI (non-ST elevated myocardial infarction) [facility])      Subjective:    Patient  Faby Mojica is a 58 y.o. male with PMH of CAD (CABG-VG-LAD, PCI, dLCX ), COPD, IPF,  current everyday smoker ( 0.25-1 ppd x 40 years), HTN, and HLD, who presented to the ED with complaints of chest pain. He was just in last week with cp and had multivessel pci. Returns today with nstemi and cp. Will need recath.  States he has been taking his meds    Objective:      Visit Vitals    /89    Pulse 68    Temp 97.6 °F (36.4 °C)    Resp 19    Ht 5' 7\" (1.702 m)    Wt 90.7 kg (200 lb)    SpO2 93%    BMI 31.32 kg/m2       ROS:  General ROS: negative for - chills  Hematological and Lymphatic ROS: negative for - blood clots or jaundice  Respiratory ROS: positive for - shortness of breath  Cardiovascular ROS: positive for - chest pain and dyspnea on exertion  Gastrointestinal ROS: no abdominal pain, change in bowel habits, or black or bloody stools  Neurological ROS: no TIA or stroke symptoms    Physical Exam:    Physical Examination: General appearance - alert, well appearing, and in no distress  Mental status - alert, oriented to person, place, and time  Eyes - pupils equal and reactive, extraocular eye movements intact  Neck/lymph - supple, no significant adenopathy  Chest/CV - clear to auscultation, no wheezes, rales or rhonchi, symmetric air entry  Heart - normal rate, regular rhythm, normal S1, S2, no murmurs, rubs, clicks or gallops  Abdomen/GI - soft, nontender, nondistended, no masses or organomegaly  Musculoskeletal - no joint tenderness, deformity or swelling  Extremities - peripheral pulses normal, no pedal edema, no clubbing or cyanosis  Skin - normal coloration and turgor, no rashes, no suspicious skin lesions noted    Current Facility-Administered Medications   Medication Dose Route Frequency    atorvastatin (LIPITOR) tablet 40 mg  40 mg Oral QHS  isosorbide mononitrate ER (IMDUR) tablet 30 mg  30 mg Oral DAILY    lisinopril (PRINIVIL, ZESTRIL) tablet 2.5 mg  2.5 mg Oral DAILY    aspirin tablet 81 mg  81 mg Oral 7am    sodium chloride (NS) flush 5-10 mL  5-10 mL IntraVENous Q8H    sodium chloride (NS) flush 5-10 mL  5-10 mL IntraVENous PRN    aspirin chewable tablet 81 mg  81 mg Oral DAILY    nitroglycerin (NITROBID) 2 % ointment 1 Inch  1 Inch Topical Q6H    nitroglycerin (NITROSTAT) tablet 0.4 mg  0.4 mg SubLINGual Q5MIN PRN    morphine injection 2 mg  2 mg IntraVENous Q4H PRN     Current Outpatient Prescriptions   Medication Sig    isosorbide mononitrate ER (IMDUR) 30 mg tablet Take 1 Tab by mouth daily.  atorvastatin (LIPITOR) 40 mg tablet Take 1 Tab by mouth nightly.  lisinopril (PRINIVIL, ZESTRIL) 2.5 mg tablet Take 1 Tab by mouth daily.  clopidogrel (PLAVIX) 75 mg tab Take 1 Tab by mouth daily.  oxyCODONE IR (ROXICODONE) 5 mg immediate release tablet Take 1 Tab by mouth every four (4) hours as needed for Pain. Max Daily Amount: 30 mg.  predniSONE (DELTASONE) 5 mg tablet Take 5 mg by mouth every other day.  acetaminophen (TYLENOL) 325 mg tablet Take 325 mg by mouth as needed for Pain.  glycopyrrolate-formoterol (BEVESPI AEROSPHERE) 9-4.8 mcg HFAA Take  by inhalation two (2) times a day.  raNITIdine (ZANTAC) 150 mg tablet Take 2 Tabs by mouth nightly. Indications: GASTROESOPHAGEAL REFLUX, HEARTBURN (Patient taking differently: Take 150 mg by mouth as needed. Indications: gastroesophageal reflux disease, HEARTBURN)    clopidogrel (PLAVIX) 75 mg tab Take 1 Tab by mouth daily. (Patient taking differently: Take 75 mg by mouth every morning.)    nitroglycerin (NITROSTAT) 0.4 mg SL tablet 1 Tab by SubLINGual route every five (5) minutes as needed for Chest Pain.  albuterol (VENTOLIN HFA) 90 mcg/actuation inhaler Take 2 Puffs by inhalation every four (4) hours as needed for Wheezing or Shortness of Breath.  (Patient taking differently: Take 2 Puffs by inhalation as needed for Wheezing or Shortness of Breath.)    aspirin 81 mg tablet Take 81 mg by mouth every morning. Data Review:   @LABRCNT(Na,K,BUN,CREA,WBC,HGB,HCT,PLT,INR,TRP,TCHOL*,Triglyceride*,LDL*,LDLCPOC HDL*,HDL])@    TELEMETRY: nsr    Assessment/Plan:     Active Problems:    CAD (coronary artery disease) (11/1/2010) needs recath for nstemi      HTN (hypertension), benign (11/1/2010)The current medical regimen is effective;  continue present plan and medications. Tobacco use disorder (11/1/2010)      Peripheral vascular disease-s/p PPI to right common iliac 5/4/11 (5/5/2011)      NSTEMI (non-ST elevated myocardial infarction) ([facility] (1/28/2017)    Echo for LV function. Due to recurrent nstemi on plavix will load with brilinta. Add heparin gtt.     chf approved BB secondary to history of low ef in the past and new nstemi      Enrique Norton MD

## 2018-08-08 NOTE — PROGRESS NOTES
TRANSFER - IN REPORT:    Verbal report received from Renate garcia [facility] being received from ED for routine progression of care. Report consisted of patients Situation, Background, Assessment and Recommendations(SBAR). Information from the following report(s) SBAR, Kardex, ED Summary, MAR, Recent Results and Cardiac Rhythm NSR was reviewed. Opportunity for questions and clarification was provided. Assessment completed upon patients arrival to unit and care assumed. Patient received to room 335. Patient connected to monitor and assessment completed. Plan of care reviewed. Patient oriented to room and call light. Patient aware to use call light to communicate any chest pain or needs.      Admission skin assessment completed with second RN and reveals the following: Patient's sacrum and heels are intact, no breakdown noted

## 2018-08-08 NOTE — PROCEDURES
Brief Cardiac Procedure Note    Patient: Vida Leventhal MRN: [id number]  SSN: [id number]    YOB: 1955  Age: 58 y.o. Sex: male      Date of Procedure: 8/8/2018     Pre-procedure Diagnosis: Typical Angina    Post-procedure Diagnosis: Coronary Artery Disease    Reason for Procedure: ACS < or = 24 Hours    Procedure: Left Heart Catheterization    Brief Description of Procedure: via rcfa    Performed By: Nicolás Arevalo MD     Assistants:     Anesthesia: Moderate Sedation    Estimated Blood Loss: Less than 10 mL      Specimens: None    Implants: None    Findings:   Cors, grafts w/o change. ifr rca >0.95  + perclose    Complications: None    Recommendations: Continue medical therapy.     Signed By: Nicolás Arevalo MD     August 8, 2018

## 2018-08-08 NOTE — PROGRESS NOTES
Report received from [address] [place] [address]. Procedural findings communicated. Intra procedural  medication administration reviewed. Progression of care discussed.      Patient received into [address] [facility] 8 post sheath removal.     Access site without bleeding or swelling     Dressing dry and intact     Patient instructed to limit movement to right lower extremity    Routine post procedural vital signs and site assessment initiated

## 2018-08-08 NOTE — IP AVS SNAPSHOT
[address] [address] 
[phone] Patient: Marta Arevalo MRN: [id number] [facility]:[birth year] A check elfego indicates which time of day the medication should be taken. My Medications CHANGE how you take these medications Instructions Each Dose to Equal  
 Morning Noon Evening Bedtime  
 albuterol 90 mcg/actuation inhaler Commonly known as:  VENTOLIN HFA What changed:  when to take this Take 2 Puffs by inhalation every four (4) hours as needed for Wheezing or Shortness of Breath. 2 Puff  
    
   
   
   
  
 clopidogrel 75 mg Tab Commonly known as:  PLAVIX What changed:  Another medication with the same name was removed. Continue taking this medication, and follow the directions you see here. Your next dose is:  8/9/2018 Morning Take 1 Tab by mouth daily. 75 mg  
    
  
   
   
   
  
 metoprolol tartrate 25 mg tablet Commonly known as:  LOPRESSOR What changed:  Another medication with the same name was removed. Continue taking this medication, and follow the directions you see here. Your next dose is:  8/9/2018 Morning Take 12.5 mg by mouth every twelve (12) hours. 12.5 mg  
    
  
   
   
  
   
  
 raNITIdine 150 mg tablet Commonly known as:  ZANTAC What changed:   
- how much to take - when to take this 
- reasons to take this Your next dose is:  08/08/2018 Tonight Take 2 Tabs by mouth nightly. Indications: GASTROESOPHAGEAL REFLUX, HEARTBURN  
 300 mg CONTINUE taking these medications Instructions Each Dose to Equal  
 Morning Noon Evening Bedtime  
 aspirin 81 mg tablet Your next dose is:  8/9/2018 Morning Take 81 mg by mouth every morning. 81 mg  
    
  
   
   
   
  
 atorvastatin 40 mg tablet Commonly known as:  LIPITOR Your next dose is:  8/8/2018 Evening Take 1 Tab by mouth nightly.   
 40 mg  
 BEVESPI AEROSPHERE 9-4.8 mcg [facility] Generic drug:  glycopyrrolate-formoterol Take  by inhalation two (2) times a day. isosorbide mononitrate ER 30 mg tablet Commonly known as:  IMDUR Your next dose is:  8/9/2018 Morning Take 1 Tab by mouth daily. 30 mg  
    
  
   
   
   
  
 lisinopril 2.5 mg tablet Commonly known as:  Ora Bee Your next dose is:  8/9/2018 Morning Take 1 Tab by mouth daily. 2.5 mg  
    
  
   
   
   
  
 nitroglycerin 0.4 mg SL tablet Commonly known as:  NITROSTAT  
   
 1 Tab by SubLINGual route every five (5) minutes as needed for Chest Pain. 0.4 mg  
    
   
   
   
  
 oxyCODONE IR 5 mg immediate release tablet Commonly known as:  Shabana Villa Take 1 Tab by mouth every four (4) hours as needed for Pain. Max Daily Amount: 30 mg.  
 5 mg  
    
   
   
   
  
 predniSONE 5 mg tablet Commonly known as:  Lashae Freeman Your next dose is:  8/9/2018 Morning Take 5 mg by mouth every other day. 5 mg TYLENOL 325 mg tablet Generic drug:  acetaminophen Take 325 mg by mouth as needed for Pain.   
 325 mg

## 2018-08-08 NOTE — ED NOTES
TRANSFER - OUT REPORT:    Verbal report given to [facility], RN on Aimee Cardenas  being transferred to [facility] for routine progression of care       Report consisted of patients Situation, Background, Assessment and   Recommendations(SBAR). Information from the following report(s) ED Summary was reviewed with the receiving nurse. Lines:   Peripheral IV 08/08/18 Right Antecubital (Active)   Site Assessment Clean, dry, & intact 8/8/2018  6:15 AM   Phlebitis Assessment 0 8/8/2018  6:15 AM   Infiltration Assessment 0 8/8/2018  6:15 AM   Dressing Status Clean, dry, & intact 8/8/2018  6:15 AM   Hub Color/Line Status Green 8/8/2018  6:15 AM        Opportunity for questions and clarification was provided.       Patient transported with:   Monitor  O2 @ 2 liters  Registered Nurse

## 2018-08-08 NOTE — DISCHARGE SUMMARY
Physician Discharge Summary     Patient ID:  Angel Jha  [id number]  58 y.o.  1955    Admit date: 8/8/2018    Discharge date and time:    Admitting Physician: Jonathan Combs MD     Primary Cardiologist:[facility]     Primary Care MD:John Duffy MD    Discharge Physician: Isael Hand NP    Admission Diagnoses: NSTEMI (non-ST elevated myocardial infarction) [facility])    Discharge Diagnoses:   Patient Active Problem List    Diagnosis Date Noted    Acute coronary syndrome ([facility] 08/04/2018    Restrictive airway disease 03/20/2017    Chronic obstructive pulmonary disease with acute exacerbation ([facility] 03/20/2017    SOB (shortness of breath) 03/20/2017    Bronchitis 02/09/2017    Cough 02/06/2017    Chest pain 01/28/2017    NSTEMI (non-ST elevated myocardial infarction) ([facility] 01/28/2017    Peripheral vascular disease-s/p PPI to right common iliac 5/4/11 05/05/2011    [facility] 11/01/2010    CAD (coronary artery disease) 11/01/2010    HTN (hypertension), benign 11/01/2010    Other and unspecified hyperlipidemia 11/01/2010    Tobacco use disorder 11/01/2010           Hospital Course: Angel Jha was admitted to the hospital on 8/8/2018 with complaints of chest pain. Serial cardiac enzymes were negative for myocardial infarction. Cardiac catheterization revealed stable anatomy, unchanged from previous angiogram. He will be discharged home tonight after bedrest.               DISPOSITION: The patient is being discharged to home on a low saturated fat, low cholesterol diet. Pt is instructed to abstain from any heavy lifting, straining, stooping or driving for 5 days. Pt is instructed to watch groin site ( if groin access was performed) for bleeding/oozing. If so,pt is instructed to apply firm pressure with clean cloth and call office at [phone]. Pt is instructed to watch for signs of infection which include increasing area of redness around site, fever/hot to touch or purulent drainage. Pt is instructed not to soak in a tub bath for 1 week, but it is okay to shower. Discharge Exam:     Visit Vitals    /88 (BP 1 Location: Right arm, BP Patient Position: At rest)    Pulse 65    Temp 97.8 °F (36.6 °C)    Resp 18    Ht 5' 6\" (1.676 m)    Wt 89 kg (196 lb 3.2 oz)    SpO2 98%    BMI 31.67 kg/m2     General Appearance:  Well developed, well nourished,alert and oriented x 3, and individual in no acute distress. Ears/Nose/Mouth/Throat:   Hearing grossly normal.         Neck: Supple. Chest:   Lungs clear to auscultation bilaterally. Cardiovascular:  Regular rate and rhythm, S1, S2 normal, no murmur. Abdomen:   Soft, non-tender, bowel sounds are active. Extremities: No edema bilaterally. Skin: Warm and dry.                Final Laboratory Data:  Recent Results (from the past 24 hour(s))   EKG, 12 LEAD, INITIAL    Collection Time: 08/08/18  2:13 AM   Result Value Ref Range    Ventricular Rate 82 BPM    Atrial Rate 82 BPM    P-R Interval 138 ms    QRS Duration 110 ms    Q-T Interval 404 ms    QTC Calculation (Bezet) 472 ms    Calculated P Axis 40 degrees    Calculated R Axis -53 degrees    Calculated T Axis 135 degrees    Diagnosis       Sinus rhythm with marked sinus arrhythmia  Pulmonary disease pattern  Left anterior fascicular block  ST & T wave abnormality, consider lateral ischemia  Abnormal ECG  When compared with ECG of 05-AUG-2018 07:15,  Premature atrial complexes are no longer Present  Incomplete right bundle branch block is no longer Present  T wave inversion now evident in Anterior leads  Confirmed by OVIDIO RIVERA (), SANDOR SCOTT ([id number]) on 8/8/2018 8:04:32 AM     CBC WITH AUTOMATED DIFF    Collection Time: 08/08/18  2:20 AM   Result Value Ref Range    WBC 8.2 4.3 - 11.1 K/uL    RBC 4.94 4.23 - 5.6 M/uL    HGB 15.1 13.6 - 17.2 g/dL    HCT 46.3 41.1 - 50.3 %    MCV 93.7 79.6 - 97.8 FL    MCH 30.6 26.1 - 32.9 PG    MCHC 32.6 31.4 - 35.0 g/dL    RDW 13.3 %    PLATELET 245 150 - 450 K/uL    MPV 9.8 9.4 - 12.3 FL    ABSOLUTE NRBC 0.00 0.0 - 0.2 K/uL    DF AUTOMATED      NEUTROPHILS 53 43 - 78 %    LYMPHOCYTES 36 13 - 44 %    MONOCYTES 7 4.0 - 12.0 %    EOSINOPHILS 4 0.5 - 7.8 %    BASOPHILS 0 0.0 - 2.0 %    IMMATURE GRANULOCYTES 0 0.0 - 5.0 %    ABS. NEUTROPHILS 4.3 K/UL    ABS. LYMPHOCYTES 3.0 K/UL    ABS. MONOCYTES 0.5 K/UL    ABS. EOSINOPHILS 0.4 K/UL    ABS. BASOPHILS 0.0 K/UL    ABS. IMM. GRANS. 0.0 K/UL   METABOLIC PANEL, COMPREHENSIVE    Collection Time: 08/08/18  2:20 AM   Result Value Ref Range    Sodium 140 136 - 145 mmol/L    Potassium 3.9 3.5 - 5.1 mmol/L    Chloride 105 98 - 107 mmol/L    CO2 26 21 - 32 mmol/L    Anion gap 9 7 - 16 mmol/L    Glucose 252 (H) 65 - 100 mg/dL    BUN 17 8 - 23 MG/DL    Creatinine 1.18 0.8 - 1.5 MG/DL    GFR est AA >60 >60 ml/min/1.73m2    GFR est non-AA >60 >60 ml/min/1.73m2    Calcium 8.4 8.3 - 10.4 MG/DL    Bilirubin, total 0.4 0.2 - 1.1 MG/DL    ALT (SGPT) 21 12 - 65 U/L    AST (SGOT) 19 15 - 37 U/L    Alk. phosphatase 90 50 - 136 U/L    Protein, total 8.2 6.3 - 8.2 g/dL    Albumin 2.9 (L) 3.2 - 4.6 g/dL    Globulin 5.3 (H) 2.3 - 3.5 g/dL    A-G Ratio 0.5 (L) 1.2 - 3.5     TROPONIN I    Collection Time: 08/08/18  2:20 AM   Result Value Ref Range    Troponin-I, Qt. 0.42 (HH) 0.02 - 0.05 NG/ML   TROPONIN I    Collection Time: 08/08/18  5:11 AM   Result Value Ref Range    Troponin-I, Qt. 0.53 (HH) 0.02 - 0.05 NG/ML   POC TROPONIN-I    Collection Time: 08/08/18  8:44 AM   Result Value Ref Range    Troponin-I (POC) 0.68 (H) 0.02 - 0.05 ng/ml       Disposition: home    Patient Instructions:   Current Discharge Medication List      CONTINUE these medications which have NOT CHANGED    Details   metoprolol tartrate (LOPRESSOR) 25 mg tablet Take 12.5 mg by mouth every twelve (12) hours. isosorbide mononitrate ER (IMDUR) 30 mg tablet Take 1 Tab by mouth daily.   Qty: 30 Tab, Refills: 11      atorvastatin (LIPITOR) 40 mg tablet Take 1 Tab by mouth nightly. Qty: 30 Tab, Refills: 11      lisinopril (PRINIVIL, ZESTRIL) 2.5 mg tablet Take 1 Tab by mouth daily. Qty: 30 Tab, Refills: 11      clopidogrel (PLAVIX) 75 mg tab Take 1 Tab by mouth daily. Qty: 30 Tab, Refills: 11      oxyCODONE IR (ROXICODONE) 5 mg immediate release tablet Take 1 Tab by mouth every four (4) hours as needed for Pain. Max Daily Amount: 30 mg.  Qty: 20 Tab, Refills: 0      predniSONE (DELTASONE) 5 mg tablet Take 5 mg by mouth every other day. acetaminophen (TYLENOL) 325 mg tablet Take 325 mg by mouth as needed for Pain. glycopyrrolate-formoterol (BEVESPI AEROSPHERE) 9-4.8 mcg HFAA Take  by inhalation two (2) times a day. raNITIdine (ZANTAC) 150 mg tablet Take 2 Tabs by mouth nightly. Indications: GASTROESOPHAGEAL REFLUX, HEARTBURN  Qty: 60 Tab, Refills: 11      nitroglycerin (NITROSTAT) 0.4 mg SL tablet 1 Tab by SubLINGual route every five (5) minutes as needed for Chest Pain. Qty: 1 Bottle, Refills: 5      albuterol (VENTOLIN HFA) 90 mcg/actuation inhaler Take 2 Puffs by inhalation every four (4) hours as needed for Wheezing or Shortness of Breath. Qty: 1 Inhaler, Refills: 4      aspirin 81 mg tablet Take 81 mg by mouth every morning. Referenced discharge instructions provided by nursing for diet and activity. Follow-up:  Primary [address] [place] [address] Cardiology as previously scheduled  PCP: Cade Richard MD) in about 4 weeks.     Signed:  Selwyn Snellen, NP  8/8/2018  4:29 PM

## 2018-08-08 NOTE — PROGRESS NOTES
Discharge instructions reviewed with patient. Opportunity for questions provided. Patient voiced understanding of all discharge instructions. Patient still on bed rest post cath.  Primary nurse to remove IV's and monitor and complete discharge on completion of bed rest.

## 2018-08-09 NOTE — PROCEDURES
[address]    Lesa Ellsworth  MR#: [id number]  : 1955  ACCOUNT #: [de-identified]   DATE OF SERVICE: 2018    PROCEDURE PERFORMED:  1. Coronary and coronary graft angiography. 2.  IFR right coronary artery. HISTORY:  This is a 68-year-old gentleman with coronary artery disease. He has had prior CABG. He has had subsequent PCI. He was recently in the hospital with unstable angina. A cath was performed and then pci to LAD vein graft and pci to the Lcx. There was transient \"no re flow\" after lcx pci. He is re admitted with chest pain and low level troponin elevation. DESCRIPTION OF PROCEDURE:  Coronary and coronary graft angiography was carried out from the right common femoral artery with 6 [place] multipurpose and Jl4. The vein graft to the right coronary artery remains patent. It inserts into the posterior descending branch of the right coronary artery, which has a stent in its proximal segment followed by smooth stenosis followed by a posterior descending branch that is small and diffusely irregular. This supplies some collaterals to the distribution of the Lcx. The multipurpose was then used to inject the native right, which is chronically occluded near its origin. The vein graft to the LAD was injected with a 6-Citizen [place] AL1. This demonstrates mild proximal disease. The middle segment looks normal.  The recently stented and previously stented distal portion looks fine with JENIFFER 3 flow into the middle and distal LAD segments. There is collateral filling of an obtuse marginal branch of the left circumflex. A 6-Citizen [place] JL4 was then used to inject the native left coronary. The left main has been stented into the left circumflex with no  restenosis. The left circumflex from its ostium through its mid segment has been previously stented with mild focal restenosis in the midportion.   There was a fresh stent in the distal segment, which is widely patent with no restenosis and with JENIFFER 3 final flow. The IFR across the distal right circulation is greater than 0.95. IMPRESSION:  Severe coronary artery disease as described above. The LAD is chronically occluded. The recently stented vein graft to the LAD remains patent. The marginal branch of left circumflex was chronically occluded and fills late by collateral flow. It is not a good  target. The native left circumflex has been stented from the left main and through its distal vessel. There is mild focal restenosis in the mid portion. The recent stent in the distal segment remains patent with good distal flow. The right coronary artery is chronically occluded. The saphenous vein graft to the right coronary artery remains patent. The distal  native disease noted is not flow limiting as measured by as measured by a pressure wire. RECOMMENDATIONS:  Continue medical therapy.       MD DAWSON Lindsey / DILLAN  D: 08/08/2018 15:57     T: 08/09/2018 06:23  JOB #: [id number]

## 2018-08-09 NOTE — DISCHARGE INSTRUCTIONS
Cardiac Catheterization/Angiography Discharge Instructions    *Check the puncture site frequently for swelling or bleeding. If you see any bleeding, lie down and apply pressure over the area with a clean town or washcloth. Notify your doctor for any redness, swelling, drainage or oozing from the puncture site. Notify your doctor for any fever or chills. *If the leg or arm with the puncture becomes cold, numb or painful, call Dr Tano Veras    *Activity should be limited for the next 48 hours. Climb stairs as little as possible and avoid any stooping, bending or strenuous activity for 48 hours. No heavy lifting (anything over 10 pounds) for three days. *Do not drive for 48 hours. *You may resume your usual diet. Drink more fluids than usual.    *Have a responsible person drive you home and stay with you for at least 24 hours after your heart catheterization/angiography. *You may remove the bandage from your Right and Groin in 24 hours. You may shower in 24 hours. No tub baths, hot tubs or swimming for one week. Do not place any lotions, creams, powders, ointments over the puncture site for one week. You may place a clean band-aid over the puncture site each day for 5 days. Change this daily. DISCHARGE SUMMARY from Nurse    PATIENT INSTRUCTIONS:    After general anesthesia or intravenous sedation, for 24 hours or while taking prescription Narcotics:  · Limit your activities  · Do not drive and operate hazardous machinery  · Do not make important personal or business decisions  · Do  not drink alcoholic beverages  · If you have not urinated within 8 hours after discharge, please contact your surgeon on call.     Report the following to your surgeon:  · Excessive pain, swelling, redness or odor of or around the surgical area  · Temperature over 100.5  · Nausea and vomiting lasting longer than 4 hours or if unable to take medications  · Any signs of decreased circulation or nerve impairment to extremity: change in color, persistent  numbness, tingling, coldness or increase pain  · Any questions    What to do at Home:  Recommended activity:     Please abstain from any heavy lifting, straining, stooping or driving for 5 days. Please watch your righ groin site for bleeding/oozing. If so, apply firm pressure with clean cloth and call office at [phone] or EMS at 911. Watch for signs of infection which include increasing area of redness around site, fever/hot to touch or purulent drainage. Do not to soak in a tub bath for 1 week, but it is okay to shower.           *  Please give a list of your current medications to your Primary Care Provider. *  Please update this list whenever your medications are discontinued, doses are      changed, or new medications (including over-the-counter products) are added. *  Please carry medication information at all times in case of emergency situations. These are general instructions for a healthy lifestyle:    No smoking/ No tobacco products/ Avoid exposure to second hand smoke  Surgeon General's Warning:  Quitting smoking now greatly reduces serious risk to your health. Obesity, smoking, and sedentary lifestyle greatly increases your risk for illness    A healthy diet, regular physical exercise & weight monitoring are important for maintaining a healthy lifestyle    You may be retaining fluid if you have a history of heart failure or if you experience any of the following symptoms:  Weight gain of 3 pounds or more overnight or 5 pounds in a week, increased swelling in our hands or feet or shortness of breath while lying flat in bed. Please call your doctor as soon as you notice any of these symptoms; do not wait until your next office visit.     Recognize signs and symptoms of STROKE:    F-face looks uneven    A-arms unable to move or move unevenly    S-speech slurred or non-existent    T-time-call 911 as soon as signs and symptoms begin-DO NOT go       Back to bed or wait to see if you get better-TIME IS BRAIN. Warning Signs of HEART ATTACK     Call 911 if you have these symptoms:   Chest discomfort. Most heart attacks involve discomfort in the center of the chest that lasts more than a few minutes, or that goes away and comes back. It can feel like uncomfortable pressure, squeezing, fullness, or pain.  Discomfort in other areas of the upper body. Symptoms can include pain or discomfort in one or both arms, the back, neck, jaw, or stomach.  Shortness of breath with or without chest discomfort.  Other signs may include breaking out in a cold sweat, nausea, or lightheadedness. Don't wait more than five minutes to call 911 - MINUTES MATTER! Fast action can save your life. Calling 911 is almost always the fastest way to get lifesaving treatment. Emergency Medical Services staff can begin treatment when they arrive -- up to an hour sooner than if someone gets to the hospital by car. The discharge information has been reviewed with the patient. The patient verbalized understanding. Discharge medications reviewed with the patient and appropriate educational materials and side effects teaching were provided.   ___________________________________________________________________________________________________________________________________

## 2018-08-17 NOTE — PROCEDURES
[address] [facility]    Kylah Lovell  MR#: [id number]  : 1955  ACCOUNT #: [de-identified]   DATE OF SERVICE: 2018    PROCEDURES PERFORMED:  1. Cardiac catheterization. 2.  PCI saphenous vein graft LAD and PCI native left circumflex. HISTORY:  This is a 74-year-old gentleman with coronary artery disease. He has had prior CABG. He has had prior PCI. He has had recent symptoms of worsening angina pectoris despite good medical therapy. Cardiac catheterization and possible PCI was recommended. PROCEDURES AND FINDINGS:  Via the left anatomic snuff box angiography was performed. A 5-Yoruba multipurpose demonstrates an occluded vein graft to the left circumflex. The vein graft to the right coronary artery is patent and normal with good runoff into the posterior descending branch of the right coronary artery. There is moderate focal disease at the insertion. There appears to be a stent in the PDA after the vein graft insertion. The PDA still provides some collateral filling to the LAD septal circulation. The multipurpose was then used to inject the vein graft to the LAD. This shows some mild smooth proximal disease. At its insertion into the LAD, there is a 95% stenosis in the before a stented segment. The stent itself looks patent with good runoff into bifurcated distal LAD. The native left was then injected with a  5-Yoruba 3.5 left Jack. This reveals from the left main into the proximal left circumflex that it has been stented. The stent in the left main is normal.  The proximal stented segment is diffusely narrow. There is high-grade focal disease at an overlap stented segment. This is 80%. There is a 70% stenosis of the non-stented left circumflex distal to the edge of the stent. The obtuse marginal branch of the left circumflex is chronically occluded and there is late filling by collateral flow.   Left ventriculography with a 5-Comoran pigtail shows good overall left ventricular size and function. Ejection fraction is 60%. Angioplasty was embarked upon. He is anticoagulated with heparin. The vein graft to the LAD was worked on first.  The guide catheter was a 5-Comoran MAC 3. Over a [place] wire, the vessel was dilated and stented with a 3.5 x 20 Roberto stent. This was guided by angiography and by IVUS. Repeat angiography showed a good result with good stent expansion and good runoff. The native left circumflex was worked on next. The guide catheter was a 5-Comoran 3.5 EBU. Over a [place], high pressure balloon angioplasty was performed with a 3.5 mm balloon. There was transient no reflow which improved with intracoronary nitroprusside and nitroglycerin. After the no reflow improved, the left circumflex stenosis distal to the stent edge was covered with a 3.0 x 16 [employer] [place] [employer] stent. Repeat angiography at the end of the procedure showed a good result with good flow into the small caliber diffusely diseased distal left circumflex. IMPRESSION:  1. Normal left ventricular function. 2.  Severe coronary artery disease as described. The LAD is occluded. The vein graft to the LAD has severe distal stenosis. A stent in the mid LAD distal to the graft remains patent. The left main through the left proximal circumflex had been previously stented. There is restenosis which is particularly severe and focal in the mid portion. The obtuse marginal branch of the left circumflex is occluded and fills late by collateral flow. The right coronary artery is chronically occluded. The vein graft to the right coronary artery remains patent. There is moderate distal disease. 3.  Successful PCI of the distal saphenous vein graft to the LAD is performed as described above. A good result was obtained after deployment of a 3.5 x 20  Roberto stent.   4.  Successful balloon angioplasty of focal restenosis of the left circumflex is performed with 3.5 mm high pressure POBA. A middle left circumflex stenosis is resolved post 3.0 x 16 [place] stent.       MD DAWSON Lopez / RHEA  D: 08/16/2018 19:19     T: 08/17/2018 07:46  JOB #: [id number]

## 2018-10-22 ENCOUNTER — RX RENEWAL (OUTPATIENT)
Age: 63
End: 2018-10-22

## 2018-10-22 PROBLEM — Z00.00 ENCOUNTER FOR PREVENTIVE HEALTH EXAMINATION: Status: ACTIVE | Noted: 2018-10-22

## 2018-12-21 ENCOUNTER — RX RENEWAL (OUTPATIENT)
Age: 63
End: 2018-12-21

## 2018-12-21 RX ORDER — ATORVASTATIN CALCIUM 80 MG/1
80 TABLET, FILM COATED ORAL DAILY
Qty: 90 | Refills: 1 | Status: ACTIVE | COMMUNITY
Start: 2018-12-21 | End: 1900-01-01

## 2019-01-01 ENCOUNTER — APPOINTMENT (OUTPATIENT)
Dept: GENERAL RADIOLOGY | Age: 64
DRG: 246 | End: 2019-01-01
Attending: EMERGENCY MEDICINE
Payer: MEDICARE

## 2019-01-01 ENCOUNTER — APPOINTMENT (OUTPATIENT)
Dept: GENERAL RADIOLOGY | Age: 64
DRG: 246 | End: 2019-01-01
Attending: INTERNAL MEDICINE
Payer: MEDICARE

## 2019-01-01 ENCOUNTER — HOSPITAL ENCOUNTER (INPATIENT)
Age: 64
LOS: 1 days | Discharge: HOME | DRG: 246 | End: 2019-05-07
Attending: EMERGENCY MEDICINE | Admitting: INTERNAL MEDICINE
Payer: MEDICARE

## 2019-01-01 ENCOUNTER — HOSPICE ADMISSION (OUTPATIENT)
Dept: HOSPICE | Facility: HOSPICE | Age: 64
End: 2019-01-01

## 2019-01-01 ENCOUNTER — HOSPITAL ENCOUNTER (INPATIENT)
Age: 64
LOS: 9 days | DRG: 246 | End: 2019-06-20
Attending: EMERGENCY MEDICINE | Admitting: INTERNAL MEDICINE
Payer: MEDICARE

## 2019-01-01 ENCOUNTER — APPOINTMENT (OUTPATIENT)
Dept: CT IMAGING | Age: 64
DRG: 246 | End: 2019-01-01
Attending: EMERGENCY MEDICINE
Payer: MEDICARE

## 2019-01-01 VITALS
WEIGHT: 183.9 LBS | OXYGEN SATURATION: 98 % | HEART RATE: 88 BPM | TEMPERATURE: 97.5 F | DIASTOLIC BLOOD PRESSURE: 76 MMHG | SYSTOLIC BLOOD PRESSURE: 119 MMHG | HEIGHT: 66 IN | BODY MASS INDEX: 29.56 KG/M2 | RESPIRATION RATE: 19 BRPM

## 2019-01-01 VITALS
BODY MASS INDEX: 30.23 KG/M2 | TEMPERATURE: 98.4 F | DIASTOLIC BLOOD PRESSURE: 81 MMHG | SYSTOLIC BLOOD PRESSURE: 134 MMHG | HEART RATE: 62 BPM | RESPIRATION RATE: 25 BRPM | WEIGHT: 188.1 LBS | OXYGEN SATURATION: 87 % | HEIGHT: 66 IN

## 2019-01-01 DIAGNOSIS — E09.9 STEROID-INDUCED DIABETES (HCC): ICD-10-CM

## 2019-01-01 DIAGNOSIS — R06.02 SOB (SHORTNESS OF BREATH): Chronic | ICD-10-CM

## 2019-01-01 DIAGNOSIS — R73.9 HYPERGLYCEMIA: ICD-10-CM

## 2019-01-01 DIAGNOSIS — Z79.52 CURRENT CHRONIC USE OF SYSTEMIC STEROIDS: Chronic | ICD-10-CM

## 2019-01-01 DIAGNOSIS — J96.11 CHRONIC RESPIRATORY FAILURE WITH HYPOXIA (HCC): Chronic | ICD-10-CM

## 2019-01-01 DIAGNOSIS — R07.2 PRECORDIAL PAIN: ICD-10-CM

## 2019-01-01 DIAGNOSIS — J84.10 PULMONARY FIBROSIS (HCC): ICD-10-CM

## 2019-01-01 DIAGNOSIS — I24.9 ACS (ACUTE CORONARY SYNDROME) (HCC): Primary | ICD-10-CM

## 2019-01-01 DIAGNOSIS — R07.9 CHEST PAIN, UNSPECIFIED TYPE: ICD-10-CM

## 2019-01-01 DIAGNOSIS — Z79.52 CURRENT CHRONIC USE OF SYSTEMIC STEROIDS: ICD-10-CM

## 2019-01-01 DIAGNOSIS — I20.8 ANGINA OF EFFORT (HCC): ICD-10-CM

## 2019-01-01 DIAGNOSIS — J96.21 ACUTE ON CHRONIC RESPIRATORY FAILURE WITH HYPOXIA (HCC): Primary | ICD-10-CM

## 2019-01-01 DIAGNOSIS — I25.10 CORONARY ARTERY DISEASE INVOLVING NATIVE CORONARY ARTERY OF NATIVE HEART WITHOUT ANGINA PECTORIS: Chronic | ICD-10-CM

## 2019-01-01 DIAGNOSIS — R06.00 DYSPNEA, UNSPECIFIED TYPE: ICD-10-CM

## 2019-01-01 DIAGNOSIS — K59.03 CONSTIPATION DUE TO OPIOID THERAPY: ICD-10-CM

## 2019-01-01 DIAGNOSIS — Z71.89 ADVANCED CARE PLANNING/COUNSELING DISCUSSION: ICD-10-CM

## 2019-01-01 DIAGNOSIS — R53.81 DEBILITY: ICD-10-CM

## 2019-01-01 DIAGNOSIS — T38.0X5A STEROID-INDUCED DIABETES (HCC): ICD-10-CM

## 2019-01-01 DIAGNOSIS — T40.2X5A CONSTIPATION DUE TO OPIOID THERAPY: ICD-10-CM

## 2019-01-01 DIAGNOSIS — E66.9 OBESITY WITH SERIOUS COMORBIDITY, UNSPECIFIED CLASSIFICATION, UNSPECIFIED OBESITY TYPE: Chronic | ICD-10-CM

## 2019-01-01 DIAGNOSIS — Z51.5 ENCOUNTER FOR PALLIATIVE CARE: ICD-10-CM

## 2019-01-01 DIAGNOSIS — J18.9 PNEUMONIA OF RIGHT LOWER LOBE DUE TO INFECTIOUS ORGANISM: ICD-10-CM

## 2019-01-01 LAB
ACT BLD: 335 SECS (ref 70–128)
ALBUMIN SERPL-MCNC: 2.8 G/DL (ref 3.2–4.6)
ALBUMIN SERPL-MCNC: 2.9 G/DL (ref 3.2–4.6)
ALBUMIN/GLOB SERPL: 0.6 {RATIO} (ref 1.2–3.5)
ALBUMIN/GLOB SERPL: 0.7 {RATIO} (ref 1.2–3.5)
ALP SERPL-CCNC: 102 U/L (ref 50–136)
ALP SERPL-CCNC: 77 U/L (ref 50–136)
ALT SERPL-CCNC: 22 U/L (ref 12–65)
ALT SERPL-CCNC: 44 U/L (ref 12–65)
ANA SER QL: NEGATIVE
ANION GAP SERPL CALC-SCNC: 11 MMOL/L (ref 7–16)
ANION GAP SERPL CALC-SCNC: 12 MMOL/L (ref 7–16)
ANION GAP SERPL CALC-SCNC: 5 MMOL/L (ref 7–16)
ANION GAP SERPL CALC-SCNC: 6 MMOL/L (ref 7–16)
ANION GAP SERPL CALC-SCNC: 7 MMOL/L (ref 7–16)
ANION GAP SERPL CALC-SCNC: 8 MMOL/L (ref 7–16)
ANION GAP SERPL CALC-SCNC: 8 MMOL/L (ref 7–16)
ANION GAP SERPL CALC-SCNC: 9 MMOL/L (ref 7–16)
APTT PPP: 100.6 SEC (ref 24.7–39.8)
APTT PPP: 142.5 SEC (ref 24.7–39.8)
APTT PPP: 24.2 SEC (ref 24.7–39.8)
APTT PPP: 77.2 SEC (ref 24.7–39.8)
APTT PPP: 79.8 SEC (ref 24.7–39.8)
APTT PPP: 80.3 SEC (ref 24.7–39.8)
ARTERIAL PATENCY WRIST A: YES
AST SERPL-CCNC: 21 U/L (ref 15–37)
AST SERPL-CCNC: 22 U/L (ref 15–37)
ATRIAL RATE: 100 BPM
ATRIAL RATE: 96 BPM
BACTERIA SPEC CULT: NORMAL
BACTERIA SPEC CULT: NORMAL
BASE EXCESS BLD CALC-SCNC: 0 MMOL/L
BASOPHILS # BLD: 0 K/UL (ref 0–0.2)
BASOPHILS NFR BLD: 0 % (ref 0–2)
BDY SITE: ABNORMAL
BILIRUB SERPL-MCNC: 0.5 MG/DL (ref 0.2–1.1)
BILIRUB SERPL-MCNC: 0.9 MG/DL (ref 0.2–1.1)
BNP SERPL-MCNC: 67 PG/ML
BODY TEMPERATURE: 98.6
BUN SERPL-MCNC: 14 MG/DL (ref 8–23)
BUN SERPL-MCNC: 15 MG/DL (ref 8–23)
BUN SERPL-MCNC: 16 MG/DL (ref 8–23)
BUN SERPL-MCNC: 18 MG/DL (ref 8–23)
BUN SERPL-MCNC: 19 MG/DL (ref 8–23)
BUN SERPL-MCNC: 20 MG/DL (ref 8–23)
BUN SERPL-MCNC: 21 MG/DL (ref 8–23)
BUN SERPL-MCNC: 22 MG/DL (ref 8–23)
BUN SERPL-MCNC: 33 MG/DL (ref 8–23)
BUN SERPL-MCNC: 36 MG/DL (ref 8–23)
BUN SERPL-MCNC: 38 MG/DL (ref 8–23)
C PEPTIDE SERPL-MCNC: 2.9 NG/ML (ref 1.1–4.4)
CALCIUM SERPL-MCNC: 7.8 MG/DL (ref 8.3–10.4)
CALCIUM SERPL-MCNC: 8.2 MG/DL (ref 8.3–10.4)
CALCIUM SERPL-MCNC: 8.8 MG/DL (ref 8.3–10.4)
CALCIUM SERPL-MCNC: 9 MG/DL (ref 8.3–10.4)
CALCIUM SERPL-MCNC: 9.1 MG/DL (ref 8.3–10.4)
CALCIUM SERPL-MCNC: 9.2 MG/DL (ref 8.3–10.4)
CALCIUM SERPL-MCNC: 9.4 MG/DL (ref 8.3–10.4)
CALCIUM SERPL-MCNC: 9.5 MG/DL (ref 8.3–10.4)
CALCULATED P AXIS, ECG09: 45 DEGREES
CALCULATED P AXIS, ECG09: 54 DEGREES
CALCULATED R AXIS, ECG10: -28 DEGREES
CALCULATED R AXIS, ECG10: -48 DEGREES
CALCULATED T AXIS, ECG11: 107 DEGREES
CALCULATED T AXIS, ECG11: 99 DEGREES
CHLORIDE SERPL-SCNC: 100 MMOL/L (ref 98–107)
CHLORIDE SERPL-SCNC: 101 MMOL/L (ref 98–107)
CHLORIDE SERPL-SCNC: 102 MMOL/L (ref 98–107)
CHLORIDE SERPL-SCNC: 102 MMOL/L (ref 98–107)
CHLORIDE SERPL-SCNC: 103 MMOL/L (ref 98–107)
CHLORIDE SERPL-SCNC: 103 MMOL/L (ref 98–107)
CHLORIDE SERPL-SCNC: 104 MMOL/L (ref 98–107)
CHLORIDE SERPL-SCNC: 104 MMOL/L (ref 98–107)
CHLORIDE SERPL-SCNC: 107 MMOL/L (ref 98–107)
CHLORIDE SERPL-SCNC: 93 MMOL/L (ref 98–107)
CHLORIDE SERPL-SCNC: 96 MMOL/L (ref 98–107)
CHLORIDE SERPL-SCNC: 99 MMOL/L (ref 98–107)
CHLORIDE SERPL-SCNC: 99 MMOL/L (ref 98–107)
CHOLEST SERPL-MCNC: 157 MG/DL
CO2 BLD-SCNC: 24 MMOL/L
CO2 SERPL-SCNC: 22 MMOL/L (ref 21–32)
CO2 SERPL-SCNC: 25 MMOL/L (ref 21–32)
CO2 SERPL-SCNC: 25 MMOL/L (ref 21–32)
CO2 SERPL-SCNC: 26 MMOL/L (ref 21–32)
CO2 SERPL-SCNC: 28 MMOL/L (ref 21–32)
CO2 SERPL-SCNC: 29 MMOL/L (ref 21–32)
CO2 SERPL-SCNC: 30 MMOL/L (ref 21–32)
CO2 SERPL-SCNC: 31 MMOL/L (ref 21–32)
CO2 SERPL-SCNC: 32 MMOL/L (ref 21–32)
COLLECT TIME,HTIME: 1105
CREAT SERPL-MCNC: 0.74 MG/DL (ref 0.8–1.5)
CREAT SERPL-MCNC: 0.82 MG/DL (ref 0.8–1.5)
CREAT SERPL-MCNC: 0.84 MG/DL (ref 0.8–1.5)
CREAT SERPL-MCNC: 0.86 MG/DL (ref 0.8–1.5)
CREAT SERPL-MCNC: 0.93 MG/DL (ref 0.8–1.5)
CREAT SERPL-MCNC: 0.94 MG/DL (ref 0.8–1.5)
CREAT SERPL-MCNC: 0.98 MG/DL (ref 0.8–1.5)
CREAT SERPL-MCNC: 1.03 MG/DL (ref 0.8–1.5)
CREAT SERPL-MCNC: 1.04 MG/DL (ref 0.8–1.5)
CREAT SERPL-MCNC: 1.06 MG/DL (ref 0.8–1.5)
CREAT SERPL-MCNC: 1.06 MG/DL (ref 0.8–1.5)
CREAT SERPL-MCNC: 1.22 MG/DL (ref 0.8–1.5)
CREAT SERPL-MCNC: 1.42 MG/DL (ref 0.8–1.5)
CRP SERPL-MCNC: 8.2 MG/DL (ref 0–0.9)
CRP SERPL-MCNC: 9.4 MG/DL (ref 0–0.9)
DIAGNOSIS, 93000: NORMAL
DIAGNOSIS, 93000: NORMAL
DIFFERENTIAL METHOD BLD: ABNORMAL
EOSINOPHIL # BLD: 0 K/UL (ref 0–0.8)
EOSINOPHIL # BLD: 0.1 K/UL (ref 0–0.8)
EOSINOPHIL NFR BLD: 0 % (ref 0.5–7.8)
EOSINOPHIL NFR BLD: 1 % (ref 0.5–7.8)
ERYTHROCYTE [DISTWIDTH] IN BLOOD BY AUTOMATED COUNT: 13.2 % (ref 11.9–14.6)
ERYTHROCYTE [DISTWIDTH] IN BLOOD BY AUTOMATED COUNT: 13.6 % (ref 11.9–14.6)
ERYTHROCYTE [DISTWIDTH] IN BLOOD BY AUTOMATED COUNT: 14.3 % (ref 11.9–14.6)
ERYTHROCYTE [DISTWIDTH] IN BLOOD BY AUTOMATED COUNT: 14.4 % (ref 11.9–14.6)
ERYTHROCYTE [DISTWIDTH] IN BLOOD BY AUTOMATED COUNT: 14.6 % (ref 11.9–14.6)
EST. AVERAGE GLUCOSE BLD GHB EST-MCNC: 303 MG/DL
FLOW RATE ISTAT,IFRATE: 4 L/MIN
GAS FLOW.O2 O2 DELIVERY SYS: ABNORMAL L/MIN
GLOBULIN SER CALC-MCNC: 4.1 G/DL (ref 2.3–3.5)
GLOBULIN SER CALC-MCNC: 4.5 G/DL (ref 2.3–3.5)
GLUCOSE BLD STRIP.AUTO-MCNC: 104 MG/DL (ref 65–100)
GLUCOSE BLD STRIP.AUTO-MCNC: 117 MG/DL (ref 65–100)
GLUCOSE BLD STRIP.AUTO-MCNC: 156 MG/DL (ref 65–100)
GLUCOSE BLD STRIP.AUTO-MCNC: 160 MG/DL (ref 65–100)
GLUCOSE BLD STRIP.AUTO-MCNC: 167 MG/DL (ref 65–100)
GLUCOSE BLD STRIP.AUTO-MCNC: 191 MG/DL (ref 65–100)
GLUCOSE BLD STRIP.AUTO-MCNC: 195 MG/DL (ref 65–100)
GLUCOSE BLD STRIP.AUTO-MCNC: 201 MG/DL (ref 65–100)
GLUCOSE BLD STRIP.AUTO-MCNC: 207 MG/DL (ref 65–100)
GLUCOSE BLD STRIP.AUTO-MCNC: 210 MG/DL (ref 65–100)
GLUCOSE BLD STRIP.AUTO-MCNC: 212 MG/DL (ref 65–100)
GLUCOSE BLD STRIP.AUTO-MCNC: 214 MG/DL (ref 65–100)
GLUCOSE BLD STRIP.AUTO-MCNC: 225 MG/DL (ref 65–100)
GLUCOSE BLD STRIP.AUTO-MCNC: 228 MG/DL (ref 65–100)
GLUCOSE BLD STRIP.AUTO-MCNC: 237 MG/DL (ref 65–100)
GLUCOSE BLD STRIP.AUTO-MCNC: 242 MG/DL (ref 65–100)
GLUCOSE BLD STRIP.AUTO-MCNC: 244 MG/DL (ref 65–100)
GLUCOSE BLD STRIP.AUTO-MCNC: 248 MG/DL (ref 65–100)
GLUCOSE BLD STRIP.AUTO-MCNC: 249 MG/DL (ref 65–100)
GLUCOSE BLD STRIP.AUTO-MCNC: 251 MG/DL (ref 65–100)
GLUCOSE BLD STRIP.AUTO-MCNC: 258 MG/DL (ref 65–100)
GLUCOSE BLD STRIP.AUTO-MCNC: 262 MG/DL (ref 65–100)
GLUCOSE BLD STRIP.AUTO-MCNC: 266 MG/DL (ref 65–100)
GLUCOSE BLD STRIP.AUTO-MCNC: 267 MG/DL (ref 65–100)
GLUCOSE BLD STRIP.AUTO-MCNC: 267 MG/DL (ref 65–100)
GLUCOSE BLD STRIP.AUTO-MCNC: 270 MG/DL (ref 65–100)
GLUCOSE BLD STRIP.AUTO-MCNC: 273 MG/DL (ref 65–100)
GLUCOSE BLD STRIP.AUTO-MCNC: 277 MG/DL (ref 65–100)
GLUCOSE BLD STRIP.AUTO-MCNC: 280 MG/DL (ref 65–100)
GLUCOSE BLD STRIP.AUTO-MCNC: 281 MG/DL (ref 65–100)
GLUCOSE BLD STRIP.AUTO-MCNC: 287 MG/DL (ref 65–100)
GLUCOSE BLD STRIP.AUTO-MCNC: 288 MG/DL (ref 65–100)
GLUCOSE BLD STRIP.AUTO-MCNC: 297 MG/DL (ref 65–100)
GLUCOSE BLD STRIP.AUTO-MCNC: 299 MG/DL (ref 65–100)
GLUCOSE BLD STRIP.AUTO-MCNC: 299 MG/DL (ref 65–100)
GLUCOSE BLD STRIP.AUTO-MCNC: 309 MG/DL (ref 65–100)
GLUCOSE BLD STRIP.AUTO-MCNC: 310 MG/DL (ref 65–100)
GLUCOSE BLD STRIP.AUTO-MCNC: 317 MG/DL (ref 65–100)
GLUCOSE BLD STRIP.AUTO-MCNC: 332 MG/DL (ref 65–100)
GLUCOSE BLD STRIP.AUTO-MCNC: 333 MG/DL (ref 65–100)
GLUCOSE BLD STRIP.AUTO-MCNC: 370 MG/DL (ref 65–100)
GLUCOSE BLD STRIP.AUTO-MCNC: 394 MG/DL (ref 65–100)
GLUCOSE BLD STRIP.AUTO-MCNC: 397 MG/DL (ref 65–100)
GLUCOSE BLD STRIP.AUTO-MCNC: 432 MG/DL (ref 65–100)
GLUCOSE BLD STRIP.AUTO-MCNC: 596 MG/DL (ref 65–100)
GLUCOSE SERPL-MCNC: 105 MG/DL (ref 65–100)
GLUCOSE SERPL-MCNC: 209 MG/DL (ref 65–100)
GLUCOSE SERPL-MCNC: 223 MG/DL (ref 65–100)
GLUCOSE SERPL-MCNC: 247 MG/DL (ref 65–100)
GLUCOSE SERPL-MCNC: 248 MG/DL (ref 65–100)
GLUCOSE SERPL-MCNC: 270 MG/DL (ref 65–100)
GLUCOSE SERPL-MCNC: 278 MG/DL (ref 65–100)
GLUCOSE SERPL-MCNC: 282 MG/DL (ref 65–100)
GLUCOSE SERPL-MCNC: 304 MG/DL (ref 65–100)
GLUCOSE SERPL-MCNC: 321 MG/DL (ref 65–100)
GLUCOSE SERPL-MCNC: 357 MG/DL (ref 65–100)
GLUCOSE SERPL-MCNC: 697 MG/DL (ref 65–100)
GLUCOSE SERPL-MCNC: 96 MG/DL (ref 65–100)
HBA1C MFR BLD: 12.2 % (ref 4.8–6)
HCO3 BLD-SCNC: 23.4 MMOL/L (ref 22–26)
HCT VFR BLD AUTO: 36.1 % (ref 41.1–50.3)
HCT VFR BLD AUTO: 38.4 % (ref 41.1–50.3)
HCT VFR BLD AUTO: 40.1 % (ref 41.1–50.3)
HCT VFR BLD AUTO: 40.7 % (ref 41.1–50.3)
HCT VFR BLD AUTO: 41.7 % (ref 41.1–50.3)
HCT VFR BLD AUTO: 42.5 % (ref 41.1–50.3)
HCT VFR BLD AUTO: 42.6 % (ref 41.1–50.3)
HCT VFR BLD AUTO: 43.4 % (ref 41.1–50.3)
HCT VFR BLD AUTO: 43.9 % (ref 41.1–50.3)
HDLC SERPL-MCNC: 67 MG/DL (ref 40–60)
HDLC SERPL: 2.3 {RATIO}
HGB BLD-MCNC: 12 G/DL (ref 13.6–17.2)
HGB BLD-MCNC: 12.4 G/DL (ref 13.6–17.2)
HGB BLD-MCNC: 12.9 G/DL (ref 13.6–17.2)
HGB BLD-MCNC: 13 G/DL (ref 13.6–17.2)
HGB BLD-MCNC: 13.8 G/DL (ref 13.6–17.2)
HGB BLD-MCNC: 13.8 G/DL (ref 13.6–17.2)
HGB BLD-MCNC: 14 G/DL (ref 13.6–17.2)
HGB BLD-MCNC: 14.2 G/DL (ref 13.6–17.2)
HGB BLD-MCNC: 14.4 G/DL (ref 13.6–17.2)
IMM GRANULOCYTES # BLD AUTO: 0.1 K/UL (ref 0–0.5)
IMM GRANULOCYTES # BLD AUTO: 0.2 K/UL (ref 0–0.5)
IMM GRANULOCYTES NFR BLD AUTO: 1 % (ref 0–5)
INR PPP: 0.9
LACTATE BLD-SCNC: 1.57 MMOL/L (ref 0.5–1.9)
LDLC SERPL CALC-MCNC: 57.4 MG/DL
LIPID PROFILE,FLP: ABNORMAL
LYMPHOCYTES # BLD: 0.4 K/UL (ref 0.5–4.6)
LYMPHOCYTES # BLD: 0.5 K/UL (ref 0.5–4.6)
LYMPHOCYTES # BLD: 0.7 K/UL (ref 0.5–4.6)
LYMPHOCYTES # BLD: 0.7 K/UL (ref 0.5–4.6)
LYMPHOCYTES # BLD: 0.8 K/UL (ref 0.5–4.6)
LYMPHOCYTES # BLD: 0.9 K/UL (ref 0.5–4.6)
LYMPHOCYTES # BLD: 2.1 K/UL (ref 0.5–4.6)
LYMPHOCYTES # BLD: 2.7 K/UL (ref 0.5–4.6)
LYMPHOCYTES NFR BLD: 21 % (ref 13–44)
LYMPHOCYTES NFR BLD: 24 % (ref 13–44)
LYMPHOCYTES NFR BLD: 3 % (ref 13–44)
LYMPHOCYTES NFR BLD: 4 % (ref 13–44)
LYMPHOCYTES NFR BLD: 5 % (ref 13–44)
LYMPHOCYTES NFR BLD: 6 % (ref 13–44)
MAGNESIUM SERPL-MCNC: 2.6 MG/DL (ref 1.8–2.4)
MCH RBC QN AUTO: 30.9 PG (ref 26.1–32.9)
MCH RBC QN AUTO: 31.2 PG (ref 26.1–32.9)
MCH RBC QN AUTO: 31.5 PG (ref 26.1–32.9)
MCH RBC QN AUTO: 31.6 PG (ref 26.1–32.9)
MCH RBC QN AUTO: 31.7 PG (ref 26.1–32.9)
MCH RBC QN AUTO: 31.8 PG (ref 26.1–32.9)
MCH RBC QN AUTO: 31.8 PG (ref 26.1–32.9)
MCHC RBC AUTO-ENTMCNC: 31.9 G/DL (ref 31.4–35)
MCHC RBC AUTO-ENTMCNC: 32.2 G/DL (ref 31.4–35)
MCHC RBC AUTO-ENTMCNC: 32.3 G/DL (ref 31.4–35)
MCHC RBC AUTO-ENTMCNC: 32.3 G/DL (ref 31.4–35)
MCHC RBC AUTO-ENTMCNC: 32.4 G/DL (ref 31.4–35)
MCHC RBC AUTO-ENTMCNC: 32.9 G/DL (ref 31.4–35)
MCHC RBC AUTO-ENTMCNC: 33.1 G/DL (ref 31.4–35)
MCHC RBC AUTO-ENTMCNC: 33.2 G/DL (ref 31.4–35)
MCHC RBC AUTO-ENTMCNC: 33.2 G/DL (ref 31.4–35)
MCV RBC AUTO: 95.5 FL (ref 79.6–97.8)
MCV RBC AUTO: 95.7 FL (ref 79.6–97.8)
MCV RBC AUTO: 95.8 FL (ref 79.6–97.8)
MCV RBC AUTO: 96.1 FL (ref 79.6–97.8)
MCV RBC AUTO: 96.4 FL (ref 79.6–97.8)
MCV RBC AUTO: 96.7 FL (ref 79.6–97.8)
MCV RBC AUTO: 96.7 FL (ref 79.6–97.8)
MCV RBC AUTO: 97.1 FL (ref 79.6–97.8)
MCV RBC AUTO: 97.6 FL (ref 79.6–97.8)
MM INDURATION POC: 0 MM (ref 0–5)
MM INDURATION POC: 0 MM (ref 0–5)
MONOCYTES # BLD: 0.2 K/UL (ref 0.1–1.3)
MONOCYTES # BLD: 0.5 K/UL (ref 0.1–1.3)
MONOCYTES # BLD: 0.6 K/UL (ref 0.1–1.3)
MONOCYTES # BLD: 0.6 K/UL (ref 0.1–1.3)
MONOCYTES # BLD: 0.7 K/UL (ref 0.1–1.3)
MONOCYTES # BLD: 0.7 K/UL (ref 0.1–1.3)
MONOCYTES # BLD: 0.9 K/UL (ref 0.1–1.3)
MONOCYTES # BLD: 0.9 K/UL (ref 0.1–1.3)
MONOCYTES NFR BLD: 2 % (ref 4–12)
MONOCYTES NFR BLD: 3 % (ref 4–12)
MONOCYTES NFR BLD: 4 % (ref 4–12)
MONOCYTES NFR BLD: 4 % (ref 4–12)
MONOCYTES NFR BLD: 5 % (ref 4–12)
MONOCYTES NFR BLD: 5 % (ref 4–12)
MONOCYTES NFR BLD: 6 % (ref 4–12)
MONOCYTES NFR BLD: 7 % (ref 4–12)
NEUTS SEG # BLD: 11.4 K/UL (ref 1.7–8.2)
NEUTS SEG # BLD: 13.6 K/UL (ref 1.7–8.2)
NEUTS SEG # BLD: 14.5 K/UL (ref 1.7–8.2)
NEUTS SEG # BLD: 17 K/UL (ref 1.7–8.2)
NEUTS SEG # BLD: 18 K/UL (ref 1.7–8.2)
NEUTS SEG # BLD: 6.1 K/UL (ref 1.7–8.2)
NEUTS SEG # BLD: 8.8 K/UL (ref 1.7–8.2)
NEUTS SEG # BLD: 8.8 K/UL (ref 1.7–8.2)
NEUTS SEG NFR BLD: 69 % (ref 43–78)
NEUTS SEG NFR BLD: 70 % (ref 43–78)
NEUTS SEG NFR BLD: 89 % (ref 43–78)
NEUTS SEG NFR BLD: 91 % (ref 43–78)
NEUTS SEG NFR BLD: 93 % (ref 43–78)
NRBC # BLD: 0 K/UL (ref 0–0.2)
NRBC # BLD: 0.02 K/UL (ref 0–0.2)
NRBC # BLD: 0.02 K/UL (ref 0–0.2)
NRBC # BLD: 0.03 K/UL (ref 0–0.2)
P-R INTERVAL, ECG05: 118 MS
P-R INTERVAL, ECG05: 132 MS
PCO2 BLD: 35.2 MMHG (ref 35–45)
PH BLD: 7.43 [PH] (ref 7.35–7.45)
PLATELET # BLD AUTO: 196 K/UL (ref 150–450)
PLATELET # BLD AUTO: 221 K/UL (ref 150–450)
PLATELET # BLD AUTO: 243 K/UL (ref 150–450)
PLATELET # BLD AUTO: 245 K/UL (ref 150–450)
PLATELET # BLD AUTO: 260 K/UL (ref 150–450)
PLATELET # BLD AUTO: 280 K/UL (ref 150–450)
PLATELET # BLD AUTO: 298 K/UL (ref 150–450)
PLATELET # BLD AUTO: 308 K/UL (ref 150–450)
PLATELET # BLD AUTO: 314 K/UL (ref 150–450)
PMV BLD AUTO: 10 FL (ref 9.4–12.3)
PMV BLD AUTO: 10.1 FL (ref 9.4–12.3)
PMV BLD AUTO: 9.3 FL (ref 9.4–12.3)
PMV BLD AUTO: 9.3 FL (ref 9.4–12.3)
PMV BLD AUTO: 9.5 FL (ref 9.4–12.3)
PMV BLD AUTO: 9.7 FL (ref 9.4–12.3)
PMV BLD AUTO: 9.8 FL (ref 9.4–12.3)
PMV BLD AUTO: 9.9 FL (ref 9.4–12.3)
PMV BLD AUTO: 9.9 FL (ref 9.4–12.3)
PO2 BLD: 74 MMHG (ref 75–100)
POTASSIUM SERPL-SCNC: 3.6 MMOL/L (ref 3.5–5.1)
POTASSIUM SERPL-SCNC: 3.7 MMOL/L (ref 3.5–5.1)
POTASSIUM SERPL-SCNC: 3.8 MMOL/L (ref 3.5–5.1)
POTASSIUM SERPL-SCNC: 3.8 MMOL/L (ref 3.5–5.1)
POTASSIUM SERPL-SCNC: 3.9 MMOL/L (ref 3.5–5.1)
POTASSIUM SERPL-SCNC: 4 MMOL/L (ref 3.5–5.1)
POTASSIUM SERPL-SCNC: 4 MMOL/L (ref 3.5–5.1)
POTASSIUM SERPL-SCNC: 4.3 MMOL/L (ref 3.5–5.1)
POTASSIUM SERPL-SCNC: 4.6 MMOL/L (ref 3.5–5.1)
POTASSIUM SERPL-SCNC: 4.6 MMOL/L (ref 3.5–5.1)
POTASSIUM SERPL-SCNC: 4.7 MMOL/L (ref 3.5–5.1)
POTASSIUM SERPL-SCNC: 4.7 MMOL/L (ref 3.5–5.1)
POTASSIUM SERPL-SCNC: 5 MMOL/L (ref 3.5–5.1)
PPD POC: NEGATIVE NEGATIVE
PPD POC: NEGATIVE NEGATIVE
PROCALCITONIN SERPL-MCNC: 0.1 NG/ML
PROCALCITONIN SERPL-MCNC: 0.1 NG/ML
PROT SERPL-MCNC: 7 G/DL (ref 6.3–8.2)
PROT SERPL-MCNC: 7.3 G/DL (ref 6.3–8.2)
PROTHROMBIN TIME: 12.3 SEC (ref 11.7–14.5)
Q-T INTERVAL, ECG07: 350 MS
Q-T INTERVAL, ECG07: 358 MS
QRS DURATION, ECG06: 104 MS
QRS DURATION, ECG06: 88 MS
QTC CALCULATION (BEZET), ECG08: 451 MS
QTC CALCULATION (BEZET), ECG08: 452 MS
RBC # BLD AUTO: 3.78 M/UL (ref 4.23–5.6)
RBC # BLD AUTO: 3.97 M/UL (ref 4.23–5.6)
RBC # BLD AUTO: 4.13 M/UL (ref 4.23–5.6)
RBC # BLD AUTO: 4.21 M/UL (ref 4.23–5.6)
RBC # BLD AUTO: 4.34 M/UL (ref 4.23–5.6)
RBC # BLD AUTO: 4.42 M/UL (ref 4.23–5.6)
RBC # BLD AUTO: 4.44 M/UL (ref 4.23–5.6)
RBC # BLD AUTO: 4.5 M/UL (ref 4.23–5.6)
RBC # BLD AUTO: 4.53 M/UL (ref 4.23–5.6)
SAO2 % BLD: 95 % (ref 95–98)
SERVICE CMNT-IMP: ABNORMAL
SERVICE CMNT-IMP: ABNORMAL
SERVICE CMNT-IMP: NORMAL
SERVICE CMNT-IMP: NORMAL
SODIUM SERPL-SCNC: 128 MMOL/L (ref 136–145)
SODIUM SERPL-SCNC: 134 MMOL/L (ref 136–145)
SODIUM SERPL-SCNC: 134 MMOL/L (ref 136–145)
SODIUM SERPL-SCNC: 135 MMOL/L (ref 136–145)
SODIUM SERPL-SCNC: 135 MMOL/L (ref 136–145)
SODIUM SERPL-SCNC: 136 MMOL/L (ref 136–145)
SODIUM SERPL-SCNC: 137 MMOL/L (ref 136–145)
SODIUM SERPL-SCNC: 138 MMOL/L (ref 136–145)
SODIUM SERPL-SCNC: 139 MMOL/L (ref 136–145)
SODIUM SERPL-SCNC: 140 MMOL/L (ref 136–145)
SODIUM SERPL-SCNC: 142 MMOL/L (ref 136–145)
SPECIMEN TYPE: ABNORMAL
TRIGL SERPL-MCNC: 163 MG/DL (ref 35–150)
TROPONIN I BLD-MCNC: 0.07 NG/ML (ref 0.02–0.05)
TROPONIN I BLD-MCNC: 0.42 NG/ML (ref 0.02–0.05)
TROPONIN I SERPL-MCNC: 0.08 NG/ML (ref 0.02–0.05)
TROPONIN I SERPL-MCNC: 1.99 NG/ML (ref 0.02–0.05)
TROPONIN I SERPL-MCNC: 2.41 NG/ML (ref 0.02–0.05)
TROPONIN I SERPL-MCNC: 4.02 NG/ML (ref 0.02–0.05)
TROPONIN I SERPL-MCNC: <0.02 NG/ML (ref 0.02–0.05)
VENTRICULAR RATE, ECG03: 100 BPM
VENTRICULAR RATE, ECG03: 96 BPM
VLDLC SERPL CALC-MCNC: 32.6 MG/DL (ref 6–23)
WBC # BLD AUTO: 12.5 K/UL (ref 4.3–11.1)
WBC # BLD AUTO: 12.9 K/UL (ref 4.3–11.1)
WBC # BLD AUTO: 15 K/UL (ref 4.3–11.1)
WBC # BLD AUTO: 16 K/UL (ref 4.3–11.1)
WBC # BLD AUTO: 18.6 K/UL (ref 4.3–11.1)
WBC # BLD AUTO: 19.7 K/UL (ref 4.3–11.1)
WBC # BLD AUTO: 8.6 K/UL (ref 4.3–11.1)
WBC # BLD AUTO: 8.9 K/UL (ref 4.3–11.1)
WBC # BLD AUTO: 9.5 K/UL (ref 4.3–11.1)

## 2019-01-01 PROCEDURE — 96366 THER/PROPH/DIAG IV INF ADDON: CPT | Performed by: EMERGENCY MEDICINE

## 2019-01-01 PROCEDURE — 85025 COMPLETE CBC W/AUTO DIFF WBC: CPT

## 2019-01-01 PROCEDURE — 86038 ANTINUCLEAR ANTIBODIES: CPT

## 2019-01-01 PROCEDURE — 77030020263 HC SOL INJ SOD CL0.9% LFCR 1000ML

## 2019-01-01 PROCEDURE — 99232 SBSQ HOSP IP/OBS MODERATE 35: CPT | Performed by: INTERNAL MEDICINE

## 2019-01-01 PROCEDURE — 80048 BASIC METABOLIC PNL TOTAL CA: CPT

## 2019-01-01 PROCEDURE — 99153 MOD SED SAME PHYS/QHP EA: CPT

## 2019-01-01 PROCEDURE — 82962 GLUCOSE BLOOD TEST: CPT

## 2019-01-01 PROCEDURE — C1769 GUIDE WIRE: HCPCS

## 2019-01-01 PROCEDURE — 92928 PRQ TCAT PLMT NTRAC ST 1 LES: CPT

## 2019-01-01 PROCEDURE — 94760 N-INVAS EAR/PLS OXIMETRY 1: CPT

## 2019-01-01 PROCEDURE — 77010033678 HC OXYGEN DAILY

## 2019-01-01 PROCEDURE — 74011636637 HC RX REV CODE- 636/637: Performed by: PHYSICIAN ASSISTANT

## 2019-01-01 PROCEDURE — 74011250636 HC RX REV CODE- 250/636: Performed by: NURSE PRACTITIONER

## 2019-01-01 PROCEDURE — C1725 CATH, TRANSLUMIN NON-LASER: HCPCS

## 2019-01-01 PROCEDURE — 74011250637 HC RX REV CODE- 250/637: Performed by: INTERNAL MEDICINE

## 2019-01-01 PROCEDURE — 74011636637 HC RX REV CODE- 636/637: Performed by: INTERNAL MEDICINE

## 2019-01-01 PROCEDURE — 96375 TX/PRO/DX INJ NEW DRUG ADDON: CPT | Performed by: EMERGENCY MEDICINE

## 2019-01-01 PROCEDURE — 74011250636 HC RX REV CODE- 250/636

## 2019-01-01 PROCEDURE — 74011000250 HC RX REV CODE- 250: Performed by: INTERNAL MEDICINE

## 2019-01-01 PROCEDURE — 96365 THER/PROPH/DIAG IV INF INIT: CPT | Performed by: EMERGENCY MEDICINE

## 2019-01-01 PROCEDURE — 74011250637 HC RX REV CODE- 250/637: Performed by: NURSE PRACTITIONER

## 2019-01-01 PROCEDURE — 77010033711 HC HIGH FLOW OXYGEN

## 2019-01-01 PROCEDURE — 83735 ASSAY OF MAGNESIUM: CPT

## 2019-01-01 PROCEDURE — 74011636637 HC RX REV CODE- 636/637: Performed by: FAMILY MEDICINE

## 2019-01-01 PROCEDURE — 71046 X-RAY EXAM CHEST 2 VIEWS: CPT

## 2019-01-01 PROCEDURE — 74011250636 HC RX REV CODE- 250/636: Performed by: INTERNAL MEDICINE

## 2019-01-01 PROCEDURE — 77030013687 HC GD NDL BARD -B

## 2019-01-01 PROCEDURE — 99285 EMERGENCY DEPT VISIT HI MDM: CPT | Performed by: EMERGENCY MEDICINE

## 2019-01-01 PROCEDURE — 74011250636 HC RX REV CODE- 250/636: Performed by: FAMILY MEDICINE

## 2019-01-01 PROCEDURE — 99497 ADVNCD CARE PLAN 30 MIN: CPT | Performed by: NURSE PRACTITIONER

## 2019-01-01 PROCEDURE — 36415 COLL VENOUS BLD VENIPUNCTURE: CPT

## 2019-01-01 PROCEDURE — 71045 X-RAY EXAM CHEST 1 VIEW: CPT

## 2019-01-01 PROCEDURE — 71260 CT THORAX DX C+: CPT

## 2019-01-01 PROCEDURE — 94762 N-INVAS EAR/PLS OXIMTRY CONT: CPT

## 2019-01-01 PROCEDURE — 74011000250 HC RX REV CODE- 250: Performed by: EMERGENCY MEDICINE

## 2019-01-01 PROCEDURE — B2131ZZ FLUOROSCOPY OF MULTIPLE CORONARY ARTERY BYPASS GRAFTS USING LOW OSMOLAR CONTRAST: ICD-10-PCS | Performed by: INTERNAL MEDICINE

## 2019-01-01 PROCEDURE — 94640 AIRWAY INHALATION TREATMENT: CPT

## 2019-01-01 PROCEDURE — 80061 LIPID PANEL: CPT

## 2019-01-01 PROCEDURE — 99218 HC RM OBSERVATION: CPT

## 2019-01-01 PROCEDURE — C1887 CATHETER, GUIDING: HCPCS

## 2019-01-01 PROCEDURE — 97530 THERAPEUTIC ACTIVITIES: CPT

## 2019-01-01 PROCEDURE — 99223 1ST HOSP IP/OBS HIGH 75: CPT | Performed by: NURSE PRACTITIONER

## 2019-01-01 PROCEDURE — 65270000029 HC RM PRIVATE

## 2019-01-01 PROCEDURE — 51798 US URINE CAPACITY MEASURE: CPT

## 2019-01-01 PROCEDURE — C1894 INTRO/SHEATH, NON-LASER: HCPCS

## 2019-01-01 PROCEDURE — 85730 THROMBOPLASTIN TIME PARTIAL: CPT

## 2019-01-01 PROCEDURE — 99291 CRITICAL CARE FIRST HOUR: CPT | Performed by: INTERNAL MEDICINE

## 2019-01-01 PROCEDURE — 87040 BLOOD CULTURE FOR BACTERIA: CPT

## 2019-01-01 PROCEDURE — 86140 C-REACTIVE PROTEIN: CPT

## 2019-01-01 PROCEDURE — 77030019605

## 2019-01-01 PROCEDURE — 74011636637 HC RX REV CODE- 636/637: Performed by: EMERGENCY MEDICINE

## 2019-01-01 PROCEDURE — 74011250637 HC RX REV CODE- 250/637: Performed by: PHYSICIAN ASSISTANT

## 2019-01-01 PROCEDURE — 74011636637 HC RX REV CODE- 636/637: Performed by: NURSE PRACTITIONER

## 2019-01-01 PROCEDURE — 83605 ASSAY OF LACTIC ACID: CPT

## 2019-01-01 PROCEDURE — 36600 WITHDRAWAL OF ARTERIAL BLOOD: CPT

## 2019-01-01 PROCEDURE — 74011636320 HC RX REV CODE- 636/320: Performed by: INTERNAL MEDICINE

## 2019-01-01 PROCEDURE — B2151ZZ FLUOROSCOPY OF LEFT HEART USING LOW OSMOLAR CONTRAST: ICD-10-PCS | Performed by: INTERNAL MEDICINE

## 2019-01-01 PROCEDURE — 83036 HEMOGLOBIN GLYCOSYLATED A1C: CPT

## 2019-01-01 PROCEDURE — 74011000258 HC RX REV CODE- 258: Performed by: INTERNAL MEDICINE

## 2019-01-01 PROCEDURE — 84145 PROCALCITONIN (PCT): CPT

## 2019-01-01 PROCEDURE — 83880 ASSAY OF NATRIURETIC PEPTIDE: CPT

## 2019-01-01 PROCEDURE — 74011636320 HC RX REV CODE- 636/320: Performed by: EMERGENCY MEDICINE

## 2019-01-01 PROCEDURE — 93455 CORONARY ART/GRFT ANGIO S&I: CPT

## 2019-01-01 PROCEDURE — 99152 MOD SED SAME PHYS/QHP 5/>YRS: CPT

## 2019-01-01 PROCEDURE — 74011250636 HC RX REV CODE- 250/636: Performed by: EMERGENCY MEDICINE

## 2019-01-01 PROCEDURE — 80053 COMPREHEN METABOLIC PANEL: CPT

## 2019-01-01 PROCEDURE — 99233 SBSQ HOSP IP/OBS HIGH 50: CPT | Performed by: INTERNAL MEDICINE

## 2019-01-01 PROCEDURE — 93458 L HRT ARTERY/VENTRICLE ANGIO: CPT

## 2019-01-01 PROCEDURE — C1874 STENT, COATED/COV W/DEL SYS: HCPCS

## 2019-01-01 PROCEDURE — 84484 ASSAY OF TROPONIN QUANT: CPT

## 2019-01-01 PROCEDURE — 74011250637 HC RX REV CODE- 250/637: Performed by: EMERGENCY MEDICINE

## 2019-01-01 PROCEDURE — B2111ZZ FLUOROSCOPY OF MULTIPLE CORONARY ARTERIES USING LOW OSMOLAR CONTRAST: ICD-10-PCS | Performed by: INTERNAL MEDICINE

## 2019-01-01 PROCEDURE — 65660000000 HC RM CCU STEPDOWN

## 2019-01-01 PROCEDURE — 86580 TB INTRADERMAL TEST: CPT | Performed by: INTERNAL MEDICINE

## 2019-01-01 PROCEDURE — 76450000000

## 2019-01-01 PROCEDURE — 93459 L HRT ART/GRFT ANGIO: CPT

## 2019-01-01 PROCEDURE — 93571 IV DOP VEL&/PRESS C FLO 1ST: CPT

## 2019-01-01 PROCEDURE — 77030011222 HC DEV INFL PTCA BSC -B

## 2019-01-01 PROCEDURE — 99223 1ST HOSP IP/OBS HIGH 75: CPT | Performed by: INTERNAL MEDICINE

## 2019-01-01 PROCEDURE — C1753 CATH, INTRAVAS ULTRASOUND: HCPCS

## 2019-01-01 PROCEDURE — 65610000001 HC ROOM ICU GENERAL

## 2019-01-01 PROCEDURE — 85610 PROTHROMBIN TIME: CPT

## 2019-01-01 PROCEDURE — 77030012468 HC VLV BLEEDBK CNTRL ABBT -B

## 2019-01-01 PROCEDURE — 74011250636 HC RX REV CODE- 250/636: Performed by: PHYSICIAN ASSISTANT

## 2019-01-01 PROCEDURE — 85027 COMPLETE CBC AUTOMATED: CPT

## 2019-01-01 PROCEDURE — 77030004534 HC CATH ANGI DX INFN CARD -A

## 2019-01-01 PROCEDURE — 99232 SBSQ HOSP IP/OBS MODERATE 35: CPT | Performed by: NURSE PRACTITIONER

## 2019-01-01 PROCEDURE — 74011250637 HC RX REV CODE- 250/637

## 2019-01-01 PROCEDURE — 92937 PRQ TRLUML REVSC CAB GRF 1: CPT

## 2019-01-01 PROCEDURE — 027034Z DILATION OF CORONARY ARTERY, ONE ARTERY WITH DRUG-ELUTING INTRALUMINAL DEVICE, PERCUTANEOUS APPROACH: ICD-10-PCS | Performed by: INTERNAL MEDICINE

## 2019-01-01 PROCEDURE — C8929 TTE W OR WO FOL WCON,DOPPLER: HCPCS

## 2019-01-01 PROCEDURE — 77030029997 HC DEV COM RDL R BND TELE -B

## 2019-01-01 PROCEDURE — 77030004559 HC CATH ANGI DX SUPT CARD -B

## 2019-01-01 PROCEDURE — 4A023N7 MEASUREMENT OF CARDIAC SAMPLING AND PRESSURE, LEFT HEART, PERCUTANEOUS APPROACH: ICD-10-PCS | Performed by: INTERNAL MEDICINE

## 2019-01-01 PROCEDURE — 74011000302 HC RX REV CODE- 302: Performed by: INTERNAL MEDICINE

## 2019-01-01 PROCEDURE — 97163 PT EVAL HIGH COMPLEX 45 MIN: CPT

## 2019-01-01 PROCEDURE — 96374 THER/PROPH/DIAG INJ IV PUSH: CPT | Performed by: EMERGENCY MEDICINE

## 2019-01-01 PROCEDURE — 77030021668 HC NEB PREFIL KT VYRM -A

## 2019-01-01 PROCEDURE — 93005 ELECTROCARDIOGRAM TRACING: CPT | Performed by: EMERGENCY MEDICINE

## 2019-01-01 PROCEDURE — 77030020253 HC SOL INJ D545NS .05 DEX .45 SAL

## 2019-01-01 PROCEDURE — 92978 ENDOLUMINL IVUS OCT C 1ST: CPT

## 2019-01-01 PROCEDURE — 85347 COAGULATION TIME ACTIVATED: CPT

## 2019-01-01 PROCEDURE — 84681 ASSAY OF C-PEPTIDE: CPT

## 2019-01-01 PROCEDURE — 92920 PRQ TRLUML C ANGIOP 1ART&/BR: CPT

## 2019-01-01 PROCEDURE — 74011000258 HC RX REV CODE- 258: Performed by: EMERGENCY MEDICINE

## 2019-01-01 PROCEDURE — 77030002888 HC SUT CHRMC J&J -A

## 2019-01-01 PROCEDURE — 82803 BLOOD GASES ANY COMBINATION: CPT

## 2019-01-01 PROCEDURE — 97166 OT EVAL MOD COMPLEX 45 MIN: CPT

## 2019-01-01 PROCEDURE — 77030013794 HC KT TRNSDUC BLD EDWD -B

## 2019-01-01 RX ORDER — METOPROLOL TARTRATE 25 MG/1
12.5 TABLET, FILM COATED ORAL EVERY 12 HOURS
Status: DISCONTINUED | OUTPATIENT
Start: 2019-01-01 | End: 2019-01-01

## 2019-01-01 RX ORDER — NITROGLYCERIN 0.4 MG/1
TABLET SUBLINGUAL
Status: COMPLETED
Start: 2019-01-01 | End: 2019-01-01

## 2019-01-01 RX ORDER — METOPROLOL TARTRATE 25 MG/1
12.5 TABLET, FILM COATED ORAL EVERY 12 HOURS
Qty: 30 TAB | Refills: 11 | Status: SHIPPED | OUTPATIENT
Start: 2019-01-01

## 2019-01-01 RX ORDER — INSULIN GLARGINE 100 [IU]/ML
30 INJECTION, SOLUTION SUBCUTANEOUS DAILY
Status: DISCONTINUED | OUTPATIENT
Start: 2019-01-01 | End: 2019-01-01

## 2019-01-01 RX ORDER — INSULIN GLARGINE 100 [IU]/ML
15 INJECTION, SOLUTION SUBCUTANEOUS DAILY
Status: DISCONTINUED | OUTPATIENT
Start: 2019-01-01 | End: 2019-01-01

## 2019-01-01 RX ORDER — NITROGLYCERIN 0.4 MG/1
0.4 TABLET SUBLINGUAL
Status: DISCONTINUED | OUTPATIENT
Start: 2019-01-01 | End: 2019-01-01

## 2019-01-01 RX ORDER — MORPHINE SULFATE 2 MG/ML
2 INJECTION, SOLUTION INTRAMUSCULAR; INTRAVENOUS
Status: DISCONTINUED | OUTPATIENT
Start: 2019-01-01 | End: 2019-01-01

## 2019-01-01 RX ORDER — INSULIN LISPRO 100 [IU]/ML
INJECTION, SOLUTION INTRAVENOUS; SUBCUTANEOUS
Qty: 1 VIAL | Refills: 5 | Status: SHIPPED
Start: 2019-01-01

## 2019-01-01 RX ORDER — METOPROLOL TARTRATE 25 MG/1
25 TABLET, FILM COATED ORAL EVERY 12 HOURS
Status: DISCONTINUED | OUTPATIENT
Start: 2019-01-01 | End: 2019-01-01

## 2019-01-01 RX ORDER — LORAZEPAM 2 MG/ML
1 INJECTION INTRAMUSCULAR
Status: DISCONTINUED | OUTPATIENT
Start: 2019-01-01 | End: 2019-01-01 | Stop reason: HOSPADM

## 2019-01-01 RX ORDER — LORAZEPAM 2 MG/ML
0.5 INJECTION INTRAMUSCULAR
Status: DISCONTINUED | OUTPATIENT
Start: 2019-01-01 | End: 2019-01-01

## 2019-01-01 RX ORDER — PREDNISONE 10 MG/1
10 TABLET ORAL
COMMUNITY
End: 2019-01-01

## 2019-01-01 RX ORDER — FUROSEMIDE 10 MG/ML
40 INJECTION INTRAMUSCULAR; INTRAVENOUS ONCE
Status: COMPLETED | OUTPATIENT
Start: 2019-01-01 | End: 2019-01-01

## 2019-01-01 RX ORDER — INSULIN LISPRO 100 [IU]/ML
INJECTION, SOLUTION INTRAVENOUS; SUBCUTANEOUS
Qty: 1 VIAL | Refills: 5 | Status: SHIPPED | OUTPATIENT
Start: 2019-01-01

## 2019-01-01 RX ORDER — MORPHINE SULFATE 2 MG/ML
2 INJECTION, SOLUTION INTRAMUSCULAR; INTRAVENOUS
Status: DISCONTINUED | OUTPATIENT
Start: 2019-01-01 | End: 2019-01-01 | Stop reason: HOSPADM

## 2019-01-01 RX ORDER — INSULIN LISPRO 100 [IU]/ML
INJECTION, SOLUTION INTRAVENOUS; SUBCUTANEOUS
Status: DISCONTINUED | OUTPATIENT
Start: 2019-01-01 | End: 2019-01-01

## 2019-01-01 RX ORDER — HEPARIN SODIUM 10000 [USP'U]/ML
1000-9000 INJECTION, SOLUTION INTRAVENOUS; SUBCUTANEOUS ONCE
Status: COMPLETED | OUTPATIENT
Start: 2019-01-01 | End: 2019-01-01

## 2019-01-01 RX ORDER — HALOPERIDOL 5 MG/ML
2 INJECTION INTRAMUSCULAR
Status: DISCONTINUED | OUTPATIENT
Start: 2019-01-01 | End: 2019-01-01 | Stop reason: HOSPADM

## 2019-01-01 RX ORDER — BUDESONIDE 0.5 MG/2ML
500 INHALANT ORAL
Status: DISCONTINUED | OUTPATIENT
Start: 2019-01-01 | End: 2019-01-01

## 2019-01-01 RX ORDER — INSULIN GLARGINE 100 [IU]/ML
25 INJECTION, SOLUTION SUBCUTANEOUS DAILY
Qty: 1 VIAL | Refills: 5 | Status: SHIPPED | OUTPATIENT
Start: 2019-01-01 | End: 2019-01-01 | Stop reason: SDUPTHER

## 2019-01-01 RX ORDER — MORPHINE SULFATE 30 MG/1
30 TABLET ORAL
Status: DISCONTINUED | OUTPATIENT
Start: 2019-01-01 | End: 2019-01-01

## 2019-01-01 RX ORDER — HEPARIN SODIUM 200 [USP'U]/100ML
3 INJECTION, SOLUTION INTRAVENOUS CONTINUOUS
Status: DISCONTINUED | OUTPATIENT
Start: 2019-01-01 | End: 2019-01-01 | Stop reason: HOSPADM

## 2019-01-01 RX ORDER — INSULIN LISPRO 100 [IU]/ML
5 INJECTION, SOLUTION INTRAVENOUS; SUBCUTANEOUS
Status: DISCONTINUED | OUTPATIENT
Start: 2019-01-01 | End: 2019-01-01 | Stop reason: HOSPADM

## 2019-01-01 RX ORDER — PREDNISONE 10 MG/1
30 TABLET ORAL
Status: DISCONTINUED | OUTPATIENT
Start: 2019-01-01 | End: 2019-01-01

## 2019-01-01 RX ORDER — METOPROLOL TARTRATE 25 MG/1
12.5 TABLET, FILM COATED ORAL EVERY 12 HOURS
Status: DISCONTINUED | OUTPATIENT
Start: 2019-01-01 | End: 2019-01-01 | Stop reason: HOSPADM

## 2019-01-01 RX ORDER — INSULIN PUMP SYRINGE, 3 ML
EACH MISCELLANEOUS
Qty: 1 KIT | Refills: 0 | Status: SHIPPED | OUTPATIENT
Start: 2019-01-01

## 2019-01-01 RX ORDER — MORPHINE SULFATE 30 MG/1
30 TABLET ORAL EVERY 12 HOURS
Status: DISCONTINUED | OUTPATIENT
Start: 2019-01-01 | End: 2019-01-01

## 2019-01-01 RX ORDER — AMOXICILLIN 250 MG
1 CAPSULE ORAL DAILY
Status: DISCONTINUED | OUTPATIENT
Start: 2019-01-01 | End: 2019-01-01

## 2019-01-01 RX ORDER — CLOPIDOGREL BISULFATE 75 MG/1
75 TABLET ORAL DAILY
Status: DISCONTINUED | OUTPATIENT
Start: 2019-01-01 | End: 2019-01-01

## 2019-01-01 RX ORDER — MIDAZOLAM HYDROCHLORIDE 1 MG/ML
.5-2 INJECTION, SOLUTION INTRAMUSCULAR; INTRAVENOUS
Status: DISCONTINUED | OUTPATIENT
Start: 2019-01-01 | End: 2019-01-01

## 2019-01-01 RX ORDER — RANOLAZINE 500 MG/1
500 TABLET, EXTENDED RELEASE ORAL 2 TIMES DAILY
Status: DISCONTINUED | OUTPATIENT
Start: 2019-01-01 | End: 2019-01-01

## 2019-01-01 RX ORDER — HYDROMORPHONE HYDROCHLORIDE 2 MG/ML
1 INJECTION, SOLUTION INTRAMUSCULAR; INTRAVENOUS; SUBCUTANEOUS ONCE
Status: COMPLETED | OUTPATIENT
Start: 2019-01-01 | End: 2019-01-01

## 2019-01-01 RX ORDER — SODIUM CHLORIDE 9 MG/ML
50 INJECTION, SOLUTION INTRAVENOUS CONTINUOUS
Status: DISCONTINUED | OUTPATIENT
Start: 2019-01-01 | End: 2019-01-01

## 2019-01-01 RX ORDER — INSULIN LISPRO 100 [IU]/ML
INJECTION, SOLUTION INTRAVENOUS; SUBCUTANEOUS
Status: DISCONTINUED | OUTPATIENT
Start: 2019-01-01 | End: 2019-01-01 | Stop reason: HOSPADM

## 2019-01-01 RX ORDER — MORPHINE SULFATE 2 MG/ML
1 INJECTION, SOLUTION INTRAMUSCULAR; INTRAVENOUS
Status: DISCONTINUED | OUTPATIENT
Start: 2019-01-01 | End: 2019-01-01

## 2019-01-01 RX ORDER — HEPARIN SODIUM 5000 [USP'U]/100ML
12-25 INJECTION, SOLUTION INTRAVENOUS
Status: DISCONTINUED | OUTPATIENT
Start: 2019-01-01 | End: 2019-01-01 | Stop reason: SDUPTHER

## 2019-01-01 RX ORDER — NITROGLYCERIN 0.4 MG/1
0.4 TABLET SUBLINGUAL
Status: DISCONTINUED | OUTPATIENT
Start: 2019-01-01 | End: 2019-01-01 | Stop reason: HOSPADM

## 2019-01-01 RX ORDER — MORPHINE SULFATE 10 MG/ML
7 INJECTION, SOLUTION INTRAMUSCULAR; INTRAVENOUS
Status: DISCONTINUED | OUTPATIENT
Start: 2019-01-01 | End: 2019-01-01 | Stop reason: HOSPADM

## 2019-01-01 RX ORDER — ATORVASTATIN CALCIUM 40 MG/1
40 TABLET, FILM COATED ORAL
Status: DISCONTINUED | OUTPATIENT
Start: 2019-01-01 | End: 2019-01-01 | Stop reason: HOSPADM

## 2019-01-01 RX ORDER — MORPHINE SULFATE 4 MG/ML
4 INJECTION INTRAVENOUS
Status: DISCONTINUED | OUTPATIENT
Start: 2019-01-01 | End: 2019-01-01 | Stop reason: HOSPADM

## 2019-01-01 RX ORDER — METFORMIN HYDROCHLORIDE 500 MG/1
500 TABLET ORAL 2 TIMES DAILY WITH MEALS
Qty: 30 TAB | Refills: 0 | Status: ON HOLD | OUTPATIENT
Start: 2019-01-01 | End: 2019-01-01

## 2019-01-01 RX ORDER — LIDOCAINE HYDROCHLORIDE 10 MG/ML
3-10 INJECTION INFILTRATION; PERINEURAL
Status: DISCONTINUED | OUTPATIENT
Start: 2019-01-01 | End: 2019-01-01

## 2019-01-01 RX ORDER — HEPARIN SODIUM 5000 [USP'U]/ML
4000 INJECTION, SOLUTION INTRAVENOUS; SUBCUTANEOUS
Status: COMPLETED | OUTPATIENT
Start: 2019-01-01 | End: 2019-01-01

## 2019-01-01 RX ORDER — INSULIN GLARGINE 100 [IU]/ML
18 INJECTION, SOLUTION SUBCUTANEOUS DAILY
Status: DISCONTINUED | OUTPATIENT
Start: 2019-01-01 | End: 2019-01-01

## 2019-01-01 RX ORDER — POLYETHYLENE GLYCOL 3350 17 G/17G
17 POWDER, FOR SOLUTION ORAL DAILY
Status: DISCONTINUED | OUTPATIENT
Start: 2019-01-01 | End: 2019-01-01

## 2019-01-01 RX ORDER — INSULIN GLARGINE 100 [IU]/ML
20 INJECTION, SOLUTION SUBCUTANEOUS DAILY
Status: DISCONTINUED | OUTPATIENT
Start: 2019-01-01 | End: 2019-01-01 | Stop reason: HOSPADM

## 2019-01-01 RX ORDER — HYDROCODONE BITARTRATE AND ACETAMINOPHEN 5; 325 MG/1; MG/1
1 TABLET ORAL
Status: DISCONTINUED | OUTPATIENT
Start: 2019-01-01 | End: 2019-01-01

## 2019-01-01 RX ORDER — PREDNISONE 5 MG/1
10 TABLET ORAL
Status: DISCONTINUED | OUTPATIENT
Start: 2019-01-01 | End: 2019-01-01

## 2019-01-01 RX ORDER — MORPHINE SULFATE 30 MG/1
30 TABLET ORAL 2 TIMES DAILY
Status: DISCONTINUED | OUTPATIENT
Start: 2019-01-01 | End: 2019-01-01

## 2019-01-01 RX ORDER — MIDAZOLAM HYDROCHLORIDE 1 MG/ML
.5-5 INJECTION, SOLUTION INTRAMUSCULAR; INTRAVENOUS
Status: DISCONTINUED | OUTPATIENT
Start: 2019-01-01 | End: 2019-01-01 | Stop reason: HOSPADM

## 2019-01-01 RX ORDER — PANTOPRAZOLE SODIUM 40 MG/1
40 TABLET, DELAYED RELEASE ORAL
Status: DISCONTINUED | OUTPATIENT
Start: 2019-01-01 | End: 2019-01-01

## 2019-01-01 RX ORDER — PREDNISONE 20 MG/1
20 TABLET ORAL
Status: DISCONTINUED | OUTPATIENT
Start: 2019-01-01 | End: 2019-01-01 | Stop reason: HOSPADM

## 2019-01-01 RX ORDER — ONDANSETRON 2 MG/ML
4 INJECTION INTRAMUSCULAR; INTRAVENOUS
Status: DISCONTINUED | OUTPATIENT
Start: 2019-01-01 | End: 2019-01-01 | Stop reason: HOSPADM

## 2019-01-01 RX ORDER — SODIUM CHLORIDE 9 MG/ML
75 INJECTION, SOLUTION INTRAVENOUS CONTINUOUS
Status: DISCONTINUED | OUTPATIENT
Start: 2019-01-01 | End: 2019-01-01 | Stop reason: HOSPADM

## 2019-01-01 RX ORDER — HEPARIN SODIUM 200 [USP'U]/100ML
2 INJECTION, SOLUTION INTRAVENOUS CONTINUOUS
Status: DISCONTINUED | OUTPATIENT
Start: 2019-01-01 | End: 2019-01-01

## 2019-01-01 RX ORDER — METOPROLOL TARTRATE 50 MG/1
50 TABLET ORAL EVERY 6 HOURS
Status: DISCONTINUED | OUTPATIENT
Start: 2019-01-01 | End: 2019-01-01

## 2019-01-01 RX ORDER — FENTANYL CITRATE 50 UG/ML
25-100 INJECTION, SOLUTION INTRAMUSCULAR; INTRAVENOUS
Status: DISCONTINUED | OUTPATIENT
Start: 2019-01-01 | End: 2019-01-01 | Stop reason: HOSPADM

## 2019-01-01 RX ORDER — ISOSORBIDE MONONITRATE 30 MG/1
30 TABLET, EXTENDED RELEASE ORAL DAILY
Status: DISCONTINUED | OUTPATIENT
Start: 2019-01-01 | End: 2019-01-01

## 2019-01-01 RX ORDER — MORPHINE SULFATE 2 MG/ML
2 INJECTION, SOLUTION INTRAMUSCULAR; INTRAVENOUS ONCE
Status: COMPLETED | OUTPATIENT
Start: 2019-01-01 | End: 2019-01-01

## 2019-01-01 RX ORDER — FAMOTIDINE 20 MG/1
10 TABLET, FILM COATED ORAL 2 TIMES DAILY
Status: DISCONTINUED | OUTPATIENT
Start: 2019-01-01 | End: 2019-01-01 | Stop reason: HOSPADM

## 2019-01-01 RX ORDER — DIPHENHYDRAMINE HCL 25 MG
25 CAPSULE ORAL
Status: DISCONTINUED | OUTPATIENT
Start: 2019-01-01 | End: 2019-01-01 | Stop reason: HOSPADM

## 2019-01-01 RX ORDER — ENOXAPARIN SODIUM 100 MG/ML
40 INJECTION SUBCUTANEOUS EVERY 24 HOURS
Status: DISCONTINUED | OUTPATIENT
Start: 2019-01-01 | End: 2019-01-01

## 2019-01-01 RX ORDER — INSULIN LISPRO 100 [IU]/ML
5 INJECTION, SOLUTION INTRAVENOUS; SUBCUTANEOUS
Status: DISCONTINUED | OUTPATIENT
Start: 2019-01-01 | End: 2019-01-01

## 2019-01-01 RX ORDER — ACETAMINOPHEN 325 MG/1
650 TABLET ORAL
Status: DISCONTINUED | OUTPATIENT
Start: 2019-01-01 | End: 2019-01-01 | Stop reason: HOSPADM

## 2019-01-01 RX ORDER — ESOMEPRAZOLE MAGNESIUM 40 MG/1
CAPSULE, DELAYED RELEASE ORAL DAILY
COMMUNITY

## 2019-01-01 RX ORDER — DOCUSATE SODIUM 100 MG/1
100 CAPSULE, LIQUID FILLED ORAL
Status: DISCONTINUED | OUTPATIENT
Start: 2019-01-01 | End: 2019-01-01

## 2019-01-01 RX ORDER — ATROPINE SULFATE 0.1 MG/ML
INJECTION INTRAVENOUS
Status: DISCONTINUED
Start: 2019-01-01 | End: 2019-01-01 | Stop reason: WASHOUT

## 2019-01-01 RX ORDER — SODIUM CHLORIDE 0.9 % (FLUSH) 0.9 %
5-40 SYRINGE (ML) INJECTION AS NEEDED
Status: DISCONTINUED | OUTPATIENT
Start: 2019-01-01 | End: 2019-01-01 | Stop reason: HOSPADM

## 2019-01-01 RX ORDER — INSULIN LISPRO 100 [IU]/ML
INJECTION, SOLUTION INTRAVENOUS; SUBCUTANEOUS EVERY 4 HOURS
Status: DISCONTINUED | OUTPATIENT
Start: 2019-01-01 | End: 2019-01-01

## 2019-01-01 RX ORDER — LIDOCAINE HYDROCHLORIDE 10 MG/ML
5-40 INJECTION INFILTRATION; PERINEURAL
Status: DISCONTINUED | OUTPATIENT
Start: 2019-01-01 | End: 2019-01-01 | Stop reason: HOSPADM

## 2019-01-01 RX ORDER — INSULIN LISPRO 100 [IU]/ML
5 INJECTION, SOLUTION INTRAVENOUS; SUBCUTANEOUS
Status: COMPLETED | OUTPATIENT
Start: 2019-01-01 | End: 2019-01-01

## 2019-01-01 RX ORDER — MORPHINE SULFATE 2 MG/ML
5 INJECTION, SOLUTION INTRAMUSCULAR; INTRAVENOUS
Status: DISCONTINUED | OUTPATIENT
Start: 2019-01-01 | End: 2019-01-01

## 2019-01-01 RX ORDER — ASPIRIN 81 MG/1
81 TABLET ORAL DAILY
Status: DISCONTINUED | OUTPATIENT
Start: 2019-01-01 | End: 2019-01-01

## 2019-01-01 RX ORDER — SODIUM CHLORIDE 0.9 % (FLUSH) 0.9 %
10 SYRINGE (ML) INJECTION
Status: COMPLETED | OUTPATIENT
Start: 2019-01-01 | End: 2019-01-01

## 2019-01-01 RX ORDER — SODIUM CHLORIDE 0.9 % (FLUSH) 0.9 %
5-40 SYRINGE (ML) INJECTION EVERY 8 HOURS
Status: DISCONTINUED | OUTPATIENT
Start: 2019-01-01 | End: 2019-01-01 | Stop reason: HOSPADM

## 2019-01-01 RX ORDER — IPRATROPIUM BROMIDE AND ALBUTEROL SULFATE 2.5; .5 MG/3ML; MG/3ML
3 SOLUTION RESPIRATORY (INHALATION)
Status: DISCONTINUED | OUTPATIENT
Start: 2019-01-01 | End: 2019-01-01

## 2019-01-01 RX ORDER — PREDNISONE 20 MG/1
20 TABLET ORAL
COMMUNITY

## 2019-01-01 RX ORDER — ASPIRIN 81 MG/1
81 TABLET ORAL
Status: DISCONTINUED | OUTPATIENT
Start: 2019-01-01 | End: 2019-01-01 | Stop reason: HOSPADM

## 2019-01-01 RX ORDER — DEXTROSE 40 %
15 GEL (GRAM) ORAL AS NEEDED
Status: DISCONTINUED | OUTPATIENT
Start: 2019-01-01 | End: 2019-01-01 | Stop reason: HOSPADM

## 2019-01-01 RX ORDER — FACIAL-BODY WIPES
10 EACH TOPICAL DAILY PRN
Status: DISCONTINUED | OUTPATIENT
Start: 2019-01-01 | End: 2019-01-01 | Stop reason: HOSPADM

## 2019-01-01 RX ORDER — SODIUM CHLORIDE 0.9 % (FLUSH) 0.9 %
5-40 SYRINGE (ML) INJECTION EVERY 8 HOURS
Status: DISCONTINUED | OUTPATIENT
Start: 2019-01-01 | End: 2019-01-01

## 2019-01-01 RX ORDER — PREDNISONE 20 MG/1
20 TABLET ORAL 2 TIMES DAILY
Status: DISCONTINUED | OUTPATIENT
Start: 2019-01-01 | End: 2019-01-01

## 2019-01-01 RX ORDER — INSULIN LISPRO 100 [IU]/ML
5 INJECTION, SOLUTION INTRAVENOUS; SUBCUTANEOUS
Status: DISCONTINUED | OUTPATIENT
Start: 2019-01-01 | End: 2019-01-01 | Stop reason: SDUPTHER

## 2019-01-01 RX ORDER — SYRINGE-NEEDLE,INSULIN,0.5 ML 31 GX5/16"
SYRINGE, EMPTY DISPOSABLE MISCELLANEOUS
Qty: 2 SYRINGE | Refills: 5 | Status: SHIPPED | OUTPATIENT
Start: 2019-01-01

## 2019-01-01 RX ORDER — INSULIN LISPRO 100 [IU]/ML
4 INJECTION, SOLUTION INTRAVENOUS; SUBCUTANEOUS ONCE
Status: COMPLETED | OUTPATIENT
Start: 2019-01-01 | End: 2019-01-01

## 2019-01-01 RX ORDER — HYDROCODONE BITARTRATE AND ACETAMINOPHEN 7.5; 325 MG/1; MG/1
1 TABLET ORAL
Status: DISCONTINUED | OUTPATIENT
Start: 2019-01-01 | End: 2019-01-01 | Stop reason: HOSPADM

## 2019-01-01 RX ORDER — IPRATROPIUM BROMIDE AND ALBUTEROL SULFATE 2.5; .5 MG/3ML; MG/3ML
3 SOLUTION RESPIRATORY (INHALATION)
Status: COMPLETED | OUTPATIENT
Start: 2019-01-01 | End: 2019-01-01

## 2019-01-01 RX ORDER — BIVALIRUDIN 250 MG/5ML
0.75 INJECTION, POWDER, LYOPHILIZED, FOR SOLUTION INTRAVENOUS ONCE
Status: COMPLETED | OUTPATIENT
Start: 2019-01-01 | End: 2019-01-01

## 2019-01-01 RX ORDER — ISOSORBIDE MONONITRATE 30 MG/1
30 TABLET, EXTENDED RELEASE ORAL DAILY
COMMUNITY

## 2019-01-01 RX ORDER — ATORVASTATIN CALCIUM 40 MG/1
80 TABLET, FILM COATED ORAL DAILY
Status: DISCONTINUED | OUTPATIENT
Start: 2019-01-01 | End: 2019-01-01

## 2019-01-01 RX ORDER — PREDNISONE 5 MG/1
10 TABLET ORAL
Status: DISCONTINUED | OUTPATIENT
Start: 2019-01-01 | End: 2019-01-01 | Stop reason: HOSPADM

## 2019-01-01 RX ORDER — SODIUM CHLORIDE 9 MG/ML
50 INJECTION, SOLUTION INTRAVENOUS CONTINUOUS
Status: DISPENSED | OUTPATIENT
Start: 2019-01-01 | End: 2019-01-01

## 2019-01-01 RX ORDER — LISINOPRIL 5 MG/1
2.5 TABLET ORAL DAILY
Status: DISCONTINUED | OUTPATIENT
Start: 2019-01-01 | End: 2019-01-01

## 2019-01-01 RX ORDER — LEVOFLOXACIN 5 MG/ML
750 INJECTION, SOLUTION INTRAVENOUS EVERY 24 HOURS
Status: DISCONTINUED | OUTPATIENT
Start: 2019-01-01 | End: 2019-01-01

## 2019-01-01 RX ORDER — LISINOPRIL 5 MG/1
2.5 TABLET ORAL DAILY
Status: DISCONTINUED | OUTPATIENT
Start: 2019-01-01 | End: 2019-01-01 | Stop reason: HOSPADM

## 2019-01-01 RX ORDER — ALBUTEROL SULFATE 0.83 MG/ML
2.5 SOLUTION RESPIRATORY (INHALATION)
Status: DISCONTINUED | OUTPATIENT
Start: 2019-01-01 | End: 2019-01-01 | Stop reason: HOSPADM

## 2019-01-01 RX ORDER — DEXTROSE MONOHYDRATE 25 G/50ML
25-50 INJECTION, SOLUTION INTRAVENOUS AS NEEDED
Status: DISCONTINUED | OUTPATIENT
Start: 2019-01-01 | End: 2019-01-01 | Stop reason: HOSPADM

## 2019-01-01 RX ORDER — LANCETS
EACH MISCELLANEOUS
Qty: 1 EACH | Refills: 11 | Status: SHIPPED | OUTPATIENT
Start: 2019-01-01

## 2019-01-01 RX ADMIN — PANTOPRAZOLE SODIUM 40 MG: 40 TABLET, DELAYED RELEASE ORAL at 05:59

## 2019-01-01 RX ADMIN — TICAGRELOR 90 MG: 90 TABLET ORAL at 08:58

## 2019-01-01 RX ADMIN — IPRATROPIUM BROMIDE AND ALBUTEROL SULFATE 3 ML: .5; 3 SOLUTION RESPIRATORY (INHALATION) at 15:07

## 2019-01-01 RX ADMIN — METHYLPREDNISOLONE SODIUM SUCCINATE 60 MG: 125 INJECTION, POWDER, FOR SOLUTION INTRAMUSCULAR; INTRAVENOUS at 21:32

## 2019-01-01 RX ADMIN — TICAGRELOR 90 MG: 90 TABLET ORAL at 22:07

## 2019-01-01 RX ADMIN — HALOPERIDOL LACTATE 2 MG: 5 INJECTION INTRAMUSCULAR at 11:41

## 2019-01-01 RX ADMIN — PANTOPRAZOLE SODIUM 40 MG: 40 TABLET, DELAYED RELEASE ORAL at 05:01

## 2019-01-01 RX ADMIN — MORPHINE SULFATE 5 MG: 2 INJECTION, SOLUTION INTRAMUSCULAR; INTRAVENOUS at 01:44

## 2019-01-01 RX ADMIN — MORPHINE SULFATE 7 MG: 10 INJECTION, SOLUTION INTRAMUSCULAR; INTRAVENOUS at 03:20

## 2019-01-01 RX ADMIN — HALOPERIDOL LACTATE 2 MG: 5 INJECTION INTRAMUSCULAR at 03:16

## 2019-01-01 RX ADMIN — METOPROLOL TARTRATE 50 MG: 50 TABLET, FILM COATED ORAL at 01:35

## 2019-01-01 RX ADMIN — INSULIN LISPRO 6 UNITS: 100 INJECTION, SOLUTION INTRAVENOUS; SUBCUTANEOUS at 17:51

## 2019-01-01 RX ADMIN — PREDNISONE 20 MG: 20 TABLET ORAL at 08:03

## 2019-01-01 RX ADMIN — INSULIN LISPRO 6 UNITS: 100 INJECTION, SOLUTION INTRAVENOUS; SUBCUTANEOUS at 08:28

## 2019-01-01 RX ADMIN — METOPROLOL TARTRATE 25 MG: 25 TABLET, FILM COATED ORAL at 21:19

## 2019-01-01 RX ADMIN — HEPARIN SODIUM 6000 UNITS: 10000 INJECTION INTRAVENOUS; SUBCUTANEOUS at 12:26

## 2019-01-01 RX ADMIN — IPRATROPIUM BROMIDE AND ALBUTEROL SULFATE 3 ML: .5; 3 SOLUTION RESPIRATORY (INHALATION) at 08:30

## 2019-01-01 RX ADMIN — LORAZEPAM 0.5 MG: 2 INJECTION INTRAMUSCULAR; INTRAVENOUS at 22:02

## 2019-01-01 RX ADMIN — INSULIN LISPRO 2 UNITS: 100 INJECTION, SOLUTION INTRAVENOUS; SUBCUTANEOUS at 21:20

## 2019-01-01 RX ADMIN — METHYLPREDNISOLONE SODIUM SUCCINATE 60 MG: 125 INJECTION, POWDER, FOR SOLUTION INTRAMUSCULAR; INTRAVENOUS at 21:21

## 2019-01-01 RX ADMIN — MIDAZOLAM HYDROCHLORIDE 2 MG: 1 INJECTION, SOLUTION INTRAMUSCULAR; INTRAVENOUS at 11:50

## 2019-01-01 RX ADMIN — SENNOSIDES, DOCUSATE SODIUM 1 TABLET: 50; 8.6 TABLET, FILM COATED ORAL at 08:58

## 2019-01-01 RX ADMIN — INSULIN LISPRO 4 UNITS: 100 INJECTION, SOLUTION INTRAVENOUS; SUBCUTANEOUS at 13:49

## 2019-01-01 RX ADMIN — INSULIN LISPRO 12 UNITS: 100 INJECTION, SOLUTION INTRAVENOUS; SUBCUTANEOUS at 07:40

## 2019-01-01 RX ADMIN — INSULIN LISPRO 4 UNITS: 100 INJECTION, SOLUTION INTRAVENOUS; SUBCUTANEOUS at 17:17

## 2019-01-01 RX ADMIN — NITROGLYCERIN 1 INCH: 20 OINTMENT TOPICAL at 00:59

## 2019-01-01 RX ADMIN — LORAZEPAM 0.5 MG: 2 INJECTION, SOLUTION INTRAMUSCULAR; INTRAVENOUS at 05:58

## 2019-01-01 RX ADMIN — INSULIN LISPRO 8 UNITS: 100 INJECTION, SOLUTION INTRAVENOUS; SUBCUTANEOUS at 16:29

## 2019-01-01 RX ADMIN — MORPHINE SULFATE 30 MG: 30 TABLET ORAL at 10:27

## 2019-01-01 RX ADMIN — ISOSORBIDE MONONITRATE 30 MG: 30 TABLET, EXTENDED RELEASE ORAL at 08:57

## 2019-01-01 RX ADMIN — NITROGLYCERIN 1 INCH: 20 OINTMENT TOPICAL at 17:17

## 2019-01-01 RX ADMIN — TUBERCULIN PURIFIED PROTEIN DERIVATIVE 5 UNITS: 5 INJECTION, SOLUTION INTRADERMAL at 18:47

## 2019-01-01 RX ADMIN — METOPROLOL TARTRATE 25 MG: 25 TABLET, FILM COATED ORAL at 08:36

## 2019-01-01 RX ADMIN — IOPAMIDOL 240 ML: 755 INJECTION, SOLUTION INTRAVENOUS at 13:28

## 2019-01-01 RX ADMIN — MORPHINE SULFATE 30 MG: 30 TABLET ORAL at 08:57

## 2019-01-01 RX ADMIN — IPRATROPIUM BROMIDE AND ALBUTEROL SULFATE 3 ML: .5; 3 SOLUTION RESPIRATORY (INHALATION) at 11:53

## 2019-01-01 RX ADMIN — MORPHINE SULFATE 5 MG: 2 INJECTION, SOLUTION INTRAMUSCULAR; INTRAVENOUS at 23:43

## 2019-01-01 RX ADMIN — PANTOPRAZOLE SODIUM 40 MG: 40 TABLET, DELAYED RELEASE ORAL at 08:57

## 2019-01-01 RX ADMIN — Medication 10 ML: at 05:58

## 2019-01-01 RX ADMIN — Medication 5 ML: at 06:35

## 2019-01-01 RX ADMIN — FAMOTIDINE 10 MG: 20 TABLET ORAL at 17:57

## 2019-01-01 RX ADMIN — Medication 10 ML: at 13:47

## 2019-01-01 RX ADMIN — METOPROLOL TARTRATE 50 MG: 50 TABLET, FILM COATED ORAL at 20:00

## 2019-01-01 RX ADMIN — MORPHINE SULFATE 2 MG: 2 INJECTION, SOLUTION INTRAMUSCULAR; INTRAVENOUS at 22:48

## 2019-01-01 RX ADMIN — ENOXAPARIN SODIUM 40 MG: 40 INJECTION SUBCUTANEOUS at 13:03

## 2019-01-01 RX ADMIN — RANOLAZINE 500 MG: 500 TABLET, FILM COATED, EXTENDED RELEASE ORAL at 11:05

## 2019-01-01 RX ADMIN — INSULIN LISPRO 8 UNITS: 100 INJECTION, SOLUTION INTRAVENOUS; SUBCUTANEOUS at 21:24

## 2019-01-01 RX ADMIN — FUROSEMIDE 40 MG: 10 INJECTION, SOLUTION INTRAMUSCULAR; INTRAVENOUS at 19:33

## 2019-01-01 RX ADMIN — METHYLPREDNISOLONE SODIUM SUCCINATE 125 MG: 125 INJECTION, POWDER, FOR SOLUTION INTRAMUSCULAR; INTRAVENOUS at 08:30

## 2019-01-01 RX ADMIN — METOPROLOL TARTRATE 50 MG: 50 TABLET, FILM COATED ORAL at 19:57

## 2019-01-01 RX ADMIN — Medication 10 ML: at 14:00

## 2019-01-01 RX ADMIN — IPRATROPIUM BROMIDE AND ALBUTEROL SULFATE 3 ML: .5; 3 SOLUTION RESPIRATORY (INHALATION) at 15:47

## 2019-01-01 RX ADMIN — HALOPERIDOL LACTATE 2 MG: 5 INJECTION INTRAMUSCULAR at 21:06

## 2019-01-01 RX ADMIN — Medication 5 ML: at 15:39

## 2019-01-01 RX ADMIN — TICAGRELOR 90 MG: 90 TABLET ORAL at 08:57

## 2019-01-01 RX ADMIN — TICAGRELOR 90 MG: 90 TABLET ORAL at 08:16

## 2019-01-01 RX ADMIN — IPRATROPIUM BROMIDE AND ALBUTEROL SULFATE 3 ML: .5; 3 SOLUTION RESPIRATORY (INHALATION) at 04:05

## 2019-01-01 RX ADMIN — FAMOTIDINE 10 MG: 20 TABLET ORAL at 17:33

## 2019-01-01 RX ADMIN — ASPIRIN 81 MG: 81 TABLET, COATED ORAL at 08:36

## 2019-01-01 RX ADMIN — MORPHINE SULFATE 2 MG: 2 INJECTION, SOLUTION INTRAMUSCULAR; INTRAVENOUS at 12:02

## 2019-01-01 RX ADMIN — METHYLPREDNISOLONE SODIUM SUCCINATE 60 MG: 125 INJECTION, POWDER, FOR SOLUTION INTRAMUSCULAR; INTRAVENOUS at 13:44

## 2019-01-01 RX ADMIN — NITROGLYCERIN 0.4 MG: 0.4 TABLET, ORALLY DISINTEGRATING SUBLINGUAL at 10:46

## 2019-01-01 RX ADMIN — METHYLPREDNISOLONE SODIUM SUCCINATE 60 MG: 125 INJECTION, POWDER, FOR SOLUTION INTRAMUSCULAR; INTRAVENOUS at 10:55

## 2019-01-01 RX ADMIN — IPRATROPIUM BROMIDE AND ALBUTEROL SULFATE 3 ML: .5; 3 SOLUTION RESPIRATORY (INHALATION) at 04:03

## 2019-01-01 RX ADMIN — ENOXAPARIN SODIUM 40 MG: 40 INJECTION SUBCUTANEOUS at 13:48

## 2019-01-01 RX ADMIN — MORPHINE SULFATE 5 MG: 2 INJECTION, SOLUTION INTRAMUSCULAR; INTRAVENOUS at 20:52

## 2019-01-01 RX ADMIN — ASPIRIN 81 MG: 81 TABLET, COATED ORAL at 06:35

## 2019-01-01 RX ADMIN — LISINOPRIL 2.5 MG: 5 TABLET ORAL at 09:29

## 2019-01-01 RX ADMIN — LORAZEPAM 1 MG: 2 INJECTION INTRAMUSCULAR; INTRAVENOUS at 15:31

## 2019-01-01 RX ADMIN — Medication 10 ML: at 06:02

## 2019-01-01 RX ADMIN — TICAGRELOR 90 MG: 90 TABLET ORAL at 11:16

## 2019-01-01 RX ADMIN — INSULIN LISPRO 4 UNITS: 100 INJECTION, SOLUTION INTRAVENOUS; SUBCUTANEOUS at 13:30

## 2019-01-01 RX ADMIN — MORPHINE SULFATE 1 MG: 2 INJECTION, SOLUTION INTRAMUSCULAR; INTRAVENOUS at 10:08

## 2019-01-01 RX ADMIN — INSULIN LISPRO 6 UNITS: 100 INJECTION, SOLUTION INTRAVENOUS; SUBCUTANEOUS at 21:41

## 2019-01-01 RX ADMIN — NITROGLYCERIN 1 INCH: 20 OINTMENT TOPICAL at 05:46

## 2019-01-01 RX ADMIN — METHYLPREDNISOLONE SODIUM SUCCINATE 60 MG: 125 INJECTION, POWDER, FOR SOLUTION INTRAMUSCULAR; INTRAVENOUS at 21:02

## 2019-01-01 RX ADMIN — Medication 10 ML: at 06:01

## 2019-01-01 RX ADMIN — ACETAMINOPHEN 650 MG: 325 TABLET, FILM COATED ORAL at 17:08

## 2019-01-01 RX ADMIN — LORAZEPAM 1 MG: 2 INJECTION INTRAMUSCULAR; INTRAVENOUS at 08:22

## 2019-01-01 RX ADMIN — ALBUTEROL SULFATE 2.5 MG: 2.5 SOLUTION RESPIRATORY (INHALATION) at 14:22

## 2019-01-01 RX ADMIN — MORPHINE SULFATE 2 MG: 2 INJECTION, SOLUTION INTRAMUSCULAR; INTRAVENOUS at 18:53

## 2019-01-01 RX ADMIN — IPRATROPIUM BROMIDE AND ALBUTEROL SULFATE 3 ML: .5; 3 SOLUTION RESPIRATORY (INHALATION) at 04:06

## 2019-01-01 RX ADMIN — METOPROLOL TARTRATE 12.5 MG: 25 TABLET ORAL at 09:29

## 2019-01-01 RX ADMIN — TICAGRELOR 90 MG: 90 TABLET ORAL at 21:18

## 2019-01-01 RX ADMIN — HEPARIN SODIUM 12 UNITS/KG/HR: 5000 INJECTION, SOLUTION INTRAVENOUS at 12:02

## 2019-01-01 RX ADMIN — METOPROLOL TARTRATE 12.5 MG: 25 TABLET ORAL at 21:20

## 2019-01-01 RX ADMIN — METOPROLOL TARTRATE 50 MG: 50 TABLET, FILM COATED ORAL at 08:16

## 2019-01-01 RX ADMIN — IPRATROPIUM BROMIDE AND ALBUTEROL SULFATE 3 ML: .5; 3 SOLUTION RESPIRATORY (INHALATION) at 03:56

## 2019-01-01 RX ADMIN — BUDESONIDE 500 MCG: 0.5 INHALANT RESPIRATORY (INHALATION) at 08:02

## 2019-01-01 RX ADMIN — PREDNISONE 30 MG: 10 TABLET ORAL at 13:48

## 2019-01-01 RX ADMIN — INSULIN GLARGINE 20 UNITS: 100 INJECTION, SOLUTION SUBCUTANEOUS at 16:28

## 2019-01-01 RX ADMIN — LORAZEPAM 1 MG: 2 INJECTION INTRAMUSCULAR; INTRAVENOUS at 22:27

## 2019-01-01 RX ADMIN — INSULIN LISPRO 9 UNITS: 100 INJECTION, SOLUTION INTRAVENOUS; SUBCUTANEOUS at 06:16

## 2019-01-01 RX ADMIN — INSULIN LISPRO 9 UNITS: 100 INJECTION, SOLUTION INTRAVENOUS; SUBCUTANEOUS at 12:57

## 2019-01-01 RX ADMIN — ATORVASTATIN CALCIUM 80 MG: 40 TABLET, FILM COATED ORAL at 08:36

## 2019-01-01 RX ADMIN — MIDAZOLAM HYDROCHLORIDE 2 MG: 1 INJECTION, SOLUTION INTRAMUSCULAR; INTRAVENOUS at 10:05

## 2019-01-01 RX ADMIN — METOPROLOL TARTRATE 50 MG: 50 TABLET, FILM COATED ORAL at 13:45

## 2019-01-01 RX ADMIN — IPRATROPIUM BROMIDE AND ALBUTEROL SULFATE 3 ML: .5; 3 SOLUTION RESPIRATORY (INHALATION) at 19:26

## 2019-01-01 RX ADMIN — METOPROLOL TARTRATE 50 MG: 50 TABLET, FILM COATED ORAL at 20:12

## 2019-01-01 RX ADMIN — ASPIRIN 81 MG: 81 TABLET, COATED ORAL at 08:56

## 2019-01-01 RX ADMIN — IPRATROPIUM BROMIDE AND ALBUTEROL SULFATE 3 ML: .5; 3 SOLUTION RESPIRATORY (INHALATION) at 08:02

## 2019-01-01 RX ADMIN — METOPROLOL TARTRATE 12.5 MG: 25 TABLET ORAL at 12:53

## 2019-01-01 RX ADMIN — Medication 5 ML: at 23:03

## 2019-01-01 RX ADMIN — MORPHINE SULFATE 30 MG: 30 TABLET ORAL at 21:40

## 2019-01-01 RX ADMIN — HEPARIN SODIUM 2 UNITS/HR: 5000 INJECTION, SOLUTION INTRAVENOUS; SUBCUTANEOUS at 11:31

## 2019-01-01 RX ADMIN — INSULIN LISPRO 4 UNITS: 100 INJECTION, SOLUTION INTRAVENOUS; SUBCUTANEOUS at 12:35

## 2019-01-01 RX ADMIN — BIVALIRUDIN 83.4 MG: 250 INJECTION, POWDER, LYOPHILIZED, FOR SOLUTION INTRAVENOUS at 10:20

## 2019-01-01 RX ADMIN — PREDNISONE 10 MG: 5 TABLET ORAL at 15:32

## 2019-01-01 RX ADMIN — FAMOTIDINE 10 MG: 20 TABLET ORAL at 09:00

## 2019-01-01 RX ADMIN — METOPROLOL TARTRATE 12.5 MG: 25 TABLET ORAL at 22:02

## 2019-01-01 RX ADMIN — NITROGLYCERIN 0.4 MG: 0.4 TABLET, ORALLY DISINTEGRATING SUBLINGUAL at 06:20

## 2019-01-01 RX ADMIN — PREDNISONE 20 MG: 20 TABLET ORAL at 08:29

## 2019-01-01 RX ADMIN — VERAPAMIL HYDROCHLORIDE 2 ML: 2.5 INJECTION, SOLUTION INTRAVENOUS at 12:01

## 2019-01-01 RX ADMIN — MIDAZOLAM HYDROCHLORIDE 2 MG: 1 INJECTION, SOLUTION INTRAMUSCULAR; INTRAVENOUS at 12:40

## 2019-01-01 RX ADMIN — HALOPERIDOL LACTATE 2 MG: 5 INJECTION INTRAMUSCULAR at 07:22

## 2019-01-01 RX ADMIN — METHYLPREDNISOLONE SODIUM SUCCINATE 60 MG: 125 INJECTION, POWDER, FOR SOLUTION INTRAMUSCULAR; INTRAVENOUS at 21:40

## 2019-01-01 RX ADMIN — SENNOSIDES, DOCUSATE SODIUM 1 TABLET: 50; 8.6 TABLET, FILM COATED ORAL at 08:15

## 2019-01-01 RX ADMIN — IPRATROPIUM BROMIDE AND ALBUTEROL SULFATE 3 ML: .5; 3 SOLUTION RESPIRATORY (INHALATION) at 23:24

## 2019-01-01 RX ADMIN — INSULIN LISPRO 5 UNITS: 100 INJECTION, SOLUTION INTRAVENOUS; SUBCUTANEOUS at 08:03

## 2019-01-01 RX ADMIN — Medication 10 ML: at 13:04

## 2019-01-01 RX ADMIN — Medication 5 ML: at 13:48

## 2019-01-01 RX ADMIN — HEPARIN SODIUM 3 ML/HR: 5000 INJECTION, SOLUTION INTRAVENOUS; SUBCUTANEOUS at 10:43

## 2019-01-01 RX ADMIN — Medication 10 ML: at 06:17

## 2019-01-01 RX ADMIN — ENOXAPARIN SODIUM 40 MG: 40 INJECTION SUBCUTANEOUS at 15:03

## 2019-01-01 RX ADMIN — INSULIN LISPRO 15 UNITS: 100 INJECTION, SOLUTION INTRAVENOUS; SUBCUTANEOUS at 12:44

## 2019-01-01 RX ADMIN — ACETAMINOPHEN 650 MG: 325 TABLET, FILM COATED ORAL at 00:07

## 2019-01-01 RX ADMIN — ALBUTEROL SULFATE 2.5 MG: 2.5 SOLUTION RESPIRATORY (INHALATION) at 20:09

## 2019-01-01 RX ADMIN — METOPROLOL TARTRATE 50 MG: 50 TABLET, FILM COATED ORAL at 20:18

## 2019-01-01 RX ADMIN — PREDNISONE 30 MG: 10 TABLET ORAL at 09:31

## 2019-01-01 RX ADMIN — INSULIN LISPRO 5 UNITS: 100 INJECTION, SOLUTION INTRAVENOUS; SUBCUTANEOUS at 13:33

## 2019-01-01 RX ADMIN — RANOLAZINE 500 MG: 500 TABLET, FILM COATED, EXTENDED RELEASE ORAL at 21:41

## 2019-01-01 RX ADMIN — MORPHINE SULFATE 2 MG: 2 INJECTION, SOLUTION INTRAMUSCULAR; INTRAVENOUS at 03:12

## 2019-01-01 RX ADMIN — PREDNISONE 10 MG: 5 TABLET ORAL at 14:48

## 2019-01-01 RX ADMIN — TICAGRELOR 90 MG: 90 TABLET ORAL at 21:40

## 2019-01-01 RX ADMIN — Medication 10 ML: at 21:32

## 2019-01-01 RX ADMIN — MORPHINE SULFATE 2 MG: 2 INJECTION, SOLUTION INTRAMUSCULAR; INTRAVENOUS at 06:19

## 2019-01-01 RX ADMIN — ASPIRIN 81 MG: 81 TABLET, COATED ORAL at 12:53

## 2019-01-01 RX ADMIN — HALOPERIDOL LACTATE 2 MG: 5 INJECTION INTRAMUSCULAR at 16:49

## 2019-01-01 RX ADMIN — IPRATROPIUM BROMIDE AND ALBUTEROL SULFATE 3 ML: .5; 3 SOLUTION RESPIRATORY (INHALATION) at 19:23

## 2019-01-01 RX ADMIN — BUDESONIDE 500 MCG: 0.5 INHALANT RESPIRATORY (INHALATION) at 19:26

## 2019-01-01 RX ADMIN — ACETAMINOPHEN 650 MG: 325 TABLET, FILM COATED ORAL at 01:12

## 2019-01-01 RX ADMIN — MORPHINE SULFATE 5 MG: 2 INJECTION, SOLUTION INTRAMUSCULAR; INTRAVENOUS at 13:53

## 2019-01-01 RX ADMIN — NITROGLYCERIN 0.4 MG: 0.4 TABLET, ORALLY DISINTEGRATING SUBLINGUAL at 10:40

## 2019-01-01 RX ADMIN — LORAZEPAM 0.5 MG: 2 INJECTION INTRAMUSCULAR; INTRAVENOUS at 00:16

## 2019-01-01 RX ADMIN — IPRATROPIUM BROMIDE AND ALBUTEROL SULFATE 3 ML: .5; 3 SOLUTION RESPIRATORY (INHALATION) at 08:09

## 2019-01-01 RX ADMIN — LORAZEPAM 0.5 MG: 2 INJECTION INTRAMUSCULAR; INTRAVENOUS at 17:48

## 2019-01-01 RX ADMIN — LEVOFLOXACIN 750 MG: 5 INJECTION, SOLUTION INTRAVENOUS at 12:52

## 2019-01-01 RX ADMIN — ACETAMINOPHEN 650 MG: 325 TABLET, FILM COATED ORAL at 00:31

## 2019-01-01 RX ADMIN — LORAZEPAM 0.5 MG: 2 INJECTION INTRAMUSCULAR; INTRAVENOUS at 21:03

## 2019-01-01 RX ADMIN — TICAGRELOR 90 MG: 90 TABLET ORAL at 21:52

## 2019-01-01 RX ADMIN — BUDESONIDE 500 MCG: 0.5 INHALANT RESPIRATORY (INHALATION) at 20:11

## 2019-01-01 RX ADMIN — MORPHINE SULFATE 30 MG: 30 TABLET ORAL at 20:42

## 2019-01-01 RX ADMIN — FAMOTIDINE 10 MG: 20 TABLET ORAL at 08:29

## 2019-01-01 RX ADMIN — LIDOCAINE HYDROCHLORIDE 10 ML: 10 INJECTION, SOLUTION INFILTRATION; PERINEURAL at 10:14

## 2019-01-01 RX ADMIN — PANTOPRAZOLE SODIUM 40 MG: 40 TABLET, DELAYED RELEASE ORAL at 07:43

## 2019-01-01 RX ADMIN — LISINOPRIL 2.5 MG: 5 TABLET ORAL at 09:00

## 2019-01-01 RX ADMIN — INSULIN GLARGINE 20 UNITS: 100 INJECTION, SOLUTION SUBCUTANEOUS at 11:50

## 2019-01-01 RX ADMIN — ACETAMINOPHEN 650 MG: 325 TABLET, FILM COATED ORAL at 00:32

## 2019-01-01 RX ADMIN — FENTANYL CITRATE 50 MCG: 50 INJECTION, SOLUTION INTRAMUSCULAR; INTRAVENOUS at 10:28

## 2019-01-01 RX ADMIN — LORAZEPAM 0.5 MG: 2 INJECTION INTRAMUSCULAR; INTRAVENOUS at 11:17

## 2019-01-01 RX ADMIN — INSULIN LISPRO 4 UNITS: 100 INJECTION, SOLUTION INTRAVENOUS; SUBCUTANEOUS at 17:28

## 2019-01-01 RX ADMIN — METOPROLOL TARTRATE 50 MG: 50 TABLET, FILM COATED ORAL at 01:34

## 2019-01-01 RX ADMIN — ENOXAPARIN SODIUM 40 MG: 40 INJECTION SUBCUTANEOUS at 13:14

## 2019-01-01 RX ADMIN — METOPROLOL TARTRATE 12.5 MG: 25 TABLET ORAL at 15:32

## 2019-01-01 RX ADMIN — ATORVASTATIN CALCIUM 40 MG: 40 TABLET, FILM COATED ORAL at 21:24

## 2019-01-01 RX ADMIN — NITROGLYCERIN 1 INCH: 20 OINTMENT TOPICAL at 11:55

## 2019-01-01 RX ADMIN — ASPIRIN 81 MG: 81 TABLET, COATED ORAL at 08:57

## 2019-01-01 RX ADMIN — MORPHINE SULFATE 2 MG: 2 INJECTION, SOLUTION INTRAMUSCULAR; INTRAVENOUS at 22:50

## 2019-01-01 RX ADMIN — Medication 10 ML: at 14:49

## 2019-01-01 RX ADMIN — RANOLAZINE 500 MG: 500 TABLET, FILM COATED, EXTENDED RELEASE ORAL at 10:28

## 2019-01-01 RX ADMIN — INSULIN LISPRO 9 UNITS: 100 INJECTION, SOLUTION INTRAVENOUS; SUBCUTANEOUS at 17:08

## 2019-01-01 RX ADMIN — TICAGRELOR 90 MG: 90 TABLET ORAL at 21:31

## 2019-01-01 RX ADMIN — METHYLPREDNISOLONE SODIUM SUCCINATE 60 MG: 125 INJECTION, POWDER, FOR SOLUTION INTRAMUSCULAR; INTRAVENOUS at 21:52

## 2019-01-01 RX ADMIN — MIDAZOLAM HYDROCHLORIDE 2 MG: 1 INJECTION, SOLUTION INTRAMUSCULAR; INTRAVENOUS at 12:52

## 2019-01-01 RX ADMIN — METHYLPREDNISOLONE SODIUM SUCCINATE 60 MG: 125 INJECTION, POWDER, FOR SOLUTION INTRAMUSCULAR; INTRAVENOUS at 06:01

## 2019-01-01 RX ADMIN — ASPIRIN 81 MG: 81 TABLET, COATED ORAL at 06:08

## 2019-01-01 RX ADMIN — TICAGRELOR 90 MG: 90 TABLET ORAL at 21:20

## 2019-01-01 RX ADMIN — NITROGLYCERIN 0.4 MG: 0.4 TABLET, ORALLY DISINTEGRATING SUBLINGUAL at 06:28

## 2019-01-01 RX ADMIN — BUDESONIDE 500 MCG: 0.5 INHALANT RESPIRATORY (INHALATION) at 10:27

## 2019-01-01 RX ADMIN — Medication 10 ML: at 15:03

## 2019-01-01 RX ADMIN — Medication 10 ML: at 12:46

## 2019-01-01 RX ADMIN — TICAGRELOR 90 MG: 90 TABLET ORAL at 21:19

## 2019-01-01 RX ADMIN — INSULIN LISPRO 12 UNITS: 100 INJECTION, SOLUTION INTRAVENOUS; SUBCUTANEOUS at 21:31

## 2019-01-01 RX ADMIN — MORPHINE SULFATE 5 MG: 2 INJECTION, SOLUTION INTRAMUSCULAR; INTRAVENOUS at 11:40

## 2019-01-01 RX ADMIN — MORPHINE SULFATE 30 MG: 30 TABLET ORAL at 08:16

## 2019-01-01 RX ADMIN — NITROGLYCERIN 1 INCH: 20 OINTMENT TOPICAL at 17:57

## 2019-01-01 RX ADMIN — ACETAMINOPHEN 650 MG: 325 TABLET, FILM COATED ORAL at 22:59

## 2019-01-01 RX ADMIN — IPRATROPIUM BROMIDE AND ALBUTEROL SULFATE 3 ML: .5; 3 SOLUTION RESPIRATORY (INHALATION) at 12:06

## 2019-01-01 RX ADMIN — INSULIN LISPRO 5 UNITS: 100 INJECTION, SOLUTION INTRAVENOUS; SUBCUTANEOUS at 16:28

## 2019-01-01 RX ADMIN — Medication 10 ML: at 05:45

## 2019-01-01 RX ADMIN — INSULIN LISPRO 9 UNITS: 100 INJECTION, SOLUTION INTRAVENOUS; SUBCUTANEOUS at 17:36

## 2019-01-01 RX ADMIN — IPRATROPIUM BROMIDE AND ALBUTEROL SULFATE 3 ML: .5; 3 SOLUTION RESPIRATORY (INHALATION) at 15:35

## 2019-01-01 RX ADMIN — BUDESONIDE 500 MCG: 0.5 INHALANT RESPIRATORY (INHALATION) at 08:34

## 2019-01-01 RX ADMIN — SODIUM CHLORIDE 50 ML/HR: 900 INJECTION, SOLUTION INTRAVENOUS at 15:13

## 2019-01-01 RX ADMIN — MORPHINE SULFATE 2 MG: 2 INJECTION, SOLUTION INTRAMUSCULAR; INTRAVENOUS at 11:02

## 2019-01-01 RX ADMIN — CLOPIDOGREL BISULFATE 75 MG: 75 TABLET ORAL at 08:57

## 2019-01-01 RX ADMIN — IPRATROPIUM BROMIDE AND ALBUTEROL SULFATE 3 ML: .5; 3 SOLUTION RESPIRATORY (INHALATION) at 07:03

## 2019-01-01 RX ADMIN — ISOSORBIDE MONONITRATE 30 MG: 30 TABLET, EXTENDED RELEASE ORAL at 09:30

## 2019-01-01 RX ADMIN — MORPHINE SULFATE 2 MG: 2 INJECTION, SOLUTION INTRAMUSCULAR; INTRAVENOUS at 07:22

## 2019-01-01 RX ADMIN — ACETAMINOPHEN 650 MG: 325 TABLET, FILM COATED ORAL at 00:10

## 2019-01-01 RX ADMIN — HEPARIN SODIUM 10 UNITS/KG/HR: 5000 INJECTION, SOLUTION INTRAVENOUS at 10:37

## 2019-01-01 RX ADMIN — BUDESONIDE 500 MCG: 0.5 INHALANT RESPIRATORY (INHALATION) at 19:54

## 2019-01-01 RX ADMIN — INSULIN LISPRO 9 UNITS: 100 INJECTION, SOLUTION INTRAVENOUS; SUBCUTANEOUS at 08:12

## 2019-01-01 RX ADMIN — ENOXAPARIN SODIUM 40 MG: 40 INJECTION SUBCUTANEOUS at 14:00

## 2019-01-01 RX ADMIN — NITROGLYCERIN 0.4 MG: 0.4 TABLET, ORALLY DISINTEGRATING SUBLINGUAL at 04:25

## 2019-01-01 RX ADMIN — ACETAMINOPHEN 650 MG: 325 TABLET, FILM COATED ORAL at 23:55

## 2019-01-01 RX ADMIN — ASPIRIN 81 MG: 81 TABLET, COATED ORAL at 08:15

## 2019-01-01 RX ADMIN — METOPROLOL TARTRATE 50 MG: 50 TABLET, FILM COATED ORAL at 02:01

## 2019-01-01 RX ADMIN — METOPROLOL TARTRATE 12.5 MG: 25 TABLET ORAL at 23:01

## 2019-01-01 RX ADMIN — BUDESONIDE 500 MCG: 0.5 INHALANT RESPIRATORY (INHALATION) at 19:40

## 2019-01-01 RX ADMIN — Medication 5 ML: at 22:09

## 2019-01-01 RX ADMIN — METHYLPREDNISOLONE SODIUM SUCCINATE 60 MG: 125 INJECTION, POWDER, FOR SOLUTION INTRAMUSCULAR; INTRAVENOUS at 13:04

## 2019-01-01 RX ADMIN — Medication 10 ML: at 10:06

## 2019-01-01 RX ADMIN — INSULIN LISPRO 4 UNITS: 100 INJECTION, SOLUTION INTRAVENOUS; SUBCUTANEOUS at 21:17

## 2019-01-01 RX ADMIN — INSULIN GLARGINE 18 UNITS: 100 INJECTION, SOLUTION SUBCUTANEOUS at 08:15

## 2019-01-01 RX ADMIN — ACETAMINOPHEN 650 MG: 325 TABLET, FILM COATED ORAL at 09:09

## 2019-01-01 RX ADMIN — LORAZEPAM 0.5 MG: 2 INJECTION, SOLUTION INTRAMUSCULAR; INTRAVENOUS at 11:43

## 2019-01-01 RX ADMIN — METOPROLOL TARTRATE 50 MG: 50 TABLET, FILM COATED ORAL at 08:58

## 2019-01-01 RX ADMIN — ACETAMINOPHEN 650 MG: 325 TABLET, FILM COATED ORAL at 07:01

## 2019-01-01 RX ADMIN — METOPROLOL TARTRATE 50 MG: 50 TABLET, FILM COATED ORAL at 01:50

## 2019-01-01 RX ADMIN — IPRATROPIUM BROMIDE AND ALBUTEROL SULFATE 3 ML: .5; 3 SOLUTION RESPIRATORY (INHALATION) at 11:11

## 2019-01-01 RX ADMIN — INSULIN LISPRO 6 UNITS: 100 INJECTION, SOLUTION INTRAVENOUS; SUBCUTANEOUS at 17:57

## 2019-01-01 RX ADMIN — INSULIN LISPRO 6 UNITS: 100 INJECTION, SOLUTION INTRAVENOUS; SUBCUTANEOUS at 22:08

## 2019-01-01 RX ADMIN — SODIUM CHLORIDE 100 ML: 900 INJECTION, SOLUTION INTRAVENOUS at 10:06

## 2019-01-01 RX ADMIN — METHYLPREDNISOLONE SODIUM SUCCINATE 60 MG: 125 INJECTION, POWDER, FOR SOLUTION INTRAMUSCULAR; INTRAVENOUS at 06:00

## 2019-01-01 RX ADMIN — ATORVASTATIN CALCIUM 80 MG: 40 TABLET, FILM COATED ORAL at 08:17

## 2019-01-01 RX ADMIN — IPRATROPIUM BROMIDE AND ALBUTEROL SULFATE 3 ML: .5; 3 SOLUTION RESPIRATORY (INHALATION) at 00:12

## 2019-01-01 RX ADMIN — ENOXAPARIN SODIUM 40 MG: 40 INJECTION SUBCUTANEOUS at 13:44

## 2019-01-01 RX ADMIN — MORPHINE SULFATE 2 MG: 2 INJECTION, SOLUTION INTRAMUSCULAR; INTRAVENOUS at 01:57

## 2019-01-01 RX ADMIN — METOPROLOL TARTRATE 50 MG: 50 TABLET, FILM COATED ORAL at 10:27

## 2019-01-01 RX ADMIN — SENNOSIDES, DOCUSATE SODIUM 1 TABLET: 50; 8.6 TABLET, FILM COATED ORAL at 10:27

## 2019-01-01 RX ADMIN — RANOLAZINE 500 MG: 500 TABLET, FILM COATED, EXTENDED RELEASE ORAL at 13:04

## 2019-01-01 RX ADMIN — INSULIN LISPRO 8 UNITS: 100 INJECTION, SOLUTION INTRAVENOUS; SUBCUTANEOUS at 23:03

## 2019-01-01 RX ADMIN — ACETAMINOPHEN 650 MG: 325 TABLET, FILM COATED ORAL at 19:06

## 2019-01-01 RX ADMIN — HALOPERIDOL LACTATE 2 MG: 5 INJECTION INTRAMUSCULAR at 23:16

## 2019-01-01 RX ADMIN — LORAZEPAM 0.5 MG: 2 INJECTION INTRAMUSCULAR; INTRAVENOUS at 23:30

## 2019-01-01 RX ADMIN — MORPHINE SULFATE 2 MG: 2 INJECTION, SOLUTION INTRAMUSCULAR; INTRAVENOUS at 05:12

## 2019-01-01 RX ADMIN — ATORVASTATIN CALCIUM 40 MG: 40 TABLET, FILM COATED ORAL at 22:02

## 2019-01-01 RX ADMIN — IPRATROPIUM BROMIDE AND ALBUTEROL SULFATE 3 ML: .5; 3 SOLUTION RESPIRATORY (INHALATION) at 23:05

## 2019-01-01 RX ADMIN — NITROGLYCERIN 1 INCH: 20 OINTMENT TOPICAL at 22:48

## 2019-01-01 RX ADMIN — ASPIRIN 81 MG: 81 TABLET, COATED ORAL at 08:29

## 2019-01-01 RX ADMIN — BUDESONIDE 500 MCG: 0.5 INHALANT RESPIRATORY (INHALATION) at 07:03

## 2019-01-01 RX ADMIN — LORAZEPAM 0.5 MG: 2 INJECTION INTRAMUSCULAR; INTRAVENOUS at 18:41

## 2019-01-01 RX ADMIN — METOPROLOL TARTRATE 50 MG: 50 TABLET, FILM COATED ORAL at 14:00

## 2019-01-01 RX ADMIN — ASPIRIN 81 MG: 81 TABLET, COATED ORAL at 10:28

## 2019-01-01 RX ADMIN — IPRATROPIUM BROMIDE AND ALBUTEROL SULFATE 3 ML: .5; 3 SOLUTION RESPIRATORY (INHALATION) at 15:24

## 2019-01-01 RX ADMIN — Medication 10 ML: at 21:39

## 2019-01-01 RX ADMIN — MORPHINE SULFATE 2 MG: 2 INJECTION, SOLUTION INTRAMUSCULAR; INTRAVENOUS at 23:53

## 2019-01-01 RX ADMIN — BUDESONIDE 500 MCG: 0.5 INHALANT RESPIRATORY (INHALATION) at 19:23

## 2019-01-01 RX ADMIN — NITROGLYCERIN 0.4 MG: 0.4 TABLET, ORALLY DISINTEGRATING SUBLINGUAL at 21:47

## 2019-01-01 RX ADMIN — NITROGLYCERIN 0.4 MG: 0.4 TABLET, ORALLY DISINTEGRATING SUBLINGUAL at 08:27

## 2019-01-01 RX ADMIN — IPRATROPIUM BROMIDE AND ALBUTEROL SULFATE 3 ML: .5; 3 SOLUTION RESPIRATORY (INHALATION) at 16:00

## 2019-01-01 RX ADMIN — ALBUTEROL SULFATE 2.5 MG: 2.5 SOLUTION RESPIRATORY (INHALATION) at 13:32

## 2019-01-01 RX ADMIN — HALOPERIDOL LACTATE 2 MG: 5 INJECTION INTRAMUSCULAR at 01:16

## 2019-01-01 RX ADMIN — HEPARIN SODIUM 3 ML/HR: 5000 INJECTION, SOLUTION INTRAVENOUS; SUBCUTANEOUS at 10:14

## 2019-01-01 RX ADMIN — SODIUM CHLORIDE 1000 ML: 900 INJECTION, SOLUTION INTRAVENOUS at 11:55

## 2019-01-01 RX ADMIN — IOPAMIDOL 100 ML: 755 INJECTION, SOLUTION INTRAVENOUS at 10:06

## 2019-01-01 RX ADMIN — IPRATROPIUM BROMIDE AND ALBUTEROL SULFATE 3 ML: .5; 3 SOLUTION RESPIRATORY (INHALATION) at 08:34

## 2019-01-01 RX ADMIN — HEPARIN SODIUM 3000 UNITS: 10000 INJECTION INTRAVENOUS; SUBCUTANEOUS at 12:35

## 2019-01-01 RX ADMIN — IOPAMIDOL 180 ML: 755 INJECTION, SOLUTION INTRAVENOUS at 10:50

## 2019-01-01 RX ADMIN — TICAGRELOR 90 MG: 90 TABLET ORAL at 09:32

## 2019-01-01 RX ADMIN — IPRATROPIUM BROMIDE AND ALBUTEROL SULFATE 3 ML: .5; 3 SOLUTION RESPIRATORY (INHALATION) at 11:25

## 2019-01-01 RX ADMIN — INSULIN LISPRO 5 UNITS: 100 INJECTION, SOLUTION INTRAVENOUS; SUBCUTANEOUS at 11:49

## 2019-01-01 RX ADMIN — METHYLPREDNISOLONE SODIUM SUCCINATE 60 MG: 125 INJECTION, POWDER, FOR SOLUTION INTRAMUSCULAR; INTRAVENOUS at 20:42

## 2019-01-01 RX ADMIN — Medication 5 ML: at 15:12

## 2019-01-01 RX ADMIN — IPRATROPIUM BROMIDE AND ALBUTEROL SULFATE 3 ML: .5; 3 SOLUTION RESPIRATORY (INHALATION) at 04:32

## 2019-01-01 RX ADMIN — ATORVASTATIN CALCIUM 80 MG: 40 TABLET, FILM COATED ORAL at 08:58

## 2019-01-01 RX ADMIN — IPRATROPIUM BROMIDE AND ALBUTEROL SULFATE 3 ML: .5; 3 SOLUTION RESPIRATORY (INHALATION) at 20:11

## 2019-01-01 RX ADMIN — LORAZEPAM 0.5 MG: 2 INJECTION INTRAMUSCULAR; INTRAVENOUS at 18:07

## 2019-01-01 RX ADMIN — BUDESONIDE 500 MCG: 0.5 INHALANT RESPIRATORY (INHALATION) at 07:13

## 2019-01-01 RX ADMIN — LORAZEPAM 0.5 MG: 2 INJECTION INTRAMUSCULAR; INTRAVENOUS at 05:12

## 2019-01-01 RX ADMIN — INSULIN LISPRO 6 UNITS: 100 INJECTION, SOLUTION INTRAVENOUS; SUBCUTANEOUS at 06:00

## 2019-01-01 RX ADMIN — Medication 5 ML: at 21:21

## 2019-01-01 RX ADMIN — INSULIN LISPRO 4 UNITS: 100 INJECTION, SOLUTION INTRAVENOUS; SUBCUTANEOUS at 07:35

## 2019-01-01 RX ADMIN — LORAZEPAM 1 MG: 2 INJECTION INTRAMUSCULAR; INTRAVENOUS at 19:11

## 2019-01-01 RX ADMIN — MORPHINE SULFATE 5 MG: 2 INJECTION, SOLUTION INTRAMUSCULAR; INTRAVENOUS at 09:36

## 2019-01-01 RX ADMIN — MORPHINE SULFATE 2 MG: 2 INJECTION, SOLUTION INTRAMUSCULAR; INTRAVENOUS at 10:55

## 2019-01-01 RX ADMIN — IPRATROPIUM BROMIDE AND ALBUTEROL SULFATE 3 ML: .5; 3 SOLUTION RESPIRATORY (INHALATION) at 07:37

## 2019-01-01 RX ADMIN — Medication 10 ML: at 21:19

## 2019-01-01 RX ADMIN — LISINOPRIL 2.5 MG: 5 TABLET ORAL at 08:57

## 2019-01-01 RX ADMIN — INSULIN GLARGINE 15 UNITS: 100 INJECTION, SOLUTION SUBCUTANEOUS at 07:34

## 2019-01-01 RX ADMIN — Medication 10 ML: at 21:03

## 2019-01-01 RX ADMIN — MORPHINE SULFATE 2 MG: 2 INJECTION, SOLUTION INTRAMUSCULAR; INTRAVENOUS at 04:12

## 2019-01-01 RX ADMIN — CLOPIDOGREL BISULFATE 75 MG: 75 TABLET ORAL at 08:29

## 2019-01-01 RX ADMIN — PANTOPRAZOLE SODIUM 40 MG: 40 TABLET, DELAYED RELEASE ORAL at 06:15

## 2019-01-01 RX ADMIN — ISOSORBIDE MONONITRATE 30 MG: 30 TABLET, EXTENDED RELEASE ORAL at 10:27

## 2019-01-01 RX ADMIN — PREDNISONE 20 MG: 20 TABLET ORAL at 07:35

## 2019-01-01 RX ADMIN — INSULIN LISPRO 5 UNITS: 100 INJECTION, SOLUTION INTRAVENOUS; SUBCUTANEOUS at 07:35

## 2019-01-01 RX ADMIN — MORPHINE SULFATE 1 MG: 2 INJECTION, SOLUTION INTRAMUSCULAR; INTRAVENOUS at 16:54

## 2019-01-01 RX ADMIN — PERFLUTREN 1 ML: 6.52 INJECTION, SUSPENSION INTRAVENOUS at 10:00

## 2019-01-01 RX ADMIN — NITROGLYCERIN 0.4 MG: 0.4 TABLET, ORALLY DISINTEGRATING SUBLINGUAL at 09:37

## 2019-01-01 RX ADMIN — MORPHINE SULFATE 2 MG: 2 INJECTION, SOLUTION INTRAMUSCULAR; INTRAVENOUS at 21:03

## 2019-01-01 RX ADMIN — LORAZEPAM 0.5 MG: 2 INJECTION, SOLUTION INTRAMUSCULAR; INTRAVENOUS at 23:36

## 2019-01-01 RX ADMIN — NITROGLYCERIN 1 INCH: 20 OINTMENT TOPICAL at 13:54

## 2019-01-01 RX ADMIN — RANOLAZINE 500 MG: 500 TABLET, FILM COATED, EXTENDED RELEASE ORAL at 17:08

## 2019-01-01 RX ADMIN — ISOSORBIDE MONONITRATE 30 MG: 30 TABLET, EXTENDED RELEASE ORAL at 08:36

## 2019-01-01 RX ADMIN — MORPHINE SULFATE 2 MG: 2 INJECTION, SOLUTION INTRAMUSCULAR; INTRAVENOUS at 00:59

## 2019-01-01 RX ADMIN — LORAZEPAM 0.5 MG: 2 INJECTION INTRAMUSCULAR; INTRAVENOUS at 05:07

## 2019-01-01 RX ADMIN — METOPROLOL TARTRATE 12.5 MG: 25 TABLET ORAL at 09:32

## 2019-01-01 RX ADMIN — LORAZEPAM 1 MG: 2 INJECTION INTRAMUSCULAR; INTRAVENOUS at 12:29

## 2019-01-01 RX ADMIN — FAMOTIDINE 10 MG: 20 TABLET ORAL at 08:57

## 2019-01-01 RX ADMIN — Medication 5 ML: at 06:24

## 2019-01-01 RX ADMIN — TICAGRELOR 90 MG: 90 TABLET ORAL at 08:36

## 2019-01-01 RX ADMIN — LORAZEPAM 0.5 MG: 2 INJECTION INTRAMUSCULAR; INTRAVENOUS at 02:30

## 2019-01-01 RX ADMIN — MORPHINE SULFATE 30 MG: 30 TABLET ORAL at 21:52

## 2019-01-01 RX ADMIN — SODIUM CHLORIDE 50 ML/HR: 900 INJECTION, SOLUTION INTRAVENOUS at 05:00

## 2019-01-01 RX ADMIN — METHYLPREDNISOLONE SODIUM SUCCINATE 60 MG: 125 INJECTION, POWDER, FOR SOLUTION INTRAMUSCULAR; INTRAVENOUS at 06:16

## 2019-01-01 RX ADMIN — ATORVASTATIN CALCIUM 80 MG: 40 TABLET, FILM COATED ORAL at 09:32

## 2019-01-01 RX ADMIN — METOPROLOL TARTRATE 50 MG: 50 TABLET, FILM COATED ORAL at 13:15

## 2019-01-01 RX ADMIN — METHYLPREDNISOLONE SODIUM SUCCINATE 60 MG: 125 INJECTION, POWDER, FOR SOLUTION INTRAMUSCULAR; INTRAVENOUS at 05:59

## 2019-01-01 RX ADMIN — IPRATROPIUM BROMIDE AND ALBUTEROL SULFATE 3 ML: .5; 3 SOLUTION RESPIRATORY (INHALATION) at 20:15

## 2019-01-01 RX ADMIN — NITROGLYCERIN 0.4 MG: 0.4 TABLET, ORALLY DISINTEGRATING SUBLINGUAL at 05:56

## 2019-01-01 RX ADMIN — RANOLAZINE 500 MG: 500 TABLET, FILM COATED, EXTENDED RELEASE ORAL at 09:10

## 2019-01-01 RX ADMIN — IPRATROPIUM BROMIDE AND ALBUTEROL SULFATE 3 ML: .5; 3 SOLUTION RESPIRATORY (INHALATION) at 04:00

## 2019-01-01 RX ADMIN — NITROGLYCERIN 0.4 MG: 0.4 TABLET, ORALLY DISINTEGRATING SUBLINGUAL at 17:25

## 2019-01-01 RX ADMIN — INSULIN LISPRO 6 UNITS: 100 INJECTION, SOLUTION INTRAVENOUS; SUBCUTANEOUS at 05:58

## 2019-01-01 RX ADMIN — NITROGLYCERIN 1 INCH: 20 OINTMENT TOPICAL at 05:48

## 2019-01-01 RX ADMIN — INSULIN LISPRO 5 UNITS: 100 INJECTION, SOLUTION INTRAVENOUS; SUBCUTANEOUS at 11:30

## 2019-01-01 RX ADMIN — SODIUM CHLORIDE 1000 ML: 900 INJECTION, SOLUTION INTRAVENOUS at 10:28

## 2019-01-01 RX ADMIN — MIDAZOLAM HYDROCHLORIDE 2 MG: 1 INJECTION, SOLUTION INTRAMUSCULAR; INTRAVENOUS at 12:12

## 2019-01-01 RX ADMIN — METHYLPREDNISOLONE SODIUM SUCCINATE 60 MG: 125 INJECTION, POWDER, FOR SOLUTION INTRAMUSCULAR; INTRAVENOUS at 14:00

## 2019-01-01 RX ADMIN — NITROGLYCERIN 0.4 MG: 0.4 TABLET, ORALLY DISINTEGRATING SUBLINGUAL at 07:52

## 2019-01-01 RX ADMIN — METHYLPREDNISOLONE SODIUM SUCCINATE 60 MG: 125 INJECTION, POWDER, FOR SOLUTION INTRAMUSCULAR; INTRAVENOUS at 09:39

## 2019-01-01 RX ADMIN — INSULIN LISPRO 4 UNITS: 100 INJECTION, SOLUTION INTRAVENOUS; SUBCUTANEOUS at 16:38

## 2019-01-01 RX ADMIN — INSULIN LISPRO 6 UNITS: 100 INJECTION, SOLUTION INTRAVENOUS; SUBCUTANEOUS at 21:52

## 2019-01-01 RX ADMIN — Medication 5 ML: at 05:02

## 2019-01-01 RX ADMIN — MORPHINE SULFATE 30 MG: 30 TABLET ORAL at 17:37

## 2019-01-01 RX ADMIN — IPRATROPIUM BROMIDE AND ALBUTEROL SULFATE 3 ML: .5; 3 SOLUTION RESPIRATORY (INHALATION) at 23:48

## 2019-01-01 RX ADMIN — METOPROLOL TARTRATE 50 MG: 50 TABLET, FILM COATED ORAL at 13:04

## 2019-01-01 RX ADMIN — LIDOCAINE HYDROCHLORIDE 3 ML: 10 INJECTION, SOLUTION INFILTRATION; PERINEURAL at 11:52

## 2019-01-01 RX ADMIN — TICAGRELOR 90 MG: 90 TABLET ORAL at 10:13

## 2019-01-01 RX ADMIN — BUDESONIDE 500 MCG: 0.5 INHALANT RESPIRATORY (INHALATION) at 08:09

## 2019-01-01 RX ADMIN — Medication 5 ML: at 14:50

## 2019-01-01 RX ADMIN — ATORVASTATIN CALCIUM 40 MG: 40 TABLET, FILM COATED ORAL at 23:01

## 2019-01-01 RX ADMIN — SODIUM CHLORIDE 75 ML/HR: 900 INJECTION, SOLUTION INTRAVENOUS at 05:50

## 2019-01-01 RX ADMIN — PREDNISONE 30 MG: 10 TABLET ORAL at 08:57

## 2019-01-01 RX ADMIN — LISINOPRIL 2.5 MG: 5 TABLET ORAL at 09:30

## 2019-01-01 RX ADMIN — ALBUTEROL SULFATE 2.5 MG: 2.5 SOLUTION RESPIRATORY (INHALATION) at 07:42

## 2019-01-01 RX ADMIN — INSULIN LISPRO 5 UNITS: 100 INJECTION, SOLUTION INTRAVENOUS; SUBCUTANEOUS at 17:57

## 2019-01-01 RX ADMIN — ISOSORBIDE MONONITRATE 30 MG: 30 TABLET, EXTENDED RELEASE ORAL at 08:15

## 2019-01-01 RX ADMIN — Medication 10 ML: at 21:53

## 2019-01-01 RX ADMIN — ACETAMINOPHEN 650 MG: 325 TABLET, FILM COATED ORAL at 10:28

## 2019-01-01 RX ADMIN — FAMOTIDINE 10 MG: 20 TABLET ORAL at 17:17

## 2019-01-01 RX ADMIN — TICAGRELOR 180 MG: 90 TABLET ORAL at 10:53

## 2019-01-01 RX ADMIN — MORPHINE SULFATE 2 MG: 2 INJECTION, SOLUTION INTRAMUSCULAR; INTRAVENOUS at 14:50

## 2019-01-01 RX ADMIN — Medication 10 ML: at 21:40

## 2019-01-01 RX ADMIN — BUDESONIDE 500 MCG: 0.5 INHALANT RESPIRATORY (INHALATION) at 20:15

## 2019-01-01 RX ADMIN — HYDROMORPHONE HYDROCHLORIDE 1 MG: 2 INJECTION, SOLUTION INTRAMUSCULAR; INTRAVENOUS; SUBCUTANEOUS at 13:05

## 2019-01-01 RX ADMIN — INSULIN LISPRO 4 UNITS: 100 INJECTION, SOLUTION INTRAVENOUS; SUBCUTANEOUS at 11:28

## 2019-01-01 RX ADMIN — INSULIN GLARGINE 20 UNITS: 100 INJECTION, SOLUTION SUBCUTANEOUS at 08:59

## 2019-01-01 RX ADMIN — INSULIN LISPRO 9 UNITS: 100 INJECTION, SOLUTION INTRAVENOUS; SUBCUTANEOUS at 11:19

## 2019-01-01 RX ADMIN — IPRATROPIUM BROMIDE AND ALBUTEROL SULFATE 3 ML: .5; 3 SOLUTION RESPIRATORY (INHALATION) at 07:13

## 2019-01-01 RX ADMIN — IPRATROPIUM BROMIDE AND ALBUTEROL SULFATE 3 ML: .5; 3 SOLUTION RESPIRATORY (INHALATION) at 23:33

## 2019-01-01 RX ADMIN — MORPHINE SULFATE 2 MG: 2 INJECTION, SOLUTION INTRAMUSCULAR; INTRAVENOUS at 12:14

## 2019-01-01 RX ADMIN — ATORVASTATIN CALCIUM 80 MG: 40 TABLET, FILM COATED ORAL at 10:28

## 2019-01-01 RX ADMIN — METOPROLOL TARTRATE 25 MG: 25 TABLET, FILM COATED ORAL at 08:58

## 2019-01-01 RX ADMIN — MORPHINE SULFATE 2 MG: 2 INJECTION, SOLUTION INTRAMUSCULAR; INTRAVENOUS at 00:51

## 2019-01-01 RX ADMIN — CEFEPIME HYDROCHLORIDE 2 G: 2 INJECTION, POWDER, FOR SOLUTION INTRAVENOUS at 11:47

## 2019-01-01 RX ADMIN — INSULIN LISPRO 2 UNITS: 100 INJECTION, SOLUTION INTRAVENOUS; SUBCUTANEOUS at 05:19

## 2019-01-01 RX ADMIN — INSULIN HUMAN 5 UNITS: 100 INJECTION, SOLUTION PARENTERAL at 11:55

## 2019-01-01 RX ADMIN — NITROGLYCERIN 0.4 MG: 0.4 TABLET, ORALLY DISINTEGRATING SUBLINGUAL at 18:10

## 2019-01-01 RX ADMIN — PREDNISONE 10 MG: 5 TABLET ORAL at 13:54

## 2019-01-01 RX ADMIN — METOPROLOL TARTRATE 12.5 MG: 25 TABLET ORAL at 08:58

## 2019-01-01 RX ADMIN — BUDESONIDE 500 MCG: 0.5 INHALANT RESPIRATORY (INHALATION) at 07:37

## 2019-01-01 RX ADMIN — HEPARIN SODIUM 4000 UNITS: 5000 INJECTION INTRAVENOUS; SUBCUTANEOUS at 11:55

## 2019-01-01 RX ADMIN — METOPROLOL TARTRATE 50 MG: 50 TABLET, FILM COATED ORAL at 20:42

## 2019-01-01 RX ADMIN — ACETAMINOPHEN 650 MG: 325 TABLET, FILM COATED ORAL at 13:46

## 2019-01-01 RX ADMIN — MORPHINE SULFATE 5 MG: 2 INJECTION, SOLUTION INTRAMUSCULAR; INTRAVENOUS at 16:44

## 2019-01-01 RX ADMIN — Medication 5 ML: at 13:17

## 2019-01-01 RX ADMIN — RANOLAZINE 500 MG: 500 TABLET, FILM COATED, EXTENDED RELEASE ORAL at 17:37

## 2019-01-01 RX ADMIN — IPRATROPIUM BROMIDE AND ALBUTEROL SULFATE 3 ML: .5; 3 SOLUTION RESPIRATORY (INHALATION) at 23:28

## 2019-01-01 RX ADMIN — BIVALIRUDIN 1.75 MG/KG/HR: 250 INJECTION, POWDER, LYOPHILIZED, FOR SOLUTION INTRAVENOUS at 10:20

## 2019-01-01 RX ADMIN — ASPIRIN 81 MG: 81 TABLET, COATED ORAL at 09:30

## 2019-01-01 RX ADMIN — MORPHINE SULFATE 1 MG: 2 INJECTION, SOLUTION INTRAMUSCULAR; INTRAVENOUS at 18:30

## 2019-01-01 RX ADMIN — IPRATROPIUM BROMIDE AND ALBUTEROL SULFATE 3 ML: .5; 3 SOLUTION RESPIRATORY (INHALATION) at 19:54

## 2019-01-01 RX ADMIN — ACETAMINOPHEN 650 MG: 325 TABLET, FILM COATED ORAL at 19:33

## 2019-01-01 RX ADMIN — INSULIN LISPRO 2 UNITS: 100 INJECTION, SOLUTION INTRAVENOUS; SUBCUTANEOUS at 08:04

## 2019-01-01 RX ADMIN — POLYETHYLENE GLYCOL 3350 17 G: 17 POWDER, FOR SOLUTION ORAL at 08:57

## 2019-01-01 RX ADMIN — TICAGRELOR 90 MG: 90 TABLET ORAL at 20:42

## 2019-01-01 RX ADMIN — ACETAMINOPHEN 650 MG: 325 TABLET, FILM COATED ORAL at 09:08

## 2019-01-01 RX ADMIN — METOPROLOL TARTRATE 12.5 MG: 25 TABLET ORAL at 09:00

## 2019-01-01 RX ADMIN — MORPHINE SULFATE 1 MG: 2 INJECTION, SOLUTION INTRAMUSCULAR; INTRAVENOUS at 18:32

## 2019-01-01 RX ADMIN — IPRATROPIUM BROMIDE AND ALBUTEROL SULFATE 3 ML: .5; 3 SOLUTION RESPIRATORY (INHALATION) at 19:40

## 2019-01-01 RX ADMIN — NITROGLYCERIN 0.4 MG: 0.4 TABLET, ORALLY DISINTEGRATING SUBLINGUAL at 08:48

## 2019-01-01 RX ADMIN — PANTOPRAZOLE SODIUM 40 MG: 40 TABLET, DELAYED RELEASE ORAL at 07:34

## 2019-01-01 RX ADMIN — IPRATROPIUM BROMIDE AND ALBUTEROL SULFATE 3 ML: .5; 3 SOLUTION RESPIRATORY (INHALATION) at 10:27

## 2019-01-01 RX ADMIN — IPRATROPIUM BROMIDE AND ALBUTEROL SULFATE 3 ML: .5; 3 SOLUTION RESPIRATORY (INHALATION) at 11:46

## 2019-01-01 RX ADMIN — INSULIN LISPRO 15 UNITS: 100 INJECTION, SOLUTION INTRAVENOUS; SUBCUTANEOUS at 21:38

## 2019-01-29 ENCOUNTER — MEDICATION RENEWAL (OUTPATIENT)
Age: 64
End: 2019-01-29

## 2019-01-29 RX ORDER — METOPROLOL SUCCINATE 50 MG/1
50 TABLET, EXTENDED RELEASE ORAL
Qty: 90 | Refills: 1 | Status: ACTIVE | COMMUNITY
Start: 2018-10-22 | End: 1900-01-01

## 2019-02-18 ENCOUNTER — RECORD ABSTRACTING (OUTPATIENT)
Age: 64
End: 2019-02-18

## 2019-02-18 DIAGNOSIS — Z80.1 FAMILY HISTORY OF MALIGNANT NEOPLASM OF TRACHEA, BRONCHUS AND LUNG: ICD-10-CM

## 2019-02-18 DIAGNOSIS — Z86.39 PERSONAL HISTORY OF OTHER ENDOCRINE, NUTRITIONAL AND METABOLIC DISEASE: ICD-10-CM

## 2019-02-18 DIAGNOSIS — Z78.9 OTHER SPECIFIED HEALTH STATUS: ICD-10-CM

## 2019-02-18 DIAGNOSIS — Z83.3 FAMILY HISTORY OF DIABETES MELLITUS: ICD-10-CM

## 2019-02-18 RX ORDER — NICOTINE 11MG/24HR
50 MCG PATCH, TRANSDERMAL 24 HOURS TRANSDERMAL
Refills: 0 | Status: ACTIVE | COMMUNITY

## 2019-02-18 RX ORDER — QUINAPRIL HYDROCHLORIDE 20 MG/1
20 TABLET, FILM COATED ORAL
Refills: 0 | Status: ACTIVE | COMMUNITY

## 2019-03-01 ENCOUNTER — APPOINTMENT (OUTPATIENT)
Dept: ENDOCRINOLOGY | Facility: CLINIC | Age: 64
End: 2019-03-01

## 2019-03-20 ENCOUNTER — MEDICATION RENEWAL (OUTPATIENT)
Age: 64
End: 2019-03-20

## 2019-03-29 ENCOUNTER — APPOINTMENT (OUTPATIENT)
Dept: ENDOCRINOLOGY | Facility: CLINIC | Age: 64
End: 2019-03-29
Payer: MEDICARE

## 2019-03-29 VITALS
HEART RATE: 95 BPM | HEIGHT: 68 IN | WEIGHT: 173 LBS | SYSTOLIC BLOOD PRESSURE: 160 MMHG | DIASTOLIC BLOOD PRESSURE: 70 MMHG | BODY MASS INDEX: 26.22 KG/M2

## 2019-03-29 VITALS — DIASTOLIC BLOOD PRESSURE: 68 MMHG | SYSTOLIC BLOOD PRESSURE: 158 MMHG

## 2019-03-29 LAB — GLUCOSE BLDC GLUCOMTR-MCNC: 172

## 2019-03-29 PROCEDURE — 99214 OFFICE O/P EST MOD 30 MIN: CPT | Mod: 25

## 2019-03-29 PROCEDURE — 82962 GLUCOSE BLOOD TEST: CPT

## 2019-03-29 RX ORDER — FLASH GLUCOSE SCANNING READER
EACH MISCELLANEOUS
Qty: 1 | Refills: 0 | Status: ACTIVE | COMMUNITY
Start: 2019-03-29 | End: 1900-01-01

## 2019-03-29 RX ORDER — EZETIMIBE 10 MG/1
10 TABLET ORAL
Refills: 0 | Status: DISCONTINUED | COMMUNITY
End: 2019-03-29

## 2019-03-29 RX ORDER — FLASH GLUCOSE SENSOR
KIT MISCELLANEOUS
Qty: 2 | Refills: 2 | Status: ACTIVE | COMMUNITY
Start: 2019-03-29 | End: 1900-01-01

## 2019-03-29 NOTE — PHYSICAL EXAM
[Alert] : alert [No Acute Distress] : no acute distress [Well Nourished] : well nourished [Well Developed] : well developed [Normal Sclera/Conjunctiva] : normal sclera/conjunctiva [EOMI] : extra ocular movement intact [No Proptosis] : no proptosis [Thyroid Not Enlarged] : the thyroid was not enlarged [No Thyroid Nodules] : there were no palpable thyroid nodules [No Respiratory Distress] : no respiratory distress [No Accessory Muscle Use] : no accessory muscle use [Clear to Auscultation] : lungs were clear to auscultation bilaterally [Normal Rate] : heart rate was normal  [Normal S1, S2] : normal S1 and S2 [Regular Rhythm] : with a regular rhythm [No Edema] : there was no peripheral edema [No Rash] : no rash [Oriented x3] : oriented to person, place, and time [Normal Affect] : the affect was normal [Normal Mood] : the mood was normal [Acanthosis Nigricans] : no acanthosis nigricans

## 2019-03-29 NOTE — REVIEW OF SYSTEMS
[Polyuria] : polyuria [Pain/Numbness of Digits] : pain/numbness of digits [Recent Weight Gain (___ Lbs)] : no recent weight gain [Recent Weight Loss (___ Lbs)] : no recent weight loss [Chest Pain] : no chest pain [Palpitations] : no palpitations [Shortness Of Breath] : no shortness of breath [Nausea] : no nausea [Vomiting] : no vomiting was observed [Abdominal Pain] : no abdominal pain [Polydipsia] : no polydipsia [FreeTextEntry3] : no changes in vision [de-identified] : B/L feet

## 2019-03-29 NOTE — HISTORY OF PRESENT ILLNESS
[FreeTextEntry1] : Presents today for routine follow-up of T2DM, hypertension, and hyperlipidemia. Had pneumonia and was on steroid taper within the past month. Has not been seen in this office for over one year. Still planning on moving to [place].\par \par Type: 2\par Severity: moderate\par Duration: since \par Associated symptoms: none\par Modifying factors: worse due to inconsistent medication use\par \par Current meds for glycemic control:\par Gkipizide 10 mg daily\par Invokana 100 mg daily\par Janumet  mg daily\par Reports he had insurance issues over the past months and has been off and on his medications for 1-2 weeks at a time.\par No SMBG. Broke meter. Asking about Freestyle Good.\par Current B, ate baked ziti 30 minutes ago\par \par Last eye exam: within in the year, no DR per patient\par Last foot exam: in office\par Diet: no changes\par Weight: stable\par Exercise: active at work

## 2019-03-29 NOTE — ASSESSMENT
[FreeTextEntry1] : 64 year old male with T2DM, hyperlipidemia, hypertension, and vitamin D deficiency. Glycemic control is unknown without recent labs or glucose data.\par \par 1. T2DM\par -Continue current medication regimen.\par -Check A1c now.\par -Needs to start SMBG. Will send RX for Freestyle Good, but if not covered, will need to resume fingersticks. Patient will call office to let us know if he gets Freestyle Good.\par -If A1c above goal, may need to consider insulin.\par \par 2. Hyperlipidemia\par -Continue statin.\par -Check lipids now.\par \par 3. Hypertension- elevated today, asymptomatic. Patient reports BP is always high at MD office.\par -May be r/t high sodium meal prior to appointment.\par -Patient has electronic BP monitor at home. Advised he check BP to make sure it improves.\par -Continue ACE inhibitor.\par \par 4. Vitamin D deficiency\par -Continue supplement.\par -Check level now.

## 2019-03-29 NOTE — REVIEW OF SYSTEMS
[Polyuria] : polyuria [Pain/Numbness of Digits] : pain/numbness of digits [Recent Weight Gain (___ Lbs)] : no recent weight gain [Recent Weight Loss (___ Lbs)] : no recent weight loss [Chest Pain] : no chest pain [Palpitations] : no palpitations [Shortness Of Breath] : no shortness of breath [Nausea] : no nausea [Vomiting] : no vomiting was observed [Abdominal Pain] : no abdominal pain [Polydipsia] : no polydipsia [FreeTextEntry3] : no changes in vision [de-identified] : B/L feet

## 2019-04-16 ENCOUNTER — MEDICATION RENEWAL (OUTPATIENT)
Age: 64
End: 2019-04-16

## 2019-04-16 RX ORDER — SITAGLIPTIN AND METFORMIN HYDROCHLORIDE 50; 1000 MG/1; MG/1
50-1000 TABLET, FILM COATED, EXTENDED RELEASE ORAL
Qty: 180 | Refills: 1 | Status: ACTIVE | COMMUNITY
Start: 2019-04-16 | End: 1900-01-01

## 2019-05-04 PROBLEM — E11.10 DIABETIC KETOACIDOSIS (HCC): Status: ACTIVE | Noted: 2019-01-01

## 2019-05-04 PROBLEM — J84.10 PULMONARY FIBROSIS (HCC): Status: ACTIVE | Noted: 2019-01-01

## 2019-05-04 PROBLEM — Z79.52 CURRENT CHRONIC USE OF SYSTEMIC STEROIDS: Status: ACTIVE | Noted: 2019-01-01

## 2019-05-04 PROBLEM — J18.9 PNEUMONITIS: Status: ACTIVE | Noted: 2019-01-01

## 2019-05-04 PROBLEM — Z79.52 CURRENT CHRONIC USE OF SYSTEMIC STEROIDS: Chronic | Status: ACTIVE | Noted: 2019-01-01

## 2019-05-04 NOTE — ED NOTES
This RN noted result of POC troponin while standing in lab - Chaparrita Miranda MD notified of result.

## 2019-05-04 NOTE — CONSULTS
[facility] Cardiology Consult Date of  Admission: 5/4/2019 10:32 AM  
 
Primary Care Physician: Dr Eneida Hartley Primary Cardiologist: Dr Yosef Monzon Referring Physician: Dr Chaparrita Miranda Consulting Physician: Dr Rodriguez Mathur Pulmonary: Dr Debbie Stephenson CC/Reason for consult: chest pain Bella Hilton is a 71 y.o. male seen in the ER for chest pain. He has a h/o pneumonitis, smoked 1 ppd x 40 years quit 2018, GERD, PAD s/p peripheral PCI, CAD s/p CABG in 1999 w multiple PCI since then, PCI to SVG to LAD in 1-2017, stable CAD 3-2017, [facility] 8-3-18 w PCI through the Lcx to L main and LAD, patent LAD to LIMA and SVG to RCA, occluded RCA. Echo 8-2018 w EF 50-55%. He had done well on ASA and plavix until this morning when [facility] and when walking began having SSCP radiating to his jaw, heaviness, 7-8/10, lasting about an hour, worse w exertion, without nausea or diaphoresis, chronic dyspnea unchanged w a cough for which he has seen Dr Debbie Stephenson and is on prednisone. No dizziness, palpitations, syncope or LE edema. He did fall recently through a porch w LLE bruising. Trop .07 increased to .08, WBC 8.9, hgb 14, , K 4.7, cr 1.42, CXR coarse interstitial markings, EKG NSR w rate 96 w anterolateral t wave inversion (unchanged from old EKG). /91. CP had resolved but has just recurred. Nitro paste being placed. Patient Active Problem List  
Diagnosis Code  Unstable angina (HCC) I20.0  CAD (coronary artery disease) I25.10  
 HTN (hypertension), benign I10  
 Other and unspecified hyperlipidemia E78.5  Tobacco use disorder F17.200  Peripheral vascular disease-s/p PPI to right common iliac 5/4/11 I73.9  Chest pain R07.9  NSTEMI (non-ST elevated myocardial infarction) ([facility] [address] I21.4  Cough R05  Bronchitis J40  Restrictive airway disease J98.4  Chronic obstructive pulmonary disease with acute exacerbation (HCC) J44.1  SOB (shortness of breath) R06.02  
  Acute coronary syndrome (HCC) I24.9  Pneumonitis J18.9  Diabetic ketoacidosis (HCC) E13.10 Past Medical History:  
Diagnosis Date  CAD (coronary artery disease) CABG , stents after (last placed )  GERD (gastroesophageal reflux disease) PRN meds  Hypertension  Interstitial pneumonitis (HCC)   
 per CT chest 3/10/17 (daily inhalers, no home 02)  Osteoarthritis   
 hands, knees  Pulmonary nodule 8mm per CT chest 3/10/17 (biopsy WNL) Past Surgical History:  
Procedure Laterality Date  HX HEART CATHETERIZATION    
 multiple  HX TONSILLECTOMY  VASCULAR SURGERY PROCEDURE UNLIST CABG Allergies Allergen Reactions  Aspirin Swelling Takes 81 mg at home. Patient can tolerate Aspirin in low doses, but in larger doses causes swelling. Family History Problem Relation Age of Onset  Heart Disease Mother  Cancer Mother UNKNOWN TYPE  
 Heart Disease Father Social History Tobacco Use  Smoking status: Former Smoker Packs/day: 0.25 Years: 30.00 Pack years: 7.50 Last attempt to quit: 2018 Years since quittin.3  Smokeless tobacco: Never Used Substance Use Topics  Alcohol use: No  
  
 
Current Facility-Administered Medications Medication Dose Route Frequency  nitroglycerin (NITROSTAT) tablet 0.4 mg  0.4 mg SubLINGual Q5MIN PRN  
 sodium chloride 0.9 % bolus infusion 1,000 mL  1,000 mL IntraVENous ONCE  
 heparin 25,000 units in dextrose 500 mL infusion  12-25 Units/kg/hr IntraVENous TITRATE Current Outpatient Medications Medication Sig  predniSONE (DELTASONE) 20 mg tablet Take 20 mg by mouth two (2) times a day.  tiotropium-olodaterol (STIOLTO RESPIMAT) 2.5-2.5 mcg/actuation inhaler Take  by inhalation.  famotidine (PEPCID) 10 mg tablet Take 10 mg by mouth as needed.   
 nitroglycerin (NITROSTAT) 0.4 mg SL tablet 1 Tab by SubLINGual route every five (5) minutes as needed for Chest Pain (after 3 doses / 15 mins, if chest pain persists, contact EMS/911).  metoprolol tartrate (LOPRESSOR) 25 mg tablet Take 12.5 mg by mouth every twelve (12) hours.  atorvastatin (LIPITOR) 40 mg tablet Take 1 Tab by mouth nightly.  lisinopril (PRINIVIL, ZESTRIL) 2.5 mg tablet Take 1 Tab by mouth daily.  clopidogrel (PLAVIX) 75 mg tab Take 1 Tab by mouth daily.  acetaminophen (TYLENOL) 325 mg tablet Take 325 mg by mouth as needed for Pain.  raNITIdine (ZANTAC) 150 mg tablet Take 2 Tabs by mouth nightly. Indications: GASTROESOPHAGEAL REFLUX, HEARTBURN (Patient taking differently: Take 300 mg by mouth nightly. Indications: gastroesophageal reflux disease, Heartburn, AS NEEDED)  aspirin 81 mg tablet Take 81 mg by mouth every morning. Review of Symptoms: 
General: no weight change,  no weakness, fever or chills Skin: no rashes, lumps, or other skin changes HEENT: no headache, dizziness, lightheadedness, vision changes, hearing changes, tinnitus, vertigo, sinus pressure/pain, bleeding gums, sore throat, or hoarseness Neck: no swollen glands, goiter, pain or stiffness Respiratory: chronic cough, sputum, hemoptysis, + dyspnea, wheezing Cardiovascular: + as per HPI Gastrointestinal: no GERD, constipation, diarrhea, liver problems, or h/o GI bleed Urinary: + urinary frequency Peripheral Vascular: + PAD Musculoskeletal: no muscle or joint pain/stiffness, joint swelling, erythema of joints, or back pain Psychiatric: no depression or excessive stress Neurological: no sensory or motor loss, seizures, syncope, tremors, numbness, no dementia Hematologic: no anemia, easy bruising or bleeding Endocrine: no thyroid problems, heat or cold intolerance, excessive sweating, polyuria, polydipsia, no h/o diabetes. Physical Exam 
Vitals:  
 05/04/19 1039 05/04/19 1040 05/04/19 1046 05/04/19 1054 BP: (!) 177/95 (!) 177/95 136/87 139/82 Pulse: 97 96 (!) 102 (!) 109 Resp:      
Temp:      
SpO2: 99%  98% 97% Weight:      
Height:      
 
 
Physical Exam: 
General: Well Developed, Well Nourished, No Acute Distress HEENT: pupils equal and round, no abnormalities noted Neck: supple, no JVD, no carotid bruits Heart: S1S2 with RRR Lungs: Clear throughout auscultation bilaterally without adventitious sounds Abd: soft, nontender, nondistended, with good bowel sounds Ext: warm, no edema, in pants in hallway and unable to assess bruise to leg Skin: warm and dry Psychiatric: Normal mood and affect Neurologic: Alert and oriented X 3 Labs:  
Recent Labs 05/04/19 
1038 * K 4.7 BUN 22  
CREA 1.42  
* WBC 8.9 HGB 14.4 HCT 43.4  Assessment/Plan: 
 
 Assessment:  
Chest pain (1/28/2017)- Mildly elevated trop w long h/o CAD and new DM w DKA, cont ASA, plavix, agree w heparin, cont BB, ACE, statin, trend troponin, check echo CAD (coronary artery disease) (11/1/2010)-  s/p CABG in 1999 w multiple PCI since then, PCI to SVG to LAD in 1-2017, stable CAD 3-2017, [facility] 8-3-18 w PCI through the Lcx to L main and LAD, patent LAD to LIMA and SVG to RCA, occluded RCA- tx as above HTN (hypertension), benign (11/1/2010)- BB, ace Peripheral vascular disease-s/p PPI to right common iliac 5/4/11 (5/5/2011)- ASA, plavix, statin Pneumonitis (5/4/2019)- prednisone Diabetic ketoacidosis ([facility] [address] (5/4/2019)- Per hospitalist  
 
Thank you very much for this referral. We appreciate the opportunity to participate in this patient's care. We will follow along with above stated plan. Delfino Topete PA-C Consulting MD: Daisy Quinn

## 2019-05-04 NOTE — ED TRIAGE NOTES
Patient brought in via EMS from home for intermittent chest pain starting 1 hour ago. Pain described as \"like when you run through cold air and your lungs hurt\". Patient states that when the pain is there it radiates to jaw and left arm. 2 Nitro given by EMS, which relieved pain. 324 ASA given as well. Hx 8 stents. 500 NACL also given. /96,

## 2019-05-04 NOTE — PROGRESS NOTES
TRANSFER - IN REPORT: 
 
Verbal report received from Pavan Wilder RN on Reanna Pump being received from ER for routine progression of care Report consisted of patients Situation, Background, Assessment and Recommendations(SBAR). Information from the following report(s) ED Summary was reviewed with the receiving nurse. Opportunity for questions and clarification was provided. Assessment completed upon patients arrival to unit and care assumed.

## 2019-05-04 NOTE — ED NOTES
TRANSFER - OUT REPORT: 
 
Verbal report given to [place], RN(name) on Danni Said  being transferred to 329(unit) for routine progression of care Report consisted of patients Situation, Background, Assessment and  
Recommendations(SBAR). Information from the following report(s) SBAR, Kardex, ED Summary, [facility] and Recent Results was reviewed with the receiving nurse. Lines:  
Peripheral IV 05/04/19 Left Antecubital (Active) Site Assessment Clean, dry, & intact 5/4/2019 10:39 AM  
Phlebitis Assessment 0 5/4/2019 10:39 AM  
Infiltration Assessment 0 5/4/2019 10:39 AM  
Dressing Status Clean, dry, & intact 5/4/2019 10:39 AM  
Hub Color/Line Status Green 5/4/2019 10:39 AM  
   
Peripheral IV 05/04/19 Right Antecubital (Active) Site Assessment Clean, dry, & intact 5/4/2019 12:15 PM  
Phlebitis Assessment 0 5/4/2019 12:15 PM  
Infiltration Assessment 0 5/4/2019 12:15 PM  
Dressing Status Clean, dry, & intact 5/4/2019 12:15 PM  
Hub Color/Line Status Green; Infusing 5/4/2019 12:15 PM  
  
 
Opportunity for questions and clarification was provided. Patient transported with: 
 Registered Nurse

## 2019-05-04 NOTE — PROGRESS NOTES
Skin assessment completed. Mid sternal chest scar. Large purple and green bruise covering foot to upper left leg. Heels are dry and intact. Sacrum is dry and intact.

## 2019-05-04 NOTE — PROGRESS NOTES
Verbal and bedside report received from [facility], [address] [facility]. Heparin drip verified.

## 2019-05-04 NOTE — CONSULTS
Chief Complaint:  Chest pain We are asked to see this patient regarding medical management and glucose management. PROBLEMS:  
Chest pain Pulmonary fibrosis on home oxygen 2 liters:  New diagnosis CAD with history of CABG Hypertension PAD with stent right iliac Unstable angina Hyperlipidemia Tobacco abuse History of NSTEMI 8/18 GERD Obesity Chronic systemic steroid use History of Present Illness:   
Patient is a pleasant, alert,oriented  71 y.o.  male who presented to ER this am with intermittent chest pain x 1 hour PTA, currently relieved with multiple doses of nitro. He has an extensive cardiac history with CABG and NSTEMI in 1999. Troponins are elevated. He is admitted by Cardiology with request for hospitalist consult when initial glucose found at 697. He is not a diabetic, however he has been taking po prednisone since Mid March when he was diagnosed with Pulmonary fibrosis. He was unaware his glucose was elevated. He was given 5 units of regular insulin IV with drop to 330 within about an hour. A1C done in ER is 12.2. PAST MEDICAL HISTORY: 
Past Medical History:  
Diagnosis Date  CAD (coronary artery disease) CABG 1999, stents after (last placed 2/17)  GERD (gastroesophageal reflux disease) PRN meds  Hypertension  Interstitial pneumonitis (HCC)   
 per CT chest 3/10/17 (daily inhalers, no home 02)  Osteoarthritis   
 hands, knees  Pulmonary nodule 8mm per CT chest 3/10/17 (biopsy WNL) Pulmonary fibrosis:  New diagnosis 3/19 PAD/PVD with iliac stent Tobacco abuse Obesity PAST SURGICAL HISTORY: 
Past Surgical History:  
Procedure Laterality Date  HX HEART CATHETERIZATION    
 multiple  HX TONSILLECTOMY  VASCULAR SURGERY PROCEDURE UNLIST CABG  
  
PTA MEDICATIONS: 
Prior to Admission medications Medication Sig Start Date End Date Taking? Authorizing Provider predniSONE (DELTASONE) 20 mg tablet Take 20 mg by mouth every morning. Takes 20 mg q am and 10 mg q pm since April 28. To take this dose 30 days. Yes Provider, Historical  
predniSONE (DELTASONE) 10 mg tablet Take 10 mg by mouth nightly. Began 20 mg q am and 10 q pm on April 28. To take this dose for one month   Yes Provider, Historical  
isosorbide mononitrate ER (IMDUR) 30 mg tablet Take 30 mg by mouth daily. Yes Provider, Historical  
esomeprazole (NEXIUM) 40 mg capsule Take  by mouth daily. Yes Provider, Historical  
nitroglycerin (NITROSTAT) 0.4 mg SL tablet 1 Tab by SubLINGual route every five (5) minutes as needed for Chest Pain (after 3 doses / 15 mins, if chest pain persists, contact EMS/911). 12/11/18   Lucinda Carmona MD  
metoprolol tartrate (LOPRESSOR) 25 mg tablet Take 12.5 mg by mouth every twelve (12) hours. Provider, Historical  
atorvastatin (LIPITOR) 40 mg tablet Take 1 Tab by mouth nightly. 8/4/18   EVERARDO Rehman  
lisinopril (PRINIVIL, ZESTRIL) 2.5 mg tablet Take 1 Tab by mouth daily. 8/5/18   EVERARDO Rehman  
clopidogrel (PLAVIX) 75 mg tab Take 1 Tab by mouth daily. 8/5/18   EVERARDO Rehman  
acetaminophen (TYLENOL) 325 mg tablet Take 325 mg by mouth as needed for Pain. Provider, Historical  
aspirin 81 mg tablet Take 81 mg by mouth every morning. Provider, Historical  
 
HOME MEDS REVIEWED AND CORRECTED. DISCUSSED WITH CARDIOLOGY. CURRENT INPATIENT MEDICATIONS: 
Current Facility-Administered Medications Medication Dose Route Frequency  nitroglycerin (NITROSTAT) tablet 0.4 mg  0.4 mg SubLINGual Q5MIN PRN  
 heparin 25,000 units in dextrose 500 mL infusion  12-25 Units/kg/hr IntraVENous TITRATE  dextrose 40% (GLUTOSE) oral gel 1 Tube  15 g Oral PRN  
 glucagon (GLUCAGEN) injection 1 mg  1 mg IntraMUSCular PRN  
 dextrose (D50) infusion 12.5-25 g  25-50 mL IntraVENous PRN  
 insulin lispro (HUMALOG) injection   SubCUTAneous AC&HS  
 
 Current Outpatient Medications Medication Sig  predniSONE (DELTASONE) 20 mg tablet Take 20 mg by mouth every morning. Takes 20 mg q am and 10 mg q pm since . To take this dose 30 days.  predniSONE (DELTASONE) 10 mg tablet Take 10 mg by mouth nightly. Began 20 mg q am and 10 q pm on . To take this dose for one month  isosorbide mononitrate ER (IMDUR) 30 mg tablet Take 30 mg by mouth daily.  esomeprazole (NEXIUM) 40 mg capsule Take  by mouth daily.  nitroglycerin (NITROSTAT) 0.4 mg SL tablet 1 Tab by SubLINGual route every five (5) minutes as needed for Chest Pain (after 3 doses / 15 mins, if chest pain persists, contact EMS/911).  metoprolol tartrate (LOPRESSOR) 25 mg tablet Take 12.5 mg by mouth every twelve (12) hours.  atorvastatin (LIPITOR) 40 mg tablet Take 1 Tab by mouth nightly.  lisinopril (PRINIVIL, ZESTRIL) 2.5 mg tablet Take 1 Tab by mouth daily.  clopidogrel (PLAVIX) 75 mg tab Take 1 Tab by mouth daily.  acetaminophen (TYLENOL) 325 mg tablet Take 325 mg by mouth as needed for Pain.  aspirin 81 mg tablet Take 81 mg by mouth every morning. ALLERGIES: 
Allergies Allergen Reactions  Aspirin Swelling Takes 81 mg at home. Patient can tolerate Aspirin in low doses, but in larger doses causes swelling. FAMILY HISTORY:  
Family History Problem Relation Age of Onset  Heart Disease Mother  Cancer Mother UNKNOWN TYPE  
 Heart Disease Father SOCIAL HISTORY: 
Social History Tobacco Use  Smoking status: Former Smoker Packs/day: 0.25 Years: 30.00 Pack years: 7.50 Last attempt to quit: 2018 Years since quittin.3  Smokeless tobacco: Never Used Substance Use Topics  Alcohol use: No  
  
Drug use:  NO  
 
Review of Systems ROS were reviewed, and all are negative outside the HPI. Physical Examination:  
Patient Vitals for the past 24 hrs: 
 BP Temp Pulse Resp SpO2 Height Weight 05/04/19 1054 139/82  (!) 109  97 %    
05/04/19 1046 136/87  (!) 102  98 %    
05/04/19 1040 (!) 177/95  96      
05/04/19 1039 (!) 177/95  97  99 %    
05/04/19 1029 (!) 151/91 97.4 °F (36.3 °C) 99 18 98 % 5' 6\" (1.676 m) 90.3 kg (199 lb) General appearance: Oriented and alert, cooperative, obese. Denies pain or SOB at present. Daughter in law at bedside. Head: Normocephalic, without obvious abnormality, atraumatic Neck: supple, symmetrical, trachea midline, no adenopathy, and no JVD Lungs: Scattered harsh sounds, otherwise clear to auscultation bilaterally. No cough Heart: regular rate and rhythm, S1, S2 normal, no murmur, click, rub or gallop Abdomen: Protuberant, soft, non-tender. Bowel sounds normal. No masses,  no organomegaly Extremities: extremities normal, atraumatic, no cyanosis or edema Skin: Skin color, texture, turgor normal. No rashes or lesions Neurologic: Grossly normal 
 
Labs: 
Recent Results (from the past 24 hour(s)) EKG, 12 LEAD, INITIAL Collection Time: 05/04/19 10:28 AM  
Result Value Ref Range Ventricular Rate 96 BPM  
 Atrial Rate 96 BPM  
 P-R Interval 132 ms QRS Duration 104 ms Q-T Interval 358 ms QTC Calculation (Bezet) 452 ms Calculated P Axis 45 degrees Calculated R Axis -48 degrees Calculated T Axis 99 degrees Diagnosis Normal sinus rhythm Incomplete right bundle branch block Left anterior fascicular block ST & T wave abnormality, consider anterolateral ischemia Abnormal ECG When compared with ECG of 08-AUG-2018 02:13, 
T wave inversion more evident in Anterior leads T wave inversion less evident in Lateral leads Confirmed by Jose Lucero MD (), CORI SANDOVAL ([id number]) on 5/4/2019 11:11:12 AM 
  
POC TROPONIN-I Collection Time: 05/04/19 10:37 AM  
Result Value Ref Range Troponin-I (POC) 0.07 (H) 0.02 - 0.05 ng/ml PROTHROMBIN TIME + INR Collection Time: 05/04/19 10:37 AM  
Result Value Ref Range Prothrombin time 12.3 11.7 - 14.5 sec INR 0.9 PTT Collection Time: 05/04/19 10:37 AM  
Result Value Ref Range aPTT 24.2 (L) 24.7 - 39.8 SEC HEMOGLOBIN A1C WITH EAG Collection Time: 05/04/19 10:37 AM  
Result Value Ref Range Hemoglobin A1c 12.2 (H) 4.8 - 6.0 % Est. average glucose 303 mg/dL CBC WITH AUTOMATED DIFF Collection Time: 05/04/19 10:38 AM  
Result Value Ref Range WBC 8.9 4.3 - 11.1 K/uL  
 RBC 4.53 4.23 - 5.6 M/uL  
 HGB 14.4 13.6 - 17.2 g/dL HCT 43.4 41.1 - 50.3 % MCV 95.8 79.6 - 97.8 FL  
 MCH 31.8 26.1 - 32.9 PG  
 MCHC 33.2 31.4 - 35.0 g/dL  
 RDW 13.2 11.9 - 14.6 % PLATELET 433 902 - 014 K/uL MPV 10.1 9.4 - 12.3 FL ABSOLUTE NRBC 0.00 0.0 - 0.2 K/uL  
 DF AUTOMATED NEUTROPHILS 69 43 - 78 % LYMPHOCYTES 24 13 - 44 % MONOCYTES 6 4.0 - 12.0 % EOSINOPHILS 0 (L) 0.5 - 7.8 % BASOPHILS 0 0.0 - 2.0 % IMMATURE GRANULOCYTES 1 0.0 - 5.0 %  
 ABS. NEUTROPHILS 6.1 1.7 - 8.2 K/UL  
 ABS. LYMPHOCYTES 2.1 0.5 - 4.6 K/UL  
 ABS. MONOCYTES 0.6 0.1 - 1.3 K/UL  
 ABS. EOSINOPHILS 0.0 0.0 - 0.8 K/UL  
 ABS. BASOPHILS 0.0 0.0 - 0.2 K/UL  
 ABS. IMM. GRANS. 0.1 0.0 - 0.5 K/UL METABOLIC PANEL, COMPREHENSIVE Collection Time: 05/04/19 10:38 AM  
Result Value Ref Range Sodium 128 (L) 136 - 145 mmol/L Potassium 4.7 3.5 - 5.1 mmol/L Chloride 93 (L) 98 - 107 mmol/L  
 CO2 26 21 - 32 mmol/L Anion gap 9 7 - 16 mmol/L Glucose 697 (HH) 65 - 100 mg/dL BUN 22 8 - 23 MG/DL Creatinine 1.42 0.8 - 1.5 MG/DL  
 GFR est AA >60 >60 ml/min/1.73m2 GFR est non-AA 53 (L) >60 ml/min/1.73m2 Calcium 7.8 (L) 8.3 - 10.4 MG/DL Bilirubin, total 0.9 0.2 - 1.1 MG/DL  
 ALT (SGPT) 44 12 - 65 U/L  
 AST (SGOT) 21 15 - 37 U/L Alk. phosphatase 102 50 - 136 U/L Protein, total 7.0 6.3 - 8.2 g/dL Albumin 2.9 (L) 3.2 - 4.6 g/dL Globulin 4.1 (H) 2.3 - 3.5 g/dL A-G Ratio 0.7 (L) 1.2 - 3.5    
TROPONIN I  Collection Time: 05/04/19 10:38 AM  
 Result Value Ref Range Troponin-I, Qt. 0.08 (H) 0.02 - 0.05 NG/ML  
GLUCOSE, POC Collection Time: 05/04/19 11:55 AM  
Result Value Ref Range Glucose (POC) 596 (HH) 65 - 100 mg/dL GLUCOSE, POC Collection Time: 05/04/19 12:47 PM  
Result Value Ref Range Glucose (POC) 397 (H) 65 - 100 mg/dL POC TROPONIN-I Collection Time: 05/04/19  1:05 PM  
Result Value Ref Range Troponin-I (POC) 0.42 (H) 0.02 - 0.05 ng/ml A1C: 12.2 CXR:  Stable exam with persistent coarsening of the interstitial lung markings. No focal alveolar infiltrate. [facility]  [facility] 
PHP:  Luiza Paez Assessment and Plan:  
Acute onset Chest pain, resolved with ntg, elevated troponin levels Extensive cardiac history including NSTEMI and CABG Cardiology admitting Discussed home meds with Cards Unstable angina As above CAD with history of NSTEMI and CABG Marked hyperglycemia with A1C 12.2:  Steroid induced new DM II Given 5 units regular insulin IV with glucose drop 300 pts. Give 5 units humalog sub q now Check glucose q hour and call me, unsure how he will  respond to insulin Place on SSI for now, patient is NPO 
 DM education and nutritionist consults Monday 5/6 HTN:  Compliant with meds Hyperlipidemia:  On meds Peripheral vascular disease-s/p PPI to right common iliac 5/4/11 Thank you for involving us in the care of this pleasant patient. Will follow. Category Status:  1 KAUSHIK Neville

## 2019-05-04 NOTE — PROGRESS NOTES
Bedside and Verbal shift change report given to LUIS Ponce (oncoming nurse) by self Chin reyes). Report included the following information SBAR, Kardex, MAR and Recent Results.

## 2019-05-04 NOTE — ED PROVIDER NOTES
Patient complains of substernal chest pain radiating the jaw on the left arm starting about an hour prior to presentation. Patient's last heart catheter was in February where he received another stent, received nitroglycerin via EMS with relief of the discomfort. At the time of my history, the patient started having pain again and is now receiving more nitroglycerin. The history is provided by the patient and the EMS personnel. Chest Pain (Angina) This is a new problem. The current episode started 1 to 2 hours ago. The problem has been gradually worsening. Duration of episode(s) is 1 hour. The problem occurs rarely. The pain is associated with rest. The pain is present in the substernal region. The pain is moderate. The quality of the pain is described as tightness. The pain radiates to the left jaw, right jaw and left shoulder. Associated symptoms include malaise/fatigue and shortness of breath (currently being treated for allergic pneumonitis with prednisone). Pertinent negatives include no abdominal pain, no back pain, no claudication, no cough, no diaphoresis, no dizziness, no exertional chest pressure, no fever, no headaches, no hemoptysis, no irregular heartbeat, no leg pain, no lower extremity edema, no nausea, no near-syncope, no numbness, no orthopnea, no palpitations, no PND, no sputum production, no vomiting and no weakness. He has tried rest, aspirin and nitroglycerin for the symptoms. The treatment provided significant relief. Risk factors include cardiac disease, obesity, male gender and hypertension. His past medical history is significant for HTN. His past medical history does not include aneurysm, cancer, DM, DVT, PE or CHF. Procedural history includes cardiac catheterization, cardiac stents and CABG (1999). Past Medical History:  
Diagnosis Date  CAD (coronary artery disease) CABG 1999, stents after (last placed 2/17)  GERD (gastroesophageal reflux disease) PRN meds  Hypertension  Interstitial pneumonitis (HCC)   
 per CT chest 3/10/17 (daily inhalers, no home 02)  Osteoarthritis   
 hands, knees  Pulmonary nodule 8mm per CT chest 3/10/17 (biopsy WNL) Past Surgical History:  
Procedure Laterality Date  HX HEART CATHETERIZATION    
 multiple  HX TONSILLECTOMY  VASCULAR SURGERY PROCEDURE UNLIST CABG Family History:  
Problem Relation Age of Onset  Heart Disease Mother  Cancer Mother UNKNOWN TYPE  
 Heart Disease Father Social History Socioeconomic History  Marital status: COMMON LAW Spouse name: Not on file  Number of children: Not on file  Years of education: Not on file  Highest education level: Not on file Occupational History  Not on file Social Needs  Financial resource strain: Not on file  Food insecurity:  
  Worry: Not on file Inability: Not on file  Transportation needs:  
  Medical: Not on file Non-medical: Not on file Tobacco Use  Smoking status: Former Smoker Packs/day: 0.25 Years: 30.00 Pack years: 7.50 Last attempt to quit:  Years since quittin.3  Smokeless tobacco: Never Used Substance and Sexual Activity  Alcohol use: No  
 Drug use: No  
 Sexual activity: Not on file Lifestyle  Physical activity:  
  Days per week: Not on file Minutes per session: Not on file  Stress: Not on file Relationships  Social connections:  
  Talks on phone: Not on file Gets together: Not on file Attends Druze service: Not on file Active member of club or organization: Not on file Attends meetings of clubs or organizations: Not on file Relationship status: Not on file  Intimate partner violence:  
  Fear of current or ex partner: Not on file Emotionally abused: Not on file Physically abused: Not on file Forced sexual activity: Not on file Other Topics Concern  Not on file Social History Narrative  Not on file ALLERGIES: Aspirin Review of Systems Constitutional: Positive for fatigue and malaise/fatigue. Negative for chills, diaphoresis and fever. Respiratory: Positive for shortness of breath (currently being treated for allergic pneumonitis with prednisone). Negative for cough, hemoptysis and sputum production. Cardiovascular: Positive for chest pain. Negative for palpitations, orthopnea, claudication, PND and near-syncope. Gastrointestinal: Negative for abdominal pain, constipation, diarrhea, nausea and vomiting. Endocrine: Positive for polydipsia and polyuria. Negative for cold intolerance, heat intolerance and polyphagia. Musculoskeletal: Negative for back pain. Neurological: Negative for dizziness, weakness, numbness and headaches. All other systems reviewed and are negative. Vitals:  
 05/04/19 1029 05/04/19 1040 BP: (!) 151/91 (!) 177/95 Pulse: 99 96 Resp: 18 Temp: 97.4 °F (36.3 °C) SpO2: 98% Weight: 90.3 kg (199 lb) Height: 5' 6\" (1.676 m) Physical Exam  
Constitutional: He is oriented to person, place, and time. He appears well-developed and well-nourished. He appears distressed. HENT:  
Head: Normocephalic and atraumatic. Right Ear: External ear normal.  
Left Ear: External ear normal.  
Mouth/Throat: Oropharynx is clear and moist.  
Eyes: Pupils are equal, round, and reactive to light. Conjunctivae and EOM are normal.  
Neck: Normal range of motion. Neck supple. No thyromegaly present. Cardiovascular: Normal rate, regular rhythm, normal heart sounds and intact distal pulses. Exam reveals no gallop and no friction rub. No murmur heard. Pulmonary/Chest: Effort normal and breath sounds normal. No accessory muscle usage. Tachypnea noted. No respiratory distress. Abdominal: Soft. Bowel sounds are normal. There is no tenderness. No hernia. Musculoskeletal: Normal range of motion. He exhibits no edema. Neurological: He is alert and oriented to person, place, and time. Skin: Skin is warm and dry. Capillary refill takes less than 2 seconds. Psychiatric: He has a normal mood and affect. His behavior is normal.  
Nursing note and vitals reviewed. MDM Number of Diagnoses or Management Options ACS (acute coronary syndrome) [facility]): new and requires workup Hyperglycemia: new and requires workup Amount and/or Complexity of Data Reviewed Clinical lab tests: ordered and reviewed Review and summarize past medical records: yes Discuss the patient with other providers: yes Independent visualization of images, tracings, or specimens: yes Risk of Complications, Morbidity, and/or Mortality Presenting problems: high Management options: high Critical Care Total time providing critical care: (=================================================================== This patient is critically ill and there is a high probability of of imminent or life threatening deterioration in the patient's condition without immediate management. The nature of the patient's clinical problem is: acute coronary syndrome, hyperglycemia I have spent 45 minutes in direct patient care, documentation, review of labs/xrays/old records, discussion with Staff, Colleague, Nursing . The time involved in the performance of separately reportable procedures was not counted toward critical care time. Beau Velez MD; 5/4/2019 @1:20 PM 
=================================================================== 
 
) Patient Progress Patient progress: improved ED Course as of May 04 1319 Sat May 04, 2019  
1145 Discussed findings with Dr. Romeo Colon of cardiology. Recommends heparin drip and admission by hospitalist for sugar control.  
 [BB] 1213 HGB: 14.4 [BB] ED Course User Index [BB] Conception Ou, MD  
 
 
Procedures The patient was observed in the ED.   Chest pain resolved with nitroglycerin. Patient was hydrated with normal saline and given insulin IV with improvement in his blood sugars. Cardiologist has seen the patient and case was discussed the hospitalist will admit. Results Reviewed: 
 
 
Recent Results (from the past 24 hour(s)) EKG, 12 LEAD, INITIAL Collection Time: 05/04/19 10:28 AM  
Result Value Ref Range Ventricular Rate 96 BPM  
 Atrial Rate 96 BPM  
 P-R Interval 132 ms QRS Duration 104 ms Q-T Interval 358 ms QTC Calculation (Bezet) 452 ms Calculated P Axis 45 degrees Calculated R Axis -48 degrees Calculated T Axis 99 degrees Diagnosis Normal sinus rhythm Incomplete right bundle branch block Left anterior fascicular block ST & T wave abnormality, consider anterolateral ischemia Abnormal ECG When compared with ECG of 08-AUG-2018 02:13, 
T wave inversion more evident in Anterior leads T wave inversion less evident in Lateral leads Confirmed by Mandy Nash MD (), CORI SANDOVAL ([id number]) on 5/4/2019 11:11:12 AM 
  
POC TROPONIN-I Collection Time: 05/04/19 10:37 AM  
Result Value Ref Range Troponin-I (POC) 0.07 (H) 0.02 - 0.05 ng/ml PROTHROMBIN TIME + INR Collection Time: 05/04/19 10:37 AM  
Result Value Ref Range Prothrombin time 12.3 11.7 - 14.5 sec INR 0.9 PTT Collection Time: 05/04/19 10:37 AM  
Result Value Ref Range aPTT 24.2 (L) 24.7 - 39.8 SEC HEMOGLOBIN A1C WITH EAG Collection Time: 05/04/19 10:37 AM  
Result Value Ref Range Hemoglobin A1c 12.2 (H) 4.8 - 6.0 % Est. average glucose 303 mg/dL CBC WITH AUTOMATED DIFF Collection Time: 05/04/19 10:38 AM  
Result Value Ref Range WBC 8.9 4.3 - 11.1 K/uL  
 RBC 4.53 4.23 - 5.6 M/uL  
 HGB 14.4 13.6 - 17.2 g/dL HCT 43.4 41.1 - 50.3 % MCV 95.8 79.6 - 97.8 FL  
 MCH 31.8 26.1 - 32.9 PG  
 MCHC 33.2 31.4 - 35.0 g/dL  
 RDW 13.2 11.9 - 14.6 % PLATELET 592 297 - 972 K/uL MPV 10.1 9.4 - 12.3 FL ABSOLUTE NRBC 0.00 0.0 - 0.2 K/uL DF AUTOMATED NEUTROPHILS 69 43 - 78 % LYMPHOCYTES 24 13 - 44 % MONOCYTES 6 4.0 - 12.0 % EOSINOPHILS 0 (L) 0.5 - 7.8 % BASOPHILS 0 0.0 - 2.0 % IMMATURE GRANULOCYTES 1 0.0 - 5.0 %  
 ABS. NEUTROPHILS 6.1 1.7 - 8.2 K/UL  
 ABS. LYMPHOCYTES 2.1 0.5 - 4.6 K/UL  
 ABS. MONOCYTES 0.6 0.1 - 1.3 K/UL  
 ABS. EOSINOPHILS 0.0 0.0 - 0.8 K/UL  
 ABS. BASOPHILS 0.0 0.0 - 0.2 K/UL  
 ABS. IMM. GRANS. 0.1 0.0 - 0.5 K/UL METABOLIC PANEL, COMPREHENSIVE Collection Time: 05/04/19 10:38 AM  
Result Value Ref Range Sodium 128 (L) 136 - 145 mmol/L Potassium 4.7 3.5 - 5.1 mmol/L Chloride 93 (L) 98 - 107 mmol/L  
 CO2 26 21 - 32 mmol/L Anion gap 9 7 - 16 mmol/L Glucose 697 (HH) 65 - 100 mg/dL BUN 22 8 - 23 MG/DL Creatinine 1.42 0.8 - 1.5 MG/DL  
 GFR est AA >60 >60 ml/min/1.73m2 GFR est non-AA 53 (L) >60 ml/min/1.73m2 Calcium 7.8 (L) 8.3 - 10.4 MG/DL Bilirubin, total 0.9 0.2 - 1.1 MG/DL  
 ALT (SGPT) 44 12 - 65 U/L  
 AST (SGOT) 21 15 - 37 U/L Alk. phosphatase 102 50 - 136 U/L Protein, total 7.0 6.3 - 8.2 g/dL Albumin 2.9 (L) 3.2 - 4.6 g/dL Globulin 4.1 (H) 2.3 - 3.5 g/dL A-G Ratio 0.7 (L) 1.2 - 3.5    
TROPONIN I Collection Time: 05/04/19 10:38 AM  
Result Value Ref Range Troponin-I, Qt. 0.08 (H) 0.02 - 0.05 NG/ML  
GLUCOSE, POC Collection Time: 05/04/19 11:55 AM  
Result Value Ref Range Glucose (POC) 596 (HH) 65 - 100 mg/dL GLUCOSE, POC Collection Time: 05/04/19 12:47 PM  
Result Value Ref Range Glucose (POC) 397 (H) 65 - 100 mg/dL POC TROPONIN-I Collection Time: 05/04/19  1:05 PM  
Result Value Ref Range Troponin-I (POC) 0.42 (H) 0.02 - 0.05 ng/ml XR CHEST PA LAT Final Result Impression: Stable exam with persistent coarsening of the interstitial lung  
markings. No focal alveolar infiltrate.

## 2019-05-05 NOTE — PROGRESS NOTES
[facility] CARDIOLOGY PROGRESS NOTE 
      
 
5/5/2019 7:22 AM 
 
Admit Date: 5/4/2019 Subjective:  
Trop peak at 4, now down to 2.41. Sugar better controlled. Brief mild chest pain last night when getting up to bathroom. No SOB. ROS: 
Cardiovascular:  As noted above Objective:  
  
Vitals:  
 05/04/19 2117 05/04/19 2248 05/05/19 0105 05/05/19 5000 BP: 121/84 138/80 109/68 117/74 Pulse: 71  72 69 Resp: 18  18 18 Temp: 97.9 °F (36.6 °C)  98.3 °F (36.8 °C) 97.7 °F (36.5 °C) SpO2: 98%  100% 100% Weight:    84.6 kg (186 lb 6.4 oz) Height:      
 
 
Physical Exam: 
General-No Acute Distress Neck- supple, no JVD 
CV- regular rate and rhythm no MRG Lung- clear bilaterally Abd- soft, nontender, nondistended Ext- no edema bilaterally. Skin- warm and dry Data Review:  
Recent Labs 05/05/19 
0458 05/04/19 
2036 05/04/19 
1438 05/04/19 
1038  05/04/19 
1037 *  --  134* 128*   < >  --   
K 4.0  --  3.9 4.7   < >  --   
BUN 18  --  20 22   < >  --   
CREA 0.74*  --  1.03 1.42   < >  --   
*  --  357* 697*   < >  --   
WBC  --   --   --  8.9  --   --   
HGB  --   --   --  14.4  --   --   
HCT  --   --   --  43.4  --   --   
PLT  --   --   --  221  --   --   
INR  --   --   --   --   --  0.9  
TROIQ 2.41* 4.02* 1.99* 0.08*   < >  --   
CHOL 157  --   --   --   --   --   
LDLC 57.4  --   --   --   --   --   
HDL 67*  --   --   --   --   --   
 < > = values in this interval not displayed.   
 
 
Assessment/Plan:  
Chest pain (1/28/2017)- Mildly elevated trop w long h/o CAD and new DM w DKA, cont ASA, plavix, heparin, cont BB, ACE, statin, echo pending 
  
CAD (coronary artery disease) (11/1/2010)-  s/p CABG in 1999 w multiple PCI since then, PCI to SVG to LAD in 1-2017, stable CAD 3-2017, [facility] 8-3-18 w PCI through the Lcx to L main and LAD, patent LAD to LIMA and SVG to RCA, occluded RCA- tx as above 
  
HTN (hypertension), benign (11/1/2010)- BB, ace 
  
 Peripheral vascular disease-s/p PPI to right common iliac 5/4/11 (5/5/2011)- ASA, plavix, statin  
  
Pneumonitis (5/4/2019)- prednisone  
  
Diabetic ketoacidosis ([facility] (5/4/2019)- Per hospitalist  
 
 
EVERARDO Ang 
5/5/2019 7:22 AM

## 2019-05-05 NOTE — PROGRESS NOTES
Nutrition Received consult for Diabetic Diet Education Sharon Lai NP) Assessment: 
Diet: DIET DIABETIC CONSISTENT CARB Regular DIET NPO   
PMH: 
Past Medical History:  
Diagnosis Date  CAD (coronary artery disease) CABG 1999, stents after (last placed 2/17)  Current chronic use of systemic steroids 03/2019  
 begun with diagnosis of pulmonary fibrosis  GERD (gastroesophageal reflux disease) PRN meds  Hypertension  Interstitial pneumonitis (HCC)   
 per CT chest 3/10/17 (daily inhalers, no home 02)  Obesity  Osteoarthritis   
 hands, knees  Pulmonary fibrosis ([address] 03/2019  Pulmonary nodule 8mm per CT chest 3/10/17 (biopsy WNL) Food/Nutrition and Pertinent History: The patient was started on steroids in March of 2019 for pulmonary fibrosis. It is suspected that he has steroid induced diabetes at this time. His HgA1c from 5/4 was noted at 12.2. He has been started on Lantus of 20 units daily and SSI as needed. He states that at home he has been with an unquenchable thirst so he has been drinking sweet tea, orange soda, grape soda and any other sweet drink he can get his hands on. He reports feeling lethargic and having to go pee every hour. He states that he has recently gained 20# but the lost 20#. At the start of the education the patient thought  Carbohydrates were \"fatty meats\" such as hamburgers etc. Discussed carbohydrate food sources and portion control. Discussed the importance of consuming 3 meals daily now that he is on insulin. The patient typically skips breakfast. He states that he does snack after dinner and these snacks are typically [address]. He is willing to switch to fruit instead. RD and patient set goal of cutting out sweet tea and soda along with [employer] [place] Cakes. POC Glucoses: (5/4): 596, 397, 237, 310; (5/5): 280, 432 AM BMP Glucose: 270 Anthropometrics:Height: 5' 6\" (167.6 cm),  Weight: 84.6 kg (186 lb 6.4 oz), Weight Source: Standing scale (comment), Body mass index is 30.09 kg/m². BMI class of obesity class I. Macronutrient needs: EER:  3944-6118 kcal /day (20-25 kcal/kg Actual BW of 84.6 kg) EPR:   grams protein/day (1-1.2 grams/kg actual BW) Max CHO: 266 grams/day (50% of estimated kcal needs) Nutrition Diagnosis: Food and nutrition knowledge deficit r/t lack of exposure to nutrition related information as evidenced by patient with newly diagnosed steroid induced diabetes. Intervention:  
Meals and snacks: Continue [facility] diet. Education: Provided patient with written and verbal instruction on consistent carbohydrate diet. Handouts provided: My meal planner, My plate planner and Ready, Set, Start Counting. Patient verbalizes fair understanding of diet. Expect fair compliance. Discharge Nutrition Plan: Recommend patient continue nutrition behavior change therapy in outpatient setting. Outpatient Diabetes Education Counseling recommended to patient- more information at [phone]. Joseph Mireles [address], [address],   
[phone]

## 2019-05-05 NOTE — PROGRESS NOTES
Bedside and Verbal shift change report given to self (oncoming nurse) by Recife, RN (offgoing nurse). Report included the following information SBAR, Kardex, MAR and Recent Results. Heparin gtt verified at bedside

## 2019-05-05 NOTE — PROGRESS NOTES
Bedside and Verbal shift change report given to Emily Pierre RN (oncoming nurse) by self Tiarra reyes). Report included the following information SBAR, Kardex, MAR and Recent Results.

## 2019-05-05 NOTE — PROGRESS NOTES
Bedside and Verbal shift change report given to Tony Espitia [facility] (oncoming nurse) by Jc Loving RN (offgoing nurse). Report included the following information SBAR, Kardex, Accordion and Recent Results.

## 2019-05-05 NOTE — PROGRESS NOTES
Problem: Diabetes Self-Management Goal: *Disease process and treatment process Description Define diabetes and identify own type of diabetes; list 3 options for treating diabetes. Outcome: Progressing Towards Goal 
Goal: *Incorporating nutritional management into lifestyle Description Describe effect of type, amount and timing of food on blood glucose; list 3 methods for planning meals. Outcome: Progressing Towards Goal 
Goal: *Incorporating physical activity into lifestyle Description State effect of exercise on blood glucose levels. Outcome: Progressing Towards Goal 
Goal: *Developing strategies to promote health/change behavior Description Define the ABC's of diabetes; identify appropriate screenings, schedule and personal plan for screenings. Outcome: Progressing Towards Goal 
Goal: *Using medications safely Description State effect of diabetes medications on diabetes; name diabetes medication taking, action and side effects. Outcome: Progressing Towards Goal 
Goal: *Monitoring blood glucose, interpreting and using results Description Identify recommended blood glucose targets  and personal targets. Outcome: Progressing Towards Goal 
Goal: *Prevention, detection, treatment of acute complications Description List symptoms of hyper- and hypoglycemia; describe how to treat low blood sugar and actions for lowering  high blood glucose level. Outcome: Progressing Towards Goal 
Goal: *Prevention, detection and treatment of chronic complications Description Define the natural course of diabetes and describe the relationship of blood glucose levels to long term complications of diabetes. Outcome: Progressing Towards Goal 
Goal: *Developing strategies to address psychosocial issues Description Describe feelings about living with diabetes; identify support needed and support network Outcome: Progressing Towards Goal 
Goal: *Insulin pump training Outcome: Progressing Towards Goal 
 Goal: *Sick day guidelines Outcome: Progressing Towards Goal 
Goal: *Patient Specific Goal (EDIT GOAL, INSERT TEXT) Outcome: Progressing Towards Goal 
  
Problem: Falls - Risk of 
Goal: *Absence of Falls Description Document Buck Abarca Fall Risk and appropriate interventions in the flowsheet. Outcome: Progressing Towards Goal 
Note:  
Fall Risk Interventions: 
Medication Interventions: Patient to call before getting OOB, Teach patient to arise slowly Elimination Interventions: Patient to call for help with toileting needs Problem: Patient Education: Go to Patient Education Activity Goal: Patient/Family Education Outcome: Progressing Towards Goal

## 2019-05-05 NOTE — PROGRESS NOTES
Hospitalist Progress Note 2019 Admit Date: 2019 10:32 AM  
NAME: Luh Osullivan :  1955 MRN:  [id number] Attending: Mohamud Lynne MD 
PCP:  Severa Burden, MD 
 
SUBJECTIVE:  
Patient is a 70 yo M with history of extensive CAD with remote CABG and total of 8 stents admitted for NSTEMI/elevated troponin. To undergo L heart cath tomorrow. Hospitalist consulted for diabetes (new diagnosis) with a1c found to be 12.2. He denies chest pain or shortness of breath. Was started on SSI yesterday. Seen with son, Ana Kimbrough, at bedside. Review of Systems negative with exception of pertinent positives noted above PHYSICAL EXAM  
 
Visit Vitals /75 (BP 1 Location: Right arm, BP Patient Position: At rest) Pulse 72 Temp 97.5 °F (36.4 °C) Resp 17 Ht 5' 6\" (1.676 m) Wt 84.6 kg (186 lb 6.4 oz) SpO2 99% BMI 30.09 kg/m² Temp (24hrs), Av.8 °F (36.6 °C), Min:97.5 °F (36.4 °C), Max:98.3 °F (36.8 °C) Patient Vitals for the past 24 hrs: 
 Temp Pulse Resp BP SpO2  
19 0900 97.5 °F (36.4 °C) 72 17 117/75 99 % 19 0541 97.7 °F (36.5 °C) 69 18 117/74 100 % 19 0105 98.3 °F (36.8 °C) 72 18 109/68 100 % 19 2248    138/80   
19 2117 97.9 °F (36.6 °C) 71 18 121/84 98 % 19 2005     98 % 19 1943     (!) 78 % 19 1620 97.8 °F (36.6 °C) 70 18 121/81 (!) 70 % 19 1440 97.5 °F (36.4 °C) 81 18 148/88 98 % 19 1351  82  145/88 99 % 19 1340  82  137/83 99 % 19 1330  84  153/85 100 % 19 1320  74  150/85 100 % 19 1310  77  152/85 100 % 19 1300  84  171/89 100 % Oxygen Therapy O2 Sat (%): 99 % (19 0900) Pulse via Oximetry: 98 beats per minute (19) O2 Device: Nasal cannula (19) O2 Flow Rate (L/min): 2 l/min (19) Intake/Output Summary (Last 24 hours) at 2019 1118 Last data filed at 2019 0105 Gross per 24 hour Intake 480 ml Output 850 ml Net -370 ml General: No acute distress   
Lungs:  CTA Bilaterally. Heart:  Regular rate and rhythm,  No murmur, rub, or gallop Abdomen: Soft, Non distended, Non tender, Positive bowel sounds Extremities: No cyanosis, clubbing or edema Neurologic:  No focal deficits ASSESSMENT Hospital Problems as of 5/5/2019 Date Reviewed: 5/4/2019 Codes Class Noted - Resolved POA Obesity (Chronic) ICD-10-CM: W21.6 ICD-9-CM: 278.00  Unknown - Present Yes Current chronic use of systemic steroids (Chronic) ICD-10-CM: Z79.52 
ICD-9-CM: V58.65  3/1/2019 - Present Yes Overview Signed 5/4/2019  1:41 PM by Ang Albright NP  
  begun with diagnosis of pulmonary fibrosis * (Principal) Chest pain ICD-10-CM: R07.9 ICD-9-CM: 786.50  1/28/2017 - Present Yes Peripheral vascular disease-s/p PPI to right common iliac 5/4/11 (Chronic) ICD-10-CM: I73.9 ICD-9-CM: 443.9  5/5/2011 - Present Yes Unstable angina (HCC) (Chronic) ICD-10-CM: I20.0 ICD-9-CM: 411.1  11/1/2010 - Present Yes CAD (coronary artery disease) (Chronic) ICD-10-CM: I25.10 ICD-9-CM: 414.00  11/1/2010 - Present Yes HTN (hypertension), benign (Chronic) ICD-10-CM: I10 
ICD-9-CM: 401.1  11/1/2010 - Present Yes Plan: · DM2- start lantus 20 units daily, humalog 5 units with meals, and continue SSI · Check C peptide- if low, will definitely need insulin. Otherwise, could be tried on oral meds as outpatient. · Diabetes educator consult pending. · CAD/NSTEMI- cath tomorrow per cards DVT Prophylaxis: Heparin gtt per cards Signed By: Leopold Custard Zelphia Branch, MD   
 May 5, 2019

## 2019-05-06 NOTE — PROGRESS NOTES
TRANSFER - OUT REPORT: 
 
Verbal report given to LUIS Dunne(name) on Shelley Elaine  being transferred to [address] [place] [address](unit) for routine progression of care Report consisted of patients Situation, Background, Assessment and  
Recommendations(SBAR). Information from the following report(s) SBAR was reviewed with the receiving nurse. is allergic to aspirin. Opportunity for questions and clarification was provided. Procedure Summary:Pt had LHC via L groin with 1 stent placed in RCA graft/native and poba of L circ. 6 fr sheath left in place and site covered with clear dsg. Med Administration Versed:  2 mg Fentanyl: 50 mcg Angiomax Stop Time: 7609 Visit Vitals /70 (BP 1 Location: Left arm, BP Patient Position: Supine) Pulse 64 Temp 97.9 °F (36.6 °C) Resp 16 Ht 5' 6\" (1.676 m) Wt 83.4 kg (183 lb 14.4 oz) SpO2 99% BMI 29.68 kg/m² Past Medical History:  
Diagnosis Date  CAD (coronary artery disease) CABG 1999, stents after (last placed 2/17)  Current chronic use of systemic steroids 03/2019  
 begun with diagnosis of pulmonary fibrosis  GERD (gastroesophageal reflux disease) PRN meds  Hypertension  Interstitial pneumonitis (HCC)   
 per CT chest 3/10/17 (daily inhalers, no home 02)  Obesity  Osteoarthritis   
 hands, knees  Pulmonary fibrosis ([address] 03/2019  Pulmonary nodule 8mm per CT chest 3/10/17 (biopsy WNL) Peripheral IV 05/04/19 Right Antecubital (Active) Site Assessment Clean, dry, & intact 5/6/2019  8:04 AM  
Phlebitis Assessment 0 5/6/2019  8:04 AM  
Infiltration Assessment 0 5/6/2019  8:04 AM  
Dressing Status Clean, dry, & intact 5/6/2019  8:04 AM  
Dressing Type Tape;Transparent 5/6/2019  8:04 AM  
Hub Color/Line Status Patent; Infusing 5/6/2019  8:04 AM  
   
Peripheral IV 05/06/19 Anterior;Right Forearm (Active) Site Assessment Clean, dry, & intact 5/6/2019  8:04 AM  
 Phlebitis Assessment 0 5/6/2019  8:04 AM  
Infiltration Assessment 0 5/6/2019  8:04 AM  
Dressing Status Clean, dry, & intact 5/6/2019  8:04 AM  
Dressing Type Transparent;Tape 5/6/2019  8:04 AM  
Hub Color/Line Status Patent; Infusing 5/6/2019  8:04 AM

## 2019-05-06 NOTE — PROCEDURES
Cardiac Catheterization Procedure Note Patient ID: 
 
 Name: Marta Harrell Medical Record Number: [id number] YOB: 1955 Date of Procedure: 5/6/2019 Pre-procedure Diagnosis:  NSTEMI Post-procedure Diagnosis: Coronary Artery Disease Reason for Procedure: ACS < or = 24 Hours Blood loss less than 5 ml Sedation. Pt received 2 mg versed and 50 mcg fentanyl for monitored conscious sedation from 1000to 1047. Nurse gabe Specimen: None No complications No assistants Time out, Mallampati, and ASA performed Procedure: After informed consent, patient was prepped and draped in the usual sterile fashion. Left femoral approach was used. 180cc Visipaque contrast were utilized for the entire procedure. no closure device used FINDINGS Left Ventricle: 55% LVEDP: 10 Left Main:ok Left Anterior descending coronary artery: occluded off lm. Fills from patent svbg Left Circumflex coronary artery: extensively stented. prox circ probable thrombus Right coronary artery: occluded Graft anatomy: sv to lad patent Sv to rca with distal 90% stenosis pre and post old stent Intervention if done: ptca to circ with NC balloon. Transient slow flow that responded to ntg Stent to distal rca graft 2.25x26 sheree Conclusions: 2 vessel pci Recommentations: dapt asa and brilinta due to nstemi No complications Signed By: Santos Hernandez MD

## 2019-05-06 NOTE — PROGRESS NOTES
TRANSFER - IN REPORT: 
 
Verbal report received from Adal Benjamin RN on Armlesli Doom being received from cath lab for routine post - op Report consisted of patients Situation, Background, Assessment and Recommendations(SBAR). Information from the following report(s) SBAR, Kardex, Intake/Output, MAR and Recent Results was reviewed with the receiving nurse. Opportunity for questions and clarification was provided. Assessment completed upon patients arrival to unit and care assumed. Left groin site assessed, sheath secured in place. Patient placed on tele and eagle monitor. Art line zeroed, blood pressure cycling every 15 minutes. Patient educated on limited movement of left leg. Fluids running. Family at bedside. Bed low and locked, call light within reach. With exception of left groin cath site. Skin integrity remains unchanged.

## 2019-05-06 NOTE — PHYSICIAN ADVISORY
Letter of Determination: Upgrade from Observation to Inpatient Status This patient was originally hospitalized as Outpatient Status with Observation Services on 5/5/2019 for non-ST segment elevation myocardial infarction. This patient now meets for Inpatient Admission based on medical necessity. The patient's stay was medically necessary based on uncontrolled diabetes type 2 with hemoglobin A1c of 12.2 mg%, and troponin-I of 2.41 ng/ml. It is our recommendation that this patient's hospitalization status should be upgraded from OBSERVATION to INPATIENT status.  
  
The final decision regarding the patient's hospitalization status depends on the attending physician's judgement. Emilee Patterson MD, MERY, Physician Advisor [address] [place] [address].

## 2019-05-06 NOTE — PROGRESS NOTES
Problem: Diabetes Self-Management Goal: *Disease process and treatment process Description Define diabetes and identify own type of diabetes; list 3 options for treating diabetes. Outcome: Progressing Towards Goal 
Goal: *Incorporating nutritional management into lifestyle Description Describe effect of type, amount and timing of food on blood glucose; list 3 methods for planning meals. Outcome: Progressing Towards Goal 
  
Problem: Falls - Risk of 
Goal: *Absence of Falls Description Document Jose E President Fall Risk and appropriate interventions in the flowsheet. Outcome: Progressing Towards Goal 
Note:  
Fall Risk Interventions: 
  
 
  
 
Medication Interventions: Patient to call before getting OOB, Teach patient to arise slowly Elimination Interventions: Call light in reach, Patient to call for help with toileting needs Problem: Patient Education: Go to Patient Education Activity Goal: Patient/Family Education Outcome: Progressing Towards Goal 
  
Problem: Cath Lab Procedures: Pre-Procedure Goal: Off Pathway (Use only if patient is Off Pathway) Outcome: Progressing Towards Goal 
Goal: Activity/Safety Outcome: Progressing Towards Goal 
Goal: Consults, if ordered Outcome: Progressing Towards Goal 
Goal: Diagnostic Test/Procedures Outcome: Progressing Towards Goal 
Goal: Nutrition/Diet Outcome: Progressing Towards Goal 
  
Problem: Cath Lab Procedures: Post-Cath Day of Procedure (Initiate SCIP Measures for Post-Op Care) Goal: Off Pathway (Use only if patient is Off Pathway) Outcome: Progressing Towards Goal 
Goal: Activity/Safety Outcome: Progressing Towards Goal 
Goal: Consults, if ordered Outcome: Progressing Towards Goal 
Goal: Diagnostic Test/Procedures Outcome: Progressing Towards Goal 
Goal: Nutrition/Diet Outcome: Progressing Towards Goal

## 2019-05-06 NOTE — PROGRESS NOTES
PSYCHIATRIC PROGRESS NOTE         Patient Name  Emil Cisneros   Date of Birth 1970   [facility] [id number]   Medical Record Number  [id number]      Age  49 y.o. PCP PROVIDER UNKNOWN   Admit date:  11/25/2017    Room Number  307/01  @ [facility]   Date of Service  12/23/2017           E & M PROGRESS NOTE:         HISTORY       CC:  \"selectively mute\"  HISTORY OF PRESENT ILLNESS/INTERVAL HISTORY:  (reviewed/updated 12/23/2017). per initial evaluation: The patient, Emil Cisneros, is a 49 y.o. WHITE OR  male with a past psychiatric history significant for schizophrenia, who presents at this time with complaints of (and/or evidence of) the following emotional symptoms: psychotic behavior. Additional symptomatology include paranoid delusion, sexually inappropriate behavior, pepisodes of yelling screaming followed by selectively mute, disorganized thought process, anxiety, concern about health problems, difficulty sleeping, fearfulness, hearing voices, increased irritability, poor concentration and problem with medication. The above symptoms have been present for many years. These symptoms are of severe severity. These symptoms are constant  in nature. The patient's condition has been precipitated by and psychosocial stressors (conflict with sister, non compliance ). Patient's condition made worse by treatment noncompliance. UDS: negative; BAL=0. Emil Cisneros presents/reports/evidences the following emotional symptoms today, 12/23/2017:psychotic behavior. The above symptoms have been present for many years. These symptoms are of severe severity. The symptoms are constant in nature. Additional symptomatology and features include       12/9-Prefers to isolate self in his room except coming out for meals. Minimal social interaction. Tp remains disorganized. Accepting meds.   12/11- minimal change- isolative and does not interact with pt/staff except going out for his Breakfast. Problem: Falls - Risk of 
Goal: *Absence of Falls Description Document Jose Luis Pichardo Fall Risk and appropriate interventions in the flowsheet.  
Outcome: Progressing Towards Goal 
  
 Has refused med at times. Will lay in bed, poor hygiene. 12/12- affect is sl better, out of his room today and talked in a soft voice. Remains disorganized and delusional.  12/13- poor hygiene and disorganized, will lay in bed with eyes closed, pt was able to get up and talk when told that we are discussing his dc planning. Talks in a soft voice. Accepting med but need lot of prompting to do his ADLs  12/14- affect is sl better, tendency to lay in bed, will only talk when we talk about dc planning  12/15- poor hygiene, selectively mute, disorganized and delusional. Poor insight  12/16- inappropriate posturing- sitting on the side of the bed and half naked. Selectively mute and isolative. poor hygiene  12/17- odd and bizarre behavior. Selectively mute and poor hygiene. Sitting at the edge of his bed with face downward and half naked. Accepting med  12/18- appear to have deteriorated over the weekend, selectively mute, odd behavior and delusional. Poor hygiene and passive. 12/19- delusional and disorganized. Stays in his room and found to be in odd position at the side of the bed. Staff informed pt was eating his break fast but quickly changed to this position ( ?behavioral). Poor hygiene   12/20- minimal change in presentation, pills found in his room ( ?compliance), poor hygiene and odd posturing  12/21/17 he is not engaging in talking and not eating well and he is in fetal position and he is paranoid and disorganized and poor hygiene and he refuse to take medications   12/22/17 he still not engaging and he still showing catatonic features and has been having posturing and he is mute and not verbal and he is not eating that well , respond to internal stimuli   12/23/17: Patient was sitting on the floor,in a fetal position,appears disheveled,poor hygiene,mute ,did not engage in conversation due to catatonia. SIDE EFFECTS: (reviewed/updated 12/23/2017)  None reported or admitted to.   No noted toxicity with use of current med   ALLERGIES:(reviewed/updated 12/23/2017)  Allergies   Allergen Reactions    Fluphenazine Unknown (comments)     Pt is unable to communicate properly.  Penicillins Unknown (comments)     Pt is unable to communicate properly. MEDICATIONS PRIOR TO ADMISSION:(reviewed/updated 12/23/2017)  Prescriptions Prior to Admission   Medication Sig    divalproex ER (DEPAKOTE ER) 500 mg ER tablet Take 1,500 mg by mouth nightly. Indications: Treatment-resistant schizophrenia    haloperidol decanoate (HALDOL DECANOATE) 100 mg/mL injection 2 mL by IntraMUSCular route every twenty-eight (28) days. Indications: SCHIZOPHRENIA    benztropine (COGENTIN) 2 mg tablet Take 1 Tab by mouth nightly. Indications: extrapyramidal disease    cloZAPine 200 mg tablet Take 600 mg by mouth nightly. Indications: TREATMENT-RESISTANT SCHIZOPHRENIA      PAST MEDICAL HISTORY: Past medical history from the initial psychiatric evaluation has been reviewed (reviewed/updated 12/23/2017) with no additional updates (I asked patient and no additional past medical history provided). Past Medical History:   Diagnosis Date    Psychiatric disorder     Psychotic disorder     [facility]    No past surgical history on file. SOCIAL HISTORY: Social history from the initial psychiatric evaluation has been reviewed (reviewed/updated 12/23/2017) with no additional updates (I asked patient and no additional social history provided). Social History     Social History    Marital status: SINGLE     Spouse name: N/A    Number of children: N/A    Years of education: N/A     Occupational History    Not on file. 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 He lives with his sister.  On disability    No pending legal charge reported      FAMILY HISTORY: Family history from the initial psychiatric evaluation has been reviewed (reviewed/updated 12/23/2017) with no additional updates (I asked patient and no additional family history provided). No family history on file. REVIEW OF SYSTEMS: (reviewed/updated 12/23/2017)  Appetite:no change from normal   Sleep: fitful   All other Review of Systems: Psychological ROS: positive for - behavioral disorder, concentration difficulties, hallucinations, irritability, mood swings and delusion  Respiratory ROS: no cough, shortness of breath, or wheezing  Cardiovascular ROS: no chest pain or dyspnea on exertion         [facility] (MSE):    MSE FINDINGS ARE WITHIN NORMAL LIMITS (WNL) UNLESS OTHERWISE STATED BELOW. ( ALL OF THE BELOW CATEGORIES OF THE MSE HAVE BEEN REVIEWED (reviewed 12/23/2017) AND UPDATED AS DEEMED APPROPRIATE )  General Presentation age appropriate and disheveled, uncooperative and unreliable   Orientation disorganized   Vital Signs  See below (reviewed 12/23/2017); Vital Signs (BP, Pulse, & Temp) are within normal limits if not listed below.    Gait and Station Stable/steady, no ataxia   Musculoskeletal System No extrapyramidal symptoms (EPS); no abnormal muscular movements or Tardive Dyskinesia (TD); muscle strength and tone are within normal limits   Language No aphasia or dysarthria   Speech:  mute   Thought Processes illogical; slow rate of thoughts; poor abstract reasoning/computation   Thought Associations blocked    Thought Content paranoid delusions, bizarre delusions, auditory hallucinations and internally preoccupied   Suicidal Ideations none   Homicidal Ideations none   Mood:  Labile mood   Affect:  Constricted, labile inappropriate and increased in intensity   Memory recent  impaired   Memory remote:  intact   Concentration/Attention:  distractable and hypervigilance   Fund of Knowledge average   Insight: limited   Reliability poor   Judgment:  limited          VITALS:     No data found. Wt Readings from Last 3 Encounters:   03/25/17 95.6 kg (210 lb 11.2 oz)   02/09/15 97.5 kg (215 lb)   02/07/15 97.5 kg (215 lb)     Temp Readings from Last 3 Encounters:   03/28/17 97.5 °F (36.4 °C)   02/07/15 98.7 °F (37.1 °C)     BP Readings from Last 3 Encounters:   12/21/17 (!) 145/106   03/28/17 100/68   02/12/15 105/73     Pulse Readings from Last 3 Encounters:   12/21/17 83   03/28/17 67   02/12/15 87            DATA     LABORATORY DATA:(reviewed/updated 12/23/2017)  No results found for this or any previous visit (from the past 24 hour(s)). Lab Results   Component Value Date/Time    Valproic acid 88 12/09/2017 08:10 AM     No results found for: LITHM   RADIOLOGY REPORTS:(reviewed/updated 12/23/2017)  No results found. MEDICATIONS     ALL MEDICATIONS:      SCHEDULED MEDICATIONS:          ASSESSMENT & PLAN     DIAGNOSES REQUIRING ACTIVE TREATMENT AND MONITORING: (reviewed/updated 12/23/2017)  Patient Active Hospital Problem List:   Paranoid schizophrenia ([facility] (3/15/2017)- tx resistant, chronic and with negative sx    Assessment: minimal change- psychotic, odd and bizarre behavior, -ve sx, poor insight- accepting med ? Check for cheeking behavior      Plan: I will ct to adjust med- Prozac, haldol dec today. Ct with Zydis and Ativan. Change Depakote to liq    Request second opinion for ECT- pt has been on Clozapine but refuses to have labs drwan. Has been tx with ECT in the past.     Patient is on two antipsychotics now due to the relative refractoriness to treatment thus far. Prior to admission patient had THREE or more failed trails of monotherapy, including: Haldol, Zyprexa, Risperdal and Clozapine. The patient is a very slow responder to medications.   Risks and benefits in the use of two antipsychotics have been weighed in full, including the risk of metabolic syndrome and the potential increased risk of QTc Based on this analysis, it is considered favorable for the utilization of two antipsychotics. In the future, once stable on the two antipsychotics, patient can possibly be tapered to one of the chosen antipsychotics. Will check on EKG results today to monitor QTc.- refusing EKG    Non compliance with tx- request court order med. - granted  12/21/17 will continue medications and he may need [facility]    12/22/17 need depakote level , BMP and ECT     12/23/17: Continue current treatment and supportive  care      I will continue to monitor blood levels (Depakote,, clozapine---a drug with a narrow therapeutic index= NTI) and associated labs for drug therapy implemented that require intense monitoring for toxicity as deemed appropriate based on current medication side effects and pharmacodynamically determined drug 1/2 lives.-88 therapeutic    In summary, Calvin Byers, is a 49 y.o.  male who presents with a severe exacerbation of the principal diagnosis of Paranoid schizophrenia ([facility]  Patient's condition is worsening/not improving/not stable . Patient requires continued inpatient hospitalization for further stabilization, safety monitoring and medication management. I will continue to coordinate the provision of individual, milieu, occupational, group, and substance abuse therapies to address target symptoms/diagnoses as deemed appropriate for the individual patient. A coordinated, multidisplinary treatment team round was conducted with the patient (this team consists of the nurse, psychiatric unit pharmcist,  and writer). Complete current electronic health record for patient has been reviewed today including consultant notes, ancillary staff notes, nurses and psychiatric tech notes. Suicide risk assessment completed and patient deemed to be of low risk for suicide at this time.      The following regarding medications was addressed during rounds with patient:   the risks and benefits of the proposed medication. The patient was given the opportunity to ask questions. Informed consent given to the use of the above medications. Will continue to adjust psychiatric and non-psychiatric medications (see above \"medication\" section and orders section for details) as deemed appropriate & based upon diagnoses and response to treatment. I will continue to order blood tests/labs and diagnostic tests as deemed appropriate and review results as they become available (see orders for details and above listed lab/test results). I will order psychiatric records from previous [facility] hospitals to further elucidate the nature of patient's psychopathology and review once available. I will gather additional collateral information from friends, family and o/p treatment team to further elucidate the nature of patient's psychopathology and baselline level of psychiatric functioning. I certify that this patient's inpatient psychiatric hospital services furnished since the previous certification were, and continue to be, required for treatment that could reasonably be expected to improve the patient's condition, or for diagnostic study, and that the patient continues to need, on a daily basis, active treatment furnished directly by or requiring the supervision of inpatient psychiatric facility personnel. In addition, the hospital records show that services furnished were intensive treatment services, admission or related services, or equivalent services.     EXPECTED DISCHARGE DATE/DAY: TBD     DISPOSITION: Home       Signed By:   Gertrude Brunson MD  12/23/2017

## 2019-05-06 NOTE — PROGRESS NOTES
Patient's bladder emptied via Urinal, 200 ml out. 6Fr femoral arterial sheath to left groin removed and manual pressure held for 19 minutes. Hemostasis achieved at 1327. BP taken q3min during procedure. Pulse, BP and rhythm remained stable throughout procedure. Site without hematoma. Dressing applied to groin gauze and tegaderm. 5 pound sandbag applied to groin. Patient instructed to lay flat and instructed on continued bedrest. Patient provided with clear liquids at this time. Patient aware to call with needs. Call light within reach.

## 2019-05-06 NOTE — DIABETES MGMT
. Pt is currently NPO for cardiac cath. Per primary nurse, transport on way to bring pt to cath lab. Plan is to continue diabetes education when pt is able to participate. Diabetes Self-Management teaching guide booklet and blood glucose meter left at bedside.

## 2019-05-06 NOTE — PROGRESS NOTES
TRANSFER - OUT REPORT: 
 
Verbal report given to Virginia Castellon RN on Aimee Cardenas  being transferred to cath lab for ordered procedure Report consisted of patients Situation, Background, Assessment and Recommendations(SBAR). Information from the following report(s) SBAR, Kardex, Intake/Output, MAR and Recent Results was reviewed with the receiving nurse. Opportunity for questions and clarification was provided.

## 2019-05-06 NOTE — PROGRESS NOTES
Bedside and Verbal shift change report given to self (oncoming nurse) by Jacinto Sanford RN (offgoing nurse). Report included the following information SBAR, Kardex, MAR and Recent Results. S/p right groin cath site visualized - dressing c/d/i, no bleeding or hematoma noted. Bedrest over - educated pt to call before getting out of bed

## 2019-05-06 NOTE — PROGRESS NOTES
Hospitalist Progress Note Patient off floor for [facility]. Blood sugars elevated yesterday, but decent this AM.  Continue present insulin regimen and adjust prn. Follow up C peptide. No charge for chart review today.  
 
Constantine Posey MD

## 2019-05-06 NOTE — PROGRESS NOTES
NAILASW met with pt and spouse at bedside. Pt lives at home with SO ([facility]  [phone] or [phone]). Pt is indep with all ADLs. Pt has home O2 in place fro a company called [employer]. Pt also reports that he is pending for medicaid eligibility. No anticipated needs but pt is open to supportive care referrals if appropriate. Care Management Interventions PCP Verified by CM: (Dr. Joseph Cavanaugh) Mode of Transport at Discharge: (son) Transition of Care Consult (CM Consult): Discharge Planning(Pt is insured with pharmacy benefits and reports that he has applied for and is pending for medicaid. ) Discharge Durable Medical Equipment: No(Pt reports that he has home oxygen ) Physical Therapy Consult: No 
Occupational Therapy Consult: No 
Speech Therapy Consult: No 
Current Support Network: Own Home, Other(Pt lives at home with SO ([facility]  [phone] or [phone]). Pt is indep with all ADLs. ) Confirm Follow Up Transport: Self Plan discussed with Pt/Family/Caregiver: Yes Freedom of Choice Offered: Yes [employer] Information Provided?: No 
Discharge Location Discharge Placement: Home

## 2019-05-06 NOTE — PROGRESS NOTES
Bedside and Verbal shift change report given to self (oncoming nurse) by Glennda Sandhoff, RN (offgoing nurse). Report included the following information SBAR, Kardex, Intake/Output, MAR and Recent Results.

## 2019-05-06 NOTE — PROGRESS NOTES
5/6/2019 6:43 AM 
 
Admit Date: 5/4/2019 Admit Diagnosis: Chest pain [R07.9] Subjective:  
 Patient with nstemi. Will need cath today. Has LE bruising from a recent fall Objective:  
  
Visit Vitals /89 (BP 1 Location: Left arm, BP Patient Position: At rest) Pulse 67 Temp 98 °F (36.7 °C) Resp 16 Ht 5' 6\" (1.676 m) Wt 83.4 kg (183 lb 14.4 oz) SpO2 100% BMI 29.68 kg/m² ROS:  General ROS: negative for - chills Hematological and Lymphatic ROS: negative for - blood clots or jaundice Respiratory ROS: no cough, shortness of breath, or wheezing Cardiovascular ROS: no chest pain or dyspnea on exertion Gastrointestinal ROS: no abdominal pain, change in bowel habits, or black or bloody stools Neurological ROS: no TIA or stroke symptoms Physical Exam: 
 
Physical Examination: General appearance - alert, well appearing, and in no distress Mental status - alert, oriented to person, place, and time Eyes - pupils equal and reactive, extraocular eye movements intact Neck/lymph - supple, no significant adenopathy Chest/CV - clear to auscultation, no wheezes, rales or rhonchi, symmetric air entry Heart - normal rate, regular rhythm, normal S1, S2, no murmurs, rubs, clicks or gallops Abdomen/GI - soft, nontender, nondistended, no masses or organomegaly Musculoskeletal - no joint tenderness, deformity or swelling Extremities - peripheral pulses normal, no pedal edema, no clubbing or cyanosis Skin - normal coloration and turgor, no rashes, no suspicious skin lesions noted Current Facility-Administered Medications Medication Dose Route Frequency  0.9% sodium chloride infusion  75 mL/hr IntraVENous CONTINUOUS  
 insulin glargine (LANTUS) injection 20 Units  20 Units SubCUTAneous DAILY  insulin lispro (HUMALOG) injection 5 Units  5 Units SubCUTAneous TIDAC  insulin lispro (HUMALOG) injection   SubCUTAneous AC&HS  
  nitroglycerin (NITROSTAT) tablet 0.4 mg  0.4 mg SubLINGual Q5MIN PRN  
 heparin 25,000 units in dextrose 500 mL infusion  12-25 Units/kg/hr IntraVENous TITRATE  dextrose 40% (GLUTOSE) oral gel 1 Tube  15 g Oral PRN  
 glucagon (GLUCAGEN) injection 1 mg  1 mg IntraMUSCular PRN  
 dextrose (D50) infusion 12.5-25 g  25-50 mL IntraVENous PRN  
 aspirin delayed-release tablet 81 mg  81 mg Oral 7am  
 atorvastatin (LIPITOR) tablet 40 mg  40 mg Oral QHS  clopidogrel (PLAVIX) tablet 75 mg  75 mg Oral DAILY  famotidine (PEPCID) tablet 10 mg  10 mg Oral BID  lisinopril (PRINIVIL, ZESTRIL) tablet 2.5 mg  2.5 mg Oral DAILY  metoprolol tartrate (LOPRESSOR) tablet 12.5 mg  12.5 mg Oral Q12H  
 sodium chloride (NS) flush 5-40 mL  5-40 mL IntraVENous PRN  
 nitroglycerin (NITROBID) 2 % ointment 1 Inch  1 Inch Topical Q6H  
 nitroglycerin (NITROSTAT) tablet 0.4 mg  0.4 mg SubLINGual Q5MIN PRN  
 morphine injection 2 mg  2 mg IntraVENous Q4H PRN  
 acetaminophen (TYLENOL) tablet 650 mg  650 mg Oral Q4H PRN  
 HYDROcodone-acetaminophen (NORCO) 7.5-325 mg per tablet 1 Tab  1 Tab Oral Q4H PRN  promethazine (PHENERGAN) with saline injection 12.5 mg  12.5 mg IntraVENous Q6H PRN  predniSONE (DELTASONE) tablet 20 mg  20 mg Oral DAILY WITH BREAKFAST  tiotropium (SPIRIVA) inhalation capsule 18 mcg  1 Cap Inhalation DAILY And  
 albuterol (PROVENTIL VENTOLIN) nebulizer solution 2.5 mg  2.5 mg Nebulization Q6HWA RT  
 predniSONE (DELTASONE) tablet 10 mg  10 mg Oral DAILY WITH LUNCH Data Review:  
@LABRCNT(Na,K,BUN,CREA,WBC,HGB,HCT,PLT,INR,TRP,TCHOL*,Triglyceride*,LDL*,LDLCPOC HDL*,HDL])@ TELEMETRY: nsr Assessment/Plan:  
 
Principal Problem: 
  Chest pain (1/28/2017) Active Problems: 
  Unstable angina ([address] (11/1/2010)nstemi for cath Pt set up for procedure. Risks benefits and alternatives discussed. Pt agrees to proceed.  Risks of bleeding infection emergent surgical procedure loss of life or limb renal failure and other known risks discussed. Pt agrees to proceed and agrees to sign consent form. CAD (coronary artery disease) (11/1/2010) HTN (hypertension), benign (11/1/2010)The current medical regimen is effective;  continue present plan and medications. Peripheral vascular disease-s/p PPI to right common iliac 5/4/11 (5/5/2011) Current chronic use of systemic steroids (3/1/2019) Overview: begun with diagnosis of pulmonary fibrosis Obesity () Ryan Rawls MD

## 2019-05-07 NOTE — PROGRESS NOTES
Hospitalist Consult Progress Note 2019 Admit Date: 2019 10:32 AM  
NAME: Luh Osullivan :  1955 MRN:  [id number] Attending: No att. providers found PCP:  Severa Burden, MD 
 
SUBJECTIVE:  
Patient is a 72 yo M with history of extensive CAD with remote CABG and total of 8 stents admitted for NSTEMI/elevated troponin. Admitted by Cardiology and underwent L heart cath . Hospitalist consulted for diabetes (new diagnosis) with a1c found to be 12.2. Patient seen with DM educator at bedside. Patient anxious about new diabetes diagnosis and management. No events overnight. No new complaints. Pain well controlled. BP controlled. Review of Systems negative with exception of pertinent positives noted above PHYSICAL EXAM  
 
Visit Vitals /76 (BP 1 Location: Left arm, BP Patient Position: At rest) Pulse 88 Temp 97.5 °F (36.4 °C) Resp 19 Ht 5' 6\" (1.676 m) Wt 83.4 kg (183 lb 14.4 oz) SpO2 98% BMI 29.68 kg/m² Temp (24hrs), Av.9 °F (36.6 °C), Min:97.5 °F (36.4 °C), Max:98.2 °F (36.8 °C) Oxygen Therapy O2 Sat (%): 98 % (19 0856) Pulse via Oximetry: 70 beats per minute (1942) O2 Device: Nasal cannula (1942) O2 Flow Rate (L/min): 2 l/min (19 0742) Intake/Output Summary (Last 24 hours) at 2019 1135 Last data filed at 2019 7705 Gross per 24 hour Intake  Output 150 ml Net -150 ml General: Alert and oriented, No acute distress. Eye: EOMI, Normal conjunctiva. HENT: Normocephalic, atraumatic Respiratory: Lungs are clear to auscultation, Respirations are non-labored. Cardiovascular: Normal rate, Regular rhythm, No murmur. Gastrointestinal: Soft, Non-tender, nondistended, positive bowel sounds Musculoskeletal: No cyanosis, clubbing or edema. Integumentary: Warm, Dry Neurologic: Alert, Oriented, No focal deficits. Cognition and Speech: Oriented, Speech clear and coherent. Psychiatric: Cooperative, Appropriate mood & affect. ASSESSMENT Active Hospital Problems Diagnosis Date Noted  Obesity  Current chronic use of systemic steroids 03/01/2019  
  begun with diagnosis of pulmonary fibrosis  Chest pain 01/28/2017  
 NSTEMI (non-ST elevated myocardial infarction) ([facility] [address] 01/28/2017  Peripheral vascular disease-s/p PPI to right common iliac 5/4/11 05/05/2011  
 HTN (hypertension), benign 11/01/2010  CAD (coronary artery disease) 11/01/2010  Unstable angina ([facility] [address] 11/01/2010 DM II NEW Dx 
- ACs qAC & qHS. - ISS. Lantus.  
- DC meds lantus, SSI, Add Metformin 
- Diabetic Diet. - A1C 10.3 
- c peptide wnl S/p 2 vessel pci 5/6 
CAD, Hypertension -  Per cardiology Obesity - Body mass index is 29.68 kg/m². - due to patient's BMI; diet, exercise and lifestyle changes advised. Thank you for this consultation. We will sign off in anticipation for patient's discharge today. Do not hesitate to call if further questions. Code Status: Full Code Discussed with Dr. Ivet Menendez Discharge: per primary Total Time spent: 25 minutes. Counseling & coordinating care dominated the encounter >55%. Counseled patient regarding dx, rx, tx. Reviewed prior records, labs, vitals, diagnostic tests, flow sheet, home medications, inpatient medications, nursing and provider notes.  
 
Steven Franklin PA-C, MPAS

## 2019-05-07 NOTE — PROGRESS NOTES
Discharge instructions and medications reviewed with patient. Patient verbalizes understanding. All questions answered. Heart monitor and IV removed by primary RN.

## 2019-05-07 NOTE — PROGRESS NOTES
Problem: Cath Lab Procedures: Post-Cath Day of Procedure (Initiate SCIP Measures for Post-Op Care) Goal: *Procedure site is without bleeding and signs of infection six hours post sheath removal 
Note: S/p left groin cath site - dressing c/d/I, no bleeding or hematoma noted, bruising at site Goal: *Hemodynamically stable Note:  
VSS Goal: *Optimal pain control at patient's stated goal 
Note: No complaints of pain 
  
Problem: Falls - Risk of 
Goal: *Absence of Falls Description Document Samantha Hughes Fall Risk and appropriate interventions in the flowsheet. Note:  
Fall Risk Interventions: 
  
 
  
 
Medication Interventions: Evaluate medications/consider consulting pharmacy, Patient to call before getting OOB, Teach patient to arise slowly Elimination Interventions: Call light in reach, Patient to call for help with toileting needs, Urinal in reach

## 2019-05-07 NOTE — DIABETES MGMT
Patient admitted with chest pain. Admitting blood glucose 697. HbA1c 12.2 (eAG 303). Blood glucose range yesterday 104-332 with pt receiving Lantus 20 units, Humalog 26 units, and Prednisone 30mg. This AM blood glucose 244. Creatinine 0.82. GFR >60. Pt denies a previous history of diabetes, but does voice a positive family history of diabetes. 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 Pt given educational handouts as a reference to take home regarding diabetes education. Pt given educational material, \"Diabetes Self-Management: A Patient Teaching Guide\", which was reviewed with pt. Explained basic physiology of diabetes, as well as causes, s/s, and treatments for hypoglycemia and hyperglycemia. Pt states he was having dry mouth, frequent urination, increased thirst, and weakness at home. Described the effects of poor glycemic control on the development of long-term complications such as renal, eye, nerve, and cardiovascular disease. Described proper diabetic foot care and the importance of checking feet qd. Pt states sometimes I have numbness and tingling in my feet. Educated re: effects of carbohydrates on blood glucose, the \"plate method\" of healthy meal planning, basics of healthy meal plan, and Consistent Carbohydrate Diet. Pt states he was drinking regular soda and sweet tea at home. Discussed alternative beverage options to help improve glycemic control. Pt states he eats sporadically at home. Discussed the benefit of eating balanced meals and snacks consistently to help improve glycemic control. Explained the impact of physical activity on glycemic control. Pt states he has not been able to be as physically active lately due to health problems. Encouraged pt to discuss exercises with provider that he would be able to do safely. Also explained the relationship between hyperglycemia and infection. Discussed target goals for blood glucose and A1C.  Discussed different types of diabetic medications including onset, mechanism of action, peak, and side effects. Pt verbalizes understanding of teaching. Explained the importance of blood glucose monitoring. Recommended frequency of qid ac, hs, and to record in log book to take to PCP appointment. Provided blood glucose meter, strips, and instructed pt in use of meter. Pt was able to return demonstrate correct use of meter. Pt will need prescription for glucometer and glucometer supplies at discharge so he may obtain the meter covered by his insurance. Patient instructed re: preparation and injection of insulin dose. Patient returned demonstrated proper insulin injection technique using injection model. Nursing will continue to have patient practice insulin self-injection when insulin dose is due. Patient also verbalizes understanding of site rotation, storage of insulin, and proper sharps disposal. Pt was also instructed in proper use of insulin pens. Pt was able to return demonstrate proper use of insulin pen using injection model. Pt prefers insulin pens. Pt will need prescription for insulin pens and pen needles at discharge. Pt successfully completed insulin self administration of Lantus 20 units under RN supervision. Patient would likely benefit from continued diabetes outpatient education. Patient provided with contact information to [facility] program to pursue at their convenience after discharge with their PCP. Pt receiving steroid therapy. Explained relationship between steroids and hyperglycemia. Educated pt re: current basal/bolus regimen of Lantus 20 units and Humalog 5 units with meals and SSI including type of insulin, timing with meals, onset, duration of effect, and peak of insulin dose. Pt practiced calculating Humalog insulin dose based on blood sugar and prescriptions. Pt successful in calculations.  Educated pt on the difference between [facility] and prandial insulin. Pt verbalizes understanding. Encouraged compliance with discharge regimen. Encouraged patient to continue to work on lifestyle modifications and to follow up with PCP for further titration of regimen. Patient verbalized understanding and voices no further questions.

## 2019-05-07 NOTE — PROGRESS NOTES
Nutrition Consult received for diabetic diet education: \"Pt requests diet education but requests teaching be deferred until tomorrow, pt scheduled for heart cath today. \" Lelo Mojica CDE/RN) Assessment: 
Diet: DIET CARDIAC Regular; Consistent Carb 1800kcal   
Pt was given formal [facility] diet education for newly diagnosed diabetes by Miguel Jacob RD, on 5/5. Pt states that he knows he needs to give up sugar sweetened beverages. He states that he has done so while here and it has not bothered him. Reinforced carbohydrate counting, proper serving sizes of CHO, and amount to have at each meal.  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 Pt states that he knows he needs to eat three meals per day. Reinforced that he does not have to eat a \"set meal\" at breakfast and lunch. Discussed easy \"grab and go\" items that he can have when he does not have time to prepare a full meal.   
Anthropometrics:Height: 5' 6\" (167.6 cm),  Weight: 83.4 kg (183 lb 14.4 oz), Weight Source: Standing scale (comment), Body mass index is 29.68 kg/m². BMI class of obesity class I. Macronutrient needs: EER:  5436-0270 kcal /day (20-25 kcal/kg Actual BW of 84.6 kg) EPR:   grams protein/day (1-1.2 grams/kg actual BW) Max CHO: 266 grams/day (50% of estimated kcal needs) Intervention:  
Meals and snacks: Continue [facility] diet. Education: Reinforced [place] education. Went over handouts provided on 5/5. Reinforced tx for hypoglycemia. Discharge Nutrition Plan: Recommend patient continue nutrition behavior change therapy in outpatient setting. Outpatient Diabetes Education Counseling recommended to patient- more information at [phone]. Joseph Hunter [facility], [facility], [address] [place] [address], [phone]

## 2019-05-07 NOTE — PROGRESS NOTES
Bedside and Verbal shift change report given to Dipika Livingston RN (oncoming nurse) by self (offgoing nurse). Report included the following information SBAR, Kardex, MAR and Recent Results.

## 2019-05-07 NOTE — PROGRESS NOTES
Cardiac Rehab: Spoke with patient regarding referral to cardiac rehab. Patient meets admission criteria based on PCI (05/06/19). Written information about Cardiac Rehab given and reviewed with patient. Discussed lifestyle modifications to promote cardiac wellness. Patient indicated that he does not want to participate in the cardiac rehab program due to his severe pulmonary issues. He also refused Cardiac Rehab Aug 2018 after PCI for the same reason. I once again reviewed the Pulmonary Rehab program and encouraged him to tour our facility when in for his MD follow up. Pt also states he is feeling overwhelmed with new diagnosed DM. Pt encouraged to pursue a referral for Diabetes Education with his PCP. His Cardiologist is Dr. Rosa Schafer. Thank you, FRANKLIN Rizo, RN Cardiopulmonary Rehabilitation Nurse Liaison [facility]

## 2019-05-07 NOTE — DISCHARGE INSTRUCTIONS
Patient Education   Patient Education   Patient Education   Patient Education      Your PCP will follow up with your Diabetes Managment  Diabetes Blood Sugar Emergencies: Your Action Plan  How can you prevent a blood sugar emergency? An important part of living with diabetes is keeping your blood sugar in your target range. You'll need to know what to do if it's too high or too low. Managing your blood sugar levels helps you avoid emergencies. This care sheet will teach you about the signs of high and low blood sugar. It will help you make an action plan with your doctor for when these signs occur. Low blood sugar is more likely to happen if you take certain medicines for diabetes. It can also happen if you skip a meal, drink alcohol, or exercise more than usual.  You may get high blood sugar if you eat differently than you normally do. One example is eating more carbohydrate than usual. Having a cold, the flu, or other sudden illness can also cause high blood sugar levels. Levels can also rise if you miss a dose of medicine. Any change in how you take your medicine may affect your blood sugar level. So it's important to work with your doctor before you make any changes. What are the symptoms of low and high blood sugar? Common symptoms of low blood sugar are sweating and feeling shaky, weak, hungry, or confused. Symptoms can start quickly. Common symptoms of high blood sugar are feeling very thirsty or very hungry. You may also pass urine more often than usual. You may have blurry vision and may lose weight without trying. But some people may have high or low blood sugar without having any symptoms. That's a good reason to check your blood sugar on a regular schedule. What should you do if you have symptoms? Work with your doctor to fill in the blank spaces below that apply to you. Low blood sugar  If you have symptoms of low blood sugar, check your blood sugar.  If it's below _70____ ( for example, below 70), eat or drink a quick-sugar food that has about 15 grams of carbohydrate. Your goal is to get your level back to your safe range. Check your blood sugar again 15 minutes later. If it's still not in your target range, take another 15 grams of carbohydrate and check your blood sugar again in 15 minutes. Repeat this until you reach your target. Then go back to your regular testing schedule. When you have low blood sugar, it's best to stop or reduce any physical activity until your blood sugar is back in your target range and is stable. If you must stay active, eat or drink 30 grams of carbohydrate. Then check your blood sugar again in 15 minutes. If it's not in your target range, take another 30 grams of carbohydrates. Check your blood sugar again in 15 minutes. Keep doing this until you reach your target. You can then go back to your regular testing schedule. If your symptoms or blood sugar levels are getting worse or have not improved after 15 minutes, seek medical care right away. Here are some examples of quick-sugar foods with 15 grams of carbohydrate:  · 3 or 4 glucose tablets  · 1 tube of glucose gel  · Hard candy (such as 3 Jolly Ranchers or 5 to 7 Life Savers)  · ½ cup to ¾ cup (4 to 6 ounces) of fruit juice or regular (not diet) soda  High blood sugar  If you have symptoms of high blood sugar, check your blood sugar. Your goal is to get your level back to your target range. If it's above ___250___ ( for example, above 250), follow these steps:  · If you missed a dose of your diabetes medicine, take it now. Take only the amount of medicine that you have been prescribed. Do not take more or less medicine. · Give yourself insulin if your doctor has prescribed it for high blood sugar. · Test for ketones, if the doctor told you to do so. If the results of the ketone test show a moderate-to-large amount of ketones, call the doctor for advice.   · Wait 30 minutes after you take the extra insulin or the missed medicine. Check your blood sugar again. If your symptoms or blood sugar levels are getting worse or have not improved after taking these steps, seek medical care right away. Follow-up care is a key part of your treatment and safety. Be sure to make and go to all appointments, and call your doctor if you are having problems. It's also a good idea to know your test results and keep a list of the medicines you take. Where can you learn more? Go to http://jaz-yaritza.info/. Enter I457 in the search box to learn more about \"Diabetes Blood Sugar Emergencies: Your Action Plan. \"  Current as of: July 25, 2018  Content Version: 11.9  © 7976-5822 [employer]. Care instructions adapted under license by [employer] (which disclaims liability or warranty for this information). If you have questions about a medical condition or this instruction, always ask your healthcare professional. [place] [address] any warranty or liability for your use of this information. Learning About Diabetes Food Guidelines  Your Care Instructions    Meal planning is important to manage diabetes. It helps keep your blood sugar at a target level (which you set with your doctor). You don't have to eat special foods. You can eat what your family eats, including sweets once in a while. But you do have to pay attention to how often you eat and how much you eat of certain foods. You may want to work with a dietitian or a certified diabetes educator (CDE) to help you plan meals and snacks. A dietitian or CDE can also help you lose weight if that is one of your goals. What should you know about eating carbs? Managing the amount of carbohydrate (carbs) you eat is an important part of healthy meals when you have diabetes. Carbohydrate is found in many foods. · Learn which foods have carbs. And learn the amounts of carbs in different foods.   ? Bread, cereal, pasta, and rice have about 15 grams of carbs in a serving. A serving is 1 slice of bread (1 ounce), ½ cup of cooked cereal, or 1/3 cup of cooked pasta or rice. ? Fruits have 15 grams of carbs in a serving. A serving is 1 small fresh fruit, such as an apple or orange; ½ of a banana; ½ cup of cooked or canned fruit; ½ cup of fruit juice; 1 cup of melon or raspberries; or 2 tablespoons of dried fruit. ? Milk and no-sugar-added yogurt have 15 grams of carbs in a serving. A serving is 1 cup of milk or 2/3 cup of no-sugar-added yogurt. ? Starchy vegetables have 15 grams of carbs in a serving. A serving is ½ cup of mashed potatoes or sweet potato; 1 cup winter squash; ½ of a small baked potato; ½ cup of cooked beans; or ½ cup cooked corn or green peas. · Learn how much carbs to eat each day and at each meal. A dietitian or CDE can teach you how to keep track of the amount of carbs you eat. This is called carbohydrate counting. · If you are not sure how to count carbohydrate grams, use the Plate Method to plan meals. It is a good, quick way to make sure that you have a balanced meal. It also helps you spread carbs throughout the day. ? Divide your plate by types of foods. Put non-starchy vegetables on half the plate, meat or other protein food on one-quarter of the plate, and a grain or starchy vegetable in the final quarter of the plate. To this you can add a small piece of fruit and 1 cup of milk or yogurt, depending on how many carbs you are supposed to eat at a meal.  · Try to eat about the same amount of carbs at each meal. Do not \"save up\" your daily allowance of carbs to eat at one meal.  · Proteins have very little or no carbs per serving. Examples of proteins are beef, chicken, turkey, fish, eggs, tofu, cheese, cottage cheese, and peanut butter. A serving size of meat is 3 ounces, which is about the size of a deck of cards.  Examples of meat substitute serving sizes (equal to 1 ounce of meat) are 1/4 cup of cottage cheese, 1 egg, 1 tablespoon of peanut butter, and ½ cup of tofu. How can you eat out and still eat healthy? · Learn to estimate the serving sizes of foods that have carbohydrate. If you measure food at home, it will be easier to estimate the amount in a serving of restaurant food. · If the meal you order has too much carbohydrate (such as potatoes, corn, or baked beans), ask to have a low-carbohydrate food instead. Ask for a salad or green vegetables. · If you use insulin, check your blood sugar before and after eating out to help you plan how much to eat in the future. · If you eat more carbohydrate at a meal than you had planned, take a walk or do other exercise. This will help lower your blood sugar. What else should you know? · Limit saturated fat, such as the fat from meat and dairy products. This is a healthy choice because people who have diabetes are at higher risk of heart disease. So choose lean cuts of meat and nonfat or low-fat dairy products. Use olive or canola oil instead of butter or shortening when cooking. · Don't skip meals. Your blood sugar may drop too low if you skip meals and take insulin or certain medicines for diabetes. · Check with your doctor before you drink alcohol. Alcohol can cause your blood sugar to drop too low. Alcohol can also cause a bad reaction if you take certain diabetes medicines. Follow-up care is a key part of your treatment and safety. Be sure to make and go to all appointments, and call your doctor if you are having problems. It's also a good idea to know your test results and keep a list of the medicines you take. Where can you learn more? Go to http://jaz-yaritza.info/. Enter C705 in the search box to learn more about \"Learning About Diabetes Food Guidelines. \"  Current as of: July 25, 2018  Content Version: 11.9  © 4196-0510 [employer].  Care instructions adapted under license by [employer] (which disclaims liability or warranty for this information). If you have questions about a medical condition or this instruction, always ask your healthcare professional. [address] any warranty or liability for your use of this information. Insulin Glargine (By injection)   Insulin Glargine, Recombinant (IN-moe-daniel REGIjeoma ree-KOM-jonatan-lyn)  Treats diabetes. Brand Name(s): Basaglar KwikPen, Lantus, Lantus Novaplus, Lantus SoloStar, Lantus SoloStar Novaplus, Toujeo   There may be other brand names for this medicine. When This Medicine Should Not Be Used: This medicine is not right for everyone. Do not use it if you had an allergic reaction to insulin glargine. How to Use This Medicine:   Injectable  · Your healthcare provider will work with you to personalize your dose and treatment based on your insulin needs and lifestyle. You will be taught how to give yourself the injections. Make sure you understand all instructions. Ask the doctor, nurse, or pharmacist if you have questions. · If you use insulin once a day, it is best to use it at about the same time every day. · Always double-check both the concentration (strength) of your insulin and your dose. Concentration and dose are not the same. The dose is how many units of insulin you will use. The concentration tells how many units of insulin are in each milliliter (mL), such as 100 units/mL (U-100), but this does not mean you will use 100 units at a time. · Read and follow the patient instructions that come with this medicine. Talk to your doctor or pharmacist if you have any questions. · This medicine should look clear before you use it. Do not shake the vial. Do not mix this medicine with any other insulin or with water. · You will be shown the body areas where this shot can be given. Use a different body area each time you give yourself a shot. Keep track of where you give each shot to make sure you rotate body areas.   · Use a new needle and syringe each time you inject your medicine. If you use a syringe, use only the kind that is made for insulin injections. Some insulin must be given with a specific type of syringe or needle. Ask your pharmacist if you are not sure which one to use. · Always check the label before use, to make sure you have the correct type of insulin. Do not change the brand, type, or concentration unless your doctor tells you to. If you use a pump or other device, make sure the insulin is made for that device. · Unopened medicine: Store the vials, cartridges, and SoloStar® pens in the refrigerator. Protect from light. Do not freeze. · Opened medicine:   ¨ Vials: Store in the refrigerator or at room temperature in a cool place, away from sunlight and heat. Use within 28 days. ¨ Cartridge or [employer]: Store at room temperature, away from direct heat and light. Do not refrigerate. Throw away any opened cartridge or Lantus® SoloStar® pen after 28 days. Throw away any opened [employer] after 42 days. · Throw away used needles in a hard, closed container that the needles cannot poke through. Keep this container away from children and pets. Drugs and Foods to Avoid:   Ask your doctor or pharmacist before using any other medicine, including over-the-counter medicines, vitamins, and herbal products. · Some medicines can change the amount of insulin you need to use and make it harder for you to control your diabetes. Tell your doctor about all other medicines that you are using. · Do not drink alcohol while you are using this medicine. Warnings While Using This Medicine:   · Tell your doctor if you are pregnant or breastfeeding, or if you have kidney disease, liver disease, heart disease, or heart failure.   · This medicine may cause the following problems:  ¨ Low blood sugar or low potassium levels in the blood  ¨ Fluid retention or heart failure (when used with thiazolidinedione [TZD] medicine)  · Never share insulin pens, needles, or cartridges with anyone. Sharing these can pass hepatitis viruses, HIV, or other illnesses from one person to another. · Keep all medicine out of the reach of children. Never share your medicine with anyone. Possible Side Effects While Using This Medicine:   Call your doctor right away if you notice any of these side effects:  · Allergic reaction: Itching or hives, swelling in your face or hands, swelling or tingling in your mouth or throat, chest tightness, trouble breathing  · Dry mouth, increased thirst, muscle cramps, nausea or vomiting, uneven heartbeat  · Rapid weight gain, swelling in your hands, ankles, or feet, trouble breathing, tiredness  · Shaking, trembling, sweating, fast or pounding heartbeat, lightheadedness, hunger, confusion  If you notice these less serious side effects, talk with your doctor:   · Redness, pain, itching, swelling, or any skin changes where the shot was given  If you notice other side effects that you think are caused by this medicine, tell your doctor. Call your doctor for medical advice about side effects. You may report side effects to FDA at 6-419-FDA-5638  © 2017 [facility] Information is for End User's use only and may not be sold, redistributed or otherwise used for commercial purposes. The above information is an  only. It is not intended as medical advice for individual conditions or treatments. Talk to your doctor, nurse or pharmacist before following any medical regimen to see if it is safe and effective for you. Percutaneous Coronary Intervention: What to Expect at [facility]    Percutaneous coronary intervention (PCI) is the name for procedures that are used to open a narrowed or blocked coronary artery. The two most common PCI procedures are coronary angioplasty and coronary stent placement.   Your groin or arm may have a bruise and feel sore for a day or two after a percutaneous coronary intervention (PCI). You can do light activities around the house, but nothing strenuous for several days. This care sheet gives you a general idea about how long it will take for you to recover. But each person recovers at a different pace. Follow the steps below to get better as quickly as possible. How can you care for yourself at home? Activity    · If the doctor gave you a sedative:  ? For 24 hours, don't do anything that requires attention to detail. It takes time for the medicine's effects to completely wear off.  ? For your safety, do not drive or operate any machinery that could be dangerous. Wait until the medicine wears off and you can think clearly and react easily.     · Do not do strenuous exercise and do not lift, pull, or push anything heavy until your doctor says it is okay. This may be for a day or two. You can walk around the house and do light activity, such as cooking.     · If the catheter was placed in your groin, try not to walk up stairs for the first couple of days.     · If the catheter was placed in your arm near your wrist, do not bend your wrist deeply for the first couple of days. Be careful using your hand to get into and out of a chair or bed.     · Carry your stent identification card with you at all times.     · If your doctor recommends it, get more exercise. Walking is a good choice. Bit by bit, increase the amount you walk every day. Try for at least 30 minutes on most days of the week. Diet    · Drink plenty of fluids to help your body flush out the dye. If you have kidney, heart, or liver disease and have to limit fluids, talk with your doctor before you increase the amount of fluids you drink.     · Keep eating a heart-healthy diet that has lots of fruits, vegetables, and whole grains. If you have not been eating this way, talk to your doctor. You also may want to talk to a dietitian. This expert can help you to learn about healthy foods and plan meals.    Medicines    · Your doctor will tell you if and when you can restart your medicines. He or she will also give you instructions about taking any new medicines.     · If you take blood thinners, such as warfarin (Coumadin), clopidogrel (Plavix), or aspirin, be sure to talk to your doctor. He or she will tell you if and when to start taking those medicines again. Make sure that you understand exactly what your doctor wants you to do.     · Your doctor will prescribe blood-thinning medicines. You will likely take aspirin plus another antiplatelet, such as clopidogrel (Plavix). It is very important that you take these medicines exactly as directed. These medicines help keep the coronary artery open and reduce your risk of a heart attack.     · Call your doctor if you think you are having a problem with your medicine.    Care of the catheter site    · For 1 or 2 days, keep a bandage over the spot where the catheter was inserted. The bandage probably will fall off in this time.     · Put ice or a cold pack on the area for 10 to 20 minutes at a time to help with soreness or swelling. Put a thin cloth between the ice and your skin.     · You may shower 24 to 48 hours after the procedure, if your doctor okays it. Pat the incision dry.     · Do not soak the catheter site until it is healed. Don't take a bath for 1 week, or until your doctor tells you it is okay.     · If you are bleeding, lie down and press on the area for 15 minutes to try to make it stop. If the bleeding does not stop, call your doctor or seek immediate medical care. Follow-up care is a key part of your treatment and safety. Be sure to make and go to all appointments, and call your doctor if you are having problems. It's also a good idea to know your test results and keep a list of the medicines you take. When should you call for help? Call 911 anytime you think you may need emergency care.  For example, call if:    · You passed out (lost consciousness).     · You have severe trouble breathing.     · You have sudden chest pain and shortness of breath, or you cough up blood.     · You have symptoms of a heart attack, such as:  ? Chest pain or pressure. ? Sweating. ? Shortness of breath. ? Nausea or vomiting. ? Pain that spreads from the chest to the neck, jaw, or one or both shoulders or arms. ? Dizziness or lightheadedness. ? A fast or uneven pulse. After calling 911, chew 1 adult-strength aspirin. Wait for an ambulance. Do not try to drive yourself.     · You have been diagnosed with angina, and you have angina symptoms that do not go away with rest or are not getting better within 5 minutes after you take one dose of nitroglycerin.    Call your doctor now or seek immediate medical care if:    · You are bleeding from the area where the catheter was put in your artery.     · You have a fast-growing, painful lump at the catheter site.     · You have signs of infection, such as:  ? Increased pain, swelling, warmth, or redness. ? Red streaks leading from the catheter site. ? Pus draining from the catheter site. ? A fever.     · Your leg or arm looks blue or feels cold, numb, or tingly.    Watch closely for changes in your health, and be sure to contact your doctor if you have any problems. Where can you learn more? Go to http://jaz-yaritza.info/. Enter I731 in the search box to learn more about \"Percutaneous Coronary Intervention: What to Expect at Home. \"  Current as of: July 22, 2018  Content Version: 11.9  © 1215-5318 [employer]. Care instructions adapted under license by [employer] (which disclaims liability or warranty for this information). If you have questions about a medical condition or this instruction, always ask your healthcare professional. [place] [address] any warranty or liability for your use of this information. HEART CATHETERIZATION/ANGIOGRAPHY DISCHARGE INSTRUCTIONS    1.  Check puncture site frequently for swelling or bleeding. If there is any bleeding, lie down and apply pressure over the area with a clean towel or washcloth. Notify your doctor for any redness, swelling, drainage, or oozing from the puncture site. Notify your doctor for any fever or chills. 2. If the extremity becomes cold, numb, or painful call [facility] at [phone].  3. Activity should be limited for the next 48 hours. Climb stairs as little as possible and avoid any stooping, bending, or strenuous activity for 48 hours. No heavy lifting (anything over 10 pounds) for 3 days. 4. You may resume your usual diet. Drink more fluids than usual.  5. Have a responsible person drive you home and stay with you for at least 24 hours after your heart catheterization/angiography. 6. You may remove bandage from your Left and Groin in 24 hours. You may shower in 24 hours. No tub baths, hot tubs, or swimming for 1 week. Do not place any lotions, creams, powders, or ointments over puncture site for 1 week. You may place a clean band-aid over the puncture site each day for 5 days. Change daily. I have read the above instructions and have had the opportunity to ask questions. DISCHARGE SUMMARY from Nurse    PATIENT INSTRUCTIONS:    After general anesthesia or intravenous sedation, for 24 hours or while taking prescription Narcotics:  · Limit your activities  · Do not drive and operate hazardous machinery  · Do not make important personal or business decisions  · Do  not drink alcoholic beverages  · If you have not urinated within 8 hours after discharge, please contact your surgeon on call.     Report the following to your surgeon:  · Excessive pain, swelling, redness or odor of or around the surgical area  · Temperature over 100.5  · Nausea and vomiting lasting longer than 4 hours or if unable to take medications  · Any signs of decreased circulation or nerve impairment to extremity: change in color, persistent  numbness, tingling, coldness or increase pain  · Any questions    What to do at Home:  Recommended activity: No lifting, Driving, or Strenuous exercise for 1 week    If you experience any of the following symptoms chest pain, shortness of breath, pain, swelling, numbness, please follow up with [facility]. *  Please give a list of your current medications to your Primary Care Provider. *  Please update this list whenever your medications are discontinued, doses are      changed, or new medications (including over-the-counter products) are added. *  Please carry medication information at all times in case of emergency situations. These are general instructions for a healthy lifestyle:    No smoking/ No tobacco products/ Avoid exposure to second hand smoke  Surgeon General's Warning:  Quitting smoking now greatly reduces serious risk to your health. Obesity, smoking, and sedentary lifestyle greatly increases your risk for illness    A healthy diet, regular physical exercise & weight monitoring are important for maintaining a healthy lifestyle    You may be retaining fluid if you have a history of heart failure or if you experience any of the following symptoms:  Weight gain of 3 pounds or more overnight or 5 pounds in a week, increased swelling in our hands or feet or shortness of breath while lying flat in bed. Please call your doctor as soon as you notice any of these symptoms; do not wait until your next office visit. Recognize signs and symptoms of STROKE:    F-face looks uneven    A-arms unable to move or move unevenly    S-speech slurred or non-existent    T-time-call 911 as soon as signs and symptoms begin-DO NOT go       Back to bed or wait to see if you get better-TIME IS BRAIN. Warning Signs of HEART ATTACK     Call 911 if you have these symptoms:   Chest discomfort.  Most heart attacks involve discomfort in the center of the chest that lasts more than a few minutes, or that goes away and comes back. It can feel like uncomfortable pressure, squeezing, fullness, or pain.  Discomfort in other areas of the upper body. Symptoms can include pain or discomfort in one or both arms, the back, neck, jaw, or stomach.  Shortness of breath with or without chest discomfort.  Other signs may include breaking out in a cold sweat, nausea, or lightheadedness. Don't wait more than five minutes to call 911 - MINUTES MATTER! Fast action can save your life. Calling 911 is almost always the fastest way to get lifesaving treatment. Emergency Medical Services staff can begin treatment when they arrive -- up to an hour sooner than if someone gets to the hospital by car. The discharge information has been reviewed with the patient. The patient verbalized understanding. Discharge medications reviewed with the patient and appropriate educational materials and side effects teaching were provided.   ___________________________________________________________________________________________________________________________________

## 2019-05-07 NOTE — DISCHARGE SUMMARY
[address] [facility] 121 Cardiology Discharge Summary Patient ID: 
Lisha Mcmillan [id number] 
71 y.o. 
1955 Admit date: 5/4/2019 Discharge date:  5/7/2019 Admitting Physician: Leann Epley, MD  
 
Discharge Physician: Dr. Meghan Monzon Admission Diagnoses: Chest pain [R07.9] NSTEMI (non-ST elevated myocardial infarction) ([facility] [address] [I21.4] Discharge Diagnoses:  
 Diagnosis  Obesity  Pulmonary fibrosis ([facility] [address]  Current chronic use of systemic steroids  NSTEMI  Restrictive airway disease  Chronic obstructive pulmonary disease with acute exacerbation ([facility] [address]  CAD (coronary artery disease)  HTN (hypertension), benign  Other and unspecified hyperlipidemia  Tobacco use disorder Cardiology Procedures this admission:  Left heart catheterization with PCI EchoCardiogram 
Consults: RUCHI SHANNON Course: Patient presented to the ER with complaints of chest pain. Serial cardiac enzymes were done with peak troponin of 4.02. Patient was admitted to telemetry for continued care. Patient underwent cardiac catheterization by Dr. Vernon Browning. Patient was found to have 90% stenosis of the SVG to RCA that was stented with a 2.25 x 26-mm Roberto JERRY with 0% residual stenosis. Patient tolerated the procedure well and was taken to the telemetry floor for recovery. Echocardiogram was done and showed: 
-  Left ventricle: Systolic function was mildly reduced. Ejection fraction was estimated in the range of 45 % to 50 %. There was mild hypokinesis of the mid 
inferoseptal and basal-mid inferior wall(s). There was mild asymmetric hypertrophy of the septum. -  Right ventricle: Systolic function was mildly reduced. -  Aortic valve: The valve was probably trileaflet. Leaflets exhibited moderate calcification. There was moderate stenosis. The aortic valve area by the continuity equation was 1.1 cm2.  
 
The patient was treated with oral steriods prior to admission and was discovered to have new DM this admission with HgA1C of 12.2 with BGL over over 700. The patient was started on Long acting and Short acting SSI insulin for coverage while on steroids and will follow up with his PCP. Key Antihyperglycemic Medications   
    
  
 insulin glargine (LANTUS) 100 unit/mL injection (Taking) 25 Units by SubCUTAneous route daily. insulin lispro (HUMALOG) 100 unit/mL injection (Taking) Give 5 Units with every meal while on Prednizone. Please stop when  Prednisone doses are finished and reassess insulin regiment with your PCP  
 insulin lispro (HUMALOG) 100 unit/mL injection (Taking) For BGL of less than 150 = 0 units, 150 -199 = 2 units, 200 -249 = 4 unit, 250 -299 = 6 units, 300 -349 = 8 units, 350 and above =   10 units and Call MD, Start Hypoglycemic protocol if blood glucose is <70 mg/dL. Fast Acting - Administer Immediately - or within 15 minutes of start of meal.  
  
 
 
The morning of discharge, patient was up feeling well without any complaints of chest pain or shortness of breath. Patient's left groin cath site was clean, dry and intact without hematoma or bruit. Patient's labs were WNL. Patient was seen and examined by Dr. Nancie Encinas and determined stable and ready for discharge. Patient was instructed on the importance of medication compliance including taking aspirin and Brilinta everyday without missing a dose. After receiving drug eluting stents, the patient will remain on dual anti-platelet therapy for 1 year. For maximized medical therapy for CAD, patient will continue BB, ACE-I, and statin as well. The patient will follow up with [facility] --  [facility] for a TC7 appoitnment . Patient has been referred to cardiac rehab. DISPOSITION: The patient is being discharged home in stable condition on a low saturated fat, low cholesterol and low salt diet.  The patient is instructed to advance activities as tolerated to the limit of fatigue or shortness of breath. The patient is instructed to avoid all heavy lifting, straining, stooping or squatting for 3-5 days. The patient is instructed to watch the cath site for bleeding/oozing; if seen, the patient is instructed to apply firm pressure with a clean cloth and call [facility] at [phone]. The patient is instructed to watch for signs of infection which include: increasing area of redness, fever/hot to touch or purulent drainage at the catheterization site. The patient is instructed not to soak in a bathtub for 7-10 days, but is cleared to shower. The patient is instructed to call the office or return to the ER for immediate evaluation for any shortness of breath or chest pain not relieved by NTG. Discharge Exam: Patient has been seen by Dr. Ray Sun: see his progress note for exam details. Visit Vitals /76 (BP 1 Location: Left arm, BP Patient Position: At rest) Pulse 88 Temp 97.5 °F (36.4 °C) Resp 19 Ht 5' 6\" (1.676 m) Wt 83.4 kg (183 lb 14.4 oz) SpO2 98% BMI 29.68 kg/m² Recent Results (from the past 24 hour(s)) GLUCOSE, POC Collection Time: 05/06/19 11:26 AM  
Result Value Ref Range Glucose (POC) 104 (H) 65 - 100 mg/dL GLUCOSE, POC Collection Time: 05/06/19  4:14 PM  
Result Value Ref Range Glucose (POC) 332 (H) 65 - 100 mg/dL GLUCOSE, POC Collection Time: 05/06/19  9:44 PM  
Result Value Ref Range Glucose (POC) 281 (H) 65 - 100 mg/dL GLUCOSE, POC Collection Time: 05/07/19  6:09 AM  
Result Value Ref Range Glucose (POC) 244 (H) 65 - 100 mg/dL METABOLIC PANEL, BASIC Collection Time: 05/07/19  6:37 AM  
Result Value Ref Range Sodium 137 136 - 145 mmol/L Potassium 3.7 3.5 - 5.1 mmol/L Chloride 104 98 - 107 mmol/L  
 CO2 26 21 - 32 mmol/L Anion gap 7 7 - 16 mmol/L Glucose 223 (H) 65 - 100 mg/dL BUN 15 8 - 23 MG/DL Creatinine 0.82 0.8 - 1.5 MG/DL  
 GFR est AA >60 >60 ml/min/1.73m2 GFR est non-AA >60 >60 ml/min/1.73m2 Calcium 8.2 (L) 8.3 - 10.4 MG/DL  
CBC W/O DIFF Collection Time: 05/07/19  6:37 AM  
Result Value Ref Range WBC 8.6 4.3 - 11.1 K/uL  
 RBC 3.78 (L) 4.23 - 5.6 M/uL  
 HGB 12.0 (L) 13.6 - 17.2 g/dL HCT 36.1 (L) 41.1 - 50.3 % MCV 95.5 79.6 - 97.8 FL  
 MCH 31.7 26.1 - 32.9 PG  
 MCHC 33.2 31.4 - 35.0 g/dL  
 RDW 13.6 11.9 - 14.6 % PLATELET 301 905 - 690 K/uL MPV 10.0 9.4 - 12.3 FL ABSOLUTE NRBC 0.00 0.0 - 0.2 K/uL Patient Instructions:  
 
Current Discharge Medication List  
  
START taking these medications Details  
ticagrelor (BRILINTA) 90 mg tablet Take 1 Tab by mouth every twelve (12) hours every twelve (12) hours. Qty: 60 Tab, Refills: 11  
  
lancets misc 2 Boxes Qty: 1 Each, Refills: 11  
  
glucose blood VI test strips (ASCENSIA AUTODISC VI, ONE TOUCH ULTRA TEST VI) strip 1 Box (10 bottles) Qty: 1 Strip, Refills: 5 Blood-Glucose Meter monitoring kit 1 Meter Kit Qty: 1 Kit, Refills: 0  
  
insulin glargine (LANTUS) 100 unit/mL injection 25 Units by SubCUTAneous route daily. Qty: 1 Vial, Refills: 5  
  
!! insulin lispro (HUMALOG) 100 unit/mL injection Give 5 Units with every meal while on Prednizone. Please stop when  Prednisone doses are finished and reassess insulin regiment with your PCP Qty: 1 Vial, Refills: 5  
  
!! insulin lispro (HUMALOG) 100 unit/mL injection For BGL of less than 150 = 0 units, 150 -199 = 2 units, 200 -249 = 4 unit, 250 -299 = 6 units, 300 -349 = 8 units, 350 and above =   10 units and Call MD, Start Hypoglycemic protocol if blood glucose is <70 mg/dL. Fast Acting - Administer Immediately - or within 15 minutes of start of meal. 
Qty: 1 Vial, Refills: 5 Insulin Syringe-Needle U-100 0.3 mL 29 gauge x 1/2\" syrg 2 Box off 100 or more Syringes with 5 refills Qty: 2 Syringe, Refills: 5 Comments: Box 100 or more count x 2 with 5 refills !! - Potential duplicate medications found. Please discuss with provider. CONTINUE these medications which have CHANGED Details  
metoprolol tartrate (LOPRESSOR) 25 mg tablet Take 0.5 Tabs by mouth every twelve (12) hours. Qty: 30 Tab, Refills: 11 CONTINUE these medications which have NOT CHANGED Details ! ! predniSONE (DELTASONE) 20 mg tablet Take 20 mg by mouth every morning. Takes 20 mg q am and 10 mg q pm since April 28. To take this dose 30 days. !! predniSONE (DELTASONE) 10 mg tablet Take 10 mg by mouth nightly. Began 20 mg q am and 10 q pm on April 28. To take this dose for one month  
  
isosorbide mononitrate ER (IMDUR) 30 mg tablet Take 30 mg by mouth daily. esomeprazole (NEXIUM) 40 mg capsule Take  by mouth daily. atorvastatin (LIPITOR) 40 mg tablet Take 1 Tab by mouth nightly. Qty: 30 Tab, Refills: 11  
  
lisinopril (PRINIVIL, ZESTRIL) 2.5 mg tablet Take 1 Tab by mouth daily. Qty: 30 Tab, Refills: 11  
  
acetaminophen (TYLENOL) 325 mg tablet Take 325 mg by mouth as needed for Pain. aspirin 81 mg tablet Take 81 mg by mouth every morning. nitroglycerin (NITROSTAT) 0.4 mg SL tablet 1 Tab by SubLINGual route every five (5) minutes as needed for Chest Pain (after 3 doses / 15 mins, if chest pain persists, contact EMS/911). Qty: 1 Bottle, Refills: 0  
  
 !! - Potential duplicate medications found. Please discuss with provider.   
  
STOP taking these medications  
  
 clopidogrel (PLAVIX) 75 mg tab Comments:  
Reason for Stopping:   
   
 famotidine (PEPCID) 10 mg tablet Comments:  
Reason for Stopping:   
   
  
 
Sindy Carballo NP 
05/07/19 
9:26 Beau Santoro MD

## 2019-05-07 NOTE — PROCEDURES
[address] [facility] 
CARDIAC CATH Name:  Harrison Dong 
MR#:  [id number] :  1955 ACCOUNT #:  [de-identified] DATE OF SERVICE:  2019 PROCEDURES PERFORMED:  Left heart cath, selective coronary angiography, left ventriculogram, saphenous vein angiography x2 with two-vessel percutaneous intervention. PREOPERATIVE DIAGNOSES:  Cad nstemi. POSTOPERATIVE DIAGNOSES:  cad. SURGEON:  Severo Rama, MD. ASSISTANT:  none ESTIMATED BLOOD LOSS:  3cc. SPECIMENS REMOVED:  none. COMPLICATIONS:  None. CONTRAST:  180 mL. IMPLANTS:  Earl stents. ANESTHESIA:  The sedation was 2 mg of Versed, 50 mcg of fentanyl given by Verneda Master from 10:05 to 10:48. ACCESS:  Left femoral. 
 
INDICATION:  Non-STEMI. HEMODYNAMICS:  Aortic pressure 103/69. LVEDP of 10. DIAGNOSTIC EQUIPMENT:  Jack left 4, Jack right 4, straight pigtail. Intervention was done with a Jack left 4 guide, Prowater wire and a 3.0 x 15 NC balloon to the circumflex Multipurpose guide, Prowater wire and a 2.25 x 26 mm Roberto stent were used for the intervention of the distal RCA graft into the PDA. FINDINGS:  Left ventriculogram done in JIANG projection shows some lateral wall hypokinesis. Overall EF 55%. No gradient on pullback. Left main arises normally, bifurcates into LAD which is flush occluded and a circumflex. Left main is stented to the ostium and has no significant disease. Circumflex artery is extensively stented from its proximal through its mid distal portion into an OM. There is what appears to be either in-stent restenosis or likely an acute hazy area of thrombus in the proximal circumflex. This is most likely the culprit, this is 90% stenosed. Right coronary artery is occluded ostially off the right cusp. Graft anatomy:  The patient has only two grafts. First is a saphenous vein bypass graft to the LAD.   This is patent with good proximal anastomosis. Mid distally, the graft has been stented extensively into a diagonal with jailing of a mid distal LAD that has 40% stenosis after the jailed portion. There is extensive collaterals from the left system over to the circumflex and right, mainly the right. The next graft is a saphenous vein bypass grafts to the distal RCA. The proximal portion of the graft is free of any disease in the proximal anastomosis is good. Distally, there is a short segment of stented vein graft. There is a high-grade 90% stenosis immediately prior to this stent and immediately post distal to this stent. Both of these are 90%. The patient is given Angiomax as an anticoagulant and the proximal mid circumflex was addressed first.  This was wired and underwent balloon angioplasty with a 3.0 NC balloon to high pressure. Of interest, there was transient slow flow indicating that this was likely the culprit and there was probably some thrombus initially there. After administration of nitroglycerin, flow returned to normal in the circumflex. Next, multipurpose was used to address the distal vein graft to the right, wire was placed in the very distal portion of the PDA and we were able to direct stent distal right covering both areas of stenosis. We were able to then postdilate the proximal and midportion of the stent with a 2.5 NC balloon to high pressure with excellent results and JENIFFER III flow. CONCLUSION: 
1. Severe diffuse coronary artery disease with chronic occlusion of a native LAD chronic occlusion of native right 2. Extensively stented native circumflex that is likely the culprit vessel with angioplasty of a proximal 90% stenosis with good results. No stenting was required. 3.  Patent vein graft to an LAD with moderate focal disease in the LAD. 4.  Patent vein graft to an RCA with distal tandem stenoses stented with a 2.25 x 26 Roberto stent postdilated with a 2.5 NC balloon. Due to the probable hazy nature and likely thrombus in the circ, the patient will be changed from Plavix to Brilinta and aspirin as his dual antiplatelet therapy. Dat Kelley MD 
 
 
[id number] 
D:  05/06/2019 11:07 
T:  05/06/2019 11:11 
JOB #:  [id number]

## 2019-06-11 PROBLEM — R06.02 SOB (SHORTNESS OF BREATH): Chronic | Status: ACTIVE | Noted: 2017-03-20

## 2019-06-11 PROBLEM — R06.00 DYSPNEA: Status: ACTIVE | Noted: 2019-01-01

## 2019-06-11 PROBLEM — J40 BRONCHITIS: Status: RESOLVED | Noted: 2017-02-09 | Resolved: 2019-01-01

## 2019-06-11 PROBLEM — J96.21 ACUTE ON CHRONIC RESPIRATORY FAILURE WITH HYPOXIA (HCC): Status: ACTIVE | Noted: 2019-01-01

## 2019-06-11 PROBLEM — R07.9 CHEST PAIN: Status: RESOLVED | Noted: 2017-01-28 | Resolved: 2019-01-01

## 2019-06-11 PROBLEM — T38.0X5A STEROID-INDUCED DIABETES (HCC): Status: ACTIVE | Noted: 2019-01-01

## 2019-06-11 PROBLEM — R07.9 CHEST PAIN: Status: ACTIVE | Noted: 2019-01-01

## 2019-06-11 PROBLEM — E09.9 STEROID-INDUCED DIABETES (HCC): Status: ACTIVE | Noted: 2019-01-01

## 2019-06-11 PROBLEM — J96.20 ACUTE ON CHRONIC RESPIRATORY FAILURE (HCC): Status: ACTIVE | Noted: 2019-01-01

## 2019-06-11 PROBLEM — J84.10 PULMONARY FIBROSIS (HCC): Chronic | Status: ACTIVE | Noted: 2019-01-01

## 2019-06-11 PROBLEM — J98.4 RESTRICTIVE AIRWAY DISEASE: Chronic | Status: ACTIVE | Noted: 2017-03-20

## 2019-06-11 PROBLEM — R05.9 COUGH: Status: RESOLVED | Noted: 2017-02-06 | Resolved: 2019-01-01

## 2019-06-11 PROBLEM — J96.11 CHRONIC RESPIRATORY FAILURE WITH HYPOXIA (HCC): Chronic | Status: ACTIVE | Noted: 2019-01-01

## 2019-06-11 NOTE — CONSULTS
[facility] Consult Date of  Admission: 6/11/2019  7:48 AM  
 
Primary Care Physician: Dr. Lobito Brown Primary Cardiologist: Dr. Zahida Petit Referring Physician: Dr. Jose Vega Consulting Physician: Dr. Bairon Weber CC/Reason for consult: chest pain with h/o CAD Farzad Bernstein is a 71 y.o. male with prior h/o CAD s/p CABG in 1999 (SVG>LAD, SVG>dRCA) then recent NSTEMI 5/19 requiring PCI to SVG to [facility] and angioplasty to native prox circ, COPD, DM, pulmonary fibrosis on home O2, and HTN. He sees pulmonary Dr. Harper Dykes in April and w/u included a positive PHOENIX (ratio 1:80) and a ESR of 14. All other studies were negative to include a RF, scleroderma, aspergillus, hypersensitivity profile studies. Last echo 5/19 showed EF 45-50% with mod AS. Dr. Jose Vega saw patient today and may need referral to lung transplant center as outpatient. Patient presented to [facility] with c/o SOB requiring increased O2 demands. Labs in ED showed BNP 67, and troponin is currently pending. Cardiology was consulted for chest pain with h/o CAD. He reports several types of chest pain described at tightness, dull ache at times, noted with coughing, at rest and exertion. Lasting 5 mins that resolved after taking NTG. Currently chest pain free but remains with chronic SOB. Diagnosis  Unstable angina (HCC)  CAD (coronary artery disease)  HTN (hypertension), benign  Other and unspecified hyperlipidemia  Peripheral vascular disease-s/p PPI to right common iliac 5/4/11  Restrictive airway disease  SOB (shortness of breath)  Pulmonary fibrosis ([address]  Current chronic use of systemic steroids  Obesity  Dyspnea  Chronic respiratory failure with hypoxia ([address]  Steroid-induced diabetes ([address]  Acute on chronic respiratory failure with hypoxia (HCC) Past Medical History:  
Diagnosis Date  Acute coronary syndrome ([address] 8/4/2018  CAD (coronary artery disease) CABG 1999, stents after (last placed 2/17)  Current chronic use of systemic steroids 03/2019  
 begun with diagnosis of pulmonary fibrosis  GERD (gastroesophageal reflux disease) PRN meds  Hypertension  Interstitial pneumonitis (HCC)   
 per CT chest 3/10/17 (daily inhalers, no home 02)  NSTEMI (non-ST elevated myocardial infarction) [address] [address] 1/28/2017  Obesity  Osteoarthritis   
 hands, knees  Pulmonary fibrosis ([facility] [address] 03/2019  Pulmonary nodule 8mm per CT chest 3/10/17 (biopsy WNL) Past Surgical History:  
Procedure Laterality Date  HX HEART CATHETERIZATION    
 multiple  HX TONSILLECTOMY  VASCULAR SURGERY PROCEDURE UNLIST CABG Allergies Allergen Reactions  Aspirin Swelling Takes 81 mg at home. Patient can tolerate Aspirin in low doses, but in larger doses causes swelling. Family History Problem Relation Age of Onset  Heart Disease Mother  Cancer Mother UNKNOWN TYPE  
 Heart Disease Father Current Facility-Administered Medications Medication Dose Route Frequency  sodium chloride (NS) flush 5-40 mL  5-40 mL IntraVENous Q8H  
 sodium chloride (NS) flush 5-40 mL  5-40 mL IntraVENous PRN  
 acetaminophen (TYLENOL) tablet 650 mg  650 mg Oral Q6H PRN  
 diphenhydrAMINE (BENADRYL) capsule 25 mg  25 mg Oral Q6H PRN  
 ondansetron (ZOFRAN) injection 4 mg  4 mg IntraVENous Q6H PRN  
 docusate sodium (COLACE) capsule 100 mg  100 mg Oral DAILY PRN  
 enoxaparin (LOVENOX) injection 40 mg  40 mg SubCUTAneous Q24H  
 aspirin delayed-release tablet 81 mg  81 mg Oral DAILY  [START ON 6/12/2019] atorvastatin (LIPITOR) tablet 80 mg  80 mg Oral DAILY  [START ON 6/12/2019] pantoprazole (PROTONIX) tablet 40 mg  40 mg Oral ACB  [START ON 6/12/2019] isosorbide mononitrate ER (IMDUR) tablet 30 mg  30 mg Oral DAILY  [START ON 6/12/2019] lisinopril (PRINIVIL, ZESTRIL) tablet 2.5 mg  2.5 mg Oral DAILY  metoprolol tartrate (LOPRESSOR) tablet 12.5 mg  12.5 mg Oral Q12H  
 nitroglycerin (NITROSTAT) tablet 0.4 mg  0.4 mg SubLINGual Q5MIN PRN  
 ticagrelor (BRILINTA) tablet 90 mg  90 mg Oral Q12H  predniSONE (DELTASONE) tablet 30 mg  30 mg Oral DAILY WITH BREAKFAST  insulin lispro (HUMALOG) injection   SubCUTAneous AC&HS Review of Systems Constitution: Negative for diaphoresis and malaise/fatigue. HENT: Negative for congestion. Cardiovascular: Positive for chest pain and dyspnea on exertion. Negative for claudication, cyanosis, irregular heartbeat, leg swelling, near-syncope, orthopnea, palpitations, paroxysmal nocturnal dyspnea and syncope. Respiratory: Positive for shortness of breath. Negative for cough and wheezing. Endocrine: Negative for cold intolerance and heat intolerance. Hematologic/Lymphatic: Does not bruise/bleed easily. Skin: Negative for nail changes. Neurological: Negative for dizziness, headaches and weakness. Physical Exam 
Vitals:  
 06/11/19 1150 06/11/19 1237 06/11/19 1414 06/11/19 1506 BP: 128/72 127/74  (!) 127/103 Pulse: 90 93  (!) 107 Resp:  18  19 Temp:  98.3 °F (36.8 °C)  98 °F (36.7 °C) SpO2: 95% 93% 91% 92% Weight:      
Height:      
 
 
Physical Exam: 
General: Well Developed, Well Nourished, No Acute Distress HEENT: pupils equal and round, no abnormalities noted Neck: supple, no JVD, no carotid bruits Heart: S1S2 with RRR with+ AS murmur Lungs: Clear throughout auscultation bilaterally without adventitious sounds Abd: soft, nontender, nondistended, with good bowel sounds Ext: warm, trace edema, calves supple/nontender, pulses 2+ bilaterally Skin: warm and dry Psychiatric: Normal mood and affect Neurologic: Alert and oriented X 3 Cardiographics Telemetry: SR 
ECG: Sinus rhythm with Premature atrial complexes Nonspecific ST abnormality Echocardiogram: 5/19 showed -  Left ventricle: Systolic function was mildly reduced. Ejection fraction was estimated in the range of 45 % to 50 %. There was mild hypokinesis of the mid 
inferoseptal and basal-mid inferior wall(s). There was mild asymmetric 
hypertrophy of the septum. -  Right ventricle: Systolic function was mildly reduced. -  Aortic valve: The valve was probably trileaflet. Leaflets exhibited moderate calcification. There was moderate stenosis. The aortic valve area by the continuity equation was 1.1 cm2. Labs:  
Recent Labs  
  06/11/19 
0756   
K 3.6 BUN 14  
CREA 0.94 GLU 96 WBC 12.5* HGB 13.8 HCT 41.7  Assessment/Plan: 
 
 Assessment:  
  
Principal Problem: 
  Acute on chronic respiratory failure with hypoxia (HCC) (6/11/2019)- per primary team  
 
Active Problems: 
  CAD (coronary artery disease) (11/1/2010)- see below HTN (hypertension), benign (11/1/2010) Pulmonary fibrosis ([facility] [address] (3/1/2019)- may need referral to transplant center outpatient per pulmonary Current chronic use of systemic steroids (3/1/2019) Overview: begun with diagnosis of pulmonary fibrosis Dyspnea (6/11/2019)- chronic Chronic respiratory failure with hypoxia ([address] (6/11/2019) Steroid-induced diabetes ([address] (6/11/2019) Chest pain (6/11/2019)- both typical and atypical at times; troponin pending. Will need LHC in am. NPO. IVFs in am. Continue asa, brilinta, statin, Imdur, ACE, and BB. Thank you very much for this referral. We appreciate the opportunity to participate in this patient's care. We will follow along with above stated plan. Aissatou Concepcion NP Consulting MD: Dr. Kory Holguin ATTENDING ADDENDUM: 
 
Patient seen and examined by me. Agree with above note by physician extender. Key findings are:  He is s/p NSTEMI and 2V intervention in May, with 1 week of recurrent, accelerating chest pain.   He has become more SOB in the past week as well, with known pulmonary fibrosis, but remains compliant with all meds including ASA and Brilinta without missed doses. He thinks his pulmonary status has overall worsened in the past few weeks. He has had 3 episodes of fairly severe CP in the past week, each responsive to NTG SL and each similar to pain he had last month, but less severe compared to last month. He thinks his CP is exacerbated by SOB/ARCE/exertion but symptoms similar to last month's NSTEMI and LCX with severe lesion with POBA only in May, ? Restenosis. ECG OK and asymptomatic at present. CV- RRR without murmur, no S3 and no JVD at 60 deg Lungs-fibrotic bibasilar crackles with inspiration Abd- soft, nontender, nondistended Ext- no edema Plan: As above. Check troponin now. Maximize meds as tolerated. Consider LHC with possible PCI tomorrow given accelerating pattern of CP syndrome. Pulmonary consulting for IPF meds as well. Chuckie Jacobs MD 
[facility] Cardiology Pager [phone] oral

## 2019-06-11 NOTE — PROGRESS NOTES
Patient arrived to the unit via stretcher from Emergency Department. Patient in stable condition with respirations even/slightly labored. Patient is tachypneaic. Oxygen placed to nasal cannula at 4 LPM. Patient was able to get from stretcher to bed with min assist. No incident noted.

## 2019-06-11 NOTE — PROGRESS NOTES
Dr. Janis Morelos returned page regarding Mr. Africa Woodward chest pressure/tightness. Patient's pressure and tightness has resolved on its own at this point. MD advised to give Nitroglycerine SL if patient has any chest pain but feels that the tightness/pressure is caused by patient moving around and coughing. Patient agrees and denies need for any nitro at this time. Encouraged patient to call nurse prn assist. Advised patient not to get out of bed. Urinal in reach. Call light in reach. Will continue to monitor.

## 2019-06-11 NOTE — LETTER
6/13/2019 5:30 PM 
 
Mr. Forte Pump [address] [address] To whom it may concern. Mr. Reanna aCrmen is a patient at [facility] in [facility]. He is in critical status and has high risk of death. His children are attempting to fly and come see him as soon as possible and I would greatly appreciate any assistance you could offer in this matter. Sincerely, Jovana Kraft MD 
Pulmonary/Critical Care Medicine

## 2019-06-11 NOTE — PROGRESS NOTES
BSR reeieved from [facility]. PT resting in bed talking on phone. Pt alert and oriented x4. Respirations even and unlabored on 5L NC. Cough is present. Pt denies pain at this time. No s/sx of distress noted. Urinal and call light within reach, bed low and locked, door open.

## 2019-06-11 NOTE — PROGRESS NOTES
Patient complains of chest tightness/pressure with no pain after having gotten up to the bathroom and coughed. VSS. Patient is already scheduled for LHC in am and troponin. [facility] cardiology paged regarding patient complaint.

## 2019-06-11 NOTE — PROGRESS NOTES
TRANSFER - IN REPORT: 
 
Verbal report received from Janis Chen RN on [facility]  being received from Emergency Department for routine progression of care Report consisted of patients Situation, Background, Assessment and  
Recommendations(SBAR). Information from the following report(s) SBAR, ED Summary, MAR, Recent Results and Med Rec Status was reviewed with the receiving nurse. Opportunity for questions and clarification was provided. Assessment will be completed upon patients arrival to unit and care assumed.

## 2019-06-11 NOTE — PROGRESS NOTES
Admission assessment completed. Patient is short of breath with exertion. Tachypnea noted at rest. IV bolus running via IV Pump at this time. Medications given per MD order. Dual skin assessment completed with Missy Braun RN. Patient skin shows bruising to BLE and BUE. Patient states he feel through a floor and scraped his BLE. Bruise noted to left thigh. No pressure ulcers/sores noted to sacral area or heels. Skin dry/rough/scaly. Patient has been on long term steroids. Patient and family member oriented to room. Call light and phone placed within reach. MD at bedside. Bed left in low/locked position. Safety measures in placed. Non-slip socks in place. Patient educated to call nurse prn assist. Will continue to monitor and call MD regarding patient blood glucose.

## 2019-06-11 NOTE — H&P (VIEW-ONLY)
CONSULT NOTE Juanita Arvizu 6/11/2019 Date of Admission:  6/11/2019 The patient's chart is reviewed and the patient is discussed with the staff. Subjective:  
 
Patient is a 71 y.o.  male seen and evaluated at the request of Dr. Alexi Grijalva. He presents with worsening dyspnea. . He states that he was told in 2017 that he had \"ground glass\" on his chest CT that was obtained when he was hospitalized for coronary artery disease and stent placement. He did not follow up for about a year due to insurance reasons but then saw Dr. Regan Carlos back this year in March for worsening shortness of breath and unproductive cough. He was started on 40 mg Prednisone in March of this year. He has tapered the dose by 10 mg per month as directed and he was taking 20 mg per day for the past 3 weeks. The prednisone has alleviated the cough but has not helped the dyspnea and he can now barely walk across the room. He was started on home oxygen in March as well - wears 2 liters continuously. Dr. Kiah Barrios workup in April included a positive PHOENIX (ratio 1:80) and a ESR of 14. All other studies were negative to include a RF, scleroderma, aspergillus, hypersensitivity profile studies. He smoked for about 30 years - 1 ppd. His chart lists restrictive lung disease but I don't see any PFTs for review. He is now retired but previously worked changing brakes. He has 2 dogs but no other animals. He is unaware of any exposures. With his concern regarding an allergic reaction, he has recently been trying to remove the carpet and backing from his home. 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  
Diagnosis Code  Unstable angina ([facility]) I20.0  CAD (coronary artery disease) I25.10  
 HTN (hypertension), benign I10  
 Other and unspecified hyperlipidemia E78.5  Peripheral vascular disease-s/p PPI to right common iliac 11 I73.9  Restrictive airway disease J98.4  
 SOB (shortness of breath) R06.02  
 Pulmonary fibrosis ([facility]) J84.10  Current chronic use of systemic steroids Z79.52  
 Obesity E66.9  Dyspnea R06.00  Chronic respiratory failure with hypoxia ([facility]) J96.11  
 Steroid-induced diabetes ([facility] E09.9, T38.0X5A Prior to Admission Medications Prescriptions Last Dose Informant Patient Reported? Taking? Blood-Glucose Meter monitoring kit   No No  
Si Meter Kit Insulin Syringe-Needle U-100 0.3 mL 29 gauge x 1/2\" syrg   No No  
Si Box off 100 or more Syringes with 5 refills Oxygen   Yes No  
Sig: Indications: 2L Contin. acetaminophen (TYLENOL) 325 mg tablet   Yes No  
Sig: Take 325 mg by mouth as needed for Pain. aspirin 81 mg tablet   Yes No  
Sig: Take 81 mg by mouth every morning. atorvastatin (LIPITOR) 80 mg tablet   No No  
Sig: Take 1 Tab by mouth daily. esomeprazole (NEXIUM) 40 mg capsule   Yes No  
Sig: Take  by mouth daily. glucose blood VI test strips (ASCENSIA AUTODISC VI, ONE TOUCH ULTRA TEST VI) strip   No No  
Si Box (10 bottles)  
insulin glargine (LANTUS) 100 unit/mL injection   No No  
Si Units by SubCUTAneous route daily. insulin lispro (HUMALOG) 100 unit/mL injection   No No  
Sig: Give 5 Units with every meal while on Prednizone.  Please stop when Prednisone doses are finished and reassess insulin regiment with your PCP  
insulin lispro (HUMALOG) 100 unit/mL injection   No No  
Sig: For BGL of less than 150 = 0 units, 150 -199 = 2 units, 200 -249 = 4 unit, 250 -299 = 6 units, 300 -349 = 8 units, 350 and above =   10 units and Call MD, Start Hypoglycemic protocol if blood glucose is <70 mg/dL. Fast Acting - Administer Immediately - or within 15 minutes of start of meal.  
isosorbide mononitrate ER (IMDUR) 30 mg tablet   Yes No  
Sig: Take 30 mg by mouth daily. lancets misc   No No  
Si Boxes  
lisinopril (PRINIVIL, ZESTRIL) 2.5 mg tablet   No No  
Sig: Take 1 Tab by mouth daily. metFORMIN (GLUCOPHAGE) 500 mg tablet   No No  
Sig: Take 1 Tab by mouth two (2) times daily (with meals). metoprolol tartrate (LOPRESSOR) 25 mg tablet   No No  
Sig: Take 0.5 Tabs by mouth every twelve (12) hours. nitroglycerin (NITROSTAT) 0.4 mg SL tablet   No No  
Si Tab by SubLINGual route every five (5) minutes as needed for Chest Pain (after 3 doses / 15 mins, if chest pain persists, contact EMS/911). predniSONE (DELTASONE) 20 mg tablet   Yes No  
Sig: Take 20 mg by mouth every morning. Takes 20 mg q am and 10 mg q pm since . To take this dose 30 days. ticagrelor (BRILINTA) 90 mg tablet   No No  
Sig: Take 1 Tab by mouth every twelve (12) hours every twelve (12) hours. Facility-Administered Medications: None Past Medical History:  
Diagnosis Date  Acute coronary syndrome ([facility] 2018  CAD (coronary artery disease) CABG , stents after (last placed )  Current chronic use of systemic steroids 2019  
 begun with diagnosis of pulmonary fibrosis  GERD (gastroesophageal reflux disease) PRN meds  Hypertension  Interstitial pneumonitis (HCC)   
 per CT chest 3/10/17 (daily inhalers, no home 02)  NSTEMI (non-ST elevated myocardial infarction) ([facility] 2017  Obesity  Osteoarthritis   
 hands, knees  Pulmonary fibrosis [facility] 03/2019  Pulmonary nodule 8mm per CT chest 3/10/17 (biopsy WNL) Active Ambulatory Problems Diagnosis Date Noted  Unstable angina ([facility] 11/01/2010  CAD (coronary artery disease) 11/01/2010  
 HTN (hypertension), benign 11/01/2010  Other and unspecified hyperlipidemia 11/01/2010  Peripheral vascular disease-s/p PPI to right common iliac 5/4/11 05/05/2011  Restrictive airway disease 03/20/2017  
 SOB (shortness of breath) 03/20/2017  Pulmonary fibrosis ([facility] 03/01/2019  Current chronic use of systemic steroids 03/01/2019  Obesity Resolved Ambulatory Problems Diagnosis Date Noted  Tobacco use disorder 11/01/2010  Chest pain 01/28/2017  Cough 02/06/2017  Bronchitis 02/09/2017 Past Medical History:  
Diagnosis Date  Acute coronary syndrome ([facility] 8/4/2018  CAD (coronary artery disease)  Current chronic use of systemic steroids 03/2019  GERD (gastroesophageal reflux disease)  Hypertension  Interstitial pneumonitis ([facility]  NSTEMI (non-ST elevated myocardial infarction) ([facility] 1/28/2017  Obesity  Osteoarthritis  Pulmonary fibrosis ([facility] 03/2019  Pulmonary nodule Past Surgical History:  
Procedure Laterality Date  HX HEART CATHETERIZATION    
 multiple  HX TONSILLECTOMY  VASCULAR SURGERY PROCEDURE UNLIST CABG Social History Socioeconomic History  Marital status: COMMON LAW Spouse name: Not on file  Number of children: Not on file  Years of education: Not on file  Highest education level: Not on file Occupational History  Not on file Social Needs  Financial resource strain: Not on file  Food insecurity:  
  Worry: Not on file Inability: Not on file  Transportation needs:  
  Medical: Not on file Non-medical: Not on file Tobacco Use  Smoking status: Former Smoker Packs/day: 0.25 Years: 30.00 Pack years: 7.50 Last attempt to quit: 2018 Years since quittin.4  Smokeless tobacco: Never Used Substance and Sexual Activity  Alcohol use: No  
 Drug use: No  
 Sexual activity: Not on file Lifestyle  Physical activity:  
  Days per week: Not on file Minutes per session: Not on file  Stress: Not on file Relationships  Social connections:  
  Talks on phone: Not on file Gets together: Not on file Attends Yarsani service: Not on file Active member of club or organization: Not on file Attends meetings of clubs or organizations: Not on file Relationship status: Not on file  Intimate partner violence:  
  Fear of current or ex partner: Not on file Emotionally abused: Not on file Physically abused: Not on file Forced sexual activity: Not on file Other Topics Concern  Not on file Social History Narrative  Not on file Family History Problem Relation Age of Onset  Heart Disease Mother  Cancer Mother UNKNOWN TYPE  
 Heart Disease Father Allergies Allergen Reactions  Aspirin Swelling Takes 81 mg at home. Patient can tolerate Aspirin in low doses, but in larger doses causes swelling. Current Facility-Administered Medications Medication Dose Route Frequency  cefepime (MAXIPIME) 2 g in 0.9% sodium chloride (MBP/ADV) 100 mL  2 g IntraVENous Q8H  
 levoFLOXacin (LEVAQUIN) 750 mg in D5W IVPB  750 mg IntraVENous Q24H  
 sodium chloride (NS) flush 5-40 mL  5-40 mL IntraVENous Q8H  
 sodium chloride (NS) flush 5-40 mL  5-40 mL IntraVENous PRN  
 acetaminophen (TYLENOL) tablet 650 mg  650 mg Oral Q6H PRN  
 diphenhydrAMINE (BENADRYL) capsule 25 mg  25 mg Oral Q6H PRN  
 ondansetron (ZOFRAN) injection 4 mg  4 mg IntraVENous Q6H PRN  
 docusate sodium (COLACE) capsule 100 mg  100 mg Oral DAILY PRN  
 enoxaparin (LOVENOX) injection 40 mg  40 mg SubCUTAneous Q24H  aspirin delayed-release tablet 81 mg  81 mg Oral DAILY  [START ON 6/12/2019] atorvastatin (LIPITOR) tablet 80 mg  80 mg Oral DAILY  [START ON 6/12/2019] pantoprazole (PROTONIX) tablet 40 mg  40 mg Oral ACB  [START ON 6/12/2019] isosorbide mononitrate ER (IMDUR) tablet 30 mg  30 mg Oral DAILY  [START ON 6/12/2019] lisinopril (PRINIVIL, ZESTRIL) tablet 2.5 mg  2.5 mg Oral DAILY  metoprolol tartrate (LOPRESSOR) tablet 12.5 mg  12.5 mg Oral Q12H  
 nitroglycerin (NITROSTAT) tablet 0.4 mg  0.4 mg SubLINGual Q5MIN PRN  
 ticagrelor (BRILINTA) tablet 90 mg  90 mg Oral Q12H  predniSONE (DELTASONE) tablet 30 mg  30 mg Oral DAILY WITH BREAKFAST Objective:  
 
Vitals:  
 06/11/19 1035 06/11/19 1109 06/11/19 1150 06/11/19 1237 BP:   128/72 127/74 Pulse: (!) 108 96 90 93 Resp:    18 Temp:    98.3 °F (36.8 °C) SpO2: 93% 95% 95% 93% Weight:      
Height: PHYSICAL EXAM  
 
Constitutional:  the patient is well developed and in no acute distress HEENT:  Sclera clear, pupils equal, oral mucosa moist 
Lungs: rales bilaterally from the posterior with some bronchial BS. Wearing 4 liter cannula. Cardiovascular:  RRR without M,G,R 
Abd/GI: soft and non-tender; with positive bowel sounds. Ext: warm without cyanosis. There is no lower leg edema. Skin:  no jaundice or rashes, no wounds Neuro: no gross neuro deficits. Alert and oriented Musculoskeletal: moves all four extremities. No deformities. Psychiatric: calm. Does not appear anxious or depressed Chest X-ray:   
 
2017 2019 Recent Labs  
  06/11/19 
0756 WBC 12.5* HGB 13.8 HCT 41.7  Recent Labs  
  06/11/19 
0756   
K 3.6  GLU 96  
CO2 25 BUN 14  
CREA 0.94 CA 9.4 ALB 2.8* SGOT 22 Assessment:  (Medical Decision Making) Hospital Problems  Date Reviewed: 5/15/2019 Codes Class Noted POA Dyspnea ICD-10-CM: R06.00 
ICD-9-CM: 786.09  6/11/2019 Yes Likely multifactorial due to interstitial lung disease and ischemic heart disease. Chronic respiratory failure with hypoxia (HCC) (Chronic) ICD-10-CM: J96.11 
ICD-9-CM: 518.83, 799.02  6/11/2019 Yes Steroid-induced diabetes ([facility] ICD-10-CM: E09.9, T38.0X5A 
ICD-9-CM: 249.00, E980.4  6/11/2019 Yes Pulmonary fibrosis (HCC) (Chronic) ICD-10-CM: J84.10 ICD-9-CM: 314  3/1/2019 Yes Had lung changes dating back to 2011. Looked like alveolitis at that time but now cystic changes likely due to honeycombing which suggests IPF. Need to define if pulmonary fibrosis is idiopathic or secondary to underlying process. 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 Pt very symptomatic and may need consideration for lung transplant. Current chronic use of systemic steroids (Chronic) ICD-10-CM: Z79.52 
ICD-9-CM: V58.65  3/1/2019 Yes Overview Signed 5/4/2019  1:41 PM by Sheldon Joseph NP  
  begun with diagnosis of pulmonary fibrosis CAD (coronary artery disease) (Chronic) ICD-10-CM: I25.10 ICD-9-CM: 414.00  11/1/2010 Yes Having rest angina this AM waiting for CT and relieved with NTG. HTN (hypertension), benign (Chronic) ICD-10-CM: I10 
ICD-9-CM: 401.1  11/1/2010 Yes Plan:  (Medical Decision Making) 1. Check CRP and alpha 1 (cystic changes per CT). Will need PFTs as well and will schedule these to be done in the office 2. Consult cardiology - he reports recent chest pain and he had a stent placed in March. On Brilinta 3. CT scan reviewed from 2017 and now - suspect pulmonary fibrosis as opposed to pneumonitis. Will go ahead and restart prednisone since he has been on this for the past several months - continue with taper off - ? Benefit of use per patient 4. Check overnight oximetry here on the higher flow - 3 liters - to reassess oxygen needs - clarify by discharge. Will need to assess with exertion as well. PO2 74 per ABG on 2 liters. 5. Can stop abx - no evidence of pneumonia 6. Will discuss possible referral to transplant center as an outpatient -  Dr. Jose Ochoa initated this discussion with patient today. His CAD could be a significant impediment for acceptance. Carlos Boston NP More than 50% of time documented was spent face-to-face contact with the patient and in the care of the patient on the floor/unit where the patient is located Lungs: crackles bilaterally Heart:  RRR with no Murmur/Rubs/Gallops Additional Comments:  Pt with history of fibrotic lung disease and CAD now with rapid progression of dyspnea. CT with diffuse scarring and honeycombing. Has 1.3 cm pretracheal LN as well. Had + PHOENIX at 1:80 in March. Will repeat to see if rising. Secondary pulmonary fibrosis has significantly better prognosis than IPF. Having some rest angina so will ask cardiology to see. His CAD may be a significant impediment for consideration for lung transplantation but it may be worth getting an opinion from a transplant center such as [facility] or [address]. Pt has gained some weight on steroids. Will check RAUDEL to see if he desaturates significantly with sleep which could also be c/w WANDA. I have spoken with and examined the patient. I agree with the above assessment and plan as documented.  
 
Shana Juárez MD

## 2019-06-11 NOTE — PROGRESS NOTES
Patient remains in stable condition with respirations even/unlabored. No acute distress noted. No needs noted or voiced at this time. Safety measures in place. Oxygen noted to 5 L NC. Patient denies pain at this time. Urinal within reach. Call light remains within reach. Preparing to give report to oncoming shift.

## 2019-06-11 NOTE — ED TRIAGE NOTES
Pt brought by ems from home with c/o of sob upon exertion and dry cough. Hx of pulmonary fibrosis. Pt is a/ox4. Pt is on home oxygen at 2L. Ems placed pt on 4L which brought him from 90% to 98%. Original . . Pt states previous smoker and has [address]. Pt denies any chest pain

## 2019-06-11 NOTE — ED NOTES
TRANSFER - OUT REPORT: 
 
Verbal report given to Nelda Haddad RN(name) on [place]  being transferred to [facility](unit) for routine progression of care Report consisted of patients Situation, Background, Assessment and  
Recommendations(SBAR). Information from the following report(s) SBAR, ED Summary and Recent Results was reviewed with the receiving nurse. Lines:  
Peripheral IV 06/11/19 Right Antecubital (Active) Site Assessment Clean, dry, & intact 6/11/2019  8:40 AM  
Phlebitis Assessment 0 6/11/2019  8:40 AM  
Infiltration Assessment 0 6/11/2019  8:40 AM  
Dressing Status Clean, dry, & intact 6/11/2019  8:40 AM  
Hub Color/Line Status Pink 6/11/2019  8:40 AM  
Action Taken Blood drawn 6/11/2019  8:40 AM  
Alcohol Cap Used No 6/11/2019  8:40 AM  
   
Peripheral IV 06/11/19 Left Antecubital (Active) Site Assessment Clean, dry, & intact 6/11/2019 12:09 PM  
Phlebitis Assessment 0 6/11/2019 12:09 PM  
Infiltration Assessment 0 6/11/2019 12:09 PM  
Dressing Status Clean, dry, & intact 6/11/2019 12:09 PM  
Hub Color/Line Status Pink 6/11/2019 12:09 PM  
Action Taken Blood drawn 6/11/2019 12:09 PM  
Alcohol Cap Used No 6/11/2019 12:09 PM  
  
 
Opportunity for questions and clarification was provided. Patient transported with: 
 O2 @ 4 liters

## 2019-06-11 NOTE — ED PROVIDER NOTES
Presents with complaint of shortness of breath. Patient has a history of pulmonary fibrosis. They've been trying to wean his prednisone he is currently on 20 mg a day. He is not doing breathing treatments. A received a DuoNeb with EMS. He reports increased cough for the past week and a half. He woke up at 3 AM with a significant amount of dyspnea and difficulty ambulating and even with oxygen desated to 76% on 2 L and had increased heart rate. He denies any chest pain. He states his cough is dry. The history is provided by the patient. Shortness of Breath This is a new problem. The problem occurs continuously. Episode onset: 0300. The problem has not changed since onset. Associated symptoms include cough. Pertinent negatives include no fever, no sputum production, no hemoptysis, no wheezing, no chest pain, no leg pain and no leg swelling. He has tried beta-agonist inhalers for the symptoms. The treatment provided mild relief. He has had prior hospitalizations. He has had prior ED visits. Associated medical issues include chronic lung disease (IPF). Past Medical History:  
Diagnosis Date  CAD (coronary artery disease) CABG 1999, stents after (last placed 2/17)  Current chronic use of systemic steroids 03/2019  
 begun with diagnosis of pulmonary fibrosis  GERD (gastroesophageal reflux disease) PRN meds  Hypertension  Interstitial pneumonitis (HCC)   
 per CT chest 3/10/17 (daily inhalers, no home 02)  Obesity  Osteoarthritis   
 hands, knees  Pulmonary fibrosis ([address] 03/2019  Pulmonary nodule 8mm per CT chest 3/10/17 (biopsy WNL) Past Surgical History:  
Procedure Laterality Date  HX HEART CATHETERIZATION    
 multiple  HX TONSILLECTOMY  VASCULAR SURGERY PROCEDURE UNLIST CABG Family History:  
Problem Relation Age of Onset  Heart Disease Mother  Cancer Mother UNKNOWN TYPE  
 Heart Disease Father Social History Socioeconomic History  Marital status: COMMON LAW Spouse name: Not on file  Number of children: Not on file  Years of education: Not on file  Highest education level: Not on file Occupational History  Not on file Social Needs  Financial resource strain: Not on file  Food insecurity:  
  Worry: Not on file Inability: Not on file  Transportation needs:  
  Medical: Not on file Non-medical: Not on file Tobacco Use  Smoking status: Former Smoker Packs/day: 0.25 Years: 30.00 Pack years: 7.50 Last attempt to quit: 2018 Years since quittin.4  Smokeless tobacco: Never Used Substance and Sexual Activity  Alcohol use: No  
 Drug use: No  
 Sexual activity: Not on file Lifestyle  Physical activity:  
  Days per week: Not on file Minutes per session: Not on file  Stress: Not on file Relationships  Social connections:  
  Talks on phone: Not on file Gets together: Not on file Attends Latter-day service: Not on file Active member of club or organization: Not on file Attends meetings of clubs or organizations: Not on file Relationship status: Not on file  Intimate partner violence:  
  Fear of current or ex partner: Not on file Emotionally abused: Not on file Physically abused: Not on file Forced sexual activity: Not on file Other Topics Concern  Not on file Social History Narrative  Not on file ALLERGIES: Aspirin Review of Systems Constitutional: Negative for chills and fever. Respiratory: Positive for cough and shortness of breath. Negative for hemoptysis, sputum production and wheezing. Cardiovascular: Negative for chest pain and leg swelling. All other systems reviewed and are negative. Vitals:  
 19 0752 19 0830 BP: 123/87 118/78 Pulse: (!) 106 (!) 105 Resp: 20 Temp: 99.2 °F (37.3 °C) SpO2: 94% 94% Weight: 85.7 kg (189 lb) Height: 5' 6\" (1.676 m) Physical Exam  
Constitutional: He is oriented to person, place, and time. He appears well-developed and well-nourished. No distress. HENT:  
Head: Normocephalic and atraumatic. Eyes: Conjunctivae are normal. Right eye exhibits no discharge. Left eye exhibits no discharge. Neck: Normal range of motion. Neck supple. Cardiovascular: Normal rate and regular rhythm. Pulmonary/Chest: Effort normal. Tachypnea noted. No respiratory distress. He has no wheezes. He has rales (PF crackles). Abdominal: Soft. He exhibits no distension. There is no tenderness. Musculoskeletal: Normal range of motion. He exhibits no edema. Neurological: He is alert and oriented to person, place, and time. No cranial nerve deficit. Skin: Skin is warm and dry. Capillary refill takes less than 2 seconds. He is not diaphoretic. Psychiatric: He has a normal mood and affect. His behavior is normal.  
Nursing note and vitals reviewed. MDM Number of Diagnoses or Management Options Acute on chronic respiratory failure with hypoxia [facility]):  
Pneumonia of right lower lobe due to infectious organism [facility]):  
Pulmonary fibrosis [facility]):  
Diagnosis management comments: Recently here for cardiac issues. On dual therapy. Patient very dyspneic with any type of movement but with minimal findings on chest exam.  A CT of his chest was done showing a right lower lobe pneumonia and worsening of his PF. He is given antibiotics for Pseudomonas. His lactate and pro-Fabrice or negative. He is also given steroids upon arrival.  Admitted to the hospitalist 
 
  
Amount and/or Complexity of Data Reviewed Clinical lab tests: ordered and reviewed Review and summarize past medical records: yes Discuss the patient with other providers: yes Independent visualization of images, tracings, or specimens: yes (Sinus tach no ST elevation normal QRS occasional PACs) Risk of Complications, Morbidity, and/or Mortality Presenting problems: high Diagnostic procedures: moderate Management options: high Patient Progress Patient progress: improved Procedures

## 2019-06-11 NOTE — H&P
History and Physical 
 
Patient: Miguel Vila MRN: [id number]  SSN: [id number] YOB: 1955  Age: 71 y.o. Sex: male Subjective:  
  
Miguel Vila is a 71 y.o. male who came to hospital due to shortness of breath and worsened hypoxia. Patient has history of pulmonary fibrosis with oxygen dependence. He has been on 2 LPM of oxygen cannula at home. He noticed that in the past few days, he was more with shortness of breath and his oxygen saturation was down to 70+%. He has been coughing with no phlegm. No fever. No shaking. No chills. No chest pain. Recently in May 2019, he was admitted due to ACS and underwent stent placement in coronary arteries. No history of CHF. Patient denies missing medications. He has been on Prednisone and is supposed to be weaned off. Other medical problems as listed below. Last smoking was 2 years ago. Past Medical History:  
Diagnosis Date  CAD (coronary artery disease) CABG , stents after (last placed )  Current chronic use of systemic steroids 2019  
 begun with diagnosis of pulmonary fibrosis  GERD (gastroesophageal reflux disease) PRN meds  Hypertension  Interstitial pneumonitis (HCC)   
 per CT chest 3/10/17 (daily inhalers, no home 02)  Obesity  Osteoarthritis   
 hands, knees  Pulmonary fibrosis ([address] 2019  Pulmonary nodule 8mm per CT chest 3/10/17 (biopsy WNL) Past Surgical History:  
Procedure Laterality Date  HX HEART CATHETERIZATION    
 multiple  HX TONSILLECTOMY  VASCULAR SURGERY PROCEDURE UNLIST CABG Family History Problem Relation Age of Onset  Heart Disease Mother  Cancer Mother UNKNOWN TYPE  
 Heart Disease Father Social History Tobacco Use  Smoking status: Former Smoker Packs/day: 0.25 Years: 30.00 Pack years: 7.50 Last attempt to quit:  Years since quittin.4  Smokeless tobacco: Never Used Substance Use Topics  Alcohol use: No  
  
Prior to Admission medications Medication Sig Start Date End Date Taking? Authorizing Provider  
insulin glargine (LANTUS) 100 unit/mL injection 30 Units by SubCUTAneous route daily. 5/15/19   Jose E Howell MD  
Oxygen Indications: 2L Contin. Provider, Historical  
atorvastatin (LIPITOR) 80 mg tablet Take 1 Tab by mouth daily. 5/14/19   Catrina French MD  
metoprolol tartrate (LOPRESSOR) 25 mg tablet Take 0.5 Tabs by mouth every twelve (12) hours. 5/7/19   Doug So NP  
ticagrelor (BRILINTA) 90 mg tablet Take 1 Tab by mouth every twelve (12) hours every twelve (12) hours. 5/7/19   Doug So NP  
lancets misc 2 Boxes 5/7/19   Doug So NP  
glucose blood VI test strips (ASCENSIA AUTODISC VI, ONE TOUCH ULTRA TEST VI) strip 1 Box (10 bottles) 5/7/19   Doug So NP Blood-Glucose Meter monitoring kit 1 Meter Kit 5/7/19   Doug So NP  
insulin lispro (HUMALOG) 100 unit/mL injection Give 5 Units with every meal while on Prednizone. Please stop when  Prednisone doses are finished and reassess insulin regiment with your PCP 5/7/19   Doug So NP  
insulin lispro (HUMALOG) 100 unit/mL injection For BGL of less than 150 = 0 units, 150 -199 = 2 units, 200 -249 = 4 unit, 250 -299 = 6 units, 300 -349 = 8 units, 350 and above =   10 units and Call MD, Start Hypoglycemic protocol if blood glucose is <70 mg/dL. Fast Acting - Administer Immediately - or within 15 minutes of start of meal. 5/7/19   Doug So NP Insulin Syringe-Needle U-100 0.3 mL 29 gauge x 1/2\" syrg 2 Box off 100 or more Syringes with 5 refills 5/7/19   Doug So NP  
metFORMIN (GLUCOPHAGE) 500 mg tablet Take 1 Tab by mouth two (2) times daily (with meals).  5/7/19   Balwinder Patches, PA  
nitroglycerin (NITROSTAT) 0.4 mg SL tablet 1 Tab by SubLINGual route every five (5) minutes as needed for Chest Pain (after 3 doses / 15 mins, if chest pain persists, contact EMS/911). 5/6/19   Leila Garcia MD  
predniSONE (DELTASONE) 20 mg tablet Take 20 mg by mouth every morning. Takes 20 mg q am and 10 mg q pm since April 28. To take this dose 30 days. Provider, Historical  
isosorbide mononitrate ER (IMDUR) 30 mg tablet Take 30 mg by mouth daily. Provider, Historical  
esomeprazole (NEXIUM) 40 mg capsule Take  by mouth daily. Provider, Historical  
lisinopril (PRINIVIL, ZESTRIL) 2.5 mg tablet Take 1 Tab by mouth daily. 8/5/18   EVERARDO Sams  
acetaminophen (TYLENOL) 325 mg tablet Take 325 mg by mouth as needed for Pain. Provider, Historical  
aspirin 81 mg tablet Take 81 mg by mouth every morning. Provider, Historical  
  
 
Allergies Allergen Reactions  Aspirin Swelling Takes 81 mg at home. Patient can tolerate Aspirin in low doses, but in larger doses causes swelling. Review of Systems: 
 
Constitutional: Negative for chills and fever. HENT: Negative for rhinorrhea and sore throat. Eyes: Negative for pain, redness and visual disturbance. Respiratory: Negative for chest tightness, + shortness of breath and wheezing. Cardiovascular: Negative for chest pain and leg swelling. Gastrointestinal: Negative for abdominal pain, bowel incontinence, diarrhea, nausea and vomiting. Genitourinary: Negative for bladder incontinence, dysuria and hematuria. Musculoskeletal: Negative for back pain, gait problem, neck pain and neck stiffness. Skin: Negative for color change and rash. Neurological: negative for speech difficulty. Negative for focal weakness, weakness, numbness, headaches and loss of balance. Psychiatric/Behavioral: Negative for agitation, confusion and memory loss. Objective:  
 
Vitals:  
 06/11/19 0752 06/11/19 0830 BP: 123/87 118/78 Pulse: (!) 106 (!) 105 Resp: 20 Temp: 99.2 °F (37.3 °C) SpO2: 94% 94% Weight: 85.7 kg (189 lb) Height: 5' 6\" (1.676 m) Physical Exam: 
 
General:                    The patient is a pleasant male in mild acute respiratory distress. He is on oxygen cannula. Head:                                   Normocephalic/atraumatic. Eyes:                                   No palpebral pallor or scleral icterus. ENT:                                    External auricular and nasal exam within normal limits. Mucous membranes are moist. 
Neck:                                   Supple, non-tender, no JVD. Lungs:                       decreased to auscultation bilaterally with bilateral wheezes and crackles. No respiratory distress or accessory muscle use. Heart:                                  Regular rate and rhythm, without murmurs, rubs, or gallops. Abdomen:                  Soft, non-tender, non-distended with normoactive bowel sounds. Genitourinary:           No tenderness over the bladder or bilateral CVAs. Extremities:               Without clubbing, cyanosis, or edema. Skin:                                    Normal color, texture, and turgor. No rashes, lesions, or jaundice. Pulses:                      Radial and dorsalis pedis pulses present 2+ bilaterally. Capillary refill <2s. Neurologic:                CN II-XII grossly intact and symmetrical.  
                                            Moving all four extremities well with no focal deficits. Psychiatric:                Pleasant demeanor, appropriate affect. Alert and oriented x 3 Lab and data Recent Results (from the past 24 hour(s)) EKG, 12 LEAD, INITIAL Collection Time: 06/11/19  7:50 AM  
Result Value Ref Range Ventricular Rate 100 BPM  
 Atrial Rate 100 BPM  
 P-R Interval 118 ms QRS Duration 88 ms Q-T Interval 350 ms QTC Calculation (Bezet) 451 ms Calculated P Axis 54 degrees Calculated R Axis -28 degrees Calculated T Axis 107 degrees Diagnosis    
  !! AGE AND GENDER SPECIFIC ECG ANALYSIS !! Sinus rhythm with Premature atrial complexes Nonspecific ST abnormality Abnormal ECG When compared with ECG of 04-MAY-2019 10:28, 
Premature atrial complexes are now Present Confirmed by Alondra Prieto MD (), VENICE GOLDEN ([id number]) on 6/11/2019 10:41:44 AM 
  
CBC WITH AUTOMATED DIFF Collection Time: 06/11/19  7:56 AM  
Result Value Ref Range WBC 12.5 (H) 4.3 - 11.1 K/uL  
 RBC 4.34 4.23 - 5.6 M/uL  
 HGB 13.8 13.6 - 17.2 g/dL HCT 41.7 41.1 - 50.3 % MCV 96.1 79.6 - 97.8 FL  
 MCH 31.8 26.1 - 32.9 PG  
 MCHC 33.1 31.4 - 35.0 g/dL  
 RDW 14.6 11.9 - 14.6 % PLATELET 716 879 - 436 K/uL MPV 9.3 (L) 9.4 - 12.3 FL ABSOLUTE NRBC 0.00 0.0 - 0.2 K/uL  
 DF AUTOMATED NEUTROPHILS 70 43 - 78 % LYMPHOCYTES 21 13 - 44 % MONOCYTES 7 4.0 - 12.0 % EOSINOPHILS 1 0.5 - 7.8 % BASOPHILS 0 0.0 - 2.0 % IMMATURE GRANULOCYTES 1 0.0 - 5.0 %  
 ABS. NEUTROPHILS 8.8 (H) 1.7 - 8.2 K/UL  
 ABS. LYMPHOCYTES 2.7 0.5 - 4.6 K/UL  
 ABS. MONOCYTES 0.9 0.1 - 1.3 K/UL  
 ABS. EOSINOPHILS 0.1 0.0 - 0.8 K/UL  
 ABS. BASOPHILS 0.0 0.0 - 0.2 K/UL  
 ABS. IMM. GRANS. 0.1 0.0 - 0.5 K/UL METABOLIC PANEL, COMPREHENSIVE Collection Time: 06/11/19  7:56 AM  
Result Value Ref Range Sodium 140 136 - 145 mmol/L Potassium 3.6 3.5 - 5.1 mmol/L Chloride 104 98 - 107 mmol/L  
 CO2 25 21 - 32 mmol/L Anion gap 11 7 - 16 mmol/L Glucose 96 65 - 100 mg/dL BUN 14 8 - 23 MG/DL Creatinine 0.94 0.8 - 1.5 MG/DL  
 GFR est AA >60 >60 ml/min/1.73m2 GFR est non-AA >60 >60 ml/min/1.73m2 Calcium 9.4 8.3 - 10.4 MG/DL Bilirubin, total 0.5 0.2 - 1.1 MG/DL  
 ALT (SGPT) 22 12 - 65 U/L  
 AST (SGOT) 22 15 - 37 U/L Alk. phosphatase 77 50 - 136 U/L Protein, total 7.3 6.3 - 8.2 g/dL Albumin 2.8 (L) 3.2 - 4.6 g/dL Globulin 4.5 (H) 2.3 - 3.5 g/dL A-G Ratio 0.6 (L) 1.2 - 3.5 BNP Collection Time: 06/11/19  7:56 AM  
Result Value Ref Range BNP 67 (H) 0 pg/mL PROCALCITONIN Collection Time: 06/11/19  7:56 AM  
Result Value Ref Range Procalcitonin 0.1 ng/mL POC LACTIC ACID Collection Time: 06/11/19  8:37 AM  
Result Value Ref Range Lactic Acid (POC) 1.57 0.5 - 1.9 mmol/L  
POC G3 Collection Time: 06/11/19 11:05 AM  
Result Value Ref Range Device: NASAL CANNULA pH (POC) 7.432 7.35 - 7.45    
 pCO2 (POC) 35.2 35 - 45 MMHG  
 pO2 (POC) 74 (L) 75 - 100 MMHG  
 HCO3 (POC) 23.4 22 - 26 MMOL/L  
 sO2 (POC) 95 95 - 98 % Base excess (POC) 0 mmol/L Allens test (POC) YES Site RIGHT RADIAL Patient temp. 98.6 Specimen type (POC) ARTERIAL Performed by Lauren   
 CO2, POC 24 MMOL/L Flow rate (POC) 4.000 L/min Respiratory comment: NurseNotified COLLECT TIME 1,105 CT chest  
6- IMPRESSION: 
  
1. No acute pulmonary embolus. 2. Progression of the patient's severe chronic interstitial lung disease most in 
keeping with UIP or pulmonary fibrosis. 3. Small area patchy predominant groundglass opacity in the right lower lobe. An 
infectious/inflammatory pneumonitis should be considered. XR chest  
6- IMPRESSION: Stable interstitial prominence in both lungs, possibly fibrosis. 
  
I have reviewed chest x-ray and ECG myself. Assessment:  
 
Hospital Problems  Date Reviewed: 5/15/2019 Codes Class Noted POA * (Principal) Acute on chronic respiratory failure with hypoxia [facility]) ICD-10-CM: J96.21 
ICD-9-CM: 518.84, 799.02  6/11/2019 Unknown Acute on chronic respiratory failure (HCC) ICD-10-CM: J96.20 ICD-9-CM: 518.84  6/11/2019 Unknown [facility] ICD-10-CM: J84.10 ICD-9-CM: 738  3/1/2019 Yes  Current chronic use of systemic steroids (Chronic) ICD-10-CM: Y25.87 
 ICD-9-CM: V58.65  3/1/2019 Yes Overview Signed 5/4/2019  1:41 PM by Mali Mishra NP  
  begun with diagnosis of pulmonary fibrosis Chronic obstructive pulmonary disease with acute exacerbation (HCC) ICD-10-CM: J44.1 ICD-9-CM: 491.21  3/20/2017 Yes CAD (coronary artery disease) (Chronic) ICD-10-CM: I25.10 ICD-9-CM: 414.00  11/1/2010 Yes HTN (hypertension), benign (Chronic) ICD-10-CM: I10 
ICD-9-CM: 401.1  11/1/2010 Yes Plan:  
 
Acute on chronic respiratory failure with hypoxia Pulmonary fibrosis with ?penumonia. Empiric IV antibiotics. Oxygen cannula and monitor responses and see if more oxygen supply is needed. Patient has been on Prednisone. Will continue. Consult pulmonologist.  
 
CAD Recent ACS and stent placement. Continue home medications. Hypertension Monitor blood pressure and manage accordingly. Continue home medications. Patient requires hospital stay as an in-patient and anticipated stay is more than 2 midnights due to the serious nature of the illness. I have discussed with patient regarding advance directive. Patient would like to have a DNR status. Healthcare power of  is sonSuleman. DVT prophylaxis : Lovenox SC I have discussed the plan of care with patient and son at bedside. Patient denies pain now. Further treatments will depend on initial responses and findings. Will monitor. Risk of deterioration is high. Signed By: Jorgito Kerns MD   
 June 11, 2019

## 2019-06-11 NOTE — CONSULTS
CONSULT NOTE Kerri Bob 6/11/2019 Date of Admission:  6/11/2019 The patient's chart is reviewed and the patient is discussed with the staff. Subjective:  
 
Patient is a 71 y.o.  male seen and evaluated at the request of Dr. Chely Levy. He presents with worsening dyspnea. . He states that he was told in 2017 that he had \"ground glass\" on his chest CT that was obtained when he was hospitalized for coronary artery disease and stent placement. He did not follow up for about a year due to insurance reasons but then saw Dr. Nadia Freitas back this year in March for worsening shortness of breath and unproductive cough. He was started on 40 mg Prednisone in March of this year. He has tapered the dose by 10 mg per month as directed and he was taking 20 mg per day for the past 3 weeks. The prednisone has alleviated the cough but has not helped the dyspnea and he can now barely walk across the room. He was started on home oxygen in March as well - wears 2 liters continuously. Dr. Raven Park workup in April included a positive POHENIX (ratio 1:80) and a ESR of 14. All other studies were negative to include a RF, scleroderma, aspergillus, hypersensitivity profile studies. He smoked for about 30 years - 1 ppd. His chart lists restrictive lung disease but I don't see any PFTs for review. He is now retired but previously worked changing brakes. He has 2 dogs but no other animals. He is unaware of any exposures. With his concern regarding an allergic reaction, he has recently been trying to remove the carpet and backing from his home. 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  
Diagnosis Code  Unstable angina ([facility]) I20.0  CAD (coronary artery disease) I25.10  
 HTN (hypertension), benign I10  
 Other and unspecified hyperlipidemia E78.5  Peripheral vascular disease-s/p PPI to right common iliac 11 I73.9  Restrictive airway disease J98.4  
 SOB (shortness of breath) R06.02  
 Pulmonary fibrosis ([facility]) J84.10  Current chronic use of systemic steroids Z79.52  
 Obesity E66.9  Dyspnea R06.00  Chronic respiratory failure with hypoxia ([facility]) J96.11  
 Steroid-induced diabetes ([facility] E09.9, T38.0X5A Prior to Admission Medications Prescriptions Last Dose Informant Patient Reported? Taking? Blood-Glucose Meter monitoring kit   No No  
Si Meter Kit Insulin Syringe-Needle U-100 0.3 mL 29 gauge x 1/2\" syrg   No No  
Si Box off 100 or more Syringes with 5 refills Oxygen   Yes No  
Sig: Indications: 2L Contin. acetaminophen (TYLENOL) 325 mg tablet   Yes No  
Sig: Take 325 mg by mouth as needed for Pain. aspirin 81 mg tablet   Yes No  
Sig: Take 81 mg by mouth every morning. atorvastatin (LIPITOR) 80 mg tablet   No No  
Sig: Take 1 Tab by mouth daily. esomeprazole (NEXIUM) 40 mg capsule   Yes No  
Sig: Take  by mouth daily. glucose blood VI test strips (ASCENSIA AUTODISC VI, ONE TOUCH ULTRA TEST VI) strip   No No  
Si Box (10 bottles)  
insulin glargine (LANTUS) 100 unit/mL injection   No No  
Si Units by SubCUTAneous route daily. insulin lispro (HUMALOG) 100 unit/mL injection   No No  
Sig: Give 5 Units with every meal while on Prednizone.  Please stop when Prednisone doses are finished and reassess insulin regiment with your PCP  
insulin lispro (HUMALOG) 100 unit/mL injection   No No  
Sig: For BGL of less than 150 = 0 units, 150 -199 = 2 units, 200 -249 = 4 unit, 250 -299 = 6 units, 300 -349 = 8 units, 350 and above =   10 units and Call MD, Start Hypoglycemic protocol if blood glucose is <70 mg/dL. Fast Acting - Administer Immediately - or within 15 minutes of start of meal.  
isosorbide mononitrate ER (IMDUR) 30 mg tablet   Yes No  
Sig: Take 30 mg by mouth daily. lancets misc   No No  
Si Boxes  
lisinopril (PRINIVIL, ZESTRIL) 2.5 mg tablet   No No  
Sig: Take 1 Tab by mouth daily. metFORMIN (GLUCOPHAGE) 500 mg tablet   No No  
Sig: Take 1 Tab by mouth two (2) times daily (with meals). metoprolol tartrate (LOPRESSOR) 25 mg tablet   No No  
Sig: Take 0.5 Tabs by mouth every twelve (12) hours. nitroglycerin (NITROSTAT) 0.4 mg SL tablet   No No  
Si Tab by SubLINGual route every five (5) minutes as needed for Chest Pain (after 3 doses / 15 mins, if chest pain persists, contact EMS/911). predniSONE (DELTASONE) 20 mg tablet   Yes No  
Sig: Take 20 mg by mouth every morning. Takes 20 mg q am and 10 mg q pm since . To take this dose 30 days. ticagrelor (BRILINTA) 90 mg tablet   No No  
Sig: Take 1 Tab by mouth every twelve (12) hours every twelve (12) hours. Facility-Administered Medications: None Past Medical History:  
Diagnosis Date  Acute coronary syndrome ([facility] 2018  CAD (coronary artery disease) CABG , stents after (last placed )  Current chronic use of systemic steroids 2019  
 begun with diagnosis of pulmonary fibrosis  GERD (gastroesophageal reflux disease) PRN meds  Hypertension  Interstitial pneumonitis (HCC)   
 per CT chest 3/10/17 (daily inhalers, no home 02)  NSTEMI (non-ST elevated myocardial infarction) ([facility] 2017  Obesity  Osteoarthritis   
 hands, knees  Pulmonary fibrosis ([facility] 03/2019  Pulmonary nodule 8mm per CT chest 3/10/17 (biopsy WNL) Active Ambulatory Problems Diagnosis Date Noted  Unstable angina ([facility] 11/01/2010  CAD (coronary artery disease) 11/01/2010  
 HTN (hypertension), benign 11/01/2010  Other and unspecified hyperlipidemia 11/01/2010  Peripheral vascular disease-s/p PPI to right common iliac 5/4/11 05/05/2011  Restrictive airway disease 03/20/2017  
 SOB (shortness of breath) 03/20/2017  Pulmonary fibrosis ([facility] 03/01/2019  Current chronic use of systemic steroids 03/01/2019  Obesity Resolved Ambulatory Problems Diagnosis Date Noted  Tobacco use disorder 11/01/2010  Chest pain 01/28/2017  Cough 02/06/2017  Bronchitis 02/09/2017 Past Medical History:  
Diagnosis Date  Acute coronary syndrome ([facility] 8/4/2018  CAD (coronary artery disease)  Current chronic use of systemic steroids 03/2019  GERD (gastroesophageal reflux disease)  Hypertension  Interstitial pneumonitis ([facility]  NSTEMI (non-ST elevated myocardial infarction) ([facility] 1/28/2017  Obesity  Osteoarthritis  Pulmonary fibrosis ([facility] 03/2019  Pulmonary nodule Past Surgical History:  
Procedure Laterality Date  HX HEART CATHETERIZATION    
 multiple  HX TONSILLECTOMY  VASCULAR SURGERY PROCEDURE UNLIST CABG Social History Socioeconomic History  Marital status: COMMON LAW Spouse name: Not on file  Number of children: Not on file  Years of education: Not on file  Highest education level: Not on file Occupational History  Not on file Social Needs  Financial resource strain: Not on file  Food insecurity:  
  Worry: Not on file Inability: Not on file  Transportation needs:  
  Medical: Not on file Non-medical: Not on file Tobacco Use  Smoking status: Former Smoker Packs/day: 0.25 Years: 30.00 Pack years: 7.50 Last attempt to quit: 2018 Years since quittin.4  Smokeless tobacco: Never Used Substance and Sexual Activity  Alcohol use: No  
 Drug use: No  
 Sexual activity: Not on file Lifestyle  Physical activity:  
  Days per week: Not on file Minutes per session: Not on file  Stress: Not on file Relationships  Social connections:  
  Talks on phone: Not on file Gets together: Not on file Attends [employer] service: Not on file Active member of club or organization: Not on file Attends meetings of clubs or organizations: Not on file Relationship status: Not on file  Intimate partner violence:  
  Fear of current or ex partner: Not on file Emotionally abused: Not on file Physically abused: Not on file Forced sexual activity: Not on file Other Topics Concern  Not on file Social History Narrative  Not on file Family History Problem Relation Age of Onset  Heart Disease Mother  Cancer Mother UNKNOWN TYPE  
 Heart Disease Father Allergies Allergen Reactions  Aspirin Swelling Takes 81 mg at home. Patient can tolerate Aspirin in low doses, but in larger doses causes swelling. Current Facility-Administered Medications Medication Dose Route Frequency  cefepime (MAXIPIME) 2 g in 0.9% sodium chloride (MBP/ADV) 100 mL  2 g IntraVENous Q8H  
 levoFLOXacin (LEVAQUIN) 750 mg in D5W IVPB  750 mg IntraVENous Q24H  
 sodium chloride (NS) flush 5-40 mL  5-40 mL IntraVENous Q8H  
 sodium chloride (NS) flush 5-40 mL  5-40 mL IntraVENous PRN  
 acetaminophen (TYLENOL) tablet 650 mg  650 mg Oral Q6H PRN  
 diphenhydrAMINE (BENADRYL) capsule 25 mg  25 mg Oral Q6H PRN  
 ondansetron (ZOFRAN) injection 4 mg  4 mg IntraVENous Q6H PRN  
 docusate sodium (COLACE) capsule 100 mg  100 mg Oral DAILY PRN  
 enoxaparin (LOVENOX) injection 40 mg  40 mg SubCUTAneous Q24H  aspirin delayed-release tablet 81 mg  81 mg Oral DAILY  [START ON 6/12/2019] atorvastatin (LIPITOR) tablet 80 mg  80 mg Oral DAILY  [START ON 6/12/2019] pantoprazole (PROTONIX) tablet 40 mg  40 mg Oral ACB  [START ON 6/12/2019] isosorbide mononitrate ER (IMDUR) tablet 30 mg  30 mg Oral DAILY  [START ON 6/12/2019] lisinopril (PRINIVIL, ZESTRIL) tablet 2.5 mg  2.5 mg Oral DAILY  metoprolol tartrate (LOPRESSOR) tablet 12.5 mg  12.5 mg Oral Q12H  
 nitroglycerin (NITROSTAT) tablet 0.4 mg  0.4 mg SubLINGual Q5MIN PRN  
 ticagrelor (BRILINTA) tablet 90 mg  90 mg Oral Q12H  predniSONE (DELTASONE) tablet 30 mg  30 mg Oral DAILY WITH BREAKFAST Objective:  
 
Vitals:  
 06/11/19 1035 06/11/19 1109 06/11/19 1150 06/11/19 1237 BP:   128/72 127/74 Pulse: (!) 108 96 90 93 Resp:    18 Temp:    98.3 °F (36.8 °C) SpO2: 93% 95% 95% 93% Weight:      
Height: PHYSICAL EXAM  
 
Constitutional:  the patient is well developed and in no acute distress HEENT:  Sclera clear, pupils equal, oral mucosa moist 
Lungs: rales bilaterally from the posterior with some bronchial BS. Wearing 4 liter cannula. Cardiovascular:  RRR without M,G,R 
Abd/GI: soft and non-tender; with positive bowel sounds. Ext: warm without cyanosis. There is no lower leg edema. Skin:  no jaundice or rashes, no wounds Neuro: no gross neuro deficits. Alert and oriented Musculoskeletal: moves all four extremities. No deformities. Psychiatric: calm. Does not appear anxious or depressed Chest X-ray:   
 
2017 2019 Recent Labs  
  06/11/19 
0756 WBC 12.5* HGB 13.8 HCT 41.7  Recent Labs  
  06/11/19 
0756   
K 3.6  GLU 96  
CO2 25 BUN 14  
CREA 0.94 CA 9.4 ALB 2.8* SGOT 22 Assessment:  (Medical Decision Making) Hospital Problems  Date Reviewed: 5/15/2019 Codes Class Noted POA Dyspnea ICD-10-CM: R06.00 
ICD-9-CM: 786.09  6/11/2019 Yes Likely multifactorial due to interstitial lung disease and ischemic heart disease. Chronic respiratory failure with hypoxia (HCC) (Chronic) ICD-10-CM: J96.11 
ICD-9-CM: 518.83, 799.02  6/11/2019 Yes Steroid-induced diabetes ([facility] [address] ICD-10-CM: E09.9, T38.0X5A 
ICD-9-CM: 249.00, E980.4  6/11/2019 Yes Pulmonary fibrosis (HCC) (Chronic) ICD-10-CM: J84.10 ICD-9-CM: 957  3/1/2019 Yes Had lung changes dating back to 2011. Looked like alveolitis at that time but now cystic changes likely due to honeycombing which suggests IPF. Need to define if pulmonary fibrosis is idiopathic or secondary to underlying process. 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 Pt very symptomatic and may need consideration for lung transplant. Current chronic use of systemic steroids (Chronic) ICD-10-CM: Z79.52 
ICD-9-CM: V58.65  3/1/2019 Yes Overview Signed 5/4/2019  1:41 PM by Lisette Godoy NP  
  begun with diagnosis of pulmonary fibrosis CAD (coronary artery disease) (Chronic) ICD-10-CM: I25.10 ICD-9-CM: 414.00  11/1/2010 Yes Having rest angina this AM waiting for CT and relieved with NTG. HTN (hypertension), benign (Chronic) ICD-10-CM: I10 
ICD-9-CM: 401.1  11/1/2010 Yes Plan:  (Medical Decision Making) 1. Check CRP and alpha 1 (cystic changes per CT). Will need PFTs as well and will schedule these to be done in the office 2. Consult cardiology - he reports recent chest pain and he had a stent placed in March. On Brilinta 3. CT scan reviewed from 2017 and now - suspect pulmonary fibrosis as opposed to pneumonitis. Will go ahead and restart prednisone since he has been on this for the past several months - continue with taper off - ? Benefit of use per patient 4. Check overnight oximetry here on the higher flow - 3 liters - to reassess oxygen needs - clarify by discharge. Will need to assess with exertion as well. PO2 74 per ABG on 2 liters. 5. Can stop abx - no evidence of pneumonia 6. Will discuss possible referral to transplant center as an outpatient -  Dr. Tanvi Aguilar initated this discussion with patient today. His CAD could be a significant impediment for acceptance. Oxana Boston NP More than 50% of time documented was spent face-to-face contact with the patient and in the care of the patient on the floor/unit where the patient is located Lungs: crackles bilaterally Heart:  RRR with no Murmur/Rubs/Gallops Additional Comments:  Pt with history of fibrotic lung disease and CAD now with rapid progression of dyspnea. CT with diffuse scarring and honeycombing. Has 1.3 cm pretracheal LN as well. Had + PHOENIX at 1:80 in March. Will repeat to see if rising. Secondary pulmonary fibrosis has significantly better prognosis than IPF. Having some rest angina so will ask cardiology to see. His CAD may be a significant impediment for consideration for lung transplantation but it may be worth getting an opinion from a transplant center such as [facility] or [facility]- [address] [place] [address]. Pt has gained some weight on steroids. Will check RAUDEL to see if he desaturates significantly with sleep which could also be c/w WANDA. I have spoken with and examined the patient. I agree with the above assessment and plan as documented.  
 
Romulo Casey MD

## 2019-06-12 NOTE — PROGRESS NOTES
Pt taken off floor to heart cath. Pt in stable condition at this time. Pt transported by 2 heart cath team members. Pt is on 5L nasal cannula.

## 2019-06-12 NOTE — PROGRESS NOTES
Care Management Interventions PCP Verified by CM: Yes Transition of Care Consult (CM Consult): Home Health [address]: Yes Physical Therapy Consult: No 
Occupational Therapy Consult: No 
Current Support Network: Other Confirm Follow Up Transport: Family Plan discussed with Pt/Family/Caregiver: Yes Freedom of Choice Offered: Yes Discharge Location Discharge Placement: Home with home health Patient lives with his common law wife. Independent with ambulation and ADLs. Uses home 02 continuous ([place] provider). Patient is working on getting Medicaid and a CLTC aide. [facility]. CM following.

## 2019-06-12 NOTE — PROGRESS NOTES
TRANSFER - IN REPORT: 
 
Verbal report received from Vanita Sherwood (8th floor) and Arnoldo Liriano on Miguel Vila being received from PACU for routine progression of care. Report consisted of patients Situation, Background, Assessment and Recommendations(SBAR). Information from the following report(s) SBAR, Kardex, Procedure Summary, Intake/Output, MAR and Recent Results was reviewed. Opportunity for questions and clarification was provided. Assessment completed upon patients arrival to unit and care assumed. Patient received to room 311. Patient connected to monitor and assessment completed. Plan of care reviewed. Patient oriented to room and call light. Patient aware to use call light to communicate any chest pain or needs. Admission skin assessment completed with second RN and reveals the following: Snuffbox site on L hand. TR band on with 14ml. Majority of L leg bruised from falling through deck at home 1 month ago. Sacrum and heels are intact and without breakdown.

## 2019-06-12 NOTE — PROGRESS NOTES
SBAR shift report received from [facility]. Pt stable. Assessment complete. Pt is sitting up on side of bed. Resp even, unlabored. Pt is alert, orient X 4. Pt appears in no acute distress at this time. Pt is on 5L nasal cannula 02. Pt voices complaints of dyspnea with exertion and tightness in chest. Pt remains NPO since midnight. Pt encouraged to call for assistance, call light in reach. Safety measures in place. Will continue to monitor. Blood sugar checked @ 0722 and noted to be 160.

## 2019-06-12 NOTE — PROGRESS NOTES
Verbal bedside report given to Akira Connelly, oncoming RN. Patient's situation, background, assessment and recommendations provided. Opportunity for questions provided. Oncoming RN assumed care of patient. L snuffbox site visualized.

## 2019-06-12 NOTE — PROGRESS NOTES
Pt given Nitro tablet for c/o tightness and aching pain in center of his chest.  
 
BP is 147/87 and HR is 86. Skin avulsion

## 2019-06-12 NOTE — PROGRESS NOTES
TRANSFER - IN REPORT: 
 
Verbal report received from [facility]) on Kristie Lund  being received from cath lab(unit) for routine progression of care Report consisted of patients Situation, Background, Assessment and  
Recommendations(SBAR). Information from the following report(s) Procedure Summary was reviewed with the receiving nurse. Opportunity for questions and clarification was provided. Assessment completed upon patients arrival to unit and care assumed.

## 2019-06-12 NOTE — PROCEDURES
Brief Cardiac Procedure Note Patient: Danni Mitchell MRN: [id number]  SSN: [id number] YOB: 1955  Age: 71 y.o. Sex: male Date of Procedure: 6/12/2019 Pre-procedure Diagnosis: Typical Angina Post-procedure Diagnosis: Coronary Artery Disease Reason for Procedure: New Onset Angina < or = 2 Months Procedure: Left Heart Catheterization with Percutaneous Coronary Intervention Brief Description of Procedure: distal rca Performed By: Luke Medley MD  
 
Assistants: lra Anesthesia: Moderate Sedation Estimated Blood Loss: Less than 10 mL Specimens: None Implants: None Findings:  
Lm ok Lad 100 
svg lad ok Distal lad 70% iFR lad 0.75 
lcx stent site recent poba ok OM1 and 2 100%, fill late 
rca 100 
svg rca ok, recent pda stent ok 0% distal lad 2.25x 12 Gretchen 
 
+hepain 
+ivus Complications: None Recommendations: Continue medical therapy. Signed By: Luke Medley MD   
 June 12, 2019

## 2019-06-12 NOTE — PROGRESS NOTES
Pt resting in bed. IV fluids infusing at 50 ml/hr. Respirations even and unlabored. Pt on continuous pulse ox. Pt is stable. Call light within reach, bed low and locked, door open. Will give report to oncoming RN.

## 2019-06-12 NOTE — PROGRESS NOTES
TRANSFER - OUT REPORT: 
 
[facility] Dr Steven Fabian LRA Stent to LAD Versed 8 mg Dilaudid 1 mg Heparin 11,000 units including radial cocktail TR band 14 ml @ 1334 No bleeding/hematoma Verbal report given to Eloy(name) on Khari Frey  being transferred to CPRU(unit) for routine progression of care Report consisted of patients Situation, Background, Assessment and  
Recommendations(SBAR). Information from the following report(s) SBAR and Procedure Summary was reviewed with the receiving nurse. Lines:  
Peripheral IV 06/11/19 Left Antecubital (Active) Site Assessment Clean, dry, & intact 6/12/2019  8:05 AM  
Phlebitis Assessment 0 6/12/2019  8:05 AM  
Infiltration Assessment 0 6/12/2019  8:05 AM  
Dressing Status Clean, dry, & intact 6/12/2019  8:05 AM  
Dressing Type Transparent;Tape 6/12/2019  8:05 AM  
Hub Color/Line Status Patent 6/12/2019  8:05 AM  
Action Taken Blood drawn 6/11/2019 12:09 PM  
Alcohol Cap Used No 6/11/2019  3:39 PM  
  
 
Opportunity for questions and clarification was provided.

## 2019-06-12 NOTE — PROGRESS NOTES
Hospitalist Progress Note 2019 Admit Date: 2019  7:48 AM  
NAME: Shelley Elaine :  1955 MRN:  [id number] Attending: Santo Steiner MD 
PCP:  Lucas Hardy MD 
 
SUBJECTIVE:  
Patient is a 88YQA with hx CAD (s/p CABG, PCI) and pulmonary fibrosis who presented to the ER with shortness of breath and worsening hypoxia. Pulm consulted and restarted the prednisone which was being weaned. Most recent PCI in march, cardiology consulted for CP. 
 
: pt experiencing increased substernal chest pain this morning and taken to cath lab, had PCI to LAD. Pt reports no further episodes of CP. Breathing feeling about like yesterday, on 5L up from home 2. Review of Systems negative with exception of pertinent positives noted above PHYSICAL EXAM  
 
Visit Vitals /59 Pulse 78 Temp 97.5 °F (36.4 °C) Resp 20 Ht 5' 6\" (1.676 m) Wt 84.1 kg (185 lb 6.5 oz) SpO2 92% BMI 29.93 kg/m² Temp (24hrs), Av.8 °F (36.6 °C), Min:97.5 °F (36.4 °C), Max:98.2 °F (36.8 °C) Oxygen Therapy O2 Sat (%): 92 % (19 1459) Pulse via Oximetry: 77 beats per minute (19 1430) O2 Device: Nasal cannula (19 1459) O2 Flow Rate (L/min): 5 l/min (19 1459) Intake/Output Summary (Last 24 hours) at 2019 1515 Last data filed at 2019 1052 Gross per 24 hour Intake 245 ml Output 1675 ml Net -1430 ml General: No acute distress Lungs:  Fine crackles greater in bases Heart:  Regular rate and rhythm,  No murmur, rub, or gallop Abdomen: Soft, Non distended, Non tender, Positive bowel sounds Extremities: No cyanosis, clubbing or edema Neurologic:  No focal deficits CT CHEST W CONT Final Result IMPRESSION:  
  
1. No acute pulmonary embolus. 2. Progression of the patient's severe chronic interstitial lung disease most in  
keeping with UIP or pulmonary fibrosis.    
3. Small area patchy predominant groundglass opacity in the right lower lobe. An  
infectious/inflammatory pneumonitis should be considered. XR CHEST PORT Final Result IMPRESSION: Stable interstitial prominence in both lungs, possibly fibrosis. ASSESSMENT Active Hospital Problems Diagnosis Date Noted  Dyspnea 06/11/2019  Chronic respiratory failure with hypoxia ([facility] 06/11/2019  Steroid-induced diabetes ([facility] 06/11/2019  Acute on chronic respiratory failure with hypoxia ([facility] 06/11/2019  Chest pain 06/11/2019  Current chronic use of systemic steroids 03/01/2019  
  begun with diagnosis of pulmonary fibrosis  Pulmonary fibrosis ([facility] 03/01/2019  CAD (coronary artery disease) 11/01/2010  
 HTN (hypertension), benign 11/01/2010 Plan: 
 
ACS Cardiology following, LHC with stent to LAD today Continue DAPT AoC respiratory failure, pulm fibrosis Pulm following No pna, abx stopped per pulm Steroids per pulmonology (prednisone 30 daily); considering viability for transfer to transplant center. Workup for fibrosis etiology ongoing. HTN Controlled on home meds Steroid-induced hyperglycemia 
ssi DVT Prophylaxis: lovenox Dispo: pending Signed By: Yareli Jerome MD   
 June 12, 2019

## 2019-06-12 NOTE — PROGRESS NOTES
TRANSFER - OUT REPORT: 
 
Verbal report given to Juan Pablo Tanner RN on Reanna Pump  being transferred to [address] [place] [address] for routine progression of care Report consisted of patients Situation, Background, Assessment and  
Recommendations(SBAR). Information from the following report(s) SBAR was reviewed with the receiving nurse. Lines:  
Peripheral IV 06/11/19 Left Antecubital (Active) Site Assessment Clean, dry, & intact 6/12/2019  8:05 AM  
Phlebitis Assessment 0 6/12/2019  8:05 AM  
Infiltration Assessment 0 6/12/2019  8:05 AM  
Dressing Status Clean, dry, & intact 6/12/2019  8:05 AM  
Dressing Type Transparent;Tape 6/12/2019  8:05 AM  
Hub Color/Line Status Patent 6/12/2019  8:05 AM  
Action Taken Blood drawn 6/11/2019 12:09 PM  
Alcohol Cap Used No 6/11/2019  3:39 PM  
  
 
Opportunity for questions and clarification was provided. Patient transported to room post procedure in cath lab.

## 2019-06-12 NOTE — PROGRESS NOTES
Angel Jha Admission Date: 6/11/2019 Daily Progress Note: 6/12/2019 The patient's chart is reviewed and the patient is discussed with the staff. Patient is a 71 y.o.  male seen and evaluated at the request of Dr. Eduardo Wood. He presents with worsening dyspnea. . He states that he was told in 2017 that he had \"ground glass\" on his chest CT that was obtained when he was hospitalized for coronary artery disease and stent placement. He did not follow up for about a year due to insurance reasons but then saw Dr. Christi Rhoades back this year in March for worsening shortness of breath and unproductive cough. He was started on 40 mg Prednisone in March of this year. He has tapered the dose by 10 mg per month as directed and he was taking 20 mg per day for the past 3 weeks. The prednisone has alleviated the cough but has not helped the dyspnea and he can now barely walk across the room. He was started on home oxygen in March as well - wears 2 liters continuously. Dr. Paco Mckeon workup in April included a positive PHOENIX (ratio 1:80) and a ESR of 14. All other studies were negative to include a RF, scleroderma, aspergillus, hypersensitivity profile studies. He smoked for about 30 years - 1 ppd. His chart lists restrictive lung disease but I don't see any PFTs for review. He is now retired but previously worked changing brakes. He has 2 dogs but no other animals. He is unaware of any exposures. With his concern regarding an allergic reaction, he has recently been trying to remove the carpet and backing from his home. Subjective:  
 
Currently on 5L with sat of 97%. Having recurrent SSCP and has taken NTG x 3 over past 12 hrs with relief. Cardiology was considering [facility] but now clearly needed. Dyspnea worse with episodes of CP. Slept poorly overnight but RAUDEL with about 18 minutes with sats < 89%. Current Facility-Administered Medications Medication Dose Route Frequency  sodium chloride (NS) flush 5-40 mL  5-40 mL IntraVENous Q8H  
 sodium chloride (NS) flush 5-40 mL  5-40 mL IntraVENous PRN  
 acetaminophen (TYLENOL) tablet 650 mg  650 mg Oral Q6H PRN  
 diphenhydrAMINE (BENADRYL) capsule 25 mg  25 mg Oral Q6H PRN  
 ondansetron (ZOFRAN) injection 4 mg  4 mg IntraVENous Q6H PRN  
 docusate sodium (COLACE) capsule 100 mg  100 mg Oral DAILY PRN  
 enoxaparin (LOVENOX) injection 40 mg  40 mg SubCUTAneous Q24H  
 aspirin delayed-release tablet 81 mg  81 mg Oral DAILY  atorvastatin (LIPITOR) tablet 80 mg  80 mg Oral DAILY  pantoprazole (PROTONIX) tablet 40 mg  40 mg Oral ACB  isosorbide mononitrate ER (IMDUR) tablet 30 mg  30 mg Oral DAILY  lisinopril (PRINIVIL, ZESTRIL) tablet 2.5 mg  2.5 mg Oral DAILY  metoprolol tartrate (LOPRESSOR) tablet 12.5 mg  12.5 mg Oral Q12H  
 nitroglycerin (NITROSTAT) tablet 0.4 mg  0.4 mg SubLINGual Q5MIN PRN  
 ticagrelor (BRILINTA) tablet 90 mg  90 mg Oral Q12H  predniSONE (DELTASONE) tablet 30 mg  30 mg Oral DAILY WITH BREAKFAST  0.9% sodium chloride infusion  50 mL/hr IntraVENous CONTINUOUS  
 insulin lispro (HUMALOG) injection   SubCUTAneous AC&HS Review of Systems Constitutional: negative for fever, chills, sweats Cardiovascular: negative for palpitations, syncope, edema; + CP Gastrointestinal:  negative for dysphagia, reflux, vomiting, diarrhea, abdominal pain, or melena Neurologic:  negative for focal weakness, numbness, headache Objective:  
 
Vitals:  
 06/11/19 2350 06/12/19 4981 06/12/19 0549 06/12/19 0997 BP: 126/82 134/83  147/73 Pulse:  71  85 Resp:  20  20 Temp:  97.6 °F (36.4 °C)  97.8 °F (36.6 °C) SpO2:  96%  97% Weight:   185 lb 6.5 oz (84.1 kg) Height:      
 
Intake and Output:  
06/10 1901 - 06/12 0700 In: 700 [P.O.:480; I.V.:220] [facility] 1725 [facility] No intake/output data recorded. Physical Exam: Constitution:  the patient is well developed and in no acute distress EENMT:  Sclera clear, pupils equal, oral mucosa moist 
Respiratory: basilar crackles Cardiovascular:  RRR without M,G,R 
Gastrointestinal: soft and non-tender; with positive bowel sounds. Musculoskeletal: warm without cyanosis. There is no lower extremity edema. Skin:  no jaundice or rashes Neurologic: no gross neuro deficits Psychiatric:  alert and oriented x 3 CXR:  
 
 
 
 
LAB Recent Labs  
  06/12/19 
0722 06/12/19 
0518 06/11/19 2014 06/11/19 
1625 06/11/19 
1241 GLUCPOC 160* 1736 [facility] Recent Labs  
  06/11/19 
0756 WBC 12.5* HGB 13.8 HCT 41.7  Recent Labs  
  06/11/19 
1723 06/11/19 
0756 NA  --  140 K  --  3.6 CL  --  104 CO2  --  25  
GLU  --  96 BUN  --  14  
CREA  --  0.94 CA  --  9.4  
TROIQ <0.02*  --   
ALB  --  2.8* TBILI  --  0.5 ALT  --  22 SGOT  --  22 Recent Labs  
  06/11/19 
1105 PHI 7.432 PCO2I 35.2 PO2I 74* HCO3I 23.4 No results for input(s): LCAD, LAC in the last 72 hours. Results for Jani Rangel (MRN [id number]) as of 6/12/2019 09:34 Ref. Range 6/11/2019 13:55 6/11/2019 17:23  
C-Reactive protein Latest Ref Range: 0.0 - 0.9 mg/dL 8.2 (H) Assessment:  (Medical Decision Making) Hospital Problems  Date Reviewed: 5/15/2019 Codes Class Noted POA Dyspnea ICD-10-CM: R06.00 
ICD-9-CM: 786.09  6/11/2019 Yes Multifactorial from ILD and ischemic HD Chronic respiratory failure with hypoxia (HCC) (Chronic) ICD-10-CM: J96.11 
ICD-9-CM: 518.83, 799.02  6/11/2019 Yes Ongoing Steroid-induced diabetes ([facility] ICD-10-CM: E09.9, T38.0X5A 
ICD-9-CM: 249.00, E980.4  6/11/2019 Yes * (Principal) Acute on chronic respiratory failure with hypoxia (HCC) ICD-10-CM: J96.21 
ICD-9-CM: 518.84, 799.02  6/11/2019 Yes Hold on weaning oxygen with evidence of likely recurrent angina Chest pain ICD-10-CM: R07.9 ICD-9-CM: 786.50  6/11/2019 Unknown Pulmonary fibrosis (HCC) (Chronic) ICD-10-CM: J84.10 ICD-9-CM: 248  3/1/2019 Yes Appears to be advamced Current chronic use of systemic steroids (Chronic) ICD-10-CM: Z79.52 
ICD-9-CM: V58.65  3/1/2019 Yes Overview Signed 5/4/2019  1:41 PM by Laura Duncan NP  
  begun with diagnosis of pulmonary fibrosis CAD (coronary artery disease) (Chronic) ICD-10-CM: I25.10 ICD-9-CM: 414.00  11/1/2010 Yes Had recent Stents HTN (hypertension), benign (Chronic) ICD-10-CM: I10 
ICD-9-CM: 401.1  11/1/2010 Yes Plan:  (Medical Decision Making) Add remote telemetry. PHOENIX today to see if rising. Cardiology likely proceeding with [facility]. Assess dyspnea once angina controlled. -- 
 
More than 50% of the time documented was spent in face-to-face contact with the patient and in the care of the patient on the floor/unit where the patient is located.  
 
Dc Linares MD

## 2019-06-12 NOTE — PROGRESS NOTES
L Snuffbox compression band removed at 1710 after slowly reducing air from 14 cc to zero as per hospital protocol. No bleeding or hematoma noted. 2 x 2 gauze with tegaderm placed over puncture site. The affected extremity is warm and dry to the touch. Frequent vital signs documented per flowsheet. Patient instructed to call if any bleeding noted on gauze. Patient verbalized understanding the nursing instructions.

## 2019-06-12 NOTE — PROGRESS NOTES
Bedside and Verbal shift report received from Liz Dumas, [facility]. Included SBAR, MAR, KARDEX, I's and O's, and Recent Results.

## 2019-06-12 NOTE — PROGRESS NOTES
Care Management Interventions PCP Verified by CM: Yes 
Palliative Care Criteria Met (RRAT>21 & CHF Dx)?: No(RRAT 15 Dx resp failure) Transition of Care Consult (CM Consult): Home Health [address] [place] [address] Road: Yes Physical Therapy Consult: No 
Occupational Therapy Consult: No 
Current Support Network: Other Confirm Follow Up Transport: Family Plan discussed with Pt/Family/Caregiver: Yes Freedom of Choice Offered: Yes Discharge Location Discharge Placement: Home with home health Patient transferred from 8th floor to room 311 for continue care S/P cardiac cath with PCI. He lives with his significant other Kanchan [phone] or [phone] and is independent of ADL's. DME includes O2 through [employer] and he has needed transportation. Current d/c is home with S/O Kanchan when medically stable. CM following.

## 2019-06-12 NOTE — PROGRESS NOTES
TRANSFER - OUT REPORT: 
 
Verbal report given to Trilby Cabot, RN(name) on Cathy Celis  being transferred to telemetry(unit) for routine progression of care Report consisted of patients Situation, Background, Assessment and  
Recommendations(SBAR). Information from the following report(s) Kardex, Procedure Summary and Cardiac Rhythm sinus was reviewed with the receiving nurse. Lines:  
Peripheral IV 06/11/19 Left Antecubital (Active) Site Assessment Clean, dry, & intact 6/12/2019  8:05 AM  
Phlebitis Assessment 0 6/12/2019  8:05 AM  
Infiltration Assessment 0 6/12/2019  8:05 AM  
Dressing Status Clean, dry, & intact 6/12/2019  8:05 AM  
Dressing Type Transparent;Tape 6/12/2019  8:05 AM  
Hub Color/Line Status Patent 6/12/2019  8:05 AM  
Action Taken Blood drawn 6/11/2019 12:09 PM  
Alcohol Cap Used No 6/11/2019  3:39 PM  
  
 
Opportunity for questions and clarification was provided. Patient transported with: 
 Monitor O2 @ 5 liters Registered nurse

## 2019-06-12 NOTE — PROGRESS NOTES
Spiritual Care Visit, initial visit. Attempted to visit with patient at bedside. Patient was off the floor. Visit by Jim Longoria, Staff .  Van., Carissa.MILVIA., B.A.

## 2019-06-13 NOTE — PROCEDURES
[facility] 
CARDIAC CATH Name:  Cristian Gross 
MR#:  [id number] :  1955 ACCOUNT #:  [de-identified] DATE OF SERVICE:  2019 PROCEDURES PERFORMED: 
1. Coronary and coronary graft angiography. 2.  PCI, LAD. PREOPERATIVE DIAGNOSIS:  Coronary artery disease with unstable angina pectoris. POSTOPERATIVE DIAGNOSIS:  Coronary artery disease with unstable angina pectoris. SURGEON:  Heather Ayala MD 
 
ASSISTANT:  None ESTIMATED BLOOD LOSS:  3 mL. SPECIMENS REMOVED:  None. COMPLICATIONS:  None. IMPLANTS:  One 2.25 x 12 Gretchen drug-eluting stent. ANESTHESIA:  Conscious sedation. HISTORY:  This is a 70-year-old gentleman with severe coronary artery disease and severe lung disease. He has had prior bypass and subsequent PCI. He has had recent PCI. He has had early re-admission for recurrent chest pain. Coronary angiography and possible intervention is recommended. PROCEDURE:  Using ultrasound guidance, the distal left radial artery is needled via the anatomic snuffbox and a 6-Comoran sheath is inserted. A 6-Comoran multipurpose catheter was advanced to the ascending aorta. The vein graft to the LAD was engaged. This shows a patent vein graft with good distal flow. The distal portion of the vein graft into the native vessel has been stented. The stent extends into an LAD diagonal.  The stents look good and the diagonal looks good. The LAD just after the takeoff of the LAD diagonal shows a stenosis. It appears beyond the order of 70%. This LAD fills a distal right coronary artery RV branch and supplies from filling to the occluded left circumflex circulation. The vein graft to the right coronary artery was then injected. It is patent and normal with good flow. It inserts into the distal right coronary at the crux. This has been recently stented into the pda over old stent.   This recent stent is widely patent with no residual narrowing and good distal flow into posterior descending branch. This posterior descending branch is relatively large compared to his other vessels. It supplies collateral filling to septal perforators and to what looks like the apical LAD. The native left coronary was then injected with a 5-Tajik 3.5 EBU. This shows that this vessel has been stented from its left main and through its middle segment. The proximal area had been recently ballooned and this is widely patent with no residual narrowing. There is good distal filling into the relatively large distal marginal.  There is collateral filling of a first marginal branch that is occluded. Looking at this first marginal branch, it is perhaps a  target, but its ostium is jailed by the prior stented segment. At this point, evaluation of the LAD was performed. The guide catheter was a 5-Tajik AL-1. A careful pressure wire evaluation of the stenotic LAD distal to its stent insertion in the diagonal was performed. This number was clearly abnormal at 0.76. Heparin was the anticoagulant. The IVUS was then performed. The IVUS would not cross into the distal LAD segment. This was then ballooned with a 2.25-mm balloon and then IVUS was able to be performed. The vessel was then carefully stented utilizing the CLEARstent technology to carefully place the distal edge of the stent without jailing the stented diagonal.  This was deployed without difficulty. It received 2.5 high-pressure balloon inflation down both limbs and high-pressure 3.0 inflation down both limbs. Final angiography showed a good-looking result with JENIFFER III flow. Final IVUS down the LAD showed good stent expansion. The case was stopped at this point. He tolerated well. IMPRESSION: 
1. Severe coronary artery disease as described. The left main has been stented into the left circumflex. This left circumflex has been stented from the left main into through its mid segment.   The stents are widely patent. The point of recent balloon inflation for restenosis is widely patent with good flow. There is a marginal branch that is occluded as are distal left circumflex branches. These filled by collaterals from the left and the right. The left anterior descending is chronically occluded. The vein graft to the left anterior descending is patent. It has been stented in its distal segment into the left anterior descending and then extended into a left anterior descending diagonal.  At this bifurcation, there is a severe stenosis of the left anterior descending both angiographically and as measured by its pressure wire. The right coronary artery is chronically occluded. The vein graft to right coronary artery remains patent. The recently stented posterior descending branch is widely patent with a good angiographic result. 2.  Successful percutaneous coronary intervention of the distal left anterior descending segment is performed as described above utilizing a modified bifurcation technique. A good result was obtained with deployment of a 2.25 x 12 Gretchen stent postdilated to high pressure from 2.5 to 3.0. Amy Prado MD 
 
 
[id number] 
D:  06/12/2019 13:48 
T:  06/12/2019 13:56 JOB #:  [id number]

## 2019-06-13 NOTE — PROGRESS NOTES
Entering room patient hyperventilating saying \"I cant do this I cant do this\". O2 sat is 92 an patient is on heated high flow O2 at 10 L. Dr. Ole Klein made aware. New orders received to send patient to ICU to be put on BIPAP.

## 2019-06-13 NOTE — PROGRESS NOTES
Chart reviewed after transfer to ICU for BIPAP. Currently back on NC per MD notes. RNCM screened pt prior to tx D/C POC for home with SO, Kanchan and HH. CM following for d/c needs.

## 2019-06-13 NOTE — PROGRESS NOTES
Head to toe assessment completed on pt this PM. Neuro: Pt is alert and oriented x 4. PERRLA - 3 mm and brisk to react. TEMPLE purposefully. Cardiac: ST to NSR. Adequate BP. S1 and S2 auscultated - no CP. Resp: 10 L HFNC. Diminished lung sounds throughout. No SOB or dyspnea at the moment. GI: Cardiac regular diet. Active bowel sounds. : Voids in urinal.  
Skin: Extensive bruising to LLE from previous fall. Sacrum with blanchable redness. Allevyn applied.

## 2019-06-13 NOTE — PROGRESS NOTES
Bedside and Verbal shift change report given to [facility] (oncoming nurse) by self (offgoing nurse). Report included the following information SBAR, Kardex, Intake/Output, MAR and Recent Results.

## 2019-06-13 NOTE — PROGRESS NOTES
Followed up with patient. Respiratory status has improved and he appears to be breathing comfortably now on HFNC. He was given morphine, ativan and IV steroids. Not sure what changed. His CXR appears to suggest mild increase in interstitial markings vs lower lung volumes. CRP remains elevated. BNP 2 days ago was 67 and hasn't gotten IVF. Agree with IV steroids. Will avoid additional IVF, but no lasix now. PCT again is 0.1 and doubt infection. Will monitor respiratory status in ICU for now. Have rediscussed code status with patient and his sister and the confirm DNR status unless there were some clearly reversible episode. I discussed with them the typical contraindication for intubation in IPF given rapid worsening with positive pressure. Additional 10 minutes of critical care time spent with patient by our service in follow up.   
 
Palak Garcia MD

## 2019-06-13 NOTE — INTERVAL H&P NOTE
H&P Update: 
Kristie Lund was seen and examined. History and physical has been reviewed. The patient has been examined.  There have been no significant clinical changes since the completion of the originally dated History and Physical.

## 2019-06-13 NOTE — PROCEDURES
Procedure: 
[employer] Insertion CPT - 09541 Indication: 
shock Chlorohexidine Skin Antiseptic: Yes Hand Hygiene: Yes 
Maximal Barrier Precautions: Yes Optimal Catheter Site Selection: Yes 
Sterile Ultrasound Technique: Yes Location: 
left 
internal jugular Time out done and correct patient/location for procedure. Area prepped and sterilized with chlorhexedine. Sterile drapes applied. Patient given local lidocane for anesthesia. Using Seldinger technique triple lumen lumen catheter inserted. Guidewire removed. All ports with blood return and lines flushed. Line sutured in place. Patient tolerated procedure well, no complications. Estimated Blood loss: 2cc Praneeth Newby MD

## 2019-06-13 NOTE — PROGRESS NOTES
Care Management Interventions PCP Verified by CM: Yes 
Palliative Care Criteria Met (RRAT>21 & CHF Dx)?: No(RRAT 15 Dx resp failure) Transition of Care Consult (CM Consult): Home Health [address]: Yes Physical Therapy Consult: No 
Occupational Therapy Consult: No 
Current Support Network: Other Confirm Follow Up Transport: Family Plan discussed with Pt/Family/Caregiver: Yes Freedom of Choice Offered: Yes Discharge Location Discharge Placement: Home with home health Follow up with patient and he states he is having shortness of breath today and is on hi flow O2. He lives with his significant other Kanchan and was independent of ADL's. Has O2 and glucometer at home and obtains medications without difficulty at [facility] in [address] [place] [address]. Patient plans to d/c home with home health but was to transfer to ICU. D/C plan pending patient clinical progress. CM following.

## 2019-06-13 NOTE — PROGRESS NOTES
TRANSFER - OUT REPORT: 
 
Verbal report given to Luciana Phillips RN on Darlene Roman  being transferred to ICU for routine progression of care Report consisted of patients Situation, Background, Assessment and Recommendations(SBAR). Information from the following report(s) SBAR, Kardex, [facility], Recent Results and Cardiac Rhythm SR was reviewed with the receiving nurse. Opportunity for questions and clarification was provided.

## 2019-06-13 NOTE — PROGRESS NOTES
06/13/19 1017 Oxygen Therapy O2 Sat (%) 95 % Pulse via Oximetry 90 beats per minute O2 Device Heated; Hi flow nasal cannula O2 Flow Rate (L/min) 40 l/min O2 Temperature 87.8 °F (31 °C) FIO2 (%) 40 %

## 2019-06-13 NOTE — PROGRESS NOTES
Hospitalist Progress Note 2019 Admit Date: 2019  7:48 AM  
NAME: Sharmila Mclaughlin :  1955 MRN:  [id number] Attending: Darcie Bah MD 
PCP:  Sammie Elkins MD 
 
SUBJECTIVE:  
Patient is a 20JQY with hx CAD (s/p CABG, PCI) and pulmonary fibrosis who presented to the ER with shortness of breath and worsening hypoxia. Pulm consulted and restarted the prednisone which was being weaned. Most recent PCI in march, cardiology consulted for CP and underwent LHC  with stent placement. Pt with worsening SOB, hypoxia earlier today. Unable to tolerate Airvo. Given morphine, Solumedrol and transferred to the ICU for BIPAP. Briefly on BIPAP, now back on NC, speaking in complete sentences and breathing comfortably. Review of Systems negative with exception of pertinent positives noted above PHYSICAL EXAM  
 
Visit Vitals /63 Pulse 98 Temp 98.2 °F (36.8 °C) Resp (!) 47 Ht 5' 6\" (1.676 m) Wt 84.3 kg (185 lb 14.4 oz) SpO2 93% BMI 30.01 kg/m² Temp (24hrs), Av.9 °F (36.6 °C), Min:97.4 °F (36.3 °C), Max:98.6 °F (37 °C) Oxygen Therapy O2 Sat (%): 93 % (19 1208) Pulse via Oximetry: 98 beats per minute (19 1208) O2 Device: Heated; Hi flow nasal cannula (19 1101) O2 Flow Rate (L/min): 10 l/min (19 1101) O2 Temperature: 87.8 °F (31 °C) (19 1027) FIO2 (%): 50 % (19 1027) Intake/Output Summary (Last 24 hours) at 2019 1258 Last data filed at 2019 8079 Gross per 24 hour Intake 540 ml Output 750 ml Net -210 ml General: No acute distress Lungs:  Fine crackles BL bases Heart:  Regular rate and rhythm,  No murmur, rub, or gallop Abdomen: Soft, Non distended, Non tender, Positive bowel sounds Extremities: No cyanosis, clubbing or edema Neurologic:  No focal deficits CT CHEST W CONT Final Result IMPRESSION:  
  
1. No acute pulmonary embolus. 2. Progression of the patient's severe chronic interstitial lung disease most in  
keeping with UIP or pulmonary fibrosis. 3. Small area patchy predominant groundglass opacity in the right lower lobe. An  
infectious/inflammatory pneumonitis should be considered. XR CHEST PORT Final Result IMPRESSION: Stable interstitial prominence in both lungs, possibly fibrosis. XR CHEST SNGL V    (Results Pending) ASSESSMENT Active Hospital Problems Diagnosis Date Noted  Dyspnea 06/11/2019  Chronic respiratory failure with hypoxia ([address] 06/11/2019  Steroid-induced diabetes ([address] 06/11/2019  Acute on chronic respiratory failure with hypoxia ([address] 06/11/2019  Chest pain 06/11/2019  Current chronic use of systemic steroids 03/01/2019  
  begun with diagnosis of pulmonary fibrosis  Pulmonary fibrosis ([address] 03/01/2019  CAD (coronary artery disease) 11/01/2010  
 HTN (hypertension), benign 11/01/2010 Plan: 
 
CAD Chest pain S/p C with stenting 6/12/2019. 
- cont ASA, statin, brilinta 
- BB, Imdur 
- cards following Acute on chronic respiratory failure Pulm fibrosis Pulm following. No indication for abx.  
- steroids increased to Solumedrol 60mg q12 
- wean O2 as tolerated 
- considering candidacy for transplant HTN Controlled on home meds Steroid-induced hyperglycemia 
- SSI 
 
DVT Prophylaxis: lovenox Dispo: pending Signed By: Melissa Nelson MD   
 June 13, 2019

## 2019-06-13 NOTE — PROGRESS NOTES
TRANSFER - IN REPORT: 
 
Verbal report received from [facility], [facility] (name) on Reanna Pump  being received from [phone] for change in patient condition((requiring BiPAP) ) Report consisted of patients Situation, Background, Assessment and  
Recommendations(SBAR). Information from the following report(s) SBAR, Kardex, ED Summary, Procedure Summary, Intake/Output, MAR, Recent Results, Alarm Parameters  and Quality Measures was reviewed with the receiving nurse. Opportunity for questions and clarification was provided. Assessment completed upon patients arrival to unit and care assumed.

## 2019-06-13 NOTE — PROCEDURES
Indication: 
 
Time out done and correct patient identified and correct incision/insertion site identified. Everyone Agrees For procedure sterile techniques used including: Sterile gown, gloves, cap, mask, drapes and chlorhexidine to the insertions site/location. Wrist Region Sterilized with Chlorhexedine. Patient given lidocaine 1% for local anesthesia. Using sterile Technique right arterial radial line placed without complication. Note resultant arterial waveform. Patient tolerated procedure well. Line sutured in place. Medhat's Test done prior and noted normal. Estimated Blood loss: 1cc Joyce Quarles MD 
 
Electronically signed.

## 2019-06-13 NOTE — PROGRESS NOTES
Lisha Deyanira Admission Date: 6/11/2019 Daily Progress Note: 6/13/2019 The patient's chart is reviewed and the patient is discussed with the staff. Patient is a 71 y.o.  male seen and evaluated at the request of Dr. Jarod Galindo. He presents with worsening dyspnea. . He states that he was told in 2017 that he had \"ground glass\" on his chest CT that was obtained when he was hospitalized for coronary artery disease and stent placement. He did not follow up for about a year due to insurance reasons but then saw Dr. Lloyd Fermin back this year in March for worsening shortness of breath and unproductive cough. He was started on 40 mg Prednisone in March of this year. He has tapered the dose by 10 mg per month as directed and he was taking 20 mg per day for the past 3 weeks. The prednisone has alleviated the cough but has not helped the dyspnea and he can now barely walk across the room. He was started on home oxygen in March as well - wears 2 liters continuously. Dr. Cancino Devoid workup in April included a positive PHOENIX (ratio 1:80) and a ESR of 14. All other studies were negative to include a RF, scleroderma, aspergillus, hypersensitivity profile studies. He smoked for about 30 years - 1 ppd. His chart lists restrictive lung disease but I don't see any PFTs for review. He is now retired but previously worked changing brakes. He has 2 dogs but no other animals. He is unaware of any exposures. With his concern regarding an allergic reaction, he has recently been trying to remove the carpet and backing from his home. Subjective: S/p Stent of LAD due to SSCP. No CP now Still on oxygen, but had to go up from 6 to 8 LPM due to dyspnea. Overall feels worse when standing. Wants to know what we can do. Has used nebs in the past and felt did not help. He has smoked > 30 pack years. Current Facility-Administered Medications Medication Dose Route Frequency  metoprolol tartrate (LOPRESSOR) tablet 25 mg  25 mg Oral Q12H  
 isosorbide mononitrate ER (IMDUR) tablet 30 mg  30 mg Oral DAILY  sodium chloride (NS) flush 5-40 mL  5-40 mL IntraVENous Q8H  
 sodium chloride (NS) flush 5-40 mL  5-40 mL IntraVENous PRN  
 acetaminophen (TYLENOL) tablet 650 mg  650 mg Oral Q6H PRN  
 diphenhydrAMINE (BENADRYL) capsule 25 mg  25 mg Oral Q6H PRN  
 ondansetron (ZOFRAN) injection 4 mg  4 mg IntraVENous Q6H PRN  
 docusate sodium (COLACE) capsule 100 mg  100 mg Oral DAILY PRN  
 enoxaparin (LOVENOX) injection 40 mg  40 mg SubCUTAneous Q24H  
 aspirin delayed-release tablet 81 mg  81 mg Oral DAILY  atorvastatin (LIPITOR) tablet 80 mg  80 mg Oral DAILY  pantoprazole (PROTONIX) tablet 40 mg  40 mg Oral ACB  nitroglycerin (NITROSTAT) tablet 0.4 mg  0.4 mg SubLINGual Q5MIN PRN  
 ticagrelor (BRILINTA) tablet 90 mg  90 mg Oral Q12H  predniSONE (DELTASONE) tablet 30 mg  30 mg Oral DAILY WITH BREAKFAST  insulin lispro (HUMALOG) injection   SubCUTAneous AC&HS Review of Systems Constitutional: negative for fever, chills, sweats Cardiovascular: negative for palpitations, syncope, edema;   
Gastrointestinal:  negative for dysphagia, reflux, vomiting, diarrhea, abdominal pain, or melena Neurologic:  negative for focal weakness, numbness, headache Objective:  
 
Vitals:  
 06/13/19 0022 06/13/19 5286 06/13/19 3360 06/13/19 0070 BP: 95/63 115/72 114/70 Pulse: 67 82 100 Resp: 20 20 19 Temp: 97.7 °F (36.5 °C) 98.6 °F (37 °C) 98 °F (36.7 °C) SpO2: 96% 92% 90% 90% Weight:  185 lb 14.4 oz (84.3 kg) Height:      
 
Intake and Output:  
06/11 1901 - 06/13 0700 In: 480 [P.O.:480] Out: 2125 [Urine:2125] 06/13 0701 - 06/13 1900 In: 61 [P.O.:60] Out: - Physical Exam:  
Constitution:  the patient is well developed and in no acute distress EENMT:  Sclera clear, pupils equal, oral mucosa moist 
 Respiratory: fine crackles more in Right base than left. On 8 LPM 
Cardiovascular:  RRR without M,G,R 
Gastrointestinal: soft and non-tender; with positive bowel sounds. Musculoskeletal: warm without cyanosis. There is no lower extremity edema. Skin:  no jaundice or rashes Neurologic: no gross neuro deficits Psychiatric:  alert and oriented x 3, looks anxious CXR:  
 
 
 
CT : 6/11/19 noted progression of pulmonary fibrosis/UIP from 2017. Does have some ground glass changes in RLL and there is one large mediastinal LN 
 
 
 
 
 
LAB Recent Labs  
  06/13/19 
2739 06/12/19 
2112 06/12/19 
1555 06/12/19 
0272 06/12/19 
0518 GLUCPOC 117* 228* 242* 160* 156* Recent Labs  
  06/11/19 
0756 WBC 12.5* HGB 13.8 HCT 41.7  Recent Labs  
  06/13/19 
0410 06/11/19 
1723 06/11/19 
6548   --  140  
K 3.8  --  3.6   --  104 CO2 30  --  25 *  --  96 BUN 21  --  14  
CREA 0.84  --  0.94  
CA 8.8  --  9.4  
TROIQ  --  <0.02*  --   
ALB  --   --  2.8* TBILI  --   --  0.5 ALT  --   --  22 SGOT  --   --  22 Recent Labs  
  06/11/19 
1105 PHI 7.432 PCO2I 35.2 PO2I 74* HCO3I 23.4 No results for input(s): LCAD, LAC in the last 72 hours. Results for Marisela Torrez (MRN [id number]) as of 6/12/2019 09:34 Ref. Range 6/11/2019 13:55 6/11/2019 17:23  
C-Reactive protein Latest Ref Range: 0.0 - 0.9 mg/dL 8.2 (H) Assessment:  (Medical Decision Making) Hospital Problems  Date Reviewed: 5/15/2019 Codes Class Noted POA Dyspnea ICD-10-CM: R06.00 
ICD-9-CM: 786.09  6/11/2019 Yes Multifactorial from ILD and ischemic HD -- now more from ILD Chronic respiratory failure with hypoxia (HCC) (Chronic) ICD-10-CM: J96.11 
ICD-9-CM: 518.83, 799.02  6/11/2019 Yes Ongoing Steroid-induced diabetes ([facility] ICD-10-CM: E09.9, T38.0X5A 
ICD-9-CM: 249.00, E980.4  6/11/2019 Yes * (Principal) Acute on chronic respiratory failure with hypoxia (HCC) ICD-10-CM: J96.21 
ICD-9-CM: 518.84, 799.02  6/11/2019 Yes Oxygenation worse, see below Chest pain ICD-10-CM: R07.9 ICD-9-CM: 786.50  6/11/2019 Unknown Pulmonary fibrosis (HCC) (Chronic) ICD-10-CM: J84.10 ICD-9-CM: 021  3/1/2019 Yes Appears to be advamced Current chronic use of systemic steroids (Chronic) ICD-10-CM: Z79.52 
ICD-9-CM: V58.65  3/1/2019 Yes Overview Signed 5/4/2019  1:41 PM by Kristina Washington NP  
  begun with diagnosis of pulmonary fibrosis CAD (coronary artery disease) (Chronic) ICD-10-CM: I25.10 ICD-9-CM: 414.00  11/1/2010 Yes Had recent Stents and now s/p stent to LAD on 6/12 HTN (hypertension), benign (Chronic) ICD-10-CM: I10 
ICD-9-CM: 401.1  11/1/2010 Yes Plan:  (Medical Decision Making) --try optiflow to see if helps work of breathing 
--add nebs  
--try morphine for dyspnea prn 
--change from po steroids ti IV steroids --f/u PHOENIX - still pending 
--f/u CAD s/p stent per cardiology --continue remaining treatment. More than 50% of the time documented was spent in face-to-face contact with the patient and in the care of the patient on the floor/unit where the patient is located.  
 
Samaria Pritchard MD

## 2019-06-13 NOTE — PROGRESS NOTES
Called back to see patient since cannot tolerate Airvo. States cannot breath. Saturation noted as low as 90% and noted almost panicky. Changed to NC and up to 10 LPM. Tried for a while and given morphine 2 mg IV. Still reports cannot breath well after 5 minutes. Saturation again dropping to 92% on 10L. Feels worse and Will send to ICU for BIPAP. Spoke with Dr. Coco lCayton and aware he is coming. Will f/u ABG. Also get new x-ray today. Is not congested just mild crackles more in Right base. Condition is critical 
Time spent is 45 minutes today.   
 
Samaria Pritchard MD

## 2019-06-13 NOTE — PROGRESS NOTES
6/13/2019 5:51 PM 
 
Admit Date: 6/11/2019 Admit Diagnosis: Acute on chronic respiratory failure ([facility] [J96.20] Subjective: No cp- sob worse Objective:  
  
Visit Vitals /60 Pulse 98 Temp 97.7 °F (36.5 °C) Resp (!) 39 Ht 5' 6\" (1.676 m) Wt 84.3 kg (185 lb 14.4 oz) SpO2 90% BMI 30.01 kg/m² Physical Exam: 
Padmini Show, Well Nourished, No Acute Distress, Alert & Oriented x 3, appropriate mood. Neck- supple, no JVD 
CV- regular rate and rhythm no MRG Lung- rales bilaterally Abd- soft, nontender, nondistended Ext- no edema bilaterally. Skin- warm and dry Data Review:  
Recent Labs  
  06/13/19 
1206 06/13/19 
0410 NA  --  142 K  --  3.8 BUN  --  21  
CREA  --  0.84 WBC 12.9*  --   
HGB 13.0*  --   
HCT 40.7*  --   
  -- Assessment/Plan:  
 
Principal Problem: 
  Acute on chronic respiratory failure with hypoxia (HCC) (6/11/2019)- worse- per pulm WIth ivf yesterday will give single dose iv lasix Active Problems: 
  CAD (coronary artery disease) (11/1/2010)Stable. Continue current medical therapy. HTN (hypertension), benign (11/1/2010) Pulmonary fibrosis ([facility] (3/1/2019) per pulm Current chronic use of systemic steroids (3/1/2019) Overview: begun with diagnosis of pulmonary fibrosis Dyspnea (6/11/2019) Chronic respiratory failure with hypoxia ([address] (6/11/2019) Steroid-induced diabetes ([address] (6/11/2019) Chest pain (6/11/2019)

## 2019-06-13 NOTE — INTERVAL H&P NOTE
H&P Update: 
Luis Armando Roy was seen and examined. History and physical has been reviewed. The patient has been examined.  There have been no significant clinical changes since the completion of the originally dated History and Physical.

## 2019-06-13 NOTE — PROGRESS NOTES
A follow up visit was made to the patient. Emotional support, spiritual presence and  
prayer were provided for the patient, his wife, and their son. Luis Mckeon

## 2019-06-13 NOTE — PROGRESS NOTES
Problem: Gas Exchange - Impaired Goal: *Absence of hypoxia Outcome: Progressing Towards Goal 
  
Problem: Patient Education: Go to Patient Education Activity Goal: Patient/Family Education Outcome: Progressing Towards Goal 
  
Problem: Patient Education: Go to Patient Education Activity Goal: Patient/Family Education Outcome: Progressing Towards Goal 
  
Problem: Falls - Risk of 
Goal: *Absence of Falls Description Document Raul Vargas Fall Risk and appropriate interventions in the flowsheet.  
Outcome: Progressing Towards Goal

## 2019-06-14 PROBLEM — E11.9 DM (DIABETES MELLITUS) (HCC): Chronic | Status: ACTIVE | Noted: 2019-01-01

## 2019-06-14 NOTE — CONSULTS
Palliative Care Patient: Angel Jha MRN: [id number]  SSN: [id number] YOB: 1955  Age: 71 y.o. Sex: male Date of Request: 6/14/19 Date of Consult:  6/14/2019 Reason for Consult:  goals of care Requesting Physician: THOR Estrada Assessment/Plan:  
 
Principal Diagnosis:   
Respiratory Failure, Chronic  J96.10 Additional Diagnoses: · Advance Care Planning Counseling Z71.89 
· Constipation, Unspecified  K59.00 · Cough  R05 · Dyspnea  R06.00 
· Pain, chest  R07.9 · Counseling, Encounter for Medical Advice  Z71.9 
· Encounter for Palliative Care  Z51.5 Palliative Performance Scale (PPS): PPS: 40 Medical Decision Making:  
Reviewed and summarized notes from admission to present. Discussed case with appropriate providers: ICU IDT rounds, Dr. Manjit Poe Reviewed laboratory and x-ray data from admission to present. The pt is resting in bed; his sister, granddtr, and dtr-in-law are at bedside. Mr. Carmen Rosenberg is very good-natured. He endorses pain in his chest when his O2 sats drop; his says that this occurs whenever he moves around or tries to get out of bed. Mr. Carmen Rosenberg is agreeable to the addition of a small dose of morphine to reduce the SOB and associated pain. Dr. Hugo Alba placed an order for morphine PO 30 mg BID. On Monday we will check with the pt to see if the low frequency of the dosing is adequate. If not, we will decrease the dose and increase the frequency to every 4 hours. Mr. Carmen Rosenberg says that the Ativan is also effective in alleviating the SOB. I have changed the frequency to every 3 hours PRN. The pt endorses constipation; scheduled PeriColace has been added. The pt is already DNR, and has completed an HCPOA with the assistance of [address] [place] [address]. When he returns home, the expectation is that the pt will have home health services.   Mr. Carmen Rosenberg is agreeable to the addition of a home visit palliative care service to assist with symptom management and ongoing management of his chronic conditions. I have placed an order to case management for home palliative care. We also briefly discussed hospice, focusing on the opportunity for more frequent visits. Mr. Stoen Grider responded that he hoped \"to avoid hospice altogether\". Will continue to follow. Will discuss findings with members of the interdisciplinary team.   
 
Thank you for this referral.    
 
  
. 
 
Subjective:  
 
History obtained from: Chief Complaint: dyspnea, chest pain on exertion History of Present Illness:  Mr. Stone Grider is a 70 yo M with a PMH of CAD s/p CABG IN 1999, NSTEMI with 2V intervention in May 2019, COPD on home O2, pulmonary fibrosis, EF of 45-50%, and other conditions listed below who presented to the ER on 6/11/19 with c/o SOB, chest pain. Coronary angiography, performed on 6/12, confirmed severed coronary artery disease; the distal left anterior descending segment was stented. On 6/13 the pt was unable to tolerate Airvo and was moved to the ICU for use of BiPAP. BiPAP wasn't needed after all. Advance Directive: Yes Code Status:  DNR Health Care Power of : Yes - Copy of [address] on file. Past Medical History:  
Diagnosis Date  Acute coronary syndrome ([address] 8/4/2018  CAD (coronary artery disease) CABG 1999, stents after (last placed 2/17)  Current chronic use of systemic steroids 03/2019  
 begun with diagnosis of pulmonary fibrosis  GERD (gastroesophageal reflux disease) PRN meds  Hypertension  Interstitial pneumonitis (HCC)   
 per CT chest 3/10/17 (daily inhalers, no home 02)  NSTEMI (non-ST elevated myocardial infarction) ([address] 1/28/2017  Obesity  Osteoarthritis   
 hands, knees  Pulmonary fibrosis ([address] 03/2019  Pulmonary nodule 8mm per CT chest 3/10/17 (biopsy WNL) Past Surgical History:  
Procedure Laterality Date  HX HEART CATHETERIZATION    
 multiple  HX TONSILLECTOMY  VASCULAR SURGERY PROCEDURE UNLIST CABG Family History Problem Relation Age of Onset  Heart Disease Mother  Cancer Mother UNKNOWN TYPE  
 Heart Disease Father Social History Tobacco Use  Smoking status: Former Smoker Packs/day: 0.25 Years: 30.00 Pack years: 7.50 Last attempt to quit:  Years since quittin.4  Smokeless tobacco: Never Used Substance Use Topics  Alcohol use: No  
 
Prior to Admission medications Medication Sig Start Date End Date Taking? Authorizing Provider  
insulin glargine (LANTUS) 100 unit/mL injection 30 Units by SubCUTAneous route daily. 5/15/19   Rebekah Bishop MD  
Oxygen Indications: 2L Contin. Provider, Historical  
atorvastatin (LIPITOR) 80 mg tablet Take 1 Tab by mouth daily. 19   Tata Howard MD  
metoprolol tartrate (LOPRESSOR) 25 mg tablet Take 0.5 Tabs by mouth every twelve (12) hours. 19   Alla Santacruz NP  
ticagrelor (BRILINTA) 90 mg tablet Take 1 Tab by mouth every twelve (12) hours every twelve (12) hours. 19   Alla Santacruz NP  
lancets misc 2 Boxes 19   Alla Santacruz NP  
glucose blood VI test strips (ASCENSIA AUTODISC VI, ONE TOUCH ULTRA TEST VI) strip 1 Box (10 bottles) 19   Alla Santacruz NP Blood-Glucose Meter monitoring kit 1 Meter Kit 19   Alla Santacruz NP  
insulin lispro (HUMALOG) 100 unit/mL injection Give 5 Units with every meal while on Prednizone.  Please stop when  Prednisone doses are finished and reassess insulin regiment with your PCP 19   Alla Santacruz NP  
insulin lispro (HUMALOG) 100 unit/mL injection For BGL of less than 150 = 0 units, 150 -199 = 2 units, 200 -249 = 4 unit, 250 -299 = 6 units, 300 -349 = 8 units, 350 and above =   10 units and Call MD, Start Hypoglycemic protocol if blood glucose is <70 mg/dL. Fast Acting - Administer Immediately - or within 15 minutes of start of meal. 5/7/19   Maye Valadez NP Insulin Syringe-Needle U-100 0.3 mL 29 gauge x 1/2\" syrg 2 Box off 100 or more Syringes with 5 refills 5/7/19   Maye Valadez NP  
nitroglycerin (NITROSTAT) 0.4 mg SL tablet 1 Tab by SubLINGual route every five (5) minutes as needed for Chest Pain (after 3 doses / 15 mins, if chest pain persists, contact EMS/911). 5/6/19   Rosevelt Goodell, MD  
predniSONE (DELTASONE) 20 mg tablet Take 20 mg by mouth every morning. Takes 20 mg q am and 10 mg q pm since April 28. To take this dose 30 days. Provider, Historical  
isosorbide mononitrate ER (IMDUR) 30 mg tablet Take 30 mg by mouth daily. Provider, Historical  
esomeprazole (NEXIUM) 40 mg capsule Take  by mouth daily. Provider, Historical  
lisinopril (PRINIVIL, ZESTRIL) 2.5 mg tablet Take 1 Tab by mouth daily. 8/5/18   EVERARDO Garcia  
acetaminophen (TYLENOL) 325 mg tablet Take 325 mg by mouth as needed for Pain. Provider, Historical  
aspirin 81 mg tablet Take 81 mg by mouth every morning. Provider, Historical  
 
 
Allergies Allergen Reactions  Aspirin Swelling Takes 81 mg at home. Patient can tolerate Aspirin in low doses, but in larger doses causes swelling. Review of Systems: A comprehensive review of systems was negative except for: Respiratory: Positive for dyspnea. Cardiovascular: Positive for angina. Objective:  
 
Visit Vitals /76 Pulse 89 Temp 97.8 °F (36.6 °C) Resp 21 Ht 5' 6\" (1.676 m) Wt 185 lb 14.4 oz (84.3 kg) SpO2 92% BMI 30.01 kg/m² Physical Exam: 
 
General:  Cooperative. No acute distress. Eyes:  Conjunctivae/corneas clear Nose: Nares normal. Septum midline. Neck: Supple, symmetrical, trachea midline, no JVD Lungs:   Clear to auscultation bilaterally, unlabored Heart:  Regular rate and rhythm, no murmur Abdomen:   Soft, non-tender, non-distended Extremities: Normal, atraumatic, no cyanosis or edema Skin: Skin color, texture, turgor normal. No rash or lesions. Neurologic: Nonfocal  
Psych: Alert and oriented Assessment:  
 
Hospital Problems  Date Reviewed: 6/14/2019 Codes Class Noted POA  
 DM (diabetes mellitus) ([facility] (Chronic) ICD-10-CM: E11.9 ICD-9-CM: 250.00  6/14/2019 Yes Dyspnea ICD-10-CM: R06.00 
ICD-9-CM: 786.09  6/11/2019 Yes Chronic respiratory failure with hypoxia (HCC) (Chronic) ICD-10-CM: J96.11 
ICD-9-CM: 518.83, 799.02  6/11/2019 Yes Steroid-induced diabetes ([facility] ICD-10-CM: E09.9, T38.0X5A 
ICD-9-CM: 249.00, E980.4  6/11/2019 Yes * (Principal) Acute on chronic respiratory failure with hypoxia (HCC) ICD-10-CM: J96.21 
ICD-9-CM: 518.84, 799.02  6/11/2019 Yes Chest pain ICD-10-CM: R07.9 ICD-9-CM: 786.50  6/11/2019 Unknown Pulmonary fibrosis (HCC) (Chronic) ICD-10-CM: J84.10 ICD-9-CM: 256  3/1/2019 Yes Current chronic use of systemic steroids (Chronic) ICD-10-CM: Z79.52 
ICD-9-CM: V58.65  3/1/2019 Yes Overview Signed 5/4/2019  1:41 PM by Lizeth Garza NP  
  begun with diagnosis of pulmonary fibrosis CAD (coronary artery disease) (Chronic) ICD-10-CM: I25.10 ICD-9-CM: 414.00  11/1/2010 Yes HTN (hypertension), benign (Chronic) ICD-10-CM: I10 
ICD-9-CM: 401.1  11/1/2010 Yes Signed By: Marisa Rodriguez NP   
 June 14, 2019

## 2019-06-14 NOTE — PROGRESS NOTES
Critical Care Daily Progress Note: 6/14/2019 Gallo Reyes Admission Date: 6/11/2019 The patient's chart is reviewed and the patient is discussed with the staff. 71 y.o. CM evaluated at the request of Dr. Miguel Bowen presented with worsening dyspnea. Has hx of ground glass/pulmonary fibrosis/UIP on chest CT in 2017. Hx CAD with previous CABG and had stent placement in 2017 and 5/4/19.  Was seen by Dr. Neo Beatty in March for worsening shortness of breath and unproductive cough. Was treated with Prednisone taper in March and has tapered the dose by 10 mg per month as directed--was taking 20 mg per day for the past 3 weeks. Was started on home O2 at 2L then, had a positive PHOENIX (ratio 1:80) and ESR of 14. All other studies were negative to include a RF, scleroderma, aspergillus, hypersensitivity profile studies. Prednisone alleviated the cough but has not helped the dyspnea and he can now barely walk across the room. Mckenzie Garvin smoked for about 30 years - 1 ppd. His chart lists restrictive lung disease but I don't see any PFTs for review. Is retired, previously worked changing brakes, has 2 dogs. With his concern regarding an allergic reaction, he has recently been trying to remove the carpet and backing from his home.  
Chest CT 6/11/19 showed progression of pulmonary fibrosis/UIP from 2017. PCI performed for chest pain. On 6/13 developed worsening shortness of breath,O2 increased to 8L, given Morphine and IV steroids. Air-Vo added but still with dyspnea and moved to ICU anticipating need for BIPAP. Discussion with patient for code status and DNR confirmed. Was continued on HFNC and did not require BIPAP. Subjective:  
 
During the night complained of chest pain treated with NTG. Having recurrent chest pain now in neck and chest not relieved with NTG--cardiology aware. Sitting up in bed and denies shortness of breath, cough or congestion. Requiring HFNC 11 L-home O2 flow 2L. Current Facility-Administered Medications Medication Dose Route Frequency  insulin glargine (LANTUS) injection 15 Units  15 Units SubCUTAneous DAILY  albuterol-ipratropium (DUO-NEB) 2.5 MG-0.5 MG/3 ML  3 mL Nebulization Q4H RT  
 budesonide (PULMICORT) 500 mcg/2 ml nebulizer suspension  500 mcg Nebulization BID RT  
 HYDROcodone-acetaminophen (NORCO) 5-325 mg per tablet 1 Tab  1 Tab Oral Q8H PRN  
 LORazepam (ATIVAN) injection 0.5 mg  0.5 mg IntraVENous Q6H PRN  
 methylPREDNISolone (PF) (Solu-MEDROL) injection 60 mg  60 mg IntraVENous Q8H  
 metoprolol tartrate (LOPRESSOR) tablet 25 mg  25 mg Oral Q12H  
 isosorbide mononitrate ER (IMDUR) tablet 30 mg  30 mg Oral DAILY  sodium chloride (NS) flush 5-40 mL  5-40 mL IntraVENous Q8H  
 sodium chloride (NS) flush 5-40 mL  5-40 mL IntraVENous PRN  
 acetaminophen (TYLENOL) tablet 650 mg  650 mg Oral Q6H PRN  
 diphenhydrAMINE (BENADRYL) capsule 25 mg  25 mg Oral Q6H PRN  
 ondansetron (ZOFRAN) injection 4 mg  4 mg IntraVENous Q6H PRN  
 docusate sodium (COLACE) capsule 100 mg  100 mg Oral DAILY PRN  
 enoxaparin (LOVENOX) injection 40 mg  40 mg SubCUTAneous Q24H  
 aspirin delayed-release tablet 81 mg  81 mg Oral DAILY  atorvastatin (LIPITOR) tablet 80 mg  80 mg Oral DAILY  pantoprazole (PROTONIX) tablet 40 mg  40 mg Oral ACB  nitroglycerin (NITROSTAT) tablet 0.4 mg  0.4 mg SubLINGual Q5MIN PRN  
 ticagrelor (BRILINTA) tablet 90 mg  90 mg Oral Q12H  
 insulin lispro (HUMALOG) injection   SubCUTAneous AC&HS Review of Systems Constitutional:  negative for fever, chills, sweats Cardiovascular:  Neck and chest pain, negative for palpitations, syncope, edema Gastrointestinal:  negative for dysphagia, reflux, vomiting, diarrhea, abdominal pain, or melena Neurologic:  negative for focal weakness, numbness, headache Objective:  
 
Vitals:  
 06/14/19 6044 06/14/19 4662 06/14/19 4747 06/14/19 0848 BP: 136/67   (!) 175/98 Pulse: 91   98 Resp: 21 Temp: 97.8 °F (36.6 °C) SpO2: 93% (!) 88% 94% Weight:      
Height:      
 
 
Intake and Output:  
06/12 1901 - 06/14 0700 In: 300 [P.O.:300] Out: 2500 [Urine:2500] 06/14 0701 - 06/14 1900 In: 120 [P.O.:120] Out: - Physical Exam:         
Constitutional:  the patient is well developed, with chest pain and requiring HFNC 11L, sat 94% EENMT:  Sclera clear, pupils equal, oral mucosa moist 
Respiratory: few anterior crackles, no wheezing Cardiovascular:  RRR without M,G,R 
Gastrointestinal: soft and non-tender; with positive bowel sounds. Musculoskeletal: warm without cyanosis. There is no lower extremity edema. Skin:  Extensive ecchymosis left leg--recent fall. No jaundice or rashes, no open wounds Neurologic: no gross neuro deficits Psychiatric:  alert and oriented x 3 LINES:   
PIV site DRIPS:   None CXR:  
6/13/19: Worsening pulmonary infiltrates which may represent worsening pulmonary edema given the bilateral distribution and associated cardiomegaly. LAB Recent Labs  
  06/14/19 
0735 06/13/19 
2120 06/13/19 
1630 06/13/19 
1232 06/13/19 
5586 GLUCPOC 287* 394* 248* 210* 117* Recent Labs  
  06/14/19 
0246 06/13/19 
1206 WBC 9.5 12.9* HGB 12.4* 13.0*  
HCT 38.4* 40.7*  245 Recent Labs  
  06/14/19 
0246 06/13/19 
0410 06/11/19 
1723  142  --   
K 3.8 3.8  --   
 107  --   
CO2 28 30  --   
* 105*  --   
BUN 19 21  --   
CREA 1.04 0.84  --   
CA 8.8 8.8  --   
TROIQ  --   --  <0.02* No results for input(s): LCAD, LAC in the last 72 hours. Recent Labs  
  06/11/19 
1105 PHI 7.432 PCO2I 35.2 PO2I 74* HCO3I 23.4 Assessment:  (Medical Decision Making) Patient Active Problem List  
Diagnosis Code  Unstable angina (HCC) I20.0  CAD (coronary artery disease) I25.10  
 HTN (hypertension), benign I10  
 Other and unspecified hyperlipidemia E78.5  Peripheral vascular disease-s/p PPI to right common iliac 5/4/11 I73.9  Restrictive airway disease J98.4  
 SOB (shortness of breath) R06.02  
 Pulmonary fibrosis (HCC) J84.10  Current chronic use of systemic steroids Z79.52  
 Obesity E66.9  Dyspnea R06.00  Chronic respiratory failure with hypoxia (HCC) J96.11  
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  
 Chest pain R07.9  DM (diabetes mellitus) ([facility] E11.9 Plan:  (Medical Decision Making) Hospital Problems  Date Reviewed: 6/14/2019 Codes Class Noted POA  
 DM (diabetes mellitus) ([facility] (Chronic) ICD-10-CM: E11.9 ICD-9-CM: 250.00  6/14/2019 Yes Chronic--ranges 238-748 Dyspnea ICD-10-CM: R06.00 
ICD-9-CM: 786.09  6/11/2019 Yes Improved on current O2 flow Chronic respiratory failure with hypoxia (HCC) (Chronic) ICD-10-CM: J96.11 
ICD-9-CM: 518.83, 799.02  6/11/2019 Yes Home O2 2L--requiring 11L now Steroid-induced diabetes ([facility] ICD-10-CM: E09.9, T38.0X5A 
ICD-9-CM: 249.00, E980.4  6/11/2019 Yes Continue current * (Principal) Acute on chronic respiratory failure with hypoxia (HCC) ICD-10-CM: J96.21 
ICD-9-CM: 518.84, 799.02  6/11/2019 Yes Requiring HFNC 11L Chest pain ICD-10-CM: R07.9 ICD-9-CM: 786.50  6/11/2019 Unknown Per cardiology--recurrent Pulmonary fibrosis (HCC) (Chronic) ICD-10-CM: J84.10 ICD-9-CM: 671  3/1/2019 Yes Chronic--progression on recent chest CT Current chronic use of systemic steroids (Chronic) ICD-10-CM: Z79.52 
ICD-9-CM: V58.65  3/1/2019 Yes Overview Signed 5/4/2019  1:41 PM by Mali Mishra NP  
  begun with diagnosis of pulmonary fibrosis Continue IV steroids CAD (coronary artery disease) (Chronic) ICD-10-CM: I25.10 ICD-9-CM: 414.00  11/1/2010 Yes Per cardiology HTN (hypertension), benign (Chronic) ICD-10-CM: I10 
ICD-9-CM: 401.1  11/1/2010 Yes Chronic--controlled  
  
 
 
--Cardiology following, recurrent chest pain--s/p PCI 
--Duoneb, Pulmicort 
--Solu Medrol 60mg q8h --Blood cultures 6/11: pending --Wean O2 as tolerated--home O2 flow 2L 
--Consult Palliative Care consult with progressive pulmonary fibrosis and symptom management--may be a candidate for home hospice. More than 50% of the time documented was spent in face-to-face contact with the patient and in the care of the patient on the floor/unit where the patient is located. Carlalariclayton Laughter, NP Lungs:  Rales in bases, mostly clear, 11L NC 98%, RR 22 Heart:  RRR with no Murmur/Rubs/Gallops Additional Comments:  Ongoing chest pain, reports anginal equivalent. Improved with nitro, but comes with any movement. States breathing is stable though O2 requirement has not improved from yesterday. Has not required BIPAP. Continue nebs, IV steroids, wean O2. Agree with PC consult given progressive IPF and particularly if chest pain is not intervenable further with cardiology I have spoken with and examined the patient. I agree with the above assessment and plan as documented.  
 
Kevin Lindsay MD

## 2019-06-14 NOTE — PROGRESS NOTES
Hospitalist Progress Note 2019 Admit Date: 2019  7:48 AM  
NAME: Luh Osullivan :  1955 MRN:  [id number] Attending: Jennifer Alfaro MD 
PCP:  Severa Burden, MD 
 
SUBJECTIVE:  
Patient is a 89MHH with hx CAD (s/p CABG, PCI) and pulmonary fibrosis who presented to the ER with shortness of breath and worsening hypoxia. Pulm consulted and restarted the prednisone which was being weaned. Most recent PCI in march, cardiology consulted for CP and underwent LHC  with stent placement. On , worsening SOB, hypoxia. Unable to tolerate Airvo. Given morphine, Solumedrol and transferred to the ICU for BIPAP but never required and SaO2 remained stable on HFNC. Reports CP overnight with radiation into neck, unrelieved with NTG. SOB about the same. Review of Systems negative with exception of pertinent positives noted above PHYSICAL EXAM  
 
Visit Vitals /76 Pulse 89 Temp 97.8 °F (36.6 °C) Resp 21 Ht 5' 6\" (1.676 m) Wt 84.3 kg (185 lb 14.4 oz) SpO2 92% BMI 30.01 kg/m² Temp (24hrs), Av.8 °F (36.6 °C), Min:97.7 °F (36.5 °C), Max:98.1 °F (36.7 °C) Oxygen Therapy O2 Sat (%): 92 % (19 1146) Pulse via Oximetry: 94 beats per minute (19 1146) O2 Device: Hi flow nasal cannula (19 1146) O2 Flow Rate (L/min): 14 l/min (19 1146) O2 Temperature: 87.8 °F (31 °C) (19 1027) FIO2 (%): 50 % (19 1027) Intake/Output Summary (Last 24 hours) at 2019 1407 Last data filed at 2019 6595 Gross per 24 hour Intake 360 ml Output 2100 ml Net -1740 ml General: No acute distress Lungs:  Fine crackles BL bases Heart:  Regular rate and rhythm,  No murmur, rub, or gallop Abdomen: Soft, Non distended, Non tender, Positive bowel sounds Extremities: No cyanosis, clubbing or edema Neurologic:  No focal deficits XR CHEST SNGL V Final Result IMPRESSION:   
1.   Worsening pulmonary infiltrates which may represent worsening pulmonary  
edema given the bilateral distribution and associated cardiomegaly. CT CHEST W CONT Final Result IMPRESSION:  
  
1. No acute pulmonary embolus. 2. Progression of the patient's severe chronic interstitial lung disease most in  
keeping with UIP or pulmonary fibrosis. 3. Small area patchy predominant groundglass opacity in the right lower lobe. An  
infectious/inflammatory pneumonitis should be considered. XR CHEST PORT Final Result IMPRESSION: Stable interstitial prominence in both lungs, possibly fibrosis. ASSESSMENT Active Hospital Problems Diagnosis Date Noted  DM (diabetes mellitus) ([address] 06/14/2019  Dyspnea 06/11/2019  Chronic respiratory failure with hypoxia ([address] 06/11/2019  Steroid-induced diabetes ([address] 06/11/2019  Acute on chronic respiratory failure with hypoxia ([address] 06/11/2019  Chest pain 06/11/2019  Current chronic use of systemic steroids 03/01/2019  
  begun with diagnosis of pulmonary fibrosis  Pulmonary fibrosis ([address] 03/01/2019  CAD (coronary artery disease) 11/01/2010  
 HTN (hypertension), benign 11/01/2010 Plan: 
 
CAD Chest pain S/p [facility] with stenting 6/12/2019. Now with recurrent CP, worse with movement 
- cont ASA, statin, brilinta 
- BB, Imdur 
- cards following Acute on chronic respiratory failure Pulm fibrosis Currently on 11L HFNC. Pulm following. No indication for abx.  
- steroids increased to Solumedrol 60mg q12 
- wean O2 as tolerated (on 2L home O2) - palliative care consulted HTN Controlled on home meds Steroid-induced hyperglycemia 
- start Lantus 
- SSI Ok to transfer to floor per discussion with pulm. DVT Prophylaxis: lovenox Dispo: pending, PT/OT consulted Signed By: Viji Flores MD   
 June 14, 2019

## 2019-06-14 NOTE — PROGRESS NOTES
C/o chest pain; states heaviness/ pressure like \"running on a cold morning\". Nitro administered per MD orders. Pain relieved within 5 minutes; continuing to monitor.

## 2019-06-14 NOTE — PROGRESS NOTES
Patient's blood sugar 394, Dr. Nader Kingsley notified, orders to give 15 units humalog. Patient also with marginal BP, PO metoprolol held per orders.

## 2019-06-14 NOTE — PROGRESS NOTES
Pt is stable. Pt is axo. Respirations are even and unlabored at rest with high flow nasal cannula, 9 L/min. Mild crackles heard upon auscultation. Pt had a coughing episode after assisting pt to roll over on r side to listen to lungs. Pt has bilateral leg bruising, pt claims he fell through a rotten board a couple of months ago. Pt denies pain in the legs. Both legs are dry, cool and flaky. Pt states that both legs have been this way since quadruple bypass surgery. Dual skin assessment performed with Marvel Huerta RN. Safety measures are in place. Will continue to monitor.

## 2019-06-14 NOTE — PROGRESS NOTES
TRANSFER - OUT REPORT: 
 
Verbal report given to Jamarcus Babb RN on Kerri Bob  being transferred to 8th floor rm. 815 for routine progression of care. Report consisted of patients Situation, Background, Assessment and  
Recommendations(SBAR). Information from the following report(s) SBAR, Kardex, Procedure Summary, Intake/Output, MAR, Recent Results, Med Rec Status, Cardiac Rhythm NSR and Quality Measures was reviewed with the receiving nurse. Lines:  
Peripheral IV 06/11/19 Left Antecubital (Active) Site Assessment Clean, dry, & intact 6/14/2019 12:00 PM  
Phlebitis Assessment 0 6/14/2019 12:00 PM  
Infiltration Assessment 0 6/14/2019 12:00 PM  
Dressing Status Clean, dry, & intact 6/14/2019 12:00 PM  
Dressing Type Transparent;Tape 6/14/2019 12:00 PM  
Hub Color/Line Status Capped 6/14/2019 12:00 PM  
Action Taken Blood drawn 6/11/2019 12:09 PM  
Alcohol Cap Used No 6/13/2019 12:08 PM  
  
 
Opportunity for questions and clarification was provided. Patient transported with: 
 O2 @ 12 liters Registered Nurse Tech

## 2019-06-14 NOTE — PROGRESS NOTES
Bedside and Verbal shift change report given to LUIS Zelaya (oncoming nurse) by Baylee Hanks RN (offgoing nurse). Report included the following information SBAR, Kardex, Intake/Output, MAR, Recent Results and Cardiac Rhythm NSR. Patient is alert and oriented x4, NSR 90s on monitor, /70, O2 sat 97% on 11L HFNC. Patient with orthopnea and dyspnea even with small exertion. Tachypneic but denies sob at this time. Lung sounds with upper lobes clear, middle/lower lobes diminished. Voiding per urinal. Skin intact. Pain in shoulder from old injury, tylenol given per orders. Family at bedside, will continue to monitor.

## 2019-06-14 NOTE — PROGRESS NOTES
[facility] CARDIOLOGY PROGRESS NOTE 
      
 
6/14/2019 4:37 PM 
 
Admit Date: 6/11/2019 Subjective:  
Angina when O2 sat drops Objective:  
  
Vitals:  
 06/14/19 1517 06/14/19 1531 06/14/19 1553 06/14/19 1558 BP: 143/80 129/75 117/72 Pulse: 92 (!) 101 93 Resp: 30 28 26 Temp:   97.5 °F (36.4 °C) SpO2: 94% 94% 93% 92% Weight:      
Height:      
 
 
Physical Exam: 
General-No Acute Distress, anxious Data Review:  
Recent Labs  
  06/14/19 
0246 06/13/19 
1206 06/13/19 
0410 06/11/19 
1723   --  142  --   
K 3.8  --  3.8  --   
BUN 19  --  21  --   
CREA 1.04  --  0.84  --   
*  --  105*  --   
WBC 9.5 12.9*  --   --   
HGB 12.4* 13.0*  --   --   
HCT 38.4* 40.7*  --   --   
 245  --   --   
TROIQ  --   --   --  <0.02* Assessment/Plan:  
 
Principal Problem: 
  Acute on chronic respiratory failure with hypoxia ([address] (6/11/2019) Active Problems: 
  CAD (coronary artery disease) (11/1/2010) HTN (hypertension), benign (11/1/2010) Pulmonary fibrosis ([address] (3/1/2019) Current chronic use of systemic steroids (3/1/2019) Dyspnea (6/11/2019) Chronic respiratory failure with hypoxia ([address] (6/11/2019) Steroid-induced diabetes ([address] (6/11/2019) Chest pain (6/11/2019) DM (diabetes mellitus) ([address] (6/14/2019) 
   
//// His metoprolol dose is increased. Ranexa and MS Contin are added. For floor transfer.  
 
 
 
 
Ayleen Huggins MD 
6/14/2019 4:37 PM

## 2019-06-14 NOTE — PROGRESS NOTES
0848: elevated /98, elevated HR 98; pt again complaining of chest pain; pressure/ heaviness. Second Nitro tab administered per orders. Pain relieved within 5 minutes; pt resting quietly at this time, /75. MD notified at 8953; no new orders at this time. Will continue to monitor.

## 2019-06-14 NOTE — PROGRESS NOTES
Physical Therapy Note: 
 
Participated in interdisciplinary rounds in ICU/CCU and chart reviewed. Patient is experiencing decrease in function from baseline. Patient would benefit from skilled acute therapy to increase independence with self care/ADLs, strength, endurance, and functional mobility. Recommend PT/OT consult when medically stable and MD agrees. Thank you for your consideration, Hai Street, PT, DPT

## 2019-06-14 NOTE — PROGRESS NOTES
[id number] with chest pain 8/10 after moving around to use urinal in bed, 0.4mg SL nitro given per orders. /89. NSR 90s on monitor, occasional PVC. 0.5mg ativan IV given for agitation. 8609- Patient now states pain 0/10. VSS.

## 2019-06-14 NOTE — PROGRESS NOTES
Pain with 10/10 chest pain; heavy/ pressure. Nitro administered per order. Dr. Sherryle Pae notified. Order for p.o. Q6H moderate to severe pain; d/c Norco.  
 
Order for 500mg Ranexa BID and MS contin 30mg p.o. Bid; increased p.o. Metoprolol to 50mg Q6H. Will continue to monitor.

## 2019-06-15 NOTE — PROGRESS NOTES
Multiple family at bedside. De-sats easily on exertion in mid 80s. Recovers to 91-92 with rest. Advise family to limit visit 10-15 minutes at a time with no more than 2 visitors at a time. Verbalizes understanding. Will monitor closely.

## 2019-06-15 NOTE — PROGRESS NOTES
Patient resting in bed alert and oriented, cooperative with care, patient denies pain or distress, tele in place NS 82, family at bedside for support, safety measures in place, call light within reach.

## 2019-06-15 NOTE — PROGRESS NOTES
Problem: Mobility Impaired (Adult and Pediatric) Goal: *Therapy Goal (Edit Goal, Insert Text) Outcome: Progressing Towards Goal 
Note: STG: 
(1.)Mr. Mara Bah will move from supine to sit and sit to supine , scoot up and down and roll side to side with STAND BY ASSIST within 4 treatment day(s). (2.)Mr. Mara Bah will transfer from bed to chair and chair to bed with STAND BY ASSIST using the least restrictive device within 4 treatment day(s). (3.)Mr. Mara Bah will ambulate with STAND BY ASSIST for 10 feet with the least restrictive device within 4 treatment day(s). LTG: 
(1.)Mr. Mara Bah will move from supine to sit and sit to supine , scoot up and down and roll side to side in bed with MODIFIED INDEPENDENCE within 7 treatment day(s). (2.)Mr. Mara Bah will transfer from bed to chair and chair to bed with MODIFIED INDEPENDENCE using the least restrictive device within 7 treatment day(s). (3.)Mr. Mara Bah will ambulate with MODIFIED INDEPENDENCE for 100 feet with the least restrictive device within 7 treatment day(s). _____________________________________________________________________________________________ PHYSICAL THERAPY: Initial Assessment 6/15/2019 INPATIENT: PT Visit Days : 1 Payor: Mike Merino / Plan: [place] / Product Type: [employer] Care Medicare /   
  
NAME/AGE/GENDER: Cathy Celis is a 71 y.o. male PRIMARY DIAGNOSIS: Acute on chronic respiratory failure (HCC) [J96.20] Acute on chronic respiratory failure with hypoxia (HCC) Acute on chronic respiratory failure with hypoxia (HCC) ICD-10: Treatment Diagnosis:  
 Generalized Muscle Weakness (M62.81) Other lack of cordination (R27.8) Difficulty in walking, Not elsewhere classified (R26.2) Precaution/Allergies: 
Aspirin ASSESSMENT:  
 
Mr. Mara Bah is a pleasant retired male with above diagnosis who demonstrates with decreased transfers, ambulation and mobility below his prior functional baseline on 11 liters 02 91% supine down to 70% percent then improves 91% 11 liters EOB. All transfers are currently limited at CGA x 1 from supine to edge of bed. Patient is coughing edge of bed for several minutes. Then patient return to supine once sats recover to 85% then positioned and sats increase to 91% in supine. Respiratory capacity is his limiting factor. Overall slow progress towards physical therapy goals. Goals listed above are appropriate. Will continue efforts as patient is still below functional baseline. ?????? ? ? This section established at most recent assessment??????????  
PROBLEM LIST (Impairments causing functional limitations): 
Decreased Strength affecting function Decreased Transfer Abilities Decreased Ambulation Ability/Technique Decreased Balance Decreased Activity Tolerance Decreased Pacing Skills Increased Fatigue affecting function Decreased Flexibility/Joint Mobility Decreased [place] with Home Exercise Program  
REHABILITATION POTENTIAL FOR STATED GOALS: GOOD PLAN OF CARE:                                                           
INTERVENTIONS PLANNED: (Benefits and precautions of physical therapy have been discussed with the patient.) 
balance exercise  
bed mobility  
family education  
gait training  
range of motion: active/assisted/passive  
therapeutic activities  
therapeutic exercise/strengthening  
transfer training FREQUENCY/DURATION: Follow patient 3 x's per week for until goals are met in order to address above goals. REHAB RECOMMENDATIONS (at time of discharge pending progress):   
Placement:  
It is my opinion, based on this patient's performance to date, that Mr. Masood Conley may benefit from [address] after discharge due to the functional deficits listed above that are likely to improve with skilled rehabilitation because he/she has multiple medical issues that affect his/her functional mobility in the community. Equipment:  
None at this time HISTORY:  
History of Present Injury/Illness (Reason for Referral): PER MD H&P Luis Armando Roy is a 71 y.o. male who came to hospital due to shortness of breath and worsened hypoxia. Patient has history of pulmonary fibrosis with oxygen dependence. He has been on 2 LPM of oxygen cannula at home. He noticed that in the past few days, he was more with shortness of breath and his oxygen saturation was down to 70+%. He has been coughing with no phlegm. No fever. No shaking. No chills. No chest pain. Recently in May 2019, he was admitted due to ACS and underwent stent placement in coronary arteries. No history of CHF. Patient denies missing medications. He has been on Prednisone and is supposed to be weaned off. Other medical problems as listed below. Last smoking was 2 years ago. Past Medical History/Comorbidities:  
Mr. Stone Grider  has a past medical history of Acute coronary syndrome ([facility] (8/4/2018), CAD (coronary artery disease), Current chronic use of systemic steroids (03/2019), GERD (gastroesophageal reflux disease), Hypertension, Interstitial pneumonitis ([address] NSTEMI (non-ST elevated myocardial infarction) ([address] (1/28/2017), Obesity, Osteoarthritis, Pulmonary fibrosis ([facility] (03/2019), and Pulmonary nodule. Mr. Stone Grider  has a past surgical history that includes hx heart catheterization; vascular surgery procedure unlist; and hx tonsillectomy. Social History/Living Environment:  
Home Environment: Private residence # Steps to Enter: 3 One/Two Story Residence: One story Living Alone: No 
Support Systems: Child(samm) Patient Expects to be Discharged to[de-identified] Private residence Current DME Used/Available at Home: Glucometer Prior Level of Function/Work/Activity: Limited per respiratory status. On nasal cannula at home and desats with ambulation to the bathroom. Dominant Side:  
      RIGHT Personal Factors:   
      Sex:  male Age:  71 y.o. Number of Personal Factors/Comorbidities that affect the Plan of Care: 3+: HIGH COMPLEXITY EXAMINATION:  
Most Recent Physical Functioning:  
Gross Assessment: 
AROM: Generally decreased, functional 
Strength: Generally decreased, functional(respiratory marginally functional) Coordination: Generally decreased, functional 
Tone: Normal 
Sensation: Intact Posture: 
Posture (WDL): Exceptions to [facility] Posture Assessment: Cervical, Forward head Balance: 
Sitting: Impaired Sitting - Static: Fair (occasional) Sitting - Dynamic: Fair (occasional) Standing: Impaired Standing - Static: Not tested(desaturation edge of bed with sitting) Standing - Dynamic : Not tested Bed Mobility: 
Rolling: Minimum assistance Supine to Sit: Minimum assistance Sit to Supine: Minimum assistance Scooting: Minimum assistance Wheelchair Mobility: 
  
Transfers: 
  
Gait: 
  
   
  
Body Structures Involved: 
Bones Joints Muscles Ligaments Body Functions Affected: 
Neuromusculoskeletal 
Movement Related Skin Related Metobolic/Endocrine Activities and Participation Affected: 
General Tasks and Demands Communication Mobility Self Care Domestic Life Community, Social and Deane Vivian Number of elements that affect the Plan of Care: 4+: HIGH COMPLEXITY CLINICAL PRESENTATION:  
Presentation: Evolving clinical presentation with unstable and unpredictable characteristics: HIGH COMPLEXITY CLINICAL DECISION MAKIN [facility] [address] [id number] AM-PAC? ?6 Clicks? Basic Mobility Inpatient Short Form How much difficulty does the patient currently have. .. Unable A Lot A Little None 1. Turning over in bed (including adjusting bedclothes, sheets and blankets)?    ? 1   ? 2   ? 3   ? 4  
 2.  Sitting down on and standing up from a chair with arms ( e.g., wheelchair, bedside commode, etc.)   ? 1   ? 2   ? 3   ? 4  
3. Moving from lying on back to sitting on the side of the bed?   ? 1   ? 2   ? 3   ? 4 How much help from another person does the patient currently need. .. Total A Lot A Little None 4. Moving to and from a bed to a chair (including a wheelchair)? ? 1   ? 2   ? 3   ? 4  
5. Need to walk in hospital room? ? 1   ? 2   ? 3   ? 4  
6. Climbing 3-5 steps with a railing? ? 1   ? 2   ? 3   ? 4  
© 2007, Trustees of [facility] [employer], under license to [employer]. All rights reserved Score:  Initial: 12 Most Recent: X (Date: -- ) Interpretation of Tool:  Represents activities that are increasingly more difficult (i.e. Bed mobility, Transfers, Gait). Medical Necessity:    
Patient demonstrates fair 
 rehab potential due to higher previous functional level. Reason for Services/Other Comments: 
Patient continues to require skilled intervention due to medical complications, patient unable to attend/participate in therapy as expected and insufficient respiratory status Mliss Atkins Use of outcome tool(s) and clinical judgement create a POC that gives a: Difficult prediction of patient's progress: HIGH COMPLEXITY  
  
 
 
 
TREATMENT:  
(In addition to Assessment/Re-Assessment sessions the following treatments were rendered) Pre-treatment Symptoms/Complaints:  0/10 Pain: Initial:  
Pain Intensity 1: 0  Post Session:  0/10 Therapeutic Activity: (    8 mins): Therapeutic activities including Bed transfers, Chair transfers and Ambulation on level ground to improve mobility, strength, balance and coordination. Required minimal   to promote coordination of bilateral, lower extremity(s) and promote motor control of bilateral, lower extremity(s). Date: 
 Date: 
 Date: Activity/Exercise Parameters Parameters Parameters Braces/Orthotics/Lines/Etc:  
O2 Device: Hi flow nasal cannula(Respiratory summoned to assess) post seated to supine. Treatment/Session Assessment:   
Response to Treatment:  improved mobility but respiratory limited. Interdisciplinary Collaboration:  
Physical Therapist 
Registered Nurse After treatment position/precautions:  
Bed alarm/tab alert on Bed/Chair-wheels locked Bed in low position Call light within reach RN notified Family at bedside Side rails x 3 Compliance with Program/Exercises: Will assess as treatment progresses Recommendations/Intent for next treatment session: \"Next visit will focus on advancements to more challenging activities, reduction in assistance provided and progress mobility and transfers per patient toleration. \". Total Treatment Duration: PT Patient Time In/Time Out Time In: 7028 Time Out: 1430 Luke Sandoval PT

## 2019-06-15 NOTE — PROGRESS NOTES
PPD placed on RA per order. Denies needs at present. NAD noted at present. Family remains at bedside. Report off to oncoming nurse

## 2019-06-15 NOTE — PROGRESS NOTES
Hospitalist Progress Note 6/15/2019 Admit Date: 2019  7:48 AM  
NAME: Monique Hardy :  1955 MRN:  [id number] Attending: Regina Apple MD 
PCP:  Micah Lyman MD 
 
SUBJECTIVE:  
t 89PYA with hx CAD (s/p CABG, PCI) and pulmonary fibrosis who presented to the ER with shortness of breath and worsening hypoxia. Pulm consulted and restarted the prednisone which was being weaned. Most recent PCI in march, cardiology consulted for CP and underwent LHC  with stent placement. On , worsening SOB, hypoxia. Unable to tolerate Airvo. Given morphine, Solumedrol and transferred to the ICU for BIPAP but never required and SaO2 remained stable on HFNC. CT chest  showing progression of UIP/pulm fibrosis. 615 - transferred back to the floor yesterday. Reports he feels slightly better. Still requiring o2 11 liters. Review of Systems negative with exception of pertinent positives noted above PHYSICAL EXAM  
 
Visit Vitals /71 Pulse 76 Temp 97.5 °F (36.4 °C) Resp 20 Ht 5' 6\" (1.676 m) Wt 85.4 kg (188 lb 4.8 oz) SpO2 95% BMI 30.39 kg/m² Temp (24hrs), Av.8 °F (36.6 °C), Min:97.5 °F (36.4 °C), Max:98.1 °F (36.7 °C) Oxygen Therapy O2 Sat (%): 95 % (06/15/19 1543) Pulse via Oximetry: 75 beats per minute (06/15/19 1535) O2 Device: Hi flow nasal cannula (06/15/19 1535) O2 Flow Rate (L/min): 11 l/min (06/15/19 1535) O2 Temperature: 87.8 °F (31 °C) (19 1027) FIO2 (%): 50 % (19 1027) Intake/Output Summary (Last 24 hours) at 6/15/2019 1740 Last data filed at 6/15/2019 1302 Gross per 24 hour Intake 420 ml Output 875 ml Net -455 ml General: No acute distress   
Lungs:  Diminished with bibasilar crackles Heart:  Regular rate and rhythm,  No murmur, rub, or gallop Abdomen: Soft, Non distended, Non tender, Positive bowel sounds Extremities: No cyanosis, clubbing or edema Neurologic:  No focal deficits ASSESSMENT Active Hospital Problems Diagnosis Date Noted  DM (diabetes mellitus) ([facility] 06/14/2019  Dyspnea 06/11/2019  Chronic respiratory failure with hypoxia ([facility] 06/11/2019  Steroid-induced diabetes ([facility] 06/11/2019  Acute on chronic respiratory failure with hypoxia ([facility] 06/11/2019  Chest pain 06/11/2019  Current chronic use of systemic steroids 03/01/2019  
  begun with diagnosis of pulmonary fibrosis  Pulmonary fibrosis ([facility] 03/01/2019  CAD (coronary artery disease) 11/01/2010  
 HTN (hypertension), benign 11/01/2010 A/P 
CAD Chest pain S/p LHC with stenting 6/12/2019. Cardiology following. Severe disease 
- cont ASA, statin, brilinta 
- BB, Imdur 
 
  
Acute on chronic respiratory failure Pulm fibrosis Currently on 11L HFNC. Pulm following. No indication for abx.  
- steroids increased to Solumedrol 60mg q8 
- wean O2 as tolerated (on 2L home O2) - palliative care consulted 
  
HTN Controlled on home meds 
  
Steroid-induced hyperglycemia 
- increase lantus 
- cont SSI 
- Add prandial coverage tomorrow if still uncontrolled DVT Prophylaxis: lovenox sq Signed By: Jasmyne Mendiola DO   
 Leana 15, 2019

## 2019-06-15 NOTE — PROGRESS NOTES
Patient resting in bed, family at bedside, patient denies pain or distress , tele in place, and reading, , breathing even and unlabored, safety measures in place.

## 2019-06-15 NOTE — PROGRESS NOTES
Miguel Vila Admission Date: 6/11/2019 Daily Progress Note: 6/15/2019 The patient's chart is reviewed and the patient is discussed with the staff. 71 y.o. CM evaluated at the request of Dr. Bronson Hutchins presented with worsening dyspnea. Has hx of ground glass/pulmonary fibrosis/UIP on chest CT in 2017. Hx CAD with previous CABG and had stent placement in 2017 and 5/4/19.  Was seen by Dr. Brittany Strickland in March for worsening shortness of breath and unproductive cough. Was treated with Prednisone taper in March and has tapered the dose by 10 mg per month as directed--was taking 20 mg per day for the past 3 weeks. Was started on home O2 at 2L then, had a positive PHOENIX (ratio 1:80) and ESR of 14. All other studies were negative to include a RF, scleroderma, aspergillus, hypersensitivity profile studies. Prednisone alleviated the cough but has not helped the dyspnea and he can now barely walk across the room. Muna Osborne smoked for about 30 years - 1 ppd. His chart lists restrictive lung disease but I don't see any PFTs for review. Is retired, previously worked changing brakes, has 2 dogs. With his concern regarding an allergic reaction, he has recently been trying to remove the carpet and backing from his home.  
Chest CT 6/11/19 showed progression of pulmonary fibrosis/UIP from 2017. PCI performed for chest pain. On 6/13 developed worsening shortness of breath,O2 increased to 8L, given Morphine and IV steroids. Air-Vo added but still with dyspnea and moved to ICU anticipating need for BIPAP. Discussion with patient for code status and DNR confirmed. Was continued on HFNC and did not require BIPAP. Subjective:  
 
Moved from ICU Has CP earlier this AM 
Son at the bedsite, asking lot of questions Current Facility-Administered Medications Medication Dose Route Frequency  morphine injection 1 mg  1 mg IntraVENous Q2H PRN  
  insulin glargine (LANTUS) injection 15 Units  15 Units SubCUTAneous DAILY  ranolazine ER (RANEXA) tablet 500 mg  500 mg Oral BID  metoprolol tartrate (LOPRESSOR) tablet 50 mg  50 mg Oral Q6H  
 morphine IR (MS IR) tablet 30 mg  30 mg Oral BID  senna-docusate (PERICOLACE) 8.6-50 mg per tablet 1 Tab  1 Tab Oral DAILY  LORazepam (ATIVAN) injection 0.5 mg  0.5 mg IntraVENous Q3H PRN  
 albuterol-ipratropium (DUO-NEB) 2.5 MG-0.5 MG/3 ML  3 mL Nebulization Q4H RT  
 budesonide (PULMICORT) 500 mcg/2 ml nebulizer suspension  500 mcg Nebulization BID RT  
 methylPREDNISolone (PF) (Solu-MEDROL) injection 60 mg  60 mg IntraVENous Q8H  
 isosorbide mononitrate ER (IMDUR) tablet 30 mg  30 mg Oral DAILY  sodium chloride (NS) flush 5-40 mL  5-40 mL IntraVENous Q8H  
 sodium chloride (NS) flush 5-40 mL  5-40 mL IntraVENous PRN  
 acetaminophen (TYLENOL) tablet 650 mg  650 mg Oral Q6H PRN  
 diphenhydrAMINE (BENADRYL) capsule 25 mg  25 mg Oral Q6H PRN  
 ondansetron (ZOFRAN) injection 4 mg  4 mg IntraVENous Q6H PRN  
 enoxaparin (LOVENOX) injection 40 mg  40 mg SubCUTAneous Q24H  
 aspirin delayed-release tablet 81 mg  81 mg Oral DAILY  atorvastatin (LIPITOR) tablet 80 mg  80 mg Oral DAILY  pantoprazole (PROTONIX) tablet 40 mg  40 mg Oral ACB  nitroglycerin (NITROSTAT) tablet 0.4 mg  0.4 mg SubLINGual Q5MIN PRN  
 ticagrelor (BRILINTA) tablet 90 mg  90 mg Oral Q12H  
 insulin lispro (HUMALOG) injection   SubCUTAneous AC&HS Review of Systems Constitutional: negative for fever, chills, sweats Cardiovascular: negative for chest pain, palpitations, syncope, edema Gastrointestinal:  negative for dysphagia, reflux, vomiting, diarrhea, abdominal pain, or melena Neurologic:  negative for focal weakness, numbness, headache Objective:  
 
Vitals:  
 06/15/19 0425 06/15/19 0121 06/15/19 1580 06/15/19 2559 BP: 116/78  131/83 Pulse: 80  77 Resp:   20 Temp:   97.8 °F (36.6 °C) SpO2:   94% 93% Weight:  188 lb 4.8 oz (85.4 kg) Height:      
 
Intake and Output:  
06/13 1901 - 06/15 0700 In: 360 [P.O.:360] Out: 2600 [Urine:2600] No intake/output data recorded. Physical Exam:  
Constitution:  the patient is well developed and in no acute distress EENMT:  Sclera clear, pupils equal, oral mucosa moist 
Respiratory: crackels Cardiovascular:  RRR without M,G,R 
Gastrointestinal: soft and non-tender; with positive bowel sounds. Musculoskeletal: warm without cyanosis. There is no lower extremity edema. Skin:  no jaundice or rashes, no wounds Neurologic: no gross neuro deficits Psychiatric:  alert and oriented x 3 CXR:  
 
 
LAB Recent Labs  
  06/15/19 
0532 06/14/19 2010 06/14/19 
1704 06/14/19 
1110 06/14/19 
9720 GLUCPOC 258* 317* 299* 297* 287* Recent Labs  
  06/15/19 
0616 06/14/19 
0246 06/13/19 
1206 WBC 18.6* 9.5 12.9* HGB 12.9* 12.4* 13.0*  
HCT 40.1* 38.4* 40.7*  243 245 Recent Labs  
  06/15/19 
0616 06/14/19 
0246 06/13/19 
0410  138 142  
K 4.6 3.8 3.8  103 107 CO2 30 28 30 * 304* 105* BUN 33* 19 21 CREA 1.06 1.04 0.84 CA 9.1 8.8 8.8 Assessment:  (Medical Decision Making) Hospital Problems  Date Reviewed: 6/14/2019 Codes Class Noted POA  
 DM (diabetes mellitus) ([facility] (Chronic) ICD-10-CM: E11.9 ICD-9-CM: 250.00  6/14/2019 Yes Dyspnea ICD-10-CM: R06.00 
ICD-9-CM: 786.09  6/11/2019 Yes Chronic respiratory failure with hypoxia (HCC) (Chronic) ICD-10-CM: J96.11 
ICD-9-CM: 518.83, 799.02  6/11/2019 Yes * (Principal) Acute on chronic respiratory failure with hypoxia (HCC) ICD-10-CM: J96.21 
ICD-9-CM: 518.84, 799.02  6/11/2019 Yes Chest pain ICD-10-CM: R07.9 ICD-9-CM: 786.50  6/11/2019 Unknown Pulmonary fibrosis (HCC) (Chronic) ICD-10-CM: J84.10 ICD-9-CM: 953  3/1/2019 Yes  Current chronic use of systemic steroids (Chronic) ICD-10-CM: V32.84 
 ICD-9-CM: V58.65  3/1/2019 Yes Overview Signed 5/4/2019  1:41 PM by Malissa Escobedo NP  
  begun with diagnosis of pulmonary fibrosis CAD (coronary artery disease) (Chronic) ICD-10-CM: I25.10 ICD-9-CM: 414.00  11/1/2010 Yes HTN (hypertension), benign (Chronic) ICD-10-CM: I10 
ICD-9-CM: 401.1  11/1/2010 Yes Plan:  (Medical Decision Making)  
 
--wean 02 as tolerated- on 2L NC  
- steroids - pain control - PC, may be hospice candidate More than 50% of the time documented was spent in face-to-face contact with the patient and in the care of the patient on the floor/unit where the patient is located.  
 
Jamie Reinoso MD

## 2019-06-15 NOTE — PROGRESS NOTES
[facility] CARDIOLOGY PROGRESS NOTE 
      
 
6/15/2019 1:11 PM 
 
Admit Date: 6/11/2019 Subjective:  
Still SOB and hypoxic. No CP. Weak, not OOB. ROS: 
Cardiovascular:  As noted above Objective:  
  
Vitals:  
 06/15/19 0722 06/15/19 0802 06/15/19 1039 06/15/19 1153 BP: 131/83  137/83 Pulse: 77  78 Resp: 20  20 Temp: 97.8 °F (36.6 °C)  97.5 °F (36.4 °C) SpO2: 94% 93% 94% 96% Weight:      
Height:      
 
 
Physical Exam: 
General-No Acute Distress Neck- supple, no JVD 
CV- regular rate and rhythm no MRG Lung- clear bilaterally with dec BS in the bases Abd- soft, nontender, nondistended Ext- no edema bilaterally. Skin- warm and dry Data Review:  
Recent Labs  
  06/15/19 
0616 06/14/19 
0246  138  
K 4.6 3.8 BUN 33* 19  
CREA 1.06 1.04  
* 304* WBC 18.6* 9.5 HGB 12.9* 12.4*  
HCT 40.1* 38.4*  243 Assessment/Plan:  
 
Principal Problem: 
  Acute on chronic respiratory failure with hypoxia ([address] (6/11/2019) Per pulmonary, may need hospice, defer to primary team and pulmonary. Last BNP was 67. EF ~50% with mod AS. Tough situation. Chest CT 6/11/19 showed progression of pulmonary fibrosis/UIP from 2017. Now DNR and may need hospice care. Active Problems: 
  CAD (coronary artery disease) (11/1/2010) S/p PCI, remain on DAPT. He has severe CAD, remain on meds. HTN (hypertension), benign (11/1/2010) Continue meds, as above We will be on standby, please call us as needed.    
 
 
 
 
Jaylon Morel, DO 
6/15/2019 1:11 PM

## 2019-06-15 NOTE — PROGRESS NOTES
Family member hypervigilant with patient care. Family member requesting clarification about the PRN Ativan family member believes that the ativan should be given every 3 hours. This nurse explained to the family member that is given to the patient if the patient request the medication.

## 2019-06-15 NOTE — PROGRESS NOTES
Bedside report received from night nurse Negrito Silva. Assessment done as noted  Respiration even and unlabored 20/min; denies pain or nausea at present. Remains on remoete tele with NSR 81 confirmed per monitor tech. Remains on oxygen 11 liters HF NC with sats 92% at rest. Encouraged to call with needs.

## 2019-06-15 NOTE — PROGRESS NOTES
Patient requesting morphine IV medication for chest pain. Md 6926 Guillaume Chen paged and aware about this patient status, MD Barkley orders: \" I will place the orders for this patient.

## 2019-06-15 NOTE — PROGRESS NOTES
Patient refused his morphine IV stating \"I want only the Ativan not the IV Morphine\". charge nurse notified

## 2019-06-16 NOTE — PROGRESS NOTES
PM assessment done. No changes noted. Respiration even and unlabored 20/min at rest. No s/s of pain noted at present. Remains on Opti-Mauro 70% and 40 L/min. Multiple family at bedside. Will monitor closely.

## 2019-06-16 NOTE — PROGRESS NOTES
Bedside report received from night nurse Lucius Rico. Assessment done as noted  Respiration even and unlabored 20/min; denies pain or nausea at present. Remains on remote tele NSR 84 confirmed per monitor tech. Son at bedside. Encouraged to call with needs.

## 2019-06-16 NOTE — PROGRESS NOTES
AM care given per CNA. De-sats into 82-83% on 12 liters HF NC. Recovers with 90-91% with rest and encouraging deep breathing. Breakfast tray at bedside.

## 2019-06-16 NOTE — PROGRESS NOTES
Hospitalist Progress Note 2019 Admit Date: 2019  7:48 AM  
NAME: Lisha Mcmillan :  1955 MRN:  [id number] Attending: Evens Mckeon MD 
PCP:  Mau Lehman MD 
 
SUBJECTIVE:  
t 50CYZ with hx CAD (s/p CABG, PCI) and pulmonary fibrosis who presented to the ER with shortness of breath and worsening hypoxia. Pulm consulted and restarted the prednisone which was being weaned. Most recent PCI in march, cardiology consulted for CP and underwent LHC  with stent placement. On , worsening SOB, hypoxia. Unable to tolerate Airvo. Given morphine, Solumedrol and transferred to the ICU for BIPAP but never required and SaO2 remained stable on HFNC. CT chest  showing progression of UIP/pulm fibrosis. 615 - transferred back to the floor yesterday. Reports he feels slightly better. Still requiring o2 11 liters.  - o2 requirement up to 12 liters this morning. Pt had completed bath and changing prior to my arrival and has eyes closed and breathing trying to improve o2 sats. Review of Systems negative with exception of pertinent positives noted above PHYSICAL EXAM  
 
Visit Vitals /83 Pulse 77 Temp 98 °F (36.7 °C) Resp 20 Ht 5' 6\" (1.676 m) Wt 87 kg (191 lb 12.8 oz) SpO2 90% BMI 30.96 kg/m² Temp (24hrs), Av.7 °F (36.5 °C), Min:97.3 °F (36.3 °C), Max:98 °F (36.7 °C) Oxygen Therapy O2 Sat (%): 90 % (19 1206) Pulse via Oximetry: 75 beats per minute (19 1206) O2 Device: Hi flow nasal cannula (19 1206) O2 Flow Rate (L/min): 12 l/min (19 1206) O2 Temperature: 87.8 °F (31 °C) (19 1027) FIO2 (%): 50 % (19 1027) Intake/Output Summary (Last 24 hours) at 2019 1432 Last data filed at 2019 1253 Gross per 24 hour Intake 840 ml Output 650 ml Net 190 ml General: Dyspneic with conversation, overweight Lungs:  Diminished with bibasilar crackles Heart:  Regular rate and rhythm,  No murmur, rub, or gallop Abdomen: Soft, Non distended, Non tender, Positive bowel sounds Extremities: No cyanosis, clubbing or edema Neurologic:  No focal deficits ASSESSMENT Active Hospital Problems Diagnosis Date Noted  DM (diabetes mellitus) ([facility] 06/14/2019  Dyspnea 06/11/2019  Chronic respiratory failure with hypoxia ([facility] 06/11/2019  Steroid-induced diabetes ([facility] 06/11/2019  Acute on chronic respiratory failure with hypoxia ([facility] 06/11/2019  Chest pain 06/11/2019  Current chronic use of systemic steroids 03/01/2019  
  begun with diagnosis of pulmonary fibrosis  Pulmonary fibrosis ([facility] 03/01/2019  CAD (coronary artery disease) 11/01/2010  
 HTN (hypertension), benign 11/01/2010 A/P 
CAD Chest pain S/p LHC with stenting 6/12/2019. Cardiology following. Severe disease 
- cont ASA, statin, brilinta 
- BB, Imdur 
  
Acute on chronic respiratory failure Pulm fibrosis Currently on 12L HFNC. Pulm following. No indication for abx.  
- steroids increased to Solumedrol 60mg q8 
- wean O2 as tolerated (on 2L home O2) - palliative care consulted 
  
HTN Controlled on home meds 
  
Steroid-induced hyperglycemia 
- continue increased dose of lantus - Add prandial 5 units Humalog 
- cont SSI 
- HgA1c on 5/4 was 12.2 DVT Prophylaxis: lovenox sq Dispo - discussed with patient his worrisome prognosis. Offered hospice consult and they have agreed. Explained that we need to attempt to get oxygen requirement down as 12L is likely too much to provide on home hospice. Signed By: Jey Bird,    
 June 16, 2019

## 2019-06-16 NOTE — PROGRESS NOTES
Called to room for sats < 90% on 15L HFNC. Brought Airvo to room, patient refused heated high flow, per previous use. States he will not use again.

## 2019-06-16 NOTE — PROGRESS NOTES
Called to room per family \"oxygen dropping\". Multiple visitors at bedside. RT at bedside. Currently with O2 sats 91%. Morphine 1mg IVP given for comfort. Will continue to monitor. no

## 2019-06-16 NOTE — PROGRESS NOTES
Placed on Optiflo (without Heat) but with humidity of 70% and 40L/min. Patient initially tolerating well.

## 2019-06-16 NOTE — PROGRESS NOTES
Problem: Self Care Deficits Care Plan (Adult) Goal: *Acute Goals and Plan of Care (Insert Text) Description 1. Patient will complete lower body bathing and dressing with SBA and adaptive equipment as needed. 2. Patient will complete toileting with SBA. 3. Patient will tolerate 23 minutes of OT treatment with as needed rest breaks to increase activity tolerance for ADLs. 4. Patient will complete functional transfers with SBA and adaptive equipment as needed. Timeframe: 7 visits Outcome: Progressing Towards Goal 
  
 
OCCUPATIONAL THERAPY: Initial Assessment, Daily Note and PM 6/16/2019 INPATIENT: OT Visit Days: 1 Payor: Ailin Hernandez / Plan: Elda Flower / Product Type: Managed Care Medicare /  
  
NAME/AGE/GENDER: Aquilino Li is a 71 y.o. male PRIMARY DIAGNOSIS:  Acute on chronic respiratory failure (HCC) [J96.20] Acute on chronic respiratory failure with hypoxia (HCC) Acute on chronic respiratory failure with hypoxia (HCC) ICD-10: Treatment Diagnosis:  
 Generalized Muscle Weakness (M62.81) Precautions/Allergies: 
  Watch O2 sats Aspirin ASSESSMENT:  
Mr. Yane Nelson presents to hospital for above. Pt presents with increased SOB, fatigue and decreased activity tolerance, weakness. Today, pt is found supine in bed upon arrival, AOx4, and agreeable to OT evaluation. He is on 12L HFNC and sating 92% at rest. Per BUE screen, AROM, strength, and coordination are [place] [place] and functional. Pt able to move supine to sit with SBA, demonstrating intact sitting balance at EOB. While at EOB, pt desats to mid 70's-low 80s. Pt instructed on deep breathing/pursed lip breathing techniques with very little change in O2 saturation. RN notified and pt increased to 15L HFNC. He was returned to supine with min A for BLE management. With deep breathing and rest, pt returns to 89-91% on 15L HFNC.  Pt will likely require more intensive O2 support for optimal therapy participation. Per charts, pt and family have decided to return home with hospice care. Mr. Andrea Escalante presents with functional limitations listed below and appears to be functioning below baseline. They will benefit from continued skilled OT services to maximize safety and independence with ADLs, in addition to decreasing burden of care. Will follow during acute stay unless otherwise instructed per MD. This section established at most recent assessment PROBLEM LIST (Impairments causing functional limitations): 
Decreased Strength Decreased ADL/Functional Activities Decreased Transfer Abilities Decreased Balance Decreased Activity Tolerance Decreased Pacing Skills Decreased Work Simplification/Energy Conservation Techniques Increased Fatigue Increased Shortness of Breath INTERVENTIONS PLANNED: (Benefits and precautions of occupational therapy have been discussed with the patient.) Activities of daily living training Adaptive equipment training Therapeutic activity Therapeutic exercise TREATMENT PLAN: Frequency/Duration: Follow patient 3x/week to address above goals. Rehabilitation Potential For Stated Goals: Fair REHAB RECOMMENDATIONS (at time of discharge pending progress):   
Placement: It is my opinion, based on this patient's performance to date, that Mr. Andrea Escalante may benefit from [address] after discharge due to the functional deficits listed above that are likely to improve with skilled rehabilitation because he/she has multiple medical issues that affect his/her functional mobility in the community and because he/she has an inability to tolerate transportation to an outpatient setting as evidenced by desaturating on HFNC with minimal exertion in bed . Equipment:  
Hygiene Aides, Type: 3:1 bed-side commode OCCUPATIONAL PROFILE AND HISTORY:  
History of Present Injury/Illness (Reason for Referral): 
See H&P Past Medical History/Comorbidities: Mr. Ronn Johnston  has a past medical history of Acute coronary syndrome ([facility] (8/4/2018), CAD (coronary artery disease), Current chronic use of systemic steroids (03/2019), GERD (gastroesophageal reflux disease), Hypertension, Interstitial pneumonitis ([facility] NSTEMI (non-ST elevated myocardial infarction) ([facility] (1/28/2017), Obesity, Osteoarthritis, Pulmonary fibrosis ([facility] (03/2019), and Pulmonary nodule. Mr. Ronn Johnston  has a past surgical history that includes hx heart catheterization; vascular surgery procedure unlist; and hx tonsillectomy. Social History/Living Environment:  
Home Environment: Private residence # Steps to Enter: 3 One/Two Story Residence: One story Living Alone: No 
Support Systems: Child(samm) Patient Expects to be Discharged to[de-identified] Private residence Current DME Used/Available at Home: Glucometer Prior Level of Function/Work/Activity: 
Unsure of baseline, pt desating and unable to respond to questions. Personal Factors:   
      Sex:  male Age:  71 y.o. Social Background:  strong family support Number of Personal Factors/Comorbidities that affect the Plan of Care: Expanded review of therapy/medical records (1-2):  MODERATE COMPLEXITY ASSESSMENT OF OCCUPATIONAL PERFORMANCE[de-identified]  
Activities of Daily Living:  
Basic ADLs (From Assessment) Complex ADLs (From Assessment) Feeding: Setup Oral Facial Hygiene/Grooming: Setup Bathing: Minimum assistance Upper Body Dressing: Setup Lower Body Dressing: Minimum assistance Toileting: Minimum assistance Grooming/Bathing/Dressing Activities of Daily Living Cognitive Retraining Safety/Judgement: Awareness of environment Bed/Mat Mobility Rolling: Supervision Supine to Sit: Stand-by assistance Sit to Supine: Minimum assistance Scooting: Contact guard assistance Most Recent Physical Functioning:  
Gross Assessment: 
AROM: Within functional limits Strength: Generally decreased, functional 
 Coordination: Within functional limits Posture: 
Posture (WDL): Exceptions to [facility] Posture Assessment: Cervical, Forward head Balance: 
Sitting: Intact Bed Mobility: 
Rolling: Supervision Supine to Sit: Stand-by assistance Sit to Supine: Minimum assistance Scooting: Contact guard assistance Wheelchair Mobility: 
  
Transfers: 
   
 
    
 
Patient Vitals for the past 6 hrs: 
 BP SpO2 O2 Flow Rate (L/min) Pulse 06/16/19 1120 137/83 (!) 88 %  77  
06/16/19 1206  90 % 12 l/min Mental Status Neurologic State: Alert Orientation Level: Oriented X4 Cognition: Follows commands Perception: Appears intact Perseveration: No perseveration noted Safety/Judgement: Awareness of environment Physical Skills Involved: 
Balance Strength Activity Tolerance Cognitive Skills Affected (resulting in the inability to perform in a timely and safe manner): 
N/a  Psychosocial Skills Affected: 
Habits/Routines Environmental Adaptation Social Roles Number of elements that affect the Plan of Care: 5+:  HIGH COMPLEXITY CLINICAL DECISION MAKING:  
[facility] AM-PAC? ?6 Clicks? Daily Activity Inpatient Short Form How much help from another person does the patient currently need. .. Total A Lot A Little None 1. Putting on and taking off regular lower body clothing? ? 1   ? 2   ? 3   ? 4  
2. Bathing (including washing, rinsing, drying)? ? 1   ? 2   ? 3   ? 4  
3. Toileting, which includes using toilet, bedpan or urinal?   ? 1   ? 2   ? 3   ? 4  
4. Putting on and taking off regular upper body clothing? ? 1   ? 2   ? 3   ? 4  
5. Taking care of personal grooming such as brushing teeth? ? 1   ? 2   ? 3   ? 4  
6. Eating meals? ? 1   ? 2   ? 3   ? 4  
© 2007, Trustees of [facility], under license to [employer]. All rights reserved Score:  Initial: 21 Most Recent: X (Date: -- ) Interpretation of Tool:  Represents activities that are increasingly more difficult (i.e. Bed mobility, Transfers, Gait). Medical Necessity:    
Patient is expected to demonstrate progress in strength and activity tolerance  
 to decrease assistance required with ADLs Hugh Carbajal Reason for Services/Other Comments: 
Patient continues to require skilled intervention due to medical complications and patient unable to attend/participate in therapy as expected Hugh Carbajal Use of outcome tool(s) and clinical judgement create a POC that gives a: MODERATE COMPLEXITY  
 
 
 
TREATMENT:  
(In addition to Assessment/Re-Assessment sessions the following treatments were rendered) Pre-treatment Symptoms/Complaints:   
Pain: Initial:  
Pain Intensity 1: 0  Post Session:  same Therapeutic Activity: (    8 minutes): Therapeutic activities including Bed transfers and sitting tolerance to improve mobility, strength, balance and activity tolerance . Required minimal verbal/visual   to deep breathing techniques at EOB . Braces/Orthotics/Lines/Etc:  
O2 Device: Hi flow nasal cannula Treatment/Session Assessment:   
Response to Treatment:  tolerated poorly as noted above. Interdisciplinary Collaboration: Occupational Therapist 
Registered Nurse After treatment position/precautions:  
Supine in bed Bed/Chair-wheels locked Bed in low position Call light within reach RN notified Family at bedside Compliance with Program/Exercises: Will assess as treatment progresses. Recommendations/Intent for next treatment session: \"Next visit will focus on advancements to more challenging activities and reduction in assistance provided\". Total Treatment Duration: OT Patient Time In/Time Out Time In: 1400 Time Out: 1430 [facility]

## 2019-06-16 NOTE — PROGRESS NOTES
Called to room per family/ \"Oxygen dropping down to 81-82%\". In bed. Attempting to eat. Multiple visitor. Encouraged pursed lip-breathing. Ativan 0.5 mg IVP given for comfort. RT paged and made aware. Pre-meal insulin and s/s insulin held for now. Spoke with Randell Lucero NP and made aware. No new orders at present. 1815- RT at bedside. O2 sats remains in 85-86%. Oxygen increased to 70% 50 liters per RT. sats now 96%. Will continue to monitor closely.

## 2019-06-16 NOTE — PROGRESS NOTES
Lenore Cox Admission Date: 6/11/2019 Daily Progress Note: 6/16/2019 The patient's chart is reviewed and the patient is discussed with the staff. 71 y.o. CM evaluated at the request of Dr. Delaney Kasper presented with worsening dyspnea. Has hx of ground glass/pulmonary fibrosis/UIP on chest CT in 2017. Hx CAD with previous CABG and had stent placement in 2017 and 5/4/19.  Was seen by Dr. Kimberly Olvera in March for worsening shortness of breath and unproductive cough. Was treated with Prednisone taper in March and has tapered the dose by 10 mg per month as directed--was taking 20 mg per day for the past 3 weeks. Was started on home O2 at 2L then, had a positive PHOENIX (ratio 1:80) and ESR of 14. All other studies were negative to include a RF, scleroderma, aspergillus, hypersensitivity profile studies. Prednisone alleviated the cough but has not helped the dyspnea and he can now barely walk across the room. [facility] smoked for about 30 years - 1 ppd. His chart lists restrictive lung disease but I don't see any PFTs for review. Is retired, previously worked changing brakes, has 2 dogs. With his concern regarding an allergic reaction, he has recently been trying to remove the carpet and backing from his home.  
Chest CT 6/11/19 showed progression of pulmonary fibrosis/UIP from 2017. PCI performed for chest pain. On 6/13 developed worsening shortness of breath,O2 increased to 8L, given Morphine and IV steroids. Air-Vo added but still with dyspnea and moved to ICU anticipating need for BIPAP. Discussion with patient for code status and DNR confirmed. Was continued on HFNC and did not require BIPAP. Subjective: On 12L NC No major change Current Facility-Administered Medications Medication Dose Route Frequency  morphine injection 1 mg  1 mg IntraVENous Q2H PRN  
 insulin glargine (LANTUS) injection 18 Units  18 Units SubCUTAneous DAILY  morphine IR (MS IR) tablet 30 mg  30 mg Oral Q12H  
 tuberculin injection 5 Units  5 Units IntraDERMal ONCE  
 ranolazine ER (RANEXA) tablet 500 mg  500 mg Oral BID  metoprolol tartrate (LOPRESSOR) tablet 50 mg  50 mg Oral Q6H  
 senna-docusate (PERICOLACE) 8.6-50 mg per tablet 1 Tab  1 Tab Oral DAILY  LORazepam (ATIVAN) injection 0.5 mg  0.5 mg IntraVENous Q3H PRN  
 albuterol-ipratropium (DUO-NEB) 2.5 MG-0.5 MG/3 ML  3 mL Nebulization Q4H RT  
 budesonide (PULMICORT) 500 mcg/2 ml nebulizer suspension  500 mcg Nebulization BID RT  
 methylPREDNISolone (PF) (Solu-MEDROL) injection 60 mg  60 mg IntraVENous Q8H  
 isosorbide mononitrate ER (IMDUR) tablet 30 mg  30 mg Oral DAILY  sodium chloride (NS) flush 5-40 mL  5-40 mL IntraVENous Q8H  
 sodium chloride (NS) flush 5-40 mL  5-40 mL IntraVENous PRN  
 acetaminophen (TYLENOL) tablet 650 mg  650 mg Oral Q6H PRN  
 diphenhydrAMINE (BENADRYL) capsule 25 mg  25 mg Oral Q6H PRN  
 ondansetron (ZOFRAN) injection 4 mg  4 mg IntraVENous Q6H PRN  
 enoxaparin (LOVENOX) injection 40 mg  40 mg SubCUTAneous Q24H  
 aspirin delayed-release tablet 81 mg  81 mg Oral DAILY  atorvastatin (LIPITOR) tablet 80 mg  80 mg Oral DAILY  pantoprazole (PROTONIX) tablet 40 mg  40 mg Oral ACB  nitroglycerin (NITROSTAT) tablet 0.4 mg  0.4 mg SubLINGual Q5MIN PRN  
 ticagrelor (BRILINTA) tablet 90 mg  90 mg Oral Q12H  
 insulin lispro (HUMALOG) injection   SubCUTAneous AC&HS Review of Systems Constitutional: negative for fever, chills, sweats Cardiovascular: negative for chest pain, palpitations, syncope, edema Gastrointestinal:  negative for dysphagia, reflux, vomiting, diarrhea, abdominal pain, or melena Neurologic:  negative for focal weakness, numbness, headache Objective:  
 
Vitals:  
 06/16/19 9260 06/16/19 3673 06/16/19 0703 06/16/19 2778 BP:  120/88  (P) 126/73 Pulse:  80  (P) 80 Resp:    (P) 20 Temp:    (P) 98 °F (36.7 °C) SpO2: 90%  (!) 88% (P) 90% Weight:      
Height:      
 
Intake and Output:  
06/14 1901 - 06/16 0700 In: 1020 [P.O.:1020] Out: 1225 [Urine:1225] No intake/output data recorded. Physical Exam:  
Constitution:  the patient is well developed and in no acute distress EENMT:  Sclera clear, pupils equal, oral mucosa moist 
Respiratory: crackels Cardiovascular:  RRR without M,G,R 
Gastrointestinal: soft and non-tender; with positive bowel sounds. Musculoskeletal: warm without cyanosis. There is no lower extremity edema. Skin:  no jaundice or rashes, no wounds Neurologic: no gross neuro deficits Psychiatric:  alert and oriented x 3 CXR:  
 
 
LAB Recent Labs  
  06/16/19 
0545 06/15/19 
2029 06/15/19 
1533 06/15/19 
1116 06/15/19 
0532 GLUCPOC 288* 249* 266* 370* 258* Recent Labs  
  06/16/19 
0629 06/15/19 
0616 06/14/19 
0246 06/13/19 
1206 WBC 19.7* 18.6* 9.5 12.9* HGB 14.2 12.9* 12.4* 13.0*  
HCT 43.9 40.1* 38.4* 40.7*  280 243 245 Recent Labs  
  06/16/19 
5175 06/15/19 
0616 06/14/19 
0246  139 138  
K 5.0 4.6 3.8 CL 99 103 103 CO2 32 30 28 * 248* 304* BUN 36* 33* 19  
CREA 1.22 1.06 1.04  
CA 9.5 9.1 8.8 Assessment:  (Medical Decision Making) Hospital Problems  Date Reviewed: 6/14/2019 Codes Class Noted POA  
 DM [facility]diabetes mellitus) [facility] (Chronic) ICD-10-CM: E11.9 ICD-9-CM: 250.00  6/14/2019 Yes Dyspnea ICD-10-CM: R06.00 
ICD-9-CM: 786.09  6/11/2019 Yes Chronic respiratory failure with hypoxia (HCC) (Chronic) ICD-10-CM: J96.11 
ICD-9-CM: 518.83, 799.02  6/11/2019 Yes * (Principal) Acute on chronic respiratory failure with hypoxia (HCC) ICD-10-CM: J96.21 
ICD-9-CM: 518.84, 799.02  6/11/2019 Yes Chest pain ICD-10-CM: R07.9 ICD-9-CM: 786.50  6/11/2019 Unknown Pulmonary fibrosis (HCC) (Chronic) ICD-10-CM: J84.10 ICD-9-CM: 080  3/1/2019 Yes Current chronic use of systemic steroids (Chronic) ICD-10-CM: Z79.52 
ICD-9-CM: V58.65  3/1/2019 Yes Overview Signed 5/4/2019  1:41 PM by Lisette Godoy NP  
  begun with diagnosis of pulmonary fibrosis CAD (coronary artery disease) (Chronic) ICD-10-CM: I25.10 ICD-9-CM: 414.00  11/1/2010 Yes HTN (hypertension), benign (Chronic) ICD-10-CM: I10 
ICD-9-CM: 401.1  11/1/2010 Yes Plan:  (Medical Decision Making)  
 
--wean 02 as tolerated- on 2L NC  
- steroids - pain control - PC, may be hospice candidate  - discussion started with family More than 50% of the time documented was spent in face-to-face contact with the patient and in the care of the patient on the floor/unit where the patient is located.  
 
Andrei Silva MD

## 2019-06-17 NOTE — PROGRESS NOTES
Daily Progress Note: 6/17/2019 Eloy Oneil Admission Date: 6/11/2019 The patient's chart is reviewed and the patient is discussed with the staff. 71 y.o. CM evaluated at the request of Dr. Sinan Braden presented with worsening dyspnea. Has hx of ground glass/pulmonary fibrosis/UIP on chest CT in 2017. Hx CAD-previous CABG, PCI 2017 and 5/4/19.  Has been seen by Dr. Wolfgang De Anda in March for worsening shortness of breath and unproductive cough. Was treated with Prednisone taper in March and has tapered the dose by 10 mg per month as directed--was taking 20 mg per day for the past 3 weeks. Was started on home O2 at 2L then, had a positive PHOENIX (ratio 1:80) and ESR of 14. All other studies were negative to include a RF, scleroderma, aspergillus, hypersensitivity profile studies. Prednisone alleviated the cough but has not helped the dyspnea and he can now barely walk across the room. [facility] smoked for about 30 years - 1 ppd. His chart lists restrictive lung disease, no PFTs for review. Is retired, previously worked changing brakes, has 2 dogs. With his concern regarding an allergic reaction, he has recently been trying to remove the carpet and backing from his home.  
Chest CT 6/11/19 showed progression of pulmonary fibrosis/UIP from 2017. PCI performed for chest pain. On 6/12 had PCI to LAD by cardiology. On 6/13 developed worsening shortness of breath,O2 increased to 8L, given Morphine and IV steroids. Air-Vo added but still with dyspnea and moved to ICU anticipating need for BIPAP but did not need. Discussion with patient for code status and DNR confirmed. Palliative Care added Morphine and Ativan for symptom management. Was continued on HFNC. Was moved to the floor. Requried HFNC 15L and placed on Opti-flow 6/16. Subjective: Hypoxia and shortness of breath last night, NC increased to 15L for sat 81% and placed on . Was placed on Opti-flow 70%, 40L during the night and states is lsees short of breath. Sitting up in the bed, complains of feeling sleepy--received Morphine at 8AM. Son at the bedside. Current Facility-Administered Medications Medication Dose Route Frequency  insulin lispro (HUMALOG) injection 5 Units  5 Units SubCUTAneous TIDAC  
 bisacodyl (DULCOLAX) suppository 10 mg  10 mg Rectal DAILY PRN  polyethylene glycol (MIRALAX) packet 17 g  17 g Oral DAILY  morphine injection 1 mg  1 mg IntraVENous Q2H PRN  
 insulin glargine (LANTUS) injection 18 Units  18 Units SubCUTAneous DAILY  morphine IR (MS IR) tablet 30 mg  30 mg Oral Q12H  
 ranolazine ER (RANEXA) tablet 500 mg  500 mg Oral BID  metoprolol tartrate (LOPRESSOR) tablet 50 mg  50 mg Oral Q6H  
 senna-docusate (PERICOLACE) 8.6-50 mg per tablet 1 Tab  1 Tab Oral DAILY  LORazepam (ATIVAN) injection 0.5 mg  0.5 mg IntraVENous Q3H PRN  
 albuterol-ipratropium (DUO-NEB) 2.5 MG-0.5 MG/3 ML  3 mL Nebulization Q4H RT  
 budesonide (PULMICORT) 500 mcg/2 ml nebulizer suspension  500 mcg Nebulization BID RT  
 methylPREDNISolone (PF) (Solu-MEDROL) injection 60 mg  60 mg IntraVENous Q8H  
 isosorbide mononitrate ER (IMDUR) tablet 30 mg  30 mg Oral DAILY  sodium chloride (NS) flush 5-40 mL  5-40 mL IntraVENous Q8H  
 sodium chloride (NS) flush 5-40 mL  5-40 mL IntraVENous PRN  
 acetaminophen (TYLENOL) tablet 650 mg  650 mg Oral Q6H PRN  
 diphenhydrAMINE (BENADRYL) capsule 25 mg  25 mg Oral Q6H PRN  
 ondansetron (ZOFRAN) injection 4 mg  4 mg IntraVENous Q6H PRN  
 enoxaparin (LOVENOX) injection 40 mg  40 mg SubCUTAneous Q24H  
 aspirin delayed-release tablet 81 mg  81 mg Oral DAILY  atorvastatin (LIPITOR) tablet 80 mg  80 mg Oral DAILY  pantoprazole (PROTONIX) tablet 40 mg  40 mg Oral ACB  nitroglycerin (NITROSTAT) tablet 0.4 mg  0.4 mg SubLINGual Q5MIN PRN  
 ticagrelor (BRILINTA) tablet 90 mg  90 mg Oral Q12H  insulin lispro (HUMALOG) injection   SubCUTAneous AC&HS Review of Systems Constitutional:  negative for fever, chills, sweats Cardiovascular:  Neck and chest pain, negative for palpitations, syncope, edema Gastrointestinal:  negative for dysphagia, reflux, vomiting, diarrhea, abdominal pain, or melena Neurologic:  negative for focal weakness, numbness, headache Objective:  
 
Vitals:  
 06/17/19 0349 06/17/19 0405 06/17/19 0700 06/17/19 0137 BP: 144/76  (!) 150/91 Pulse: 75  70 Resp: 22  18 Temp: 98.6 °F (37 °C)  97.5 °F (36.4 °C) SpO2: 95% 97% 95% 95% Weight: 195 lb 11.2 oz (88.8 kg) Height:      
 
 
Intake and Output:  
06/15 1901 - 06/17 0700 In: 720 [P.O.:720] Out: 1425 [DEKPB:4321] No intake/output data recorded. Physical Exam:         
Constitutional:  the patient is well developed, in no acute distress, requiring Opti-flow 70%, 40L, sat 95% EENMT:  Sclera clear, pupils equal, oral mucosa moist 
Respiratory: few anterior and posterior crackles, no wheezing Cardiovascular:  RRR without M,G,R 
Gastrointestinal: soft and non-tender; with positive bowel sounds. Musculoskeletal: warm without cyanosis. There is no lower extremity edema. Skin:  Extensive ecchymosis left leg--recent fall. No jaundice or rashes, no open wounds Neurologic: no gross neuro deficits Psychiatric:  alert and oriented x 3 LINES:   
PIV site CXR:  
6/13/19: Worsening pulmonary infiltrates which may represent worsening pulmonary edema given the bilateral distribution and associated cardiomegaly. LAB Recent Labs  
  06/17/19 
0533 06/16/19 
2052 06/16/19 
1611 06/16/19 
1123 06/16/19 
0545 GLUCPOC 212* 225* 214* 262* 288* Recent Labs  
  06/17/19 
8639 06/16/19 
8238 06/15/19 
1995 WBC 16.0* 19.7* 18.6* HGB 13.8 14.2 12.9*  
HCT 42.6 43.9 40.1*  
 314 280 Recent Labs  
  06/17/19 
6652 06/16/19 
4796 06/15/19 
6823  137 139  
K 4.3 5.0 4.6  99 103 CO2 29 32 30 * 278* 248* BUN 38* 36* 33* CREA 0.93 1.22 1.06  
CA 9.2 9.5 9.1 No results for input(s): LCAD, LAC in the last 72 hours. No results for input(s): PH, PCO2, PO2, HCO3, PHI, PCO2I, PO2I, HCO3I in the last 72 hours. Assessment:  (Medical Decision Making) Patient Active Problem List  
Diagnosis Code  Unstable angina (HCC) I20.0  CAD (coronary artery disease) I25.10  
 HTN (hypertension), benign I10  
 Other and unspecified hyperlipidemia E78.5  Peripheral vascular disease-s/p PPI to right common iliac 5/4/11 I73.9  Restrictive airway disease J98.4  
 SOB (shortness of breath) R06.02  
 Pulmonary fibrosis (HCC) J84.10  Current chronic use of systemic steroids Z79.52  
 Obesity E66.9  Dyspnea R06.00  Chronic respiratory failure with hypoxia (HCC) J96.11  
 Ster[facility] diabetes [facility] E09.9, T38.0X5A  Acute on chronic respiratory failure with hypoxia (HCC) J96.21  
 Chest pain R07.9  DM (diabetes mellitus) ([facility] E11.9 Plan:  (Medical Decision Making) Hospital Problems  Date Reviewed: 6/14/2019 Codes Class Noted POA  
 DM (diabetes mellitus) ([facility] (Chronic) ICD-10-CM: E11.9 ICD-9-CM: 250.00  6/14/2019 Yes Chronic--ranges 212-288, per hospitalist  
 Dyspnea ICD-10-CM: R06.00 
ICD-9-CM: 786.09  6/11/2019 Yes Improved on current O2 flow--on Opti-flow Chronic respiratory failure with hypoxia (HCC) (Chronic) ICD-10-CM: J96.11 
ICD-9-CM: 518.83, 799.02  6/11/2019 Yes Home O2 2L-- now on Opti-flow Steroid-induced diabetes ([facility] ICD-10-CM: E09.9, T38.0X5A 
ICD-9-CM: 249.00, E980.4  6/11/2019 Yes Glucose ranges 214-288 * (Principal) Acute on chronic respiratory failure with hypoxia (HCC) ICD-10-CM: J96.21 
ICD-9-CM: 518.84, 799.02  6/11/2019 Yes Requiring Opti-flow 70%, 40L Chest pain ICD-10-CM: R07.9 ICD-9-CM: 786.50  6/11/2019 Unknown Resolved Pulmonary fibrosis (HCC) (Chronic) ICD-10-CM: J84.10 ICD-9-CM: 949  3/1/2019 Yes Chronic--progression on recent chest CT Current chronic use of systemic steroids (Chronic) ICD-10-CM: Z79.52 
ICD-9-CM: V58.65  3/1/2019 Yes Overview Signed 5/4/2019  1:41 PM by Elke Joel NP  
  begun with diagnosis of pulmonary fibrosis Consider weaning IV steroids CAD (coronary artery disease) (Chronic) ICD-10-CM: I25.10 ICD-9-CM: 414.00  11/1/2010 Yes PCI to LAD 6/12/19 HTN (hypertension), benign (Chronic) ICD-10-CM: I10 
ICD-9-CM: 401.1  11/1/2010 Yes Chronic--controlled  
  
 
 
--Duoneb, Pulmicort 
--Solu Medrol 60mg b7g--ln wheezing, consider wean?? 
--WBC 16.0 
--Morphine IR 30mg q12h 
--Wean O2 as tolerated--home O2 flow 2L 
--Palliative Care following for symptom management of dyspnea with progressive pulmonary fibrosis. Hospice consult ordered. Currently not a candidate for home hospice due to high O2 demands. More than 50% of the time documented was spent in face-to-face contact with the patient and in the care of the patient on the floor/unit where the patient is located. Windy Holguin NP Lungs:    Few crackles Heart:  RRR with no Murmur/Rubs/Gallops Additional Comments:  Increasing O2 requirements,  Hospice being consulted,  Will decrease steroids I have spoken with and examined the patient. I agree with the above assessment and plan as documented.  
 
Antoni Bedolla MD

## 2019-06-17 NOTE — CONSULTS
Palliative Care Patient: Cathy Celis MRN: [id number]  SSN: [id number] YOB: 1955  Age: 71 y.o. Sex: male Date of Request: 6/15/2019 Date of Consult:  6/17/2019 Reason for Consult:  advanced care plan planning/end of life, pain and symptom management and medical decision making Requesting Physician: Dr. Julius Correa Assessment/Plan:  
 
Principal Diagnosis: Dyspnea  R06.00-morphine and ativan prn; lengthy discussion with pt regarding the likelihood that to get good symptom management, we may give up some level of alertness. Additional Diagnoses: · Anxiety  F41.1-ativan prn · Debility, Unspecified  R53.81-see below · Counseling, Encounter for Medical Advice  Z71.9 
· Encounter for Palliative Care  Z51.5 Palliative Performance Scale (PPS): PPS: 40 Medical Decision Making:  
Reviewed and summarized H&P and notes since admission. Discussed case with appropriate providers: discussed with Don, NP and pt primary RN. Reviewed laboratory and x-ray data. Lengthy conversation with pt and son regarding goals of care, hospice care, location of care, and symptom management. Pt would like to be home and would like to discuss this with his common-law-wife. Son maintains that he will respect this, however he is concerned that pt wife is oxygen dependent and will certainly not be able to care for pt in the home, even with hospice support. We discussed both home hospice and GIP hospice. Pt requiring IV morphine for symptom control and would likely met criteria for IP care. He is currently on optiflow, so discussion was had that pt would have to be stable on a decreased amount of O2 to even consider leaving hospital.  I am not sure at this point if this will be feasible. Pt is a DNR. He will meet with hospice at some point today to discuss further. Will see if able to wean from optiflow to determine whether or not dc is possible. Will discuss findings with members of the interdisciplinary team.   
 
Thank you for this referral.    
 
. 
 
Subjective:  
 
History obtained from:  Patient, Family, Care Provider and Chart Chief Complaint: dyspnea/anxiety History of Present Illness: This is a 71year old male admitted 6/11/2019 with dyspnea and worsening hypoxia. He has been O2 dependent on 2l/min via nc at home. Has pulmonary fibrosis. There has been some discussion regarding lung transplant, just this hospital admission. Unfortunately, pt respiratory status has deteriorated over the past 24 hours with O2 sat 81% on 15L/min oxygen which prompted transition to Optiflow which pt remains on now with O2 sat 95%. Hospice discussions have been begun with pt and family. Pt wishes to be at home, but son quite concerned that this is doable at home. Advance Directive: No      
Code Status:  DNR Health Care Power of : No - Patient does not have a [address] Past Medical History:  
Diagnosis Date  Acute coronary syndrome ([facility] 8/4/2018  CAD (coronary artery disease) CABG 1999, stents after (last placed 2/17)  Current chronic use of systemic steroids 03/2019  
 begun with diagnosis of pulmonary fibrosis  GERD (gastroesophageal reflux disease) PRN meds  Hypertension  Interstitial pneumonitis (HCC)   
 per CT chest 3/10/17 (daily inhalers, no home 02)  NSTEMI (non-ST elevated myocardial infarction) ([address] 1/28/2017  Obesity  Osteoarthritis   
 hands, knees  Pulmonary fibrosis ([address] 03/2019  Pulmonary nodule 8mm per CT chest 3/10/17 (biopsy WNL) Past Surgical History:  
Procedure Laterality Date  HX HEART CATHETERIZATION    
 multiple  HX TONSILLECTOMY  VASCULAR SURGERY PROCEDURE UNLIST CABG Family History Problem Relation Age of Onset  Heart Disease Mother  Cancer Mother UNKNOWN TYPE  
 Heart Disease Father Social History Tobacco Use  Smoking status: Former Smoker Packs/day: 0.25 Years: 30.00 Pack years: 7.50 Last attempt to quit: 2018 Years since quittin.4  Smokeless tobacco: Never Used Substance Use Topics  Alcohol use: No  
 
Prior to Admission medications Medication Sig Start Date End Date Taking? Authorizing Provider  
insulin glargine (LANTUS) 100 unit/mL injection 30 Units by SubCUTAneous route daily. 5/15/19   Siomara White MD  
Oxygen Indications: 2L Contin. Provider, Historical  
atorvastatin (LIPITOR) 80 mg tablet Take 1 Tab by mouth daily. 19   Hollie Buchanan MD  
metoprolol tartrate (LOPRESSOR) 25 mg tablet Take 0.5 Tabs by mouth every twelve (12) hours. 19   Xiao Bailey NP  
ticagrelor (BRILINTA) 90 mg tablet Take 1 Tab by mouth every twelve (12) hours every twelve (12) hours. 19   Xiao Bailey NP  
lancets misc 2 Boxes 19   Xiao Bailey NP  
glucose blood VI test strips (ASCENSIA AUTODISC VI, ONE TOUCH ULTRA TEST VI) strip 1 Box (10 bottles) 19   Xiao Bailey NP Blood-Glucose Meter monitoring kit 1 Meter Kit 19   Xiao Bailey NP  
insulin lispro (HUMALOG) 100 unit/mL injection Give 5 Units with every meal while on Prednizone. Please stop when  Prednisone doses are finished and reassess insulin regiment with your PCP 19   Xiao Bailey NP  
insulin lispro (HUMALOG) 100 unit/mL injection For BGL of less than 150 = 0 units, 150 -199 = 2 units, 200 -249 = 4 unit, 250 -299 = 6 units, 300 -349 = 8 units, 350 and above =   10 units and Call MD, Start Hypoglycemic protocol if blood glucose is <70 mg/dL. Fast Acting - Administer Immediately - or within 15 minutes of start of meal. 19   Xiao Bailey NP Insulin Syringe-Needle U-100 0.3 mL 29 gauge x 1/2\" syrg 2 Box off 100 or more Syringes with 5 refills 19   Xiao Bailey NP  
nitroglycerin (NITROSTAT) 0.4 mg SL tablet 1 Tab by SubLINGual route every five (5) minutes as needed for Chest Pain (after 3 doses / 15 mins, if chest pain persists, contact EMS/911). 5/6/19   Miri Crespo MD  
predniSONE (DELTASONE) 20 mg tablet Take 20 mg by mouth every morning. Takes 20 mg q am and 10 mg q pm since April 28. To take this dose 30 days. Provider, Historical  
isosorbide mononitrate ER (IMDUR) 30 mg tablet Take 30 mg by mouth daily. Provider, Historical  
esomeprazole (NEXIUM) 40 mg capsule Take  by mouth daily. Provider, Historical  
lisinopril (PRINIVIL, ZESTRIL) 2.5 mg tablet Take 1 Tab by mouth daily. 8/5/18   EVERARDO Benitez  
acetaminophen (TYLENOL) 325 mg tablet Take 325 mg by mouth as needed for Pain. Provider, Historical  
aspirin 81 mg tablet Take 81 mg by mouth every morning. Provider, Historical  
 
 
Allergies Allergen Reactions  Aspirin Swelling Takes 81 mg at home. Patient can tolerate Aspirin in low doses, but in larger doses causes swelling. Review of Systems: A comprehensive review of systems was negative except for: Constitutional: Positive for fatigue. Respiratory: Positive for dyspnea. Behavioral: anxiety. Objective:  
 
Visit Vitals /75 Pulse (!) 57 Temp 97.8 °F (36.6 °C) Resp 18 Ht 5' 6\" (1.676 m) Wt 195 lb 11.2 oz (88.8 kg) SpO2 (!) 89% BMI 31.59 kg/m² Physical Exam: 
 
General:  Cooperative. Ill appearing; son at bedside. Eyes:  Conjunctivae/corneas clear Nose: Nares normal. Septum midline. Neck: Supple, symmetrical, trachea midline, no JVD Lungs:   Labored at rest  
Heart:  Regular rate and rhythm Abdomen:   Soft, non-tender, non-distended Extremities: Normal, atraumatic, no cyanosis or edema Skin: Skin color, texture, turgor normal. No rash or lesions. Neurologic: Delayed responses Psych: Alert and oriented x3 Assessment:  
 
Hospital Problems  Date Reviewed: 6/17/2019 Codes Class Noted POA DM (diabetes mellitus) (HCC) (Chronic) ICD-10-CM: E11.9 ICD-9-CM: 250.00  6/14/2019 Yes Dyspnea ICD-10-CM: R06.00 
ICD-9-CM: 786.09  6/11/2019 Yes Chronic respiratory failure with hypoxia (HCC) (Chronic) ICD-10-CM: J96.11 
ICD-9-CM: 518.83, 799.02  6/11/2019 Yes [facility] ICD-10-CM: E09.9, T38.0X5A 
ICD-9-CM: 249.00, E980.4  6/11/2019 Yes * (Principal) Acute on chronic respiratory failure with hypoxia (HCC) ICD-10-CM: J96.21 
ICD-9-CM: 518.84, 799.02  6/11/2019 Yes Chest pain ICD-10-CM: R07.9 ICD-9-CM: 786.50  6/11/2019 Unknown Pulmonary fibrosis (HCC) (Chronic) ICD-10-CM: J84.10 ICD-9-CM: 603  3/1/2019 Yes Current chronic use of systemic steroids (Chronic) ICD-10-CM: Z79.52 
ICD-9-CM: V58.65  3/1/2019 Yes Overview Signed 5/4/2019  1:41 PM by Hina Raphael NP  
  begun with diagnosis of pulmonary fibrosis CAD (coronary artery disease) (Chronic) ICD-10-CM: I25.10 ICD-9-CM: 414.00  11/1/2010 Yes HTN (hypertension), benign (Chronic) ICD-10-CM: I10 
ICD-9-CM: 401.1  11/1/2010 Yes Signed By: Lore Chavez NP   
 June 17, 2019

## 2019-06-17 NOTE — ACP (ADVANCE CARE PLANNING)
Lengthy conversation with pt and son regarding goals of care, hospice care, location of care, and symptom management. Pt would like to be home and would like to discuss this with his common-law-wife. Son maintains that he will respect this, however he is concerned that pt wife is oxygen dependent and will certainly not be able to care for pt in the home, even with hospice support. We discussed both home hospice and GIP hospice. Pt requiring IV morphine for symptom control and would likely met criteria for IP care. He is currently on optiflow, so discussion was had that pt would have to be stable on a decreased amount of O2 to even consider leaving hospital.  I am not sure at this point if this will be feasible. Pt is a DNR. He will meet with hospice at some point today to discuss further. Will see if able to wean from optiflow to determine whether or not dc is possible. Pt does not have HCPOA. He has a common law spouse and his son is at bedside. In addition to visit, an additional 25 minutes was spent face to face in advanced care planning. Romeo Wong, ANP-BC, [facility] Palliative Care Team

## 2019-06-17 NOTE — HOSPICE
Met with family at bedside and discussed hospice philosophy, levels of care, services hospice offers, medicare hospice benefit, managment of symptoms> Case discussed with Dr. Seda Swartz and if patient able to wean and remain stable for transport to 15L/min HFNC than would be a candidate for [facility] level of care [facility]. Spoke with Dr. Eriberto Ramirez and she is aware. Will continue to follow. Family on board with transfer if patient is stable and a bed is available. Thank you for th referral. 
Myriam Dye, RN, BSN Community Nurse Liaison [facility] [phone]

## 2019-06-17 NOTE — PROGRESS NOTES
Bedside report received from night nurse Wilner Wick. Assessment done as noted  Respiration even and unlabored 20/min; denies pain or nausea at present. Remains on remote tele with sinus cony with 56 confirmed per monitor tech. Remains on Opti-Mauro. Family remains at bedside. Awaiting hospice. Encouraged to call with needs.

## 2019-06-17 NOTE — PROGRESS NOTES
Monitor tech reports patient with 6 beat run of v-tach. Pt assessed. In bed resting with eyes closed. Awakened. Denies chest pain, denies shortness of breath. Asymptomatic at present. Held ordered insulin Lantus and Humalog due to patient's condition and poor intake. Spoke with Dr. Judy Paz and made aware. No new orders received.

## 2019-06-17 NOTE — PROGRESS NOTES
Called to room per RT. Pt coughing while trying to eat and de-sats into 60s with O2 100% on opti-tim. NPO for now. Morphine 1mg slow IVP given. Switched to mask while on opti-tim. O2 now 92% and on 88L O2. Dr. Cruz Nava made aware and state will come and see pt soon. Will monitor closely.

## 2019-06-17 NOTE — PROGRESS NOTES
PT Note: 
 
Discussed during IDT rounds. Pt is pursing hospice at this time. Will discharge from PT services. Thanks, KRIS ErnandezT

## 2019-06-17 NOTE — PROGRESS NOTES
Patient has been accepted for admission to [facility] providing that his O2 can be weaned below 15 Lpm via HFNC and patient remain stable enough for transport to the hospice house. Case Management will continue to follow. Care Management Interventions PCP Verified by CM: Yes 
Palliative Care Criteria Met (RRAT>21 & CHF Dx)?: No(RRAT 15 Dx resp failure) Transition of Care Consult (CM Consult): Home Hospice, [address]: Yes [facility]: Yes Physical Therapy Consult: No 
Occupational Therapy Consult: No 
Current Support Network: Other Confirm Follow Up Transport: Family Plan discussed with Pt/Family/Caregiver: Yes Freedom of Choice Offered: Yes Discharge Location Discharge Placement: Home with hospice

## 2019-06-17 NOTE — PROGRESS NOTES
desats with eating, family understands likely to have trouble with O2 wean and does not want to deprive patient of food and make NPO, they will discuss going ahead with hospice while admitted, will allow to continue with modified diet and anticipate full comfort measures once family discusses and patient more alert Torres Mcgee MD

## 2019-06-17 NOTE — PROGRESS NOTES
Resting quietly at present. Remote tele monitor discontinued per order and returned to monitor bin. NAD noted. To report off to on coming nurse.

## 2019-06-17 NOTE — PROGRESS NOTES
Hospitalist Note Admit Date:  2019  7:48 AM  
Name:  Jayce Galarza Age:  71 y.o. 
:  1955 MRN:  [id number] PCP:  Konstantin Song MD 
Treatment Team: Attending Provider: Misty Lam MD; Consulting Provider: Jaya Aguilar MD; Utilization Review: Aby Roberts RN; Consulting Provider: Prema García NP; Care Manager: Brittany Flores RN; Consulting Provider: Jose David Younger NP 
 
HPI/Subjective:  
 
 
Mr. Sadia Acosta is a 72 yo male with PMH of CAD s/p CABG, pulmonary fibrosis, admitted with acute on chronic hypoxic respiratory failure. He has been seen by pulmonary, required ICU care for worsening hypoxia and need for high flow O2. He has been on IV steroids. CT chest shows UIP/ pulm fibrosis. He additionally was seen by cardiology for chest pain s/p [facility] 19  S/p stent placement. Plans are pending for hospice due to failure to improve. 19 son present, shortness of breath is bad, some anorexia,  Constipated, no pain, nursing reports 6 [facility] Objective:  
 
Patient Vitals for the past 24 hrs: 
 Temp Pulse Resp BP SpO2  
19 1508     95 % 19 1500 98.1 °F (36.7 °C) 68 18 131/79 90 % 19 1112     (!) 89 % 19 1100 97.8 °F (36.6 °C) (!) 57 18 125/75 92 % 19 0834     95 % 19 0700 97.5 °F (36.4 °C) 70 18 (!) 150/91 95 % 19 0405     97 % 19 0349 98.6 °F (37 °C) 75 22 144/76 95 % 19 0149  80  135/78   
19 0046 98.6 °F (37 °C) 71 22 151/79 94 % 19 2350     95 % 19 2015     94 % 19 1957 98.9 °F (37.2 °C)  22 138/86 96 % 19 1814     91 % 19 1607 97.6 °F (36.4 °C) 73 20 132/79 91 % Oxygen Therapy O2 Sat (%): 95 % (19 1508) Pulse via Oximetry: 74 beats per minute (19 1508) O2 Device: Heated; Hi flow nasal cannula (19 0405) O2 Flow Rate (L/min): 50 l/min (19 1508) O2 Temperature: 123.8 °F (51 °C) (06/17/19 1112) FIO2 (%): 75 % (06/17/19 1508) Intake/Output Summary (Last 24 hours) at 6/17/2019 1552 Last data filed at 6/17/2019 1439 Gross per 24 hour Intake 440 ml Output 1225 ml Net -785 ml *Note that automatically entered I/Os may not be accurate; dependent on patient compliance with collection and accurate  by techs. General:    Well nourished. Alert. Elderly appearing, no distress CV:   RRR. No murmur, rub, or gallop. No edema Lungs:   CTAB. No wheezing, rhonchi, or rales. Abdomen:   Soft, nontender, nondistended. obese Extremities: Warm and dry. Skin:     No rashes or jaundice. Neuro:  No gross focal deficits Data Review: 
I have reviewed all labs, meds, and studies from the last 24 hours: 
 
Recent Results (from the past 24 hour(s)) GLUCOSE, POC Collection Time: 06/16/19  4:11 PM  
Result Value Ref Range Glucose (POC) 214 (H) 65 - 100 mg/dL GLUCOSE, POC Collection Time: 06/16/19  8:52 PM  
Result Value Ref Range Glucose (POC) 225 (H) 65 - 100 mg/dL GLUCOSE, POC Collection Time: 06/17/19  5:33 AM  
Result Value Ref Range Glucose (POC) 212 (H) 65 - 100 mg/dL CBC WITH AUTOMATED DIFF Collection Time: 06/17/19  7:13 AM  
Result Value Ref Range WBC 16.0 (H) 4.3 - 11.1 K/uL  
 RBC 4.42 4.23 - 5.6 M/uL  
 HGB 13.8 13.6 - 17.2 g/dL HCT 42.6 41.1 - 50.3 % MCV 96.4 79.6 - 97.8 FL  
 MCH 31.2 26.1 - 32.9 PG  
 MCHC 32.4 31.4 - 35.0 g/dL  
 RDW 14.3 11.9 - 14.6 % PLATELET 982 961 - 222 K/uL MPV 9.9 9.4 - 12.3 FL ABSOLUTE NRBC 0.02 0.0 - 0.2 K/uL  
 DF AUTOMATED NEUTROPHILS 91 (H) 43 - 78 % LYMPHOCYTES 4 (L) 13 - 44 % MONOCYTES 4 4.0 - 12.0 % EOSINOPHILS 0 (L) 0.5 - 7.8 % BASOPHILS 0 0.0 - 2.0 % IMMATURE GRANULOCYTES 1 0.0 - 5.0 %  
 ABS. NEUTROPHILS 14.5 (H) 1.7 - 8.2 K/UL  
 ABS. LYMPHOCYTES 0.7 0.5 - 4.6 K/UL  
 ABS. MONOCYTES 0.6 0.1 - 1.3 K/UL ABS. EOSINOPHILS 0.0 0.0 - 0.8 K/UL  
 ABS. BASOPHILS 0.0 0.0 - 0.2 K/UL  
 ABS. IMM. GRANS. 0.2 0.0 - 0.5 K/UL METABOLIC PANEL, BASIC Collection Time: 06/17/19  7:13 AM  
Result Value Ref Range Sodium 137 136 - 145 mmol/L Potassium 4.3 3.5 - 5.1 mmol/L Chloride 101 98 - 107 mmol/L  
 CO2 29 21 - 32 mmol/L Anion gap 7 7 - 16 mmol/L Glucose 247 (H) 65 - 100 mg/dL BUN 38 (H) 8 - 23 MG/DL Creatinine 0.93 0.8 - 1.5 MG/DL  
 GFR est AA >60 >60 ml/min/1.73m2 GFR est non-AA >60 >60 ml/min/1.73m2 Calcium 9.2 8.3 - 10.4 MG/DL  
GLUCOSE, POC Collection Time: 06/17/19 11:59 AM  
Result Value Ref Range Glucose (POC) 251 (H) 65 - 100 mg/dL GLUCOSE, POC Collection Time: 06/17/19  3:25 PM  
Result Value Ref Range Glucose (POC) 267 (H) 65 - 100 mg/dL All Micro Results Procedure Component Value Units Date/Time CULTURE, BLOOD [[id number]] Collected:  06/11/19 1132 Order Status:  Completed Specimen:  Blood Updated:  06/16/19 1251 Special Requests: --     
  RIGHT 
HAND Culture result: NO GROWTH 5 DAYS     
 CULTURE, BLOOD [[id number]] Collected:  06/11/19 1127 Order Status:  Completed Specimen:  Blood Updated:  06/16/19 1251 Special Requests: --     
  LEFT Antecubital 
  
  Culture result: NO GROWTH 5 DAYS     
 CULTURE, RESPIRATORY/SPUTUM/BRONCH [place] STAIN [[id number]] Order Status:  Canceled Specimen:  Sputum CULTURE, BLOOD [[id number]] Order Status:  Canceled Specimen:  Blood CULTURE, BLOOD [[id number]] Order Status:  Canceled Specimen:  Blood No results found for this visit on 06/11/19. Current Meds: 
Current Facility-Administered Medications Medication Dose Route Frequency  methylPREDNISolone (PF) (Solu-MEDROL) injection 60 mg  60 mg IntraVENous Q12H  
 insulin lispro (HUMALOG) injection 5 Units  5 Units SubCUTAneous TIDAC  
 bisacodyl (DULCOLAX) suppository 10 mg  10 mg Rectal DAILY PRN  
  polyethylene glycol (MIRALAX) packet 17 g  17 g Oral DAILY  morphine injection 1 mg  1 mg IntraVENous Q2H PRN  
 insulin glargine (LANTUS) injection 18 Units  18 Units SubCUTAneous DAILY  morphine IR (MS IR) tablet 30 mg  30 mg Oral Q12H  
 ranolazine ER (RANEXA) tablet 500 mg  500 mg Oral BID  metoprolol tartrate (LOPRESSOR) tablet 50 mg  50 mg Oral Q6H  
 senna-docusate (PERICOLACE) 8.6-50 mg per tablet 1 Tab  1 Tab Oral DAILY  LORazepam (ATIVAN) injection 0.5 mg  0.5 mg IntraVENous Q3H PRN  
 albuterol-ipratropium (DUO-NEB) 2.5 MG-0.5 MG/3 ML  3 mL Nebulization Q4H RT  
 budesonide (PULMICORT) 500 mcg/2 ml nebulizer suspension  500 mcg Nebulization BID RT  
 isosorbide mononitrate ER (IMDUR) tablet 30 mg  30 mg Oral DAILY  sodium chloride (NS) flush 5-40 mL  5-40 mL IntraVENous Q8H  
 sodium chloride (NS) flush 5-40 mL  5-40 mL IntraVENous PRN  
 acetaminophen (TYLENOL) tablet 650 mg  650 mg Oral Q6H PRN  
 diphenhydrAMINE (BENADRYL) capsule 25 mg  25 mg Oral Q6H PRN  
 ondansetron (ZOFRAN) injection 4 mg  4 mg IntraVENous Q6H PRN  
 enoxaparin (LOVENOX) injection 40 mg  40 mg SubCUTAneous Q24H  
 aspirin delayed-release tablet 81 mg  81 mg Oral DAILY  atorvastatin (LIPITOR) tablet 80 mg  80 mg Oral DAILY  pantoprazole (PROTONIX) tablet 40 mg  40 mg Oral ACB  nitroglycerin (NITROSTAT) tablet 0.4 mg  0.4 mg SubLINGual Q5MIN PRN  
 ticagrelor (BRILINTA) tablet 90 mg  90 mg Oral Q12H  
 insulin lispro (HUMALOG) injection   SubCUTAneous AC&HS Other Studies (last 24 hours): No results found. Assessment and Plan:  
 
Hospital Problems as of 6/17/2019 Date Reviewed: 6/17/2019 Codes Class Noted - Resolved POA  
 DM (diabetes mellitus) ([email or web address] 75.) (Chronic) ICD-10-CM: E11.9 ICD-9-CM: 250.00  6/14/2019 - Present Yes Dyspnea ICD-10-CM: R06.00 
ICD-9-CM: 786.09  6/11/2019 - Present Yes  Chronic respiratory failure with hypoxia (HCC) (Chronic) ICD-10-CM: J96.11 
ICD-9-CM: 518.83, 799.02  6/11/2019 - Present Yes Steroid-induced diabetes ([address] ICD-10-CM: E09.9, T38.0X5A 
ICD-9-CM: 249.00, E980.4  6/11/2019 - Present Yes * (Principal) Acute on chronic respiratory failure with hypoxia [facility]) ICD-10-CM: J96.21 
ICD-9-CM: 518.84, 799.02  6/11/2019 - Present Yes Chest pain ICD-10-CM: R07.9 ICD-9-CM: 786.50  6/11/2019 - Present Unknown Pulmonary fibrosis (HCC) (Chronic) ICD-10-CM: J84.10 ICD-9-CM: 336  3/1/2019 - Present Yes Current chronic use of systemic steroids (Chronic) ICD-10-CM: Z79.52 
ICD-9-CM: V58.65  3/1/2019 - Present Yes Overview Signed 5/4/2019  1:41 PM by Fara Rachel NP  
  begun with diagnosis of pulmonary fibrosis CAD (coronary artery disease) (Chronic) ICD-10-CM: I25.10 ICD-9-CM: 414.00  11/1/2010 - Present Yes HTN (hypertension), benign (Chronic) ICD-10-CM: I10 
ICD-9-CM: 401.1  11/1/2010 - Present Yes Plan: · Acute on chronic hypoxic respiratory failure/ pulmonary fibrosis: wean O2 as tolerant in order to transfer to hospice house, discussed with son/ patient/ [place] of palliative care and [facility]ice liason, appreciate pulm, continued IV steroids / duonebs/ pulmicort · CAD: continue asa/lipitor/imdur/ Lopressor/ ranexa/ brilinta · DM2: SSI, stop lantus and premeal due to anorexia · Constipation: continue pericolace/miralax · VTACH:  Recheck potassium/magnesium DC planning/Dispo:  pending Diet:  DIET CARDIAC 
DVT ppx:  lovenox Signed: Marko Crocker MD

## 2019-06-17 NOTE — PROGRESS NOTES
Patient resting in bed alert and oriented, family at bedside, breathing even and unlabored, safety measures in place, call light with in reach.

## 2019-06-18 NOTE — PROGRESS NOTES
Palliative Care Progress Note Patient: German Mohr MRN: [id number]  SSN: [id number] YOB: 1955  Age: 71 y.o. Sex: male Assessment/Plan: Chief Complaint/Interval History: alert, reports dyspnea at present Principal Diagnosis: · Dyspnea  R06.00 Additional Diagnoses: · Debility, Unspecified  R53.81 · Fatigue, Lethargy  R53.83 
· Frailty  R54 · Respiratory Failure, Acute on Chronic  J96.20 · Encounter for Palliative Care  Z51.5 Palliative Performance Scale (PPS) PPS: 40 Medical Decision Making:  
Reviewed and summarized notes over last 24 hours Discussed case with appropriate providers Reviewed laboratory and x-ray data- CBC, BMP Pt resting in bed, no acute distress noted. Son at bedside. Pt states he is not doing well today, and endorses dyspnea. He states he does not like to take Morphine because it makes him \"out of it. \"  Justice Hunger again on the trade off of sleepiness for better management of his dyspnea, and encouraged him to take medications as needed. He is currently on O2 via NC and NRB mask. Hospice is following, and he has been accepted to [facility]. Discussed with pt and son. Will continue to follow. Will discuss findings with members of the interdisciplinary team.   
 
  
More than 50% of this 25 minute visit was spent counseling and coordination of care as outlined above. Subjective:  
 
Review of Systems: A comprehensive review of systems was negative except for:  
Constitutional: Positive for fatigue. Respiratory: Positive for dyspnea Objective:  
 
Visit Vitals /84 Pulse 77 Temp 97.5 °F (36.4 °C) Resp 22 Ht 5' 6\" (1.676 m) Wt 195 lb 11.2 oz (88.8 kg) SpO2 97% BMI 31.59 kg/m² Physical Exam: 
 
General:  Cooperative. Debilitated. No acute distress. Eyes:  Conjunctivae/corneas clear Nose: Nares normal. Septum midline. O2 via NC and NRB mask Neck: Supple, symmetrical, trachea midline Lungs:   Crackles bilaterally, unlabored Heart:  Regular rate and rhythm Abdomen:   Soft, non-tender, non-distended Extremities: Normal, atraumatic, no cyanosis or edema Skin: Skin color, texture, turgor normal.   
Neurologic: Nonfocal  
Psych: Alert and oriented Signed By: Jackson Cantu NP   
 June 18, 2019

## 2019-06-18 NOTE — HOSPICE
[facility]- 
 
Pt with 02 15L/nc and 15L/non-rebreather. Dr Alexey Watters came into the room and we discussed that with transport and at the [facility] we can only deliver one set up of 02 15L. Discussed how medications could help ease the burden of breathing for the pt. Several family members present and tried to answer their questions. Dr Alexey Watters states that a urologist will be inserting a pineda catheter for the pt and that the pt's 02 needs will continue to be weaned. Thank you for this referral- 
 
Jose Burgess RN BSN Hospice Liaison [phone]

## 2019-06-18 NOTE — PROGRESS NOTES
Spoke with Dr. Reji Chen concerning patient bleeding from penis with 3 moderate size clots present while attempting to urinate. Bladder scan 159 ml. MD to evaluate.

## 2019-06-18 NOTE — PROGRESS NOTES
Nutrition Assessment for: LOS Day 7 Assessment: Pt admitted with increasing SOB. PMH notable for CAD, GERD, HTN, pulmonary fibrosis, obesity, osteoarthritis. Pt's work of breathing has continued to increase throughout admission. Pt and family have decided to pursue comfort care. Pt accepted to [facility]. MD has downgraded pt's diet due to work of breathing while eating and risk of aspiration with high O2 needs. Pt and family asking for supplements that would be easier for pt to eat until he transfers to [facility]. He is willing to try [employer] and [employer]. DIET CARDIAC Pureed; Consistent Carb 1800kcal; 3 Honey/3 Moderately Thick Anthropometrics: 
Height: 5' 6\" (167.6 cm), Weight: 85.3 kg (188 lb 1.6 oz), Weight Source: Bed, Body mass index is 30.36 kg/m². BMI class of obesity class I. Macronutrient needs: EER: 2502-8129 kcal/day 20-25 kcal/kg CBW (Current body weight) EPR:  g/day (1-1.25 g/kg CBW (Current body weight)) Intake/Comparative Standards: Patient consuming average 56% of 12 recorded meals in 7 days. This potentially meets 65% of kcal needs and 58% of protein needs. Nutrition Diagnosis: 
Inadequate oral intake related to decreased ability to consume sufficient intake as evidenced by pt meeting 56% EER and 58% EPR, report of increased SOB/work of breathing while eating. Nutrition Intervention: 
Meals and snacks: Continue current diet for comfort. Medical food supplement therapy: Commercial beverage- Honey thick MightyShake daily with breakfast and Magic Cup BID with lunch and dinner Discharge Nutrition Plan: Diet and supplement for comfort.   
 
Andi Larsen, MS, RD, LD

## 2019-06-18 NOTE — PROGRESS NOTES
Ativan 0.5 mg slow IV given for c/o shortness of breath and agitation. Respirations even and unlabored. No s/sx distress. No further needs at present.

## 2019-06-18 NOTE — PROGRESS NOTES
Hospitalist Note Admit Date:  2019  7:48 AM  
Name:  Shelley Elaine Age:  71 y.o. 
:  1955 MRN:  [id number] PCP:  Lucas Hardy MD 
Treatment Team: Attending Provider: Kamilah Downing MD; Consulting Provider: Facundo Vasques MD; Utilization Review: Rios Zambrano RN; Consulting Provider: Jordyn Kraft NP; Care Manager: Brian Alvarado RN; Consulting Provider: Derek Rizvi NP 
 
HPI/Subjective:  
 
 
Mr. Stacy De Paz is a 70 yo male with PMH of CAD s/p CABG, pulmonary fibrosis, admitted with acute on chronic hypoxic respiratory failure. He has been seen by pulmonary, required ICU care for worsening hypoxia and need for high flow O2. He has been on IV steroids. CT chest shows UIP/ pulm fibrosis. He additionally was seen by cardiology for chest pain s/p [facility] 19  S/p stent placement and managed with asa/ brilinta. Plans are pending for hospice due to failure to improve. 19 has large clots from pineda, unable to take meds due to oral dryness and dysphagia, has significant chest tightness/ smothering sensation , unable to take meals due to choking Objective:  
 
Patient Vitals for the past 24 hrs: 
 Temp Pulse Resp BP SpO2  
19 1125     95 % 19 1101 98.1 °F (36.7 °C) 76 22 (!) 146/91 95 % 19 0859     97 % 19 0819 97.5 °F (36.4 °C) 77 22 123/84 94 % 19 0714     93 % 19 0405     97 % 19 0353 99 °F (37.2 °C) 69 22 130/71 93 % 19 2324     96 % 19 2318 98.9 °F (37.2 °C) 81 24 144/79 92 % 19 98.9 °F (37.2 °C) 77 24 142/82 94 % 19 1923     99 % Oxygen Therapy O2 Sat (%): 95 % (19 1125) Pulse via Oximetry: 74 beats per minute (19 1125) O2 Device: Hi flow nasal cannula(+ NRB PRN) (19 08) O2 Flow Rate (L/min): 15 l/min (19) O2 Temperature: 87.8 °F (31 °C)(present on am assessment) (19 0710) FIO2 (%): 70 % (06/18/19 0714) Intake/Output Summary (Last 24 hours) at 6/18/2019 1615 Last data filed at 6/18/2019 1556 Gross per 24 hour Intake 400 ml Output 1200 ml Net -800 ml *Note that automatically entered I/Os may not be accurate; dependent on patient compliance with collection and accurate  by techs. General:    Well nourished. Alert. Elderly appearing, no distress CV:   RRR. No murmur, rub, or gallop. No edema Lungs:   CTAB. No wheezing, rhonchi, or rales. Abdomen:   Soft, nontender, nondistended. obese Extremities: Warm and dry. Skin:     No rashes or jaundice. Neuro:  No gross focal deficits Data Review: 
I have reviewed all labs, meds, and studies from the last 24 hours: 
 
Recent Results (from the past 24 hour(s)) MAGNESIUM Collection Time: 06/17/19  6:40 PM  
Result Value Ref Range Magnesium 2.6 (H) 1.8 - 2.4 mg/dL METABOLIC PANEL, BASIC Collection Time: 06/17/19  6:40 PM  
Result Value Ref Range Sodium 136 136 - 145 mmol/L Potassium 4.6 3.5 - 5.1 mmol/L Chloride 99 98 - 107 mmol/L  
 CO2 30 21 - 32 mmol/L Anion gap 7 7 - 16 mmol/L Glucose 282 (H) 65 - 100 mg/dL BUN 38 (H) 8 - 23 MG/DL Creatinine 0.98 0.8 - 1.5 MG/DL  
 GFR est AA >60 >60 ml/min/1.73m2 GFR est non-AA >60 >60 ml/min/1.73m2 Calcium 8.8 8.3 - 10.4 MG/DL  
GLUCOSE, POC Collection Time: 06/17/19  8:56 PM  
Result Value Ref Range Glucose (POC) 270 (H) 65 - 100 mg/dL GLUCOSE, POC Collection Time: 06/18/19  5:49 AM  
Result Value Ref Range Glucose (POC) 309 (H) 65 - 100 mg/dL CBC WITH AUTOMATED DIFF Collection Time: 06/18/19  6:08 AM  
Result Value Ref Range WBC 15.0 (H) 4.3 - 11.1 K/uL  
 RBC 4.44 4.23 - 5.6 M/uL  
 HGB 14.0 13.6 - 17.2 g/dL HCT 42.5 41.1 - 50.3 % MCV 95.7 79.6 - 97.8 FL  
 MCH 31.5 26.1 - 32.9 PG  
 MCHC 32.9 31.4 - 35.0 g/dL  
 RDW 14.4 11.9 - 14.6 % PLATELET 514 420 - 327 K/uL  MPV 9.9 9.4 - 12.3 FL  
 ABSOLUTE NRBC 0.03 0.0 - 0.2 K/uL  
 DF AUTOMATED NEUTROPHILS 91 (H) 43 - 78 % LYMPHOCYTES 3 (L) 13 - 44 % MONOCYTES 5 4.0 - 12.0 % EOSINOPHILS 0 (L) 0.5 - 7.8 % BASOPHILS 0 0.0 - 2.0 % IMMATURE GRANULOCYTES 1 0.0 - 5.0 %  
 ABS. NEUTROPHILS 13.6 (H) 1.7 - 8.2 K/UL  
 ABS. LYMPHOCYTES 0.5 0.5 - 4.6 K/UL  
 ABS. MONOCYTES 0.7 0.1 - 1.3 K/UL  
 ABS. EOSINOPHILS 0.0 0.0 - 0.8 K/UL  
 ABS. BASOPHILS 0.0 0.0 - 0.2 K/UL  
 ABS. IMM. GRANS. 0.2 0.0 - 0.5 K/UL METABOLIC PANEL, BASIC Collection Time: 06/18/19  6:08 AM  
Result Value Ref Range Sodium 135 (L) 136 - 145 mmol/L Potassium 4.7 3.5 - 5.1 mmol/L Chloride 96 (L) 98 - 107 mmol/L  
 CO2 31 21 - 32 mmol/L Anion gap 8 7 - 16 mmol/L Glucose 321 (H) 65 - 100 mg/dL BUN 38 (H) 8 - 23 MG/DL Creatinine 1.06 0.8 - 1.5 MG/DL  
 GFR est AA >60 >60 ml/min/1.73m2 GFR est non-AA >60 >60 ml/min/1.73m2 Calcium 9.0 8.3 - 10.4 MG/DL  
GLUCOSE, POC Collection Time: 06/18/19 11:54 AM  
Result Value Ref Range Glucose (POC) 167 (H) 65 - 100 mg/dL All Micro Results Procedure Component Value Units Date/Time CULTURE, BLOOD [[id number]] Collected:  06/11/19 1132 Order Status:  Completed Specimen:  Blood Updated:  06/16/19 1251 Special Requests: --     
  RIGHT 
HAND Culture result: NO GROWTH 5 DAYS     
 CULTURE, BLOOD [[id number]] Collected:  06/11/19 1127 Order Status:  Completed Specimen:  Blood Updated:  06/16/19 1251 Special Requests: --     
  LEFT Antecubital 
  
  Culture result: NO GROWTH 5 DAYS     
 CULTURE, RESPIRATORY/SPUTUM/BRONCH [place] STAIN [[id number]] Order Status:  Canceled Specimen:  Sputum CULTURE, BLOOD [[id number]] Order Status:  Canceled Specimen:  Blood CULTURE, BLOOD [[id number]] Order Status:  Canceled Specimen:  Blood No results found for this visit on 06/11/19. Current Meds: 
Current Facility-Administered Medications Medication Dose Route Frequency  insulin glargine (LANTUS) injection 15 Units  15 Units SubCUTAneous DAILY  morphine injection 2 mg  2 mg IntraVENous Q2H PRN  
 artificial saliva (MOUTH KOTE) 1 Spray  1 Spray Oral PRN  
 methylPREDNISolone (PF) (Solu-MEDROL) injection 60 mg  60 mg IntraVENous Q12H  
 bisacodyl (DULCOLAX) suppository 10 mg  10 mg Rectal DAILY PRN  polyethylene glycol (MIRALAX) packet 17 g  17 g Oral DAILY  morphine IR (MS IR) tablet 30 mg  30 mg Oral Q12H  
 ranolazine ER (RANEXA) tablet 500 mg  500 mg Oral BID  metoprolol tartrate (LOPRESSOR) tablet 50 mg  50 mg Oral Q6H  
 senna-docusate (PERICOLACE) 8.6-50 mg per tablet 1 Tab  1 Tab Oral DAILY  LORazepam (ATIVAN) injection 0.5 mg  0.5 mg IntraVENous Q3H PRN  
 albuterol-ipratropium (DUO-NEB) 2.5 MG-0.5 MG/3 ML  3 mL Nebulization Q4H RT  
 budesonide (PULMICORT) 500 mcg/2 ml nebulizer suspension  500 mcg Nebulization BID RT  
 isosorbide mononitrate ER (IMDUR) tablet 30 mg  30 mg Oral DAILY  sodium chloride (NS) flush 5-40 mL  5-40 mL IntraVENous Q8H  
 sodium chloride (NS) flush 5-40 mL  5-40 mL IntraVENous PRN  
 acetaminophen (TYLENOL) tablet 650 mg  650 mg Oral Q6H PRN  
 diphenhydrAMINE (BENADRYL) capsule 25 mg  25 mg Oral Q6H PRN  
 ondansetron (ZOFRAN) injection 4 mg  4 mg IntraVENous Q6H PRN  pantoprazole (PROTONIX) tablet 40 mg  40 mg Oral ACB  nitroglycerin (NITROSTAT) tablet 0.4 mg  0.4 mg SubLINGual Q5MIN PRN  
 insulin lispro (HUMALOG) injection   SubCUTAneous AC&HS Other Studies (last 24 hours): No results found. Assessment and Plan:  
 
Hospital Problems as of 6/18/2019 Date Reviewed: 6/17/2019 Codes Class Noted - Resolved POA Encounter for palliative care ICD-10-CM: Z51.5 ICD-9-CM: V66.7  Unknown - Present Unknown Advanced care planning/counseling discussion ICD-10-CM: Z71.89 ICD-9-CM: V65.49  Unknown - Present Unknown DM (diabetes mellitus) (HCC) (Chronic) ICD-10-CM: E11.9 ICD-9-CM: 250.00  6/14/2019 - Present Yes Dyspnea ICD-10-CM: R06.00 
ICD-9-CM: 786.09  6/11/2019 - Present Yes Chronic respiratory failure with hypoxia (HCC) (Chronic) ICD-10-CM: J96.11 
ICD-9-CM: 518.83, 799.02  6/11/2019 - Present Yes Steroid-induced diabetes ([facility] [address] ICD-10-CM: E09.9, T38.0X5A 
ICD-9-CM: 249.00, E980.4  6/11/2019 - Present Yes * (Principal) Acute on chronic respiratory failure with hypoxia [facility]) ICD-10-CM: J96.21 
ICD-9-CM: 518.84, 799.02  6/11/2019 - Present Yes Chest pain ICD-10-CM: R07.9 ICD-9-CM: 786.50  6/11/2019 - Present Unknown Pulmonary fibrosis (HCC) (Chronic) ICD-10-CM: J84.10 ICD-9-CM: 864  3/1/2019 - Present Yes Current chronic use of systemic steroids (Chronic) ICD-10-CM: Z79.52 
ICD-9-CM: V58.65  3/1/2019 - Present Yes Overview Signed 5/4/2019  1:41 PM by Mali Mishra NP  
  begun with diagnosis of pulmonary fibrosis CAD (coronary artery disease) (Chronic) ICD-10-CM: I25.10 ICD-9-CM: 414.00  11/1/2010 - Present Yes HTN (hypertension), benign (Chronic) ICD-10-CM: I10 
ICD-9-CM: 401.1  11/1/2010 - Present Yes Plan: · Acute on chronic hypoxic respiratory failure/ pulmonary fibrosis: wean O2 as tolerant in order to transfer to hospice house, discussed with son/ patient/ [place] or pulm,, continued IV steroids / duonebs/ pulmicort- son and patient agree for comfort care and proceeding with hospice, has diet for comfort · CAD: continue lipitor/imdur/ Lopressor/ ranexa as tolerant- holding asa/ brilinta due to acute hematuria · DM2: SSI, restart lantus due to hyperglycemia · Constipation: continue pericolace/miralax as tolerant · Hematuria: stop lovenox/asa/brilinta, consult urology · VTACH:  Off tele due to comfort care DC planning/Dispo:  pending Diet:  DIET CARDIAC 
DVT ppx:  lovenox Signed: Kulwant Mai MD

## 2019-06-18 NOTE — PROGRESS NOTES
Daily Progress Note: 6/18/2019 Sarah Mendez Admission Date: 6/11/2019 The patient's chart is reviewed and the patient is discussed with the staff. 71 y.o. CM evaluated at the request of Dr. Olivia Woodson presented with worsening dyspnea. Has hx of ground glass/pulmonary fibrosis/UIP on chest CT in 2017. Hx CAD-previous CABG, PCI 2017 and 5/4/19.  Has been seen by Dr. Teresita Palencia in March for worsening shortness of breath and unproductive cough. Was treated with Prednisone taper in March and has tapered the dose by 10 mg per month as directed--was taking 20 mg per day for the past 3 weeks. Was started on home O2 at 2L then, had a positive PHOENIX (ratio 1:80) and ESR of 14. All other studies were negative to include a RF, scleroderma, aspergillus, hypersensitivity profile studies. Prednisone alleviated the cough but has not helped the dyspnea and he can now barely walk across the room. [facility] about 30 years - 1 ppd. His chart lists restrictive lung disease, no PFTs for review. Is retired, previously worked changing brakes, has 2 dogs. With his concern regarding an allergic reaction, he has recently been trying to remove the carpet and backing from his home.  
Chest CT 6/11/19 showed progression of pulmonary fibrosis/UIP from 2017. PCI performed for chest pain. On 6/12 had PCI to LAD by cardiology. On 6/13 developed worsening shortness of breath,O2 increased to 8L, given Morphine and IV steroids. Air-Vo added but still with dyspnea and moved to ICU anticipating need for BIPAP but did not need. Discussion with patient for code status and DNR confirmed. Palliative Care added Morphine and Ativan for symptom management. Was continued on HFNC. Was moved to the floor. Requried HFNC 15L and placed on Opti-flow 6/16. Subjective:  
 
Remains on Opti-flow 70%, 40L during the night.    
Sitting up in the bed, complains of dryness in , mouth, throat and chest.   
 Drowsy but arouses and conversant--on scheduled Morphine. Family meeting last night and family has agreed on comfort measures. Family at the bedside. Current Facility-Administered Medications Medication Dose Route Frequency  methylPREDNISolone (PF) (Solu-MEDROL) injection 60 mg  60 mg IntraVENous Q12H  
 bisacodyl (DULCOLAX) suppository 10 mg  10 mg Rectal DAILY PRN  polyethylene glycol (MIRALAX) packet 17 g  17 g Oral DAILY  morphine injection 1 mg  1 mg IntraVENous Q2H PRN  
 morphine IR (MS IR) tablet 30 mg  30 mg Oral Q12H  
 ranolazine ER (RANEXA) tablet 500 mg  500 mg Oral BID  metoprolol tartrate (LOPRESSOR) tablet 50 mg  50 mg Oral Q6H  
 senna-docusate (PERICOLACE) 8.6-50 mg per tablet 1 Tab  1 Tab Oral DAILY  LORazepam (ATIVAN) injection 0.5 mg  0.5 mg IntraVENous Q3H PRN  
 albuterol-ipratropium (DUO-NEB) 2.5 MG-0.5 MG/3 ML  3 mL Nebulization Q4H RT  
 budesonide (PULMICORT) 500 mcg/2 ml nebulizer suspension  500 mcg Nebulization BID RT  
 isosorbide mononitrate ER (IMDUR) tablet 30 mg  30 mg Oral DAILY  sodium chloride (NS) flush 5-40 mL  5-40 mL IntraVENous Q8H  
 sodium chloride (NS) flush 5-40 mL  5-40 mL IntraVENous PRN  
 acetaminophen (TYLENOL) tablet 650 mg  650 mg Oral Q6H PRN  
 diphenhydrAMINE (BENADRYL) capsule 25 mg  25 mg Oral Q6H PRN  
 ondansetron (ZOFRAN) injection 4 mg  4 mg IntraVENous Q6H PRN  
 enoxaparin (LOVENOX) injection 40 mg  40 mg SubCUTAneous Q24H  
 aspirin delayed-release tablet 81 mg  81 mg Oral DAILY  atorvastatin (LIPITOR) tablet 80 mg  80 mg Oral DAILY  pantoprazole (PROTONIX) tablet 40 mg  40 mg Oral ACB  nitroglycerin (NITROSTAT) tablet 0.4 mg  0.4 mg SubLINGual Q5MIN PRN  
 ticagrelor (BRILINTA) tablet 90 mg  90 mg Oral Q12H  
 insulin lispro (HUMALOG) injection   SubCUTAneous AC&HS Review of Systems Constitutional:  negative for fever, chills, sweats Cardiovascular:  negative for palpitations, syncope, edema Gastrointestinal:  negative for dysphagia, reflux, vomiting, diarrhea, abdominal pain, or melena Neurologic:  negative for focal weakness, numbness, headache Objective:  
 
Vitals:  
 06/17/19 2324 06/18/19 5283 06/18/19 0405 06/18/19 5755 BP:  130/71 Pulse:  69 Resp:  22 Temp:  99 °F (37.2 °C) SpO2: 96% 93% 97% 93% Weight:      
Height:      
 
 
Intake and Output:  
06/16 1901 - 06/18 0700 In: 300 [P.O.:300] Out: 2075 [Urine:2075] No intake/output data recorded. Physical Exam:         
Constitutional:  the patient is well developed, in no acute distress, requiring Opti-flow 70%, 40L, sat 97% EENMT:  Sclera clear, pupils equal, oral mucosa moist 
Respiratory: few anterior and posterior crackles, no wheezing Cardiovascular:  RRR without M,G,R 
Gastrointestinal: soft and non-tender; with positive bowel sounds. Musculoskeletal: warm without cyanosis. There is no lower extremity edema. Skin:  Extensive ecchymosis left leg--recent fall. No jaundice or rashes, no open wounds Neurologic: no gross neuro deficits Psychiatric:  alert and oriented x 3, drowsy CXR:  
6/13/19: Worsening pulmonary infiltrates which may represent worsening pulmonary edema given the bilateral distribution and associated cardiomegaly. LAB Recent Labs  
  06/18/19 
0549 06/17/19 
2056 06/17/19 
1525 06/17/19 
1159 06/17/19 
0533 GLUCPOC 309* 270* 267* 251* 212* Recent Labs  
  06/18/19 
2757 06/17/19 
9176 06/16/19 
3375 WBC 15.0* 16.0* 19.7* HGB 14.0 13.8 14.2 HCT 42.5 42.6 43.9  308 314 Recent Labs  
  06/18/19 
7500 06/17/19 
1840 06/17/19 
7913 * 136 137  
K 4.7 4.6 4.3 CL 96* 99 101 CO2 31 30 29 * 282* 247* BUN 38* 38* 38* CREA 1.06 0.98 0.93  
MG  --  2.6*  --   
CA 9.0 8.8 9.2 No results for input(s): LCAD, LAC in the last 72 hours. No results for input(s): PH, PCO2, PO2, HCO3, PHI, PCO2I, PO2I, HCO3I in the last 72 hours. Assessment:  (Medical Decision Making) Patient Active Problem List  
Diagnosis Code  Unstable angina (HCC) I20.0  CAD (coronary artery disease) I25.10  
 HTN (hypertension), benign I10  
 Other and unspecified hyperlipidemia E78.5  Peripheral vascular disease-s/p PPI to right common iliac 5/4/11 I73.9  Restrictive airway disease J98.4  
 SOB (shortness of breath) R06.02  
 Pulmonary fibrosis (HCC) J84.10  Current chronic use of systemic steroids Z79.52  
 Obesity E66.9  Dyspnea R06.00  Chronic respiratory failure with hypoxia (HCC) J96.11  
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  
 Chest pain R07.9  DM (diabetes mellitus) ([facility] E11.9  
 Encounter for palliative care Z51.5  Advanced care planning/counseling discussion Z71.89 Plan:  (Medical Decision Making) Hospital Problems  Date Reviewed: 6/14/2019 Codes Class Noted POA  
 DM (diabetes mellitus) ([facility] (Chronic) ICD-10-CM: E11.9 ICD-9-CM: 250.00  6/14/2019 Yes Chronic--ranges 212-309, per hospitalist  
 Dyspnea ICD-10-CM: R06.00 
ICD-9-CM: 786.09  6/11/2019 Yes Try to wean Opti-flow Chronic respiratory failure with hypoxia (HCC) (Chronic) ICD-10-CM: J96.11 
ICD-9-CM: 518.83, 799.02  6/11/2019 Yes Home O2 2L-- wean to [facility] and NRB Steroid-induced diabetes ([facility] ICD-10-CM: E09.9, T38.0X5A 
ICD-9-CM: 249.00, E980.4  6/11/2019 Yes  
 unchanged * (Principal) Acute on chronic respiratory failure with hypoxia (HCC) ICD-10-CM: J96.21 
ICD-9-CM: 518.84, 799.02  6/11/2019 Yes Try NRB and HFNC Chest pain ICD-10-CM: R07.9 ICD-9-CM: 786.50  6/11/2019 Unknown Resolved Pulmonary fibrosis (HCC) (Chronic) ICD-10-CM: J84.10 ICD-9-CM: 102  3/1/2019 Yes Chronic--progression on recent chest CT Current chronic use of systemic steroids (Chronic) ICD-10-CM: Z79.52 
ICD-9-CM: V58.65  3/1/2019 Yes Overview Signed 5/4/2019  1:41 PM by Bertha Blanco NP  
  begun with diagnosis of pulmonary fibrosis Remains on IV steroids CAD (coronary artery disease) (Chronic) ICD-10-CM: I25.10 ICD-9-CM: 414.00  11/1/2010 Yes PCI to LAD 6/12/19 HTN (hypertension), benign (Chronic) ICD-10-CM: I10 
ICD-9-CM: 401.1  11/1/2010 Yes Chronic--controlled  
  
 
 
--Duoneb, Pulmicort 
--Solu Medrol 60mg q12h 
--WBC 15.0 
--Morphine IR 30mg q12h 
--Discussion with family and they had family meeting last night to progress to comfort care. Spoke with Grace, with [facility] and they could likely accept tomorrow if he is comfortable on NRB. More than 50% of the time documented was spent in face-to-face contact with the patient and in the care of the patient on the floor/unit where the patient is located. Moody Nino NP Lungs:  Basilar crackles Heart:  RRR with no Murmur/Rubs/Gallops Additional Comments:  Dry mouth, having trouble swallowing pills,  ? Hospice house tomorrow I have spoken with and examined the patient. I agree with the above assessment and plan as documented.  
 
Erwin Ureña MD

## 2019-06-18 NOTE — PROGRESS NOTES
Pt in bed resting with family at bedside. Respirations present. No s/sx of distress. Call light within reach. Bed low and locked.

## 2019-06-18 NOTE — PROGRESS NOTES
Discussed with family, has hematuria on asa/ brilinta/lovenox, large clots from penis, held antiplatelet meds and discussed difficult situation, pending hospice transfer, will consult urology as issue not hospice related and should be addressed prior to transfer, also discussed O2 with family and hospice liaison hemanth and nursing, will wean to NRB and reassess as not able to do NRB and NC at hospice [place] Susannah Alves MD

## 2019-06-18 NOTE — INTERDISCIPLINARY ROUNDS
Interdisciplinary team rounds were held 6/18/2019 with the following team members:Care Management, Physical Therapy, Physician and Clinical Coordinator and the patient. Plan of care discussed. See clinical pathway and/or care plan for interventions and desired outcomes.

## 2019-06-18 NOTE — PROGRESS NOTES
Bedside report received from  [facility]. Assessment completed. Bed is in low and locked position, with floor free of clutter. Respirations even and unlabored Breath sounds coarse and diminished. Alert and Oriented x4. Optiflow at 45L at 79.9% Fio2. O2 at 6L. Call light within reach. Family at bedside.

## 2019-06-19 NOTE — CONSULTS
Urology Consult Subjective:  
 
Date of Consultation:  June 19, 2019 Referring Physician: Collette Canas Reason for Consultation:  Hematuria History of Present Illness:  
 
Farzad Bernstein is a 71 y.o.  male who is being seen for hematuria. He was admitted to the hospital for Acute on chronic respiratory failure ([facility] [J96.20]. He has past medical hx of CAD s/p CABG, pulmonary fibrosis, admitted with acute on chronic hypoxic respiratory failure. He was in ICU for worsening hypoxia and need for high flow O2. He has been on IV steroids. CT chest shows pulmonary fibrosis. He additionally was seen by cardiology for chest pain s/p [facility] 6-12-19  S/p stent placement and managed with asa/ brilinta. Antiplatelets now stopped due to hematuria. Now getting medications for comfort (morphine, haldol, ativan). He is voiding blood-tinged urine, but he is able to empty and with consistent output. Past Medical History:  
Diagnosis Date  Acute coronary syndrome ([facility] [facility] 8/4/2018  CAD (coronary artery disease) CABG 1999, stents after (last placed 2/17)  Current chronic use of systemic steroids 03/2019  
 begun with diagnosis of pulmonary fibrosis  GERD (gastroesophageal reflux disease) PRN meds  Hypertension  Interstitial pneumonitis (HCC)   
 per CT chest 3/10/17 (daily inhalers, no home 02)  NSTEMI (non-ST elevated myocardial infarction) ([facility] 1/28/2017  Obesity  Osteoarthritis   
 hands, knees  Pulmonary fibrosis ([facility] [address] 03/2019  Pulmonary nodule 8mm per CT chest 3/10/17 (biopsy WNL) Past Surgical History:  
Procedure Laterality Date  HX HEART CATHETERIZATION    
 multiple  HX TONSILLECTOMY  VASCULAR SURGERY PROCEDURE UNLIST CABG Family History Problem Relation Age of Onset  Heart Disease Mother  Cancer Mother UNKNOWN TYPE  
 Heart Disease Father Social History Tobacco Use  Smoking status: Former Smoker Packs/day: 0.25 Years: 30.00 Pack years: 7.50 Last attempt to quit: 2018 Years since quittin.4  Smokeless tobacco: Never Used Substance Use Topics  Alcohol use: No  
 
Allergies Allergen Reactions  Aspirin Swelling Takes 81 mg at home. Patient can tolerate Aspirin in low doses, but in larger doses causes swelling. Prior to Admission medications Medication Sig Start Date End Date Taking? Authorizing Provider  
insulin glargine (LANTUS) 100 unit/mL injection 30 Units by SubCUTAneous route daily. 5/15/19   Sammie Elkins MD  
Oxygen Indications: 2L Contin. Provider, Historical  
atorvastatin (LIPITOR) 80 mg tablet Take 1 Tab by mouth daily. 19   Darren Clemons MD  
metoprolol tartrate (LOPRESSOR) 25 mg tablet Take 0.5 Tabs by mouth every twelve (12) hours. 19   Haven Reddy NP  
ticagrelor (BRILINTA) 90 mg tablet Take 1 Tab by mouth every twelve (12) hours every twelve (12) hours. 19   Haven Reddy NP  
lancets misc 2 Boxes 19   Haven Reddy NP  
glucose blood VI test strips (ASCENSIA AUTODISC VI, ONE TOUCH ULTRA TEST VI) strip 1 Box (10 bottles) 19   Haven Reddy NP Blood-Glucose Meter monitoring kit 1 Meter Kit 19   Haven Reddy NP  
insulin lispro (HUMALOG) 100 unit/mL injection Give 5 Units with every meal while on Prednizone. Please stop when  Prednisone doses are finished and reassess insulin regiment with your PCP 19   Haven Reddy NP  
insulin lispro (HUMALOG) 100 unit/mL injection For BGL of less than 150 = 0 units, 150 -199 = 2 units, 200 -249 = 4 unit, 250 -299 = 6 units, 300 -349 = 8 units, 350 and above =   10 units and Call MD, Start Hypoglycemic protocol if blood glucose is <70 mg/dL. Fast Acting - Administer Immediately - or within 15 minutes of start of meal. 19   Haven Reddy NP Insulin Syringe-Needle U-100 0.3 mL 29 gauge x 1/2\" syrg 2 Box off 100 or more Syringes with 5 refills 19   Dionisio Bah NP  
nitroglycerin (NITROSTAT) 0.4 mg SL tablet 1 Tab by SubLINGual route every five (5) minutes as needed for Chest Pain (after 3 doses / 15 mins, if chest pain persists, contact EMS/911). 19   Edmond Goodman MD  
predniSONE (DELTASONE) 20 mg tablet Take 20 mg by mouth every morning. Takes 20 mg q am and 10 mg q pm since . To take this dose 30 days. Provider, Historical  
isosorbide mononitrate ER (IMDUR) 30 mg tablet Take 30 mg by mouth daily. Provider, Historical  
esomeprazole (NEXIUM) 40 mg capsule Take  by mouth daily. Provider, Historical  
lisinopril (PRINIVIL, ZESTRIL) 2.5 mg tablet Take 1 Tab by mouth daily. 18   EVERARDO Kelly  
acetaminophen (TYLENOL) 325 mg tablet Take 325 mg by mouth as needed for Pain. Provider, Historical  
aspirin 81 mg tablet Take 81 mg by mouth every morning. Provider, Historical  
 
 
 
Review of Systems:  A comprehensive review of systems was negative except for that written in the HPI. Objective:  
 
Patient Vitals for the past 8 hrs: 
 Temp SpO2  
19 0809  95 % Temp (24hrs), Av.6 °F (37 °C), Min:98.4 °F (36.9 °C), Max:98.7 °F (37.1 °C) Intake and Output:  
 1901 -  0700 In: 310 [P.O.:310] Out: 1150 [[id number]] Physical Exam: 
          General:    lethargic, breathing mask Skin:  no rash or abnormalities HEENT:  unable to assess Throat/Neck:  neck supple Lungs:  diminished, with O2 mask on  
  
Cardiovascular:  RRR, S1 S2 Abdomen[de-identified]  soft, non-tender :  voiding Extremities:  peripheral pulses 2+ and symmetric Assessment:  
 
Principal Problem: 
  Acute on chronic respiratory failure with hypoxia ([address] (2019) Active Problems: 
  CAD (coronary artery disease) (2010) HTN (hypertension), benign (2010) Pulmonary fibrosis ([facility] (3/1/2019) Current chronic use of systemic steroids (3/1/2019) Overview: begun with diagnosis of pulmonary fibrosis Dyspnea (6/11/2019) Chronic respiratory failure with hypoxia ([address] (6/11/2019) Steroid-induced diabetes ([address] (6/11/2019) Chest pain (6/11/2019) DM (diabetes mellitus) ([facility] (6/14/2019) Encounter for palliative care () Advanced care planning/counseling discussion () Hematuria. Plan:  
 
Hematuria resolving. Pt DNR and with poor prognosis. Will be available if needed.  
 
 
  
Signed By: Jeffery Camarena NP

## 2019-06-19 NOTE — PROGRESS NOTES
Dr. Reyes Harper called for pt agitation and SOB. New orders for Haldol IM 2mg q 4hrs, Ativan 0.5mg IV q 3hrs, and Morphine 2 mg IV q 1 hr.

## 2019-06-19 NOTE — ROUTINE PROCESS
Respiratory Care Services Policy Number: [id number] Title: Oxygen Protocol Effective Date: 01/1996 Revised Date: 06/2013, 02/29/2016 Reviewed Date: 05/2014, 03/2015, 06/2017 I. Policy: The Oxygen Protocol will be initiated for all patients upon written order from physician for administration of oxygen therapy or if patient is found to have an oxygen saturation of 88% or less. II. Purpose: To provide protocol driven respiratory therapy for the administration of oxygen at concentrations greater than that in ambient air with the intent of treating or preventing the symptoms and manifestations of hypoxia. III. Responsibility: Director Respiratory Care Services, all Respiratory Care Practitioners IV. Indications: A. Implement this protocol for all patients when physician orders oxygen to be administered or when patient is found to have an oxygen saturation of 88% or less. B. To assure routine monitoring of patient's oxygen saturation b.i.d. and to make appropriate adjustments in accordance with ordered oxygen saturation parameters. C. To assure continuity of respiratory care that meets [facility] Clinical Practice Guidelines. V. Assessment:  Assess the following parameters to determine the need to adjust oxygen: A. Measurement of patient's oxygen saturation via pulse oximetry. B. Observation of patient's color, respiratory effort, and responsiveness. C. Measurement of heart rate and respiratory rate. VI. Initiation:  Upon receipt of an order for oxygen, the RCP will: A. Verify order in the patient's EMR, which should include the desired oxygen saturation to be maintained. B. In the event that no saturation is specified, a saturation of 90% will be maintained for all patients with the exception of cardiac patients who will be maintained at 92%. C.  The patient will be placed on oxygen with humidity as ordered by the physician to achieve the prescribed oxygen saturation. D. Patients, who are found to have a SaO2 of 88% or less, may be started on supplemental oxygen as described above. E. The patient will be informed of the \"no smoking policy\" and instructed in the proper use of oxygen therapy. F. Once desired oxygen saturation has been achieved, the P will document FIO2 and oxygen saturation in the respiratory section of the patients EMR. VII. Maintenance: A. 30-second oxygen saturation check will be taken to maintain the saturation ordered by the physician each day. B. Patients will be assessed each shift by pulse oximetry to determine if oxygen needs to be decreased, increased or discontinued. C. If changes in FIO2 are indicated, all changes will be documented in the respiratory section of the patients EMR. D. If no changes in FIO2 are required, the patient's oxygen flow rate and saturation will be recorded in the respiratory section of the patients EMR. E. Per [facility], patients who are receiving oxygen therapy but are not on oxygen at home, should be weaned off oxygen as soon as possible or when anticipated discharge becomes evident. Mace Deaner will be discontinued after oxygen saturation has been maintained for 24 hours on room air and documented in the patients EMR. G. Patients on the Inpatient Rehabilitation area on 9th floor will be exempt from having their oxygen discontinued per protocol. Oxygen may be weaned but will be changed to prn to meet the needs of the patient when exercising and participating in physical therapy. VIII. Safety: [facility] will address the following safety issues: A. Identify patient using the two patient identifiers name and birth date via ID bracelet. B. Perform hand hygiene per hospital policy utilizing Standard Precautions for all patients and following transmission-based isolation as indicated per hospital policy. C. For cardiac patients, oxygen will be discontinued by order of physician. D. If a patients FIO2 requirements necessitate changing oxygen delivery devices to a high concentration of oxygen, the ordering physician will be notified. E. If a patient has a hemoglobin level <8 mg. RCP will consult physician before discontinuing oxygen. F. Patients who have an oxygen saturation >95% may be weaned by increments of 10%. G. Patients who have an oxygen saturation <95% may be weaned by increments of 5%. IX. Interventions: A. RCP will assess patient for signs of respiratory distress or suspicion of CO2 retention. B. An ABG may be obtained for patients exhibiting respiratory distress. C. An order should be entered into patients EMR for ABG under per protocol. X. Documentation A. Document assessment findings in the respiratory section of the patients EMR. B. Document changes in therapy per protocol in the respiratory orders section and in the care plan section of the patients EMR. C. Document patient education in the patient education section of the patients EMR. XI. Reportable Conditions:  Report to the physician immediately: A. Acute changes in patient's respiratory status. B. An oxygen saturation <85%. C. A change in oxygen delivery device to provide a high concentration of oxygen. XII. Patient Instructions: Review with Patient A. Purpose of oxygen therapy B. Proper technique for using oxygen C. No smoking policy Approval: Pulmonary Committee (1-25-96) Revision: Chest Committee (4-28-05)

## 2019-06-19 NOTE — PROGRESS NOTES
Sarah Mendez Admission Date: 6/11/2019 Daily Progress Note: 6/19/2019 The patient's chart is reviewed and the patient is discussed with the staff. 71 y.o. CM evaluated at the request of Dr. Olivia Woodson presented with worsening dyspnea. Has hx of ground glass/pulmonary fibrosis/UIP on chest CT in 2017. Hx CAD-previous CABG, PCI 2017 and 5/4/19.  Has been seen by Dr. Teresita Palencia in March for worsening shortness of breath and unproductive cough. Was treated with Prednisone taper in March and has tapered the dose by 10 mg per month as directed--was taking 20 mg per day for the past 3 weeks. Was started on home O2 at 2L then, had a positive PHOENIX (ratio 1:80) and ESR of 14. All other studies were negative to include a RF, scleroderma, aspergillus, hypersensitivity profile studies. Prednisone alleviated the cough but has not helped the dyspnea and he can now barely walk across the room. Good Lombardi smoked for about 30 years - 1 ppd. His chart lists restrictive lung disease, no PFTs for review. Is retired, previously worked changing brakes, has 2 dogs. With his concern regarding an allergic reaction, he has recently been trying to remove the carpet and backing from his home.  
Chest CT 6/11/19 showed progression of pulmonary fibrosis/UIP from 2017. PCI performed for chest pain. On 6/12 had PCI to LAD by cardiology. On 6/13 developed worsening shortness of breath,O2 increased to 8L, given Morphine and IV steroids. Air-Vo added but still with dyspnea and moved to ICU anticipating need for BIPAP but did not need. Discussion with patient for code status and DNR confirmed. Palliative Care added Morphine and Ativan for symptom management. Was continued on HFNC. Was moved to the floor. Requried HFNC 15L and placed on Opti-flow 6/16. Subjective:  
Currently on  Nrb,  Agitated through the night, appears calm now Current Facility-Administered Medications Medication Dose Route Frequency  LORazepam (ATIVAN) injection 1 mg  1 mg IntraVENous Q3H PRN  
 morphine injection 5 mg  5 mg IntraVENous Q2H PRN  
 artificial saliva (MOUTH KOTE) 1 Spray  1 Spray Oral PRN  
 haloperidol lactate (HALDOL) injection 2 mg  2 mg IntraMUSCular Q4H PRN  
 morphine injection 2 mg  2 mg IntraVENous Q1H PRN  
 bisacodyl (DULCOLAX) suppository 10 mg  10 mg Rectal DAILY PRN  
 sodium chloride (NS) flush 5-40 mL  5-40 mL IntraVENous PRN  
 acetaminophen (TYLENOL) tablet 650 mg  650 mg Oral Q6H PRN  
 diphenhydrAMINE (BENADRYL) capsule 25 mg  25 mg Oral Q6H PRN  
 ondansetron (ZOFRAN) injection 4 mg  4 mg IntraVENous Q6H PRN Review of Systems Constitutional: negative for fever, chills, sweats Cardiovascular: negative for chest pain, palpitations, syncope, edema Gastrointestinal:  negative for dysphagia, reflux, vomiting, diarrhea, abdominal pain, or melena Neurologic:  negative for focal weakness, numbness, headache Objective:  
 
Vitals:  
 06/18/19 2056 06/18/19 2305 06/19/19 0315 06/19/19 9598 BP: 134/81 Pulse: 62 Resp: 25 Temp: 98.4 °F (36.9 °C) SpO2: 93% 92% 94% 95% Weight:      
Height:      
 
Intake and Output:  
06/17 1901 - 06/19 0700 In: 310 [P.O.:310] Out: 1150 [[id number]] No intake/output data recorded. Physical Exam:  
Constitution:  the patient is well developed and in no acute distress EENMT:  Sclera clear, pupils equal, oral mucosa moist 
Respiratory: bilateral crackles Cardiovascular:  RRR without M,G,R 
Gastrointestinal: soft and non-tender; with positive bowel sounds. Musculoskeletal: warm without cyanosis. There is no lower extremity edema. Skin:  no jaundice or rashes, no wounds Neurologic: no gross neuro deficits Psychiatric:  Resting now CXR:  
 
 
LAB Recent Labs  
  06/18/19 
2121 06/18/19 
1707 06/18/19 
1154 06/18/19 
0549 06/17/19 
2056 GLUCPOC 267* 277* 167* 309* 270* Recent Labs 06/18/19 
0439 06/17/19 
9064 WBC 15.0* 16.0*  
HGB 14.0 13.8 HCT 42.5 42.6  308 Recent Labs  
  06/18/19 
2718 06/17/19 
1840 06/17/19 
2172 * 136 137  
K 4.7 4.6 4.3 CL 96* 99 101 CO2 31 30 29 * 282* 247* BUN 38* 38* 38* CREA 1.06 0.98 0.93  
MG  --  2.6*  --   
CA 9.0 8.8 9.2 No results for input(s): PH, PCO2, PO2, HCO3, PHI, PCO2I, PO2I, HCO3I in the last 72 hours. No results for input(s): LCAD, LAC in the last 72 hours. Assessment:  (Medical Decision Making) Hospital Problems  Date Reviewed: 6/17/2019 Codes Class Noted POA Encounter for palliative care ICD-10-CM: Z51.5 ICD-9-CM: V66.7  Unknown Unknown Advanced care planning/counseling discussion ICD-10-CM: Z71.89 ICD-9-CM: V65.49  Unknown Unknown DM (diabetes mellitus) (HCC) (Chronic) ICD-10-CM: E11.9 ICD-9-CM: 250.00  6/14/2019 Yes Dyspnea ICD-10-CM: R06.00 
ICD-9-CM: 786.09  6/11/2019 Yes Chronic respiratory failure with hypoxia (HCC) (Chronic) ICD-10-CM: J96.11 
ICD-9-CM: 518.83, 799.02  6/11/2019 Yes Steroid-induced diabetes ([facility] ICD-10-CM: E09.9, T38.0X5A 
ICD-9-CM: 249.00, E980.4  6/11/2019 Yes * (Principal) Acute on chronic respiratory failure with hypoxia (HCC) ICD-10-CM: J96.21 
ICD-9-CM: 518.84, 799.02  6/11/2019 Yes Chest pain ICD-10-CM: R07.9 ICD-9-CM: 786.50  6/11/2019 Unknown Pulmonary fibrosis (HCC) (Chronic) ICD-10-CM: J84.10 ICD-9-CM: 712  3/1/2019 Yes Current chronic use of systemic steroids (Chronic) ICD-10-CM: Z79.52 
ICD-9-CM: V58.65  3/1/2019 Yes Overview Signed 5/4/2019  1:41 PM by Elza Roblero NP  
  begun with diagnosis of pulmonary fibrosis CAD (coronary artery disease) (Chronic) ICD-10-CM: I25.10 ICD-9-CM: 414.00  11/1/2010 Yes HTN (hypertension), benign (Chronic) ICD-10-CM: I10 
ICD-9-CM: 401.1  11/1/2010 Yes Plan:  (Medical Decision Making) 1   Hold off on going to hospice house , requiring iv meds due to agitation -- 
 
More than 50% of the time documented was spent in face-to-face contact with the patient and in the care of the patient on the floor/unit where the patient is located.  
 
Pili Simmons MD

## 2019-06-19 NOTE — CONSULTS
consult Gross hematuria on lovenox and no dysuria or retention of urine. No pineda placed. Will monitor blood since blood thinner held.  
 
Oni RIVERA

## 2019-06-19 NOTE — HOSPICE
Met with family at bedside. Family stated Dr. Alexey Watters had been in earlier this morning and stated patient was not stable for transport and would remain in comfort measures at the hospital. Please call if anything changes. We will follow family for bereavement if desired. Janie Milian, RN, BSN Community Nurse Liaison [facility] [phone]

## 2019-06-19 NOTE — PROGRESS NOTES
Palliative Care Progress Note Patient: Monique Hardy MRN: [id number]  SSN: [id number] YOB: 1955  Age: 71 y.o. Sex: male Assessment/Plan: Chief Complaint/Interval History: lethargic, appears comfortable Principal Diagnosis: · Dyspnea  R06.00 Additional Diagnoses: · Debility, Unspecified  R53.81 · Fatigue, Lethargy  R53.83 
· Frailty  R54 · Respiratory Failure, Acute on Chronic  J96.20 · Encounter for Palliative Care  Z51.5 Palliative Performance Scale (PPS) PPS: 40 Medical Decision Making:  
Reviewed and summarized notes over last 24 hours Discussed case with appropriate providers- Dr Michael Hansen Reviewed laboratory and x-ray data- none Pt lethargic, appears comfortable. Son at bedside. Pt apparently had a difficult night and required frequent dosing of medications. Medication dosing and frequency has been increased this morning. Pt will stay at [facility] for now, as he is still requiring significant amounts of O2 and did not tolerate weaning yesterday. Counseled son on availability of medications. Discussed with Dr Michael Hansen. Will continue to follow. Will discuss findings with members of the interdisciplinary team.   
 
  
More than 50% of this 25 minute visit was spent counseling and coordination of care as outlined above. Subjective:  
 
Review of Systems: A comprehensive review of systems was not obtained due to pt factors: lethargic Objective:  
 
Visit Vitals /81 Pulse 62 Temp 98.4 °F (36.9 °C) Resp 25 Ht 5' 6\" (1.676 m) Wt 188 lb 1.6 oz (85.3 kg) SpO2 91% BMI 30.36 kg/m² Physical Exam: 
 
General:  Lethargic. Debilitated. No acute distress. Eyes:  Conjunctivae/corneas clear Nose: Nares normal. Septum midline. O2 via NC and NRB mask Neck: Supple, symmetrical, trachea midline Lungs:   Crackles bilaterally, unlabored Heart:  Regular rate and rhythm Abdomen:   Soft, non-tender, non-distended Extremities: Normal, atraumatic, no cyanosis or edema Skin: Skin color, texture, turgor normal.   
Neurologic: Nonfocal  
Psych: Lethargic Signed By: Deedee Denver, NP   
 June 19, 2019

## 2019-06-19 NOTE — PROGRESS NOTES
Pt in bed resting quietly. Family at bedside. Respirations present. Nonrebreather mask at 15L O2. Upper body cool to the touch. Call light within reach. Bed low and locked.

## 2019-06-19 NOTE — PROGRESS NOTES
Patient remains on comfort measures. He has not been able to wean O2 enough for transfer to [facility]. [facility] continues to follow. Case Management will continue to follow. Care Management Interventions PCP Verified by CM: Yes 
Palliative Care Criteria Met (RRAT>21 & CHF Dx)?: No(RRAT 15 Dx resp failure) Transition of Care Consult (CM Consult): Home Hospice, [address]: Yes [employer]: Yes Physical Therapy Consult: No 
Occupational Therapy Consult: No 
Current Support Network: Other Confirm Follow Up Transport: Family Plan discussed with Pt/Family/Caregiver: Yes Freedom of Choice Offered: Yes Discharge Location Discharge Placement: Home with hospice

## 2019-06-19 NOTE — PROGRESS NOTES
Ativan 1 mg slow IV given for agitation. Respirations even and unlabored. No s/sx distress. No further needs at present.

## 2019-06-19 NOTE — PROGRESS NOTES
Bedside report received from  [facility], [facility]. Assessment completed. Bed is in low and locked position, with floor free of clutter. Respirations even and unlabored Breath sounds coarse and diminished. Alert and Oriented x4. Non rebreather NC at 15 L O2. Call light within reach. Family at bedside.

## 2019-06-19 NOTE — PROGRESS NOTES
Hospitalist Note Admit Date:  2019  7:48 AM  
Name:  Martha Hammond Age:  71 y.o. 
:  1955 MRN:  [id number] PCP:  Preet Espana MD 
Treatment Team: Attending Provider: Evgeny De Paz MD; Consulting Provider: Nena Greenwood MD; Utilization Review: Mellisa Soler RN; Consulting Provider: Ciara Peters NP; Care Manager: Taylor Gudino RN; Consulting Provider: Avery León NP; Consulting Provider: Idania Vázquez MD 
 
HPI/Subjective:  
 
 
Mr. Leroy Biswas is a 70 yo male with PMH of CAD s/p CABG, pulmonary fibrosis, admitted with acute on chronic hypoxic respiratory failure. He has been seen by pulmonary, required ICU care for worsening hypoxia and need for high flow O2. He has been on IV steroids. CT chest shows UIP/ pulm fibrosis. He additionally was seen by cardiology for chest pain s/p [facility] 19  S/p stent placement and managed with asa/ brilinta. Antiplatelets stopped due to hematuria. He has been seen by urology with monitoring. Plans are for comfort care due to failure to improve. He has been pending hospice house transfer. 19 family present, had overall rough night due to agitation and restlessness, dyspnea,  
 
Objective:  
 
Patient Vitals for the past 24 hrs: 
 Temp Pulse Resp BP SpO2  
19 0809     95 % 19 0315     94 % 19 2305     92 % 19 2056 98.4 °F (36.9 °C) 62 25 134/81 93 % 19 1926     97 % 19 1659 98.7 °F (37.1 °C) 78 22 142/80 94 % 19 1125     95 % 19 1101 98.1 °F (36.7 °C) 76 22 (!) 146/91 95 % 19 0859     97 % 19 0819 97.5 °F (36.4 °C) 77 22 123/84 94 % Oxygen Therapy O2 Sat (%): 95 % (19 0809) Pulse via Oximetry: 84 beats per minute (19 0809) O2 Device: Non-rebreather mask (19) O2 Flow Rate (L/min): 15 l/min (19) O2 Temperature: 87.8 °F (31 °C)(present on am assessment) (19) FIO2 (%): 100 % (06/19/19 0809) Intake/Output Summary (Last 24 hours) at 6/19/2019 7128 Last data filed at 6/18/2019 2057 Gross per 24 hour Intake 310 ml Output 350 ml Net -40 ml  
   
*Note that automatically entered I/Os may not be accurate; dependent on patient compliance with collection and accurate  by techs. General:    Resting with intermittent fidgeting CV:   Regular, tachycardic Lungs:   CTAB. No wheezing, rhonchi, or rales. anterior exam 
Abdomen:   Soft, nontender, nondistended. obese Extremities: Warm and dry. Skin:     No rashes or jaundice. Neuro:  lethargic Data Review: 
I have reviewed all labs, meds, and studies from the last 24 hours: 
 
Recent Results (from the past 24 hour(s)) GLUCOSE, POC Collection Time: 06/18/19 11:54 AM  
Result Value Ref Range Glucose (POC) 167 (H) 65 - 100 mg/dL GLUCOSE, POC Collection Time: 06/18/19  5:07 PM  
Result Value Ref Range Glucose (POC) 277 (H) 65 - 100 mg/dL GLUCOSE, POC Collection Time: 06/18/19  9:21 PM  
Result Value Ref Range Glucose (POC) 267 (H) 65 - 100 mg/dL All Micro Results Procedure Component Value Units Date/Time CULTURE, BLOOD [[id number]] Collected:  06/11/19 1132 Order Status:  Completed Specimen:  Blood Updated:  06/16/19 1251 Special Requests: --     
  RIGHT 
HAND Culture result: NO GROWTH 5 DAYS     
 CULTURE, BLOOD [[id number]] Collected:  06/11/19 1127 Order Status:  Completed Specimen:  Blood Updated:  06/16/19 1251 Special Requests: --     
  LEFT Antecubital 
  
  Culture result: NO GROWTH 5 DAYS     
 CULTURE, RESPIRATORY/SPUTUM/BRONCH Jahaira Woodward STAIN [[id number]] Order Status:  Canceled Specimen:  Sputum CULTURE, BLOOD [[id number]] Order Status:  Canceled Specimen:  Blood CULTURE, BLOOD [[id number]] Order Status:  Canceled Specimen:  Blood No results found for this visit on 06/11/19. Current Meds: Current Facility-Administered Medications Medication Dose Route Frequency  LORazepam (ATIVAN) injection 1 mg  1 mg IntraVENous Q3H PRN  
 morphine 10 mg/ml injection 5 mg  5 mg IntraVENous Q2H PRN  
 artificial saliva (MOUTH KOTE) 1 Spray  1 Spray Oral PRN  
 haloperidol lactate (HALDOL) injection 2 mg  2 mg IntraMUSCular Q4H PRN  
 morphine injection 2 mg  2 mg IntraVENous Q1H PRN  
 bisacodyl (DULCOLAX) suppository 10 mg  10 mg Rectal DAILY PRN  
 sodium chloride (NS) flush 5-40 mL  5-40 mL IntraVENous PRN  
 acetaminophen (TYLENOL) tablet 650 mg  650 mg Oral Q6H PRN  
 diphenhydrAMINE (BENADRYL) capsule 25 mg  25 mg Oral Q6H PRN  
 ondansetron (ZOFRAN) injection 4 mg  4 mg IntraVENous Q6H PRN Other Studies (last 24 hours): No results found. Assessment and Plan:  
 
Hospital Problems as of 6/19/2019 Date Reviewed: 6/17/2019 Codes Class Noted - Resolved POA Encounter for palliative care ICD-10-CM: Z51.5 ICD-9-CM: V66.7  Unknown - Present Unknown Advanced care planning/counseling discussion ICD-10-CM: Z71.89 ICD-9-CM: V65.49  Unknown - Present Unknown DM (diabetes mellitus) (HCC) (Chronic) ICD-10-CM: E11.9 ICD-9-CM: 250.00  6/14/2019 - Present Yes Dyspnea ICD-10-CM: R06.00 
ICD-9-CM: 786.09  6/11/2019 - Present Yes Chronic respiratory failure with hypoxia (HCC) (Chronic) ICD-10-CM: J96.11 
ICD-9-CM: 518.83, 799.02  6/11/2019 - Present Yes Steroid-induced diabetes ([facility] ICD-10-CM: E09.9, T38.0X5A 
ICD-9-CM: 249.00, E980.4  6/11/2019 - Present Yes * (Principal) Acute on chronic respiratory failure with hypoxia [facility]) ICD-10-CM: J96.21 
ICD-9-CM: 518.84, 799.02  6/11/2019 - Present Yes Chest pain ICD-10-CM: R07.9 ICD-9-CM: 786.50  6/11/2019 - Present Unknown Pulmonary fibrosis (HCC) (Chronic) ICD-10-CM: J84.10 ICD-9-CM: 767  3/1/2019 - Present Yes  Current chronic use of systemic steroids (Chronic) ICD-10-CM: K52.58 
 ICD-9-CM: V58.65  3/1/2019 - Present Yes Overview Signed 5/4/2019  1:41 PM by Lisette Godoy NP  
  begun with diagnosis of pulmonary fibrosis CAD (coronary artery disease) (Chronic) ICD-10-CM: I25.10 ICD-9-CM: 414.00  11/1/2010 - Present Yes HTN (hypertension), benign (Chronic) ICD-10-CM: I10 
ICD-9-CM: 401.1  11/1/2010 - Present Yes Plan: · Acute on chronic hypoxic respiratory failure/ pulmonary fibrosis/UIP:  Medications adjusted for comfort to include as needed morphine, haldol, ativan, family reassured, has NRB on at present and appears to be resting, discussed with nursing · CAD: unable to tolerate oral meds · DM2: stopped medications due to comfort care · Constipation: noted · Hematuria: stopped lovenox/asa/brilinta, consulted urology and plans for ongoing monitoring · VTACH:  Off tele due to comfort care DC planning/Dispo:  pending Diet:  DIET CARDIAC 
DIET NUTRITIONAL SUPPLEMENTS 
DIET NUTRITIONAL SUPPLEMENTS 
DVT ppx:  None Signed: Michelle Tejeda MD

## 2019-06-20 NOTE — PROGRESS NOTES
Pt displaying SOB on NRB and keeps trying to pull off mask. Fam Spies PRN morphine 5 mg IV given slowly IV push at this time.

## 2019-06-20 NOTE — PROGRESS NOTES
Pt family requesting morphine for patient. Pt RR 30 bpm with secretions. Prn morphine 7 mg IV given at this time for SOB. Family at bedside.

## 2019-06-20 NOTE — PROGRESS NOTES
Pt lying in bed resting on NRB oxygen. Pt head of bed elevated for comfort. Pt is on comfort care orders. Family at the bedside and instructed to call for assistance, if needed

## 2019-06-20 NOTE — PROGRESS NOTES
MD paged per family requests. Pt's family member states \"the 5 mg morphine is not working, he is still SOB\". Pt RR 32 bpm. Will give 5 mg morphine IV now and then begin morphine 7 mg IV q2h and morphine 4mg IV q1h per md telephone order. Pt family requesting to see Md NICOLE notifed and will come see family when available.

## 2019-06-20 NOTE — PROGRESS NOTES
RN called to room by pt's family to assess patient. Upon assessment no lung sounds or pulse present. Dr Winter Porter and nursing supervisor notified

## 2019-06-20 NOTE — DISCHARGE SUMMARY
Hospitalist Death Summary Name:  Juanita Arvizu Age:  71 y.o. 
:   1955 MRN:   [id number] PCP:   Hector Santiago MD 
Admit date:  2019  7:48 AM 
Treatment Team: Attending Provider: Christie Solorio MD; Consulting Provider: Hadley Hickman MD; Utilization Review: Radha Truong RN; Consulting Provider: Tani Black NP; Care Manager: Matty Manzano RN; Consulting Provider: Raymundo Carlos NP; Consulting Provider: Unique Nance MD 
 
Problem List for this Hospitalization: 
Hospital Problems as of 2019 Date Reviewed: 2019 Codes Class Noted - Resolved POA Encounter for palliative care ICD-10-CM: Z51.5 ICD-9-CM: V66.7  Unknown - Present Unknown Advanced care planning/counseling discussion ICD-10-CM: Z71.89 ICD-9-CM: V65.49  Unknown - Present Unknown DM (diabetes mellitus) (HCC) (Chronic) ICD-10-CM: E11.9 ICD-9-CM: 250.00  2019 - Present Yes Dyspnea ICD-10-CM: R06.00 
ICD-9-CM: 786.09  2019 - Present Yes Chronic respiratory failure with hypoxia (HCC) (Chronic) ICD-10-CM: J96.11 
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 [address] ICD-10-CM: E09.9, T38.0X5A 
ICD-9-CM: 249.00, E980.4  2019 - Present Yes * (Principal) Acute on chronic respiratory failure with hypoxia [facility]) ICD-10-CM: J96.21 
ICD-9-CM: 518.84, 799.02  2019 - Present Yes Chest pain ICD-10-CM: R07.9 ICD-9-CM: 786.50  2019 - Present Unknown Pulmonary fibrosis (HCC) (Chronic) ICD-10-CM: J84.10 ICD-9-CM: 925  3/1/2019 - Present Yes Current chronic use of systemic steroids (Chronic) ICD-10-CM: Z79.52 
ICD-9-CM: V58.65  3/1/2019 - Present Yes Overview Signed 2019  1:41 PM by Lizeth Garza NP  
  begun with diagnosis of pulmonary fibrosis CAD (coronary artery disease) (Chronic) ICD-10-CM: I25.10 ICD-9-CM: 414.00  2010 - Present Yes HTN (hypertension), benign (Chronic) ICD-10-CM: I10 
ICD-9-CM: 401.1  2010 - Present Yes Admission HPI from 2019:   
\" Kerri Bob is a 71 y.o. male who came to hospital due to shortness of breath and worsened hypoxia.  
  
Patient has history of pulmonary fibrosis with oxygen dependence. He has been on 2 LPM of oxygen cannula at home. He noticed that in the past few days, he was more with shortness of breath and his oxygen saturation was down to 70+%. He has been coughing with no phlegm. No fever. No shaking. No chills. No chest pain.  
  
Recently in May 2019, he was admitted due to ACS and underwent stent placement in coronary arteries. No history of CHF.  
  
Patient denies missing medications. He has been on Prednisone and is supposed to be weaned off.  
  
Other medical problems as listed below. Last smoking was 2 years ago. \" 
 
Hospital Course: PT was admitted to [facility] floor and started on empiric IV antibiotics and prednisone for presumed pneumonia and pulmonary fibrosis. Pulmonology was consulted and recommended discontinuation of antibiotics but continuation of corticosteroids. Cardiology was consulted and recommended monitoring and continuation of home cardiac medications as well as a left heart catheterization which was performed by Dr. Gordo Jaramillo on 19 and showed severe multivessel disease. PCI and stenting of distal LAD was performed at that time. Pt continued to become more short of breath and hypoxic. Palliative care was consulted and discussed the case with the family, who elected for hospice. The patient was placed on comfort measures and  on  at 0346. Time of Death:  
5211 Cause of Death:  
Acute on chronic hypoxic respiratory failure Significant Diagnostic Imaging/Tests:  
Ct Chest W Cont Result Date: 2019 CT OF THE CHEST WITH CONTRAST, PULMONARY EMBOLUS PROTOCOL.  CLINICAL INDICATION: Shortness of breath, pulmonary fibrosis, evaluate for pulmonary embolus PROCEDURE: Serial thin section axial images are obtained from the thoracic inlet through the upper abdomen following the administration of intravenous contrast per a dedicated, institutional pulmonary embolus protocol. Coronal MIP reformatted images are generated. Radiation dose reduction techniques were used for this study. Our CT scanners use one or all of the following: Automated exposure control, adjusted of the mA and/or kV according to patient size, iterative reconstruction COMPARISON: Chest CT dated 3/10/2017 FINDINGS:  There is adequate opacification of the central pulmonary arterial tree. No central intraluminal filling defect noted indicate acute pulmonary embolus. The aorta is unremarkable. Shotty mediastinal lymph nodes are present with the largest node is appreciated in the right paratracheal space that measures 1.3 cm in short axis. Subpleural bronchiectasis and interstitial thickening noted throughout both lungs in keeping with UIP or pulmonary fibrosis. This has progressed during the imaging interval. Patchy groundglass opacity is appreciated in the peripheral right lower lobe this is nonspecific but most likely an infectious/inflammatory pneumonitis. There is no pneumothorax. Limited evaluation of the upper abdomen is unremarkable. No aggressive osseous is identified. IMPRESSION: 1. No acute pulmonary embolus. 2. Progression of the patient's severe chronic interstitial lung disease most in keeping with UIP or pulmonary fibrosis. 3. Small area patchy predominant groundglass opacity in the right lower lobe. An infectious/inflammatory pneumonitis should be considered. Xr Chest [facility] Result Date: 6/11/2019 Chest X-ray INDICATION: Shortness of breath A portable AP view of the chest was obtained.  FINDINGS: There is stable interstitial prominence in both lungs. There is no developing focal infiltrate. There is stable cardiomegaly. Sternotomy changes are present. IMPRESSION: Stable interstitial prominence in both lungs, possibly fibrosis. No results found for this visit on 06/11/19. All Micro Results Procedure Component Value Units Date/Time CULTURE, BLOOD [[id number]] Collected:  06/11/19 1132 Order Status:  Completed Specimen:  Blood Updated:  06/16/19 1251 Special Requests: --     
  RIGHT 
HAND Culture result: NO GROWTH 5 DAYS     
 CULTURE, BLOOD [[id number]] Collected:  06/11/19 1127 Order Status:  Completed Specimen:  Blood Updated:  06/16/19 1251 Special Requests: --     
  LEFT Antecubital 
  
  Culture result: NO GROWTH 5 DAYS     
 CULTURE, RESPIRATORY/SPUTUM/BRONCH Sol Jock STAIN [[id number]] Order Status:  Canceled Specimen:  Sputum CULTURE, BLOOD [[id number]] Order Status:  Canceled Specimen:  Blood CULTURE, BLOOD [[id number]] Order Status:  Canceled Specimen:  Blood Labs: Results: BMP Recent Labs  
  06/18/19 
0608 06/17/19 
1840 06/17/19 
2258 * 136 137  
K 4.7 4.6 4.3 CL 96* 99 101 CO2 31 30 29 AGAP 8 7 7 BUN 38* 38* 38* CREA 1.06 0.98 0.93  
CA 9.0 8.8 9.2 * 282* 247* CBC w/Diff Recent Labs  
  06/18/19 
7831 06/17/19 
7475 WBC 15.0* 16.0*  
RBC 4.44 4.42  
HGB 14.0 13.8 HCT 42.5 42.6  308 GRANS 91* 91* LYMPH 3* 4* EOS 0* 0* LFTs No results for input(s): SGOT, ALT, TBIL, AP, TP, ALB, GLOB, AGRAT, GPT in the last 72 hours. Cardiac Markers Lab Results Component Value Date/Time  Troponin-I <0.05 10/30/2010 04:32 AM  
 Troponin-I, Qt. <0.02 (L) 06/11/2019 05:23 PM  
 Troponin-I, Qt. 2.41 (HH) 05/05/2019 04:58 AM  
 Troponin-I, Qt. 4.02 (HH) 05/04/2019 08:36 PM  
 BNP 67 (H) 06/11/2019 07:56 AM  
 BNP 77 08/02/2018 02:18 PM  
 BNP 30 03/10/2017 12:55 PM  
 BNP 92 12/20/2016 06:06 PM  
 BNP 41 10/30/2010 04:32 AM  
 Coagulation No results for input(s): PTP, INR, APTT in the last 72 hours. No lab exists for component: INREXT Last A1c Lab Results Component Value Date/Time Hemoglobin A1c 12.2 (H) 2019 10:37 AM  
 Hemoglobin A1c 6.6 (H) 2017 03:43 AM  
  
Lipid Panel Lab Results Component Value Date/Time Cholesterol, total 157 2019 04:58 AM  
 HDL Cholesterol 67 (H) 2019 04:58 AM  
 LDL, calculated 57.4 2019 04:58 AM  
 VLDL, calculated 32.6 (H) 2019 04:58 AM  
 Triglyceride 163 (H) 2019 04:58 AM  
 CHOL/HDL Ratio 2.3 2019 04:58 AM  
  
Thyroid Studies Lab Results Component Value Date/Time TSH 2.360 2017 03:43 AM  
    
Urinalysis No results found for: COLOR, APPRN, REFSG, CHARLIE, PROTU, GLUCU, KETU, BILU, BLDU, UROU, [place] All Labs from Last 24 Hrs: 
No results found for this or any previous visit (from the past 24 hour(s)). Discharge Exam: 
Eyes: Pupils fixed/nonreactive Lungs: No breath sounds. Heart:  No heart sounds Neurologic: unresponsive Disposition:  May his memory be eternal. 
 
Signed: 
Lottie Bourgeois MD

## 2019-06-20 NOTE — PROGRESS NOTES
Md paged per family request for \"higher doses of medication\". Md reviewed chart and made aware of pt having two different prn morphine orders. MD instructed me to go ahead and give morphine 2 mg IV at this time. MD verbalized understanding that pt will be getting morphine essentially every hour as needed.

## 2019-06-26 ENCOUNTER — TELEPHONE (OUTPATIENT)
Dept: DIABETES SERVICES | Age: 64
End: 2019-06-26

## 2019-07-19 ENCOUNTER — MEDICATION RENEWAL (OUTPATIENT)
Age: 64
End: 2019-07-19

## 2019-08-02 VITALS
WEIGHT: 174 LBS | BODY MASS INDEX: 26.37 KG/M2 | SYSTOLIC BLOOD PRESSURE: 140 MMHG | HEART RATE: 89 BPM | DIASTOLIC BLOOD PRESSURE: 80 MMHG | HEIGHT: 68 IN

## 2019-08-02 DIAGNOSIS — E78.5 HYPERLIPIDEMIA, UNSPECIFIED: ICD-10-CM

## 2019-08-02 DIAGNOSIS — I10 ESSENTIAL (PRIMARY) HYPERTENSION: ICD-10-CM

## 2019-08-02 DIAGNOSIS — E11.65 TYPE 2 DIABETES MELLITUS WITH HYPERGLYCEMIA: ICD-10-CM

## 2019-08-02 DIAGNOSIS — E55.9 VITAMIN D DEFICIENCY, UNSPECIFIED: ICD-10-CM

## 2019-08-02 LAB — GLUCOSE BLDC GLUCOMTR-MCNC: 125

## 2019-08-02 PROCEDURE — 82962 GLUCOSE BLOOD TEST: CPT

## 2019-08-02 PROCEDURE — 99214 OFFICE O/P EST MOD 30 MIN: CPT | Mod: 25

## 2019-08-02 RX ORDER — CANAGLIFLOZIN 100 MG/1
100 TABLET, FILM COATED ORAL
Refills: 0 | Status: DISCONTINUED | COMMUNITY
End: 2019-08-02

## 2019-08-02 RX ORDER — EZETIMIBE 10 MG/1
10 TABLET ORAL DAILY
Qty: 90 | Refills: 1 | Status: ACTIVE | COMMUNITY
Start: 1900-01-01 | End: 1900-01-01

## 2019-08-02 RX ORDER — EMPAGLIFLOZIN 10 MG/1
10 TABLET, FILM COATED ORAL
Qty: 90 | Refills: 1 | Status: ACTIVE | COMMUNITY
Start: 2018-10-22 | End: 1900-01-01

## 2019-08-29 RX ORDER — GLIPIZIDE 10 MG/1
10 TABLET ORAL
Qty: 90 | Refills: 1 | Status: ACTIVE | COMMUNITY
Start: 2018-10-22 | End: 1900-01-01

## 2019-09-17 ENCOUNTER — OUTPATIENT (OUTPATIENT)
Dept: OUTPATIENT SERVICES | Facility: HOSPITAL | Age: 64
LOS: 1 days | End: 2019-09-17

## 2019-12-27 ENCOUNTER — APPOINTMENT (OUTPATIENT)
Dept: ENDOCRINOLOGY | Facility: CLINIC | Age: 64
End: 2019-12-27

## 2025-04-30 ENCOUNTER — APPOINTMENT (OUTPATIENT)
Dept: VASCULAR SURGERY | Facility: CLINIC | Age: 70
End: 2025-04-30
Payer: MEDICARE

## 2025-04-30 VITALS
SYSTOLIC BLOOD PRESSURE: 142 MMHG | BODY MASS INDEX: 24.25 KG/M2 | HEIGHT: 68 IN | DIASTOLIC BLOOD PRESSURE: 70 MMHG | WEIGHT: 160 LBS

## 2025-04-30 DIAGNOSIS — M54.41 LUMBAGO WITH SCIATICA, LEFT SIDE: ICD-10-CM

## 2025-04-30 DIAGNOSIS — I73.9 PERIPHERAL VASCULAR DISEASE, UNSPECIFIED: ICD-10-CM

## 2025-04-30 DIAGNOSIS — G89.29 LUMBAGO WITH SCIATICA, LEFT SIDE: ICD-10-CM

## 2025-04-30 DIAGNOSIS — M54.42 LUMBAGO WITH SCIATICA, LEFT SIDE: ICD-10-CM

## 2025-04-30 PROCEDURE — 99203 OFFICE O/P NEW LOW 30 MIN: CPT

## 2025-04-30 PROCEDURE — 93923 UPR/LXTR ART STDY 3+ LVLS: CPT

## 2025-04-30 NOTE — ASSESSMENT
[FreeTextEntry1] : 71 yo male with bilateral leg pain when walking. OLAMIDE normal but may be falsely elevated. non-palpable pedal pulses. Possible leg pain from spinal stenosis   Pt counseled on results of OLAMIDE/PVR and above diagnosis. Pt given rx for baclofen and diclofenac for leg pain  RTC in 4 weeks for BLE arterial duplex   A total of 30 minutes was spent with patient and coordinating care

## 2025-04-30 NOTE — HISTORY OF PRESENT ILLNESS
[FreeTextEntry1] : 69 yo male PMHx HLD, HTN, DM II, vitamin D deficiency, presents for evaluation of PAD. Pt moved from [place] and had a vascular surgeon when he lived there. He has not had any interventions on legs. He states he has leg pain bilaterally when walking, slightly worse on left. He states the pain is not consistent. Some days occurs as soon as he starts walking and some days he can walk several blocks without pain. States the pain is in posterior thighs. He has some chronic lower back pain. He is a former smoker, quit several years ago after diagnosis of lung cancer.

## 2025-05-01 RX ORDER — DICLOFENAC POTASSIUM 50 MG/1
50 TABLET, COATED ORAL
Qty: 30 | Refills: 0 | Status: ACTIVE | COMMUNITY
Start: 2025-04-30 | End: 1900-01-01

## 2025-05-01 RX ORDER — BACLOFEN 10 MG/1
10 TABLET ORAL 3 TIMES DAILY
Qty: 30 | Refills: 1 | Status: ACTIVE | COMMUNITY
Start: 2025-04-30 | End: 1900-01-01

## 2025-05-28 ENCOUNTER — APPOINTMENT (OUTPATIENT)
Dept: VASCULAR SURGERY | Facility: CLINIC | Age: 70
End: 2025-05-28

## 2025-06-25 ENCOUNTER — APPOINTMENT (OUTPATIENT)
Dept: VASCULAR SURGERY | Facility: CLINIC | Age: 70
End: 2025-06-25
Payer: MEDICARE

## 2025-06-25 PROCEDURE — 99203 OFFICE O/P NEW LOW 30 MIN: CPT

## 2025-06-25 PROCEDURE — 93925 LOWER EXTREMITY STUDY: CPT

## 2025-06-25 NOTE — ASSESSMENT
[FreeTextEntry1] : 69 yo male with PAD, bilateral leg pain when walking. Low velocities in iliac arteries bilaterally indicating more proximal disease   Pt counseled on results of arterial duplex and above diagnosis. Plan for CTA abd and pelvis to better assess proximal aorta RTC after scan to plan intervention

## 2025-06-25 NOTE — PROCEDURE
[FreeTextEntry1] : 4/30/25 OLAMIDE/PVR: right 1.41, left 1.35   6/25/25 BLE arterial duplex:  Right: RT- <50% stenosis proximal SFA. No flow noted in the mid SFA with reconstitution in the distal SFA left: Monophasic flow noted bilaterally, 3 vessel runoff with low flow noted in the distal peroneal artery bilaterally.

## 2025-07-03 ENCOUNTER — APPOINTMENT (OUTPATIENT)
Dept: CT IMAGING | Facility: CLINIC | Age: 70
End: 2025-07-03

## 2025-07-03 PROCEDURE — 74174 CTA ABD&PLVS W/CONTRAST: CPT

## 2025-07-23 ENCOUNTER — APPOINTMENT (OUTPATIENT)
Dept: VASCULAR SURGERY | Facility: CLINIC | Age: 70
End: 2025-07-23
Payer: MEDICARE

## 2025-07-23 DIAGNOSIS — I73.9 PERIPHERAL VASCULAR DISEASE, UNSPECIFIED: ICD-10-CM

## 2025-07-23 PROCEDURE — 99213 OFFICE O/P EST LOW 20 MIN: CPT

## 2025-07-24 NOTE — ASSESSMENT
[FreeTextEntry1] : 69 yo male with PAD, bilateral leg pain when walking. 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   Pt counseled on results of CTA and above diagnosis. Plan for aortogram with bilateral leg angiogram and possible iliac artery intervention.   Medication instructions for angiogram:  Take 1/2 dose night time insulin and none in AM. Do not take glipizide, Janumet, or Jardiance morning of surgery

## 2025-07-24 NOTE — HISTORY OF PRESENT ILLNESS
[FreeTextEntry1] : 6/25/25: 69 yo male PMHx HLD, HTN, DM II, vitamin D deficiency, presents for evaluation of PAD. Pt moved from [place] and had a vascular surgeon when he lived there. He has not had any interventions on legs. He states he has leg pain bilaterally when walking, slightly worse on left. He states the pain is not consistent. Some days occurs as soon as he starts walking and some days he can walk several blocks without pain. States the pain is in posterior thighs. He has some chronic lower back pain. He is a former smoker, quit several years ago after diagnosis of lung cancer.   7/24/25: Pt here to review CTA. Same symptoms as last visit.

## 2025-07-24 NOTE — PROCEDURE
[FreeTextEntry1] : 4/30/25 OLAMIDE/PVR: right 1.41, left 1.35   6/25/25 BLE arterial duplex:  Right: RT- <50% stenosis proximal SFA. No flow noted in the mid SFA with reconstitution in the distal SFA left: Monophasic flow noted bilaterally, 3 vessel runoff with low flow noted in the distal peroneal artery bilaterally.    7/3/25 CTA: Moderate to severe atherosclerotic disease with atherosclerotic calcifications and thrombotic mural layering in the abdominal aorta. 4 cm occlusion of the right common iliac artery due to atherosclerotic calcifications. High-grade stenosis of a short segment of the left common iliac artery. Moderate atherosclerosis as described of the external and internal iliac arteries and common femoral arteries. Patent celiac and mesenteric arteries.

## 2025-07-25 RX ORDER — METFORMIN HYDROCHLORIDE 500 MG/1
500 TABLET, FILM COATED ORAL
Refills: 0 | Status: ACTIVE | COMMUNITY

## 2025-07-25 RX ORDER — CILOSTAZOL 50 MG/1
50 TABLET ORAL
Refills: 0 | Status: ACTIVE | COMMUNITY

## 2025-07-25 RX ORDER — LOSARTAN POTASSIUM 100 MG/1
100 TABLET, FILM COATED ORAL
Refills: 0 | Status: ACTIVE | COMMUNITY

## 2025-07-25 RX ORDER — HYDROCHLOROTHIAZIDE 25 MG/1
25 TABLET ORAL
Refills: 0 | Status: ACTIVE | COMMUNITY

## 2025-07-25 RX ORDER — ESOMEPRAZOLE MAGNESIUM 40 MG/1
40 CAPSULE, DELAYED RELEASE ORAL
Refills: 0 | Status: ACTIVE | COMMUNITY

## 2025-07-25 RX ORDER — CLOPIDOGREL 75 MG/1
75 TABLET, FILM COATED ORAL
Refills: 0 | Status: ACTIVE | COMMUNITY

## 2025-07-25 RX ORDER — ATORVASTATIN CALCIUM 20 MG/1
20 TABLET, FILM COATED ORAL
Refills: 0 | Status: ACTIVE | COMMUNITY

## 2025-07-25 RX ORDER — EZETIMIBE 10 MG/1
10 TABLET ORAL
Refills: 0 | Status: ACTIVE | COMMUNITY

## 2025-09-03 ENCOUNTER — APPOINTMENT (OUTPATIENT)
Dept: VASCULAR SURGERY | Facility: CLINIC | Age: 70
End: 2025-09-03
Payer: MEDICARE

## 2025-09-03 VITALS — DIASTOLIC BLOOD PRESSURE: 50 MMHG | SYSTOLIC BLOOD PRESSURE: 120 MMHG

## 2025-09-03 DIAGNOSIS — I73.9 PERIPHERAL VASCULAR DISEASE, UNSPECIFIED: ICD-10-CM

## 2025-09-03 PROCEDURE — 99213 OFFICE O/P EST LOW 20 MIN: CPT

## (undated) DEVICE — SYR LR LCK 1ML GRAD NSAF 30ML --

## (undated) DEVICE — MASTISOL ADHESIVE LIQ 2/3ML

## (undated) DEVICE — NDL SPNE QNCKE 18GX3.5IN LF --

## (undated) DEVICE — (D)PREP SKN CHLRAPRP APPL 26ML -- CONVERT TO ITEM 371833

## (undated) DEVICE — BLADE SHV 4.0MM TOMCAT --

## (undated) DEVICE — STOCKINETTE,IMPERVIOUS,12X48,STERILE: Brand: MEDLINE

## (undated) DEVICE — SOLUTION IRRIG 3000ML 0.9% SOD CHL FLX CONT 0797208] ICU MEDICAL INC]

## (undated) DEVICE — INTENDED FOR TISSUE SEPARATION, AND OTHER PROCEDURES THAT REQUIRE A SHARP SURGICAL BLADE TO PUNCTURE OR CUT.: Brand: BARD-PARKER ® STAINLESS STEEL BLADES

## (undated) DEVICE — AMD ANTIMICROBIAL BANDAGE ROLL,6 PLY: Brand: KERLIX

## (undated) DEVICE — 3M™ COBAN™ SELF-ADHERENT WRAP, 1586S, STERILE, 6 IN X 5 YD (15 CM X 4,5 M), 12 ROLLS/CASE: Brand: 3M™ COBAN™

## (undated) DEVICE — SET IRRIG W 96IN TBNG 4 LN FLX BG

## (undated) DEVICE — SINGLE PORT MANIFOLD: Brand: NEPTUNE 2

## (undated) DEVICE — SET IRRIG DST FLX M CONN

## (undated) DEVICE — SURGICAL PROCEDURE PACK BASIC ST FRANCIS

## (undated) DEVICE — ZIMMER® STERILE DISPOSABLE TOURNIQUET CUFF WITH PLC, DUAL PORT, SINGLE BLADDER, 30 IN. (76 CM)

## (undated) DEVICE — INTEGRATED CABLE WAND ICW, MENIVAC 45: Brand: COBLATION

## (undated) DEVICE — AMD ANTIMICROBIAL GAUZE SPONGES,12 PLY USP TYPE VII, 0.2% POLYHEXAMETHYLENE BIGUANIDE HCI (PHMB): Brand: CURITY

## (undated) DEVICE — STERILE HOOK LOCK LATEX FREE ELASTIC BANDAGE 6INX5YD: Brand: HOOK LOCK™

## (undated) DEVICE — (D)STRIP SKN CLSR 0.5X4IN WHT --

## (undated) DEVICE — T-DRAPE,EXTREMITY,STERILE: Brand: MEDLINE